# Patient Record
Sex: FEMALE | Race: WHITE | NOT HISPANIC OR LATINO | Employment: OTHER | URBAN - METROPOLITAN AREA
[De-identification: names, ages, dates, MRNs, and addresses within clinical notes are randomized per-mention and may not be internally consistent; named-entity substitution may affect disease eponyms.]

---

## 2017-06-16 DIAGNOSIS — I25.10 CORONARY ARTERY DISEASE INVOLVING NATIVE CORONARY ARTERY OF NATIVE HEART WITHOUT ANGINA PECTORIS: Primary | ICD-10-CM

## 2017-07-13 ENCOUNTER — INITIAL CONSULT (OUTPATIENT)
Dept: CARDIOLOGY | Facility: CLINIC | Age: 77
End: 2017-07-13
Payer: MEDICARE

## 2017-07-13 ENCOUNTER — DOCUMENTATION ONLY (OUTPATIENT)
Dept: CARDIOLOGY | Facility: CLINIC | Age: 77
End: 2017-07-13

## 2017-07-13 VITALS
OXYGEN SATURATION: 97 % | HEIGHT: 59 IN | BODY MASS INDEX: 22.05 KG/M2 | WEIGHT: 109.38 LBS | DIASTOLIC BLOOD PRESSURE: 60 MMHG | SYSTOLIC BLOOD PRESSURE: 100 MMHG

## 2017-07-13 DIAGNOSIS — I25.10 CORONARY ARTERY DISEASE INVOLVING NATIVE CORONARY ARTERY OF NATIVE HEART WITHOUT ANGINA PECTORIS: Primary | ICD-10-CM

## 2017-07-13 DIAGNOSIS — I25.119 CORONARY ARTERY DISEASE INVOLVING NATIVE CORONARY ARTERY OF NATIVE HEART WITH ANGINA PECTORIS: ICD-10-CM

## 2017-07-13 DIAGNOSIS — I48.0 PAROXYSMAL ATRIAL FIBRILLATION: ICD-10-CM

## 2017-07-13 DIAGNOSIS — I65.23 BILATERAL CAROTID ARTERY STENOSIS: ICD-10-CM

## 2017-07-13 DIAGNOSIS — I25.118 CORONARY ARTERY DISEASE OF NATIVE ARTERY OF NATIVE HEART WITH STABLE ANGINA PECTORIS: Primary | ICD-10-CM

## 2017-07-13 DIAGNOSIS — E78.5 DYSLIPIDEMIA: ICD-10-CM

## 2017-07-13 DIAGNOSIS — I10 ESSENTIAL HYPERTENSION: ICD-10-CM

## 2017-07-13 PROCEDURE — 1125F AMNT PAIN NOTED PAIN PRSNT: CPT | Mod: S$GLB,,, | Performed by: INTERNAL MEDICINE

## 2017-07-13 PROCEDURE — 1159F MED LIST DOCD IN RCRD: CPT | Mod: S$GLB,,, | Performed by: INTERNAL MEDICINE

## 2017-07-13 PROCEDURE — 99204 OFFICE O/P NEW MOD 45 MIN: CPT | Mod: S$GLB,,, | Performed by: INTERNAL MEDICINE

## 2017-07-13 PROCEDURE — 99999 PR PBB SHADOW E&M-EST. PATIENT-LVL III: CPT | Mod: PBBFAC,,, | Performed by: INTERNAL MEDICINE

## 2017-07-13 RX ORDER — PRAVASTATIN SODIUM 20 MG/1
20 TABLET ORAL DAILY
Qty: 90 TABLET | Refills: 3 | Status: SHIPPED | OUTPATIENT
Start: 2017-07-13 | End: 2022-01-03

## 2017-07-13 RX ORDER — WARFARIN 2.5 MG/1
2.5 TABLET ORAL DAILY
Status: ON HOLD | COMMUNITY
End: 2018-05-23 | Stop reason: HOSPADM

## 2017-07-13 RX ORDER — SODIUM CHLORIDE 9 MG/ML
3 INJECTION, SOLUTION INTRAVENOUS CONTINUOUS
Status: CANCELLED | OUTPATIENT
Start: 2017-07-13 | End: 2017-07-13

## 2017-07-13 RX ORDER — DIPHENHYDRAMINE HCL 25 MG
50 CAPSULE ORAL ONCE
Status: CANCELLED | OUTPATIENT
Start: 2017-07-13 | End: 2017-07-13

## 2017-07-13 NOTE — PROGRESS NOTES
OUTPATIENT CATHETERIZATION INSTRUCTIONS    You have been scheduled for a procedure in the catheterization lab on Friday, July 28, 2017.     Please report to the Cardiology Waiting Area on the Third floor of the hospital and check in at 7:30 AM.   You will then be taken to the SSCU (Short Stay Cardiac Unit) and prepared for your procedure. Please be aware that this is not the time of your procedure but the time you are to arrive. The procedures are scheduled on an hourly basis; however, emergency cases take precedence over all other cases.       You may not have anything to eat or drink after midnight the night before your test. You may take your regular morning medications with water. If there are any medications that you should not take you will be instructed to hold them that morning. If you are diabetic and on Metformin (Glucophage) do not take it the day before, the day of, and for 2 days after your procedure.      The procedure will take 1-2 hours to perform. After the procedure, you will return to SSCU on the third floor of the hospital. You will need to lie still (or keep your arm still) for the next 4 to 6 hours to minimize bleeding from the puncture site. Your family may remain in the room with you during this time.       You may be able to be discharged home that same afternoon if there is someone to drive you home and there were no complications. If you have one of the balloon, stent, or device procedures you may spend the night in the hospital. Your doctor will determine, based on your progress, the date and time of your discharge. The results of your procedure will be discussed with you before you are discharged. Any further testing or procedures will be scheduled for you either before you leave or you will be called with these appointments.       If you should have any questions, concerns, or need to change the date of your procedure, please call  ANNETTE Mahoney @ [phone]    Special  Instructions:  Hold Coumadin Tuesday, Wednesday and Thursday before your procedure  Drink plenty of water the day before and after the procedure.        THE ABOVE INSTRUCTIONS WERE GIVEN TO THE PATIENT VERBALLY AND THEY VERBALIZED UNDERSTANDING.  THEY DO NOT REQUIRE ANY SPECIAL NEEDS AND DO NOT HAVE ANY LEARNING BARRIERS.          Directions for Reporting to Cardiology Waiting Area in the Hospital  If you park in the Parking Garage:  Take elevators to the1st floor of the parking garage.  Continue past the gift shop, coffee shop, and piano.  Take a right and go to the gold elevators. (Elevator B)  Take the elevator to the 3rd floor.  Follow the arrow on the sign on the wall that says Cath Lab Registration/EP Lab Registration.  Follow the long hallway all the way around until you come to a big open area.  This is the registration area.  Check in at Reception Desk.    OR    If family is dropping you off:  Have them drop you off at the front of the Hospital under the green overhang.  Enter through the doors and take a right.  Take the E elevators to the 3rd floor Cardiology Waiting Area.  Check in at the Reception Desk in the waiting room.

## 2017-07-13 NOTE — LETTER
July 13, 2017      Travon Nance MD  [address] [place] [address] Bld  [address]  Cardiology Center  [facility] [address]           [facility]  [address]  [facility] [address]  Phone: [phone]          Patient: Katt Mcneal   MR Number: [id number]   YOB: 1940   Date of Visit: 7/13/2017       Dear Dr. Travon Nance:    Thank you for referring Katt Mcneal to me for evaluation. Attached you will find relevant portions of my assessment and plan of care.    If you have questions, please do not hesitate to call me. I look forward to following Katt Mcneal along with you.    Sincerely,    Nish Marks MD    Enclosure  CC:  No Recipients    If you would like to receive this communication electronically, please contact [email or web address] or [phone] to request more information on [facility] access.    For providers and/or their staff who would like to refer a patient to [facility], please contact us through our one-stop-shop provider referral line, [facility], at [phone].    If you feel you have received this communication in error or would no longer like to receive these types of communications, please e-mail [email or web address]

## 2017-07-13 NOTE — PROGRESS NOTES
Cardiology Clinic Note  Reason for Visit: CAD  Referring MD: Dr Palomino    HPI:   Mrs Katt Mcneal is a 75 yo lady referred by Dr Palomino for angina.    She has known CAD with previous RCA PCI with stents in 2015, pAF with [id number] of 3 on warfarin, HTN, HLD who is referred for angina  He last episode was 4 weeks ago which happened at rest - substernal CP with nausea that resolved in 2 hrs- she did not seek any medical attention at that time    She has known CAD with the previous coronary angiogram in 2015 revealing 40% LM and 85% mLAD disease that was not intervened on.    She denies any syncopal events, TOSCANO, orthopnea, LE swelling    She reports bruising on her extremities with the triple regimen she is currently on (asa, plavix, warfarin)    ROS:    Constitution: Negative for fever or chills. Negative for weight loss or gain.   HENT: Negative for sore throat or headaches. Negative for rhinorrhea.  Eyes: Negative for blurred or double vision.   Cardiovascular: See above  Pulmonary: Negative for SOB. Negative for cough.   Gastrointestinal: Negative for abdominal pain. Negative for nausea/ vomiting. Negative for diarrhea.   : Negative for dysuria.   Neurological: Negative for focal weakness or sensory changes.  PMH:     Past Medical History:   Diagnosis Date    Arthritis     Atrial fibrillation     Coronary artery disease     Fibromyalgia     Hyperlipidemia     Hypertension     Pancreatitis     Thyroid disease      Past Surgical History:   Procedure Laterality Date    CHOLECYSTECTOMY      CORONARY STENT PLACEMENT  3/24/15    HYSTERECTOMY  10/28/2004     Allergies:     Review of patient's allergies indicates:   Allergen Reactions    Sulfa (sulfonamide antibiotics) Hives    Bactrim [sulfamethoxazole-trimethoprim] Other (See Comments)     Pt. Reports that it caused severe pain    Integrilin [eptifibatide]     Statins-hmg-coa reductase inhibitors Other (See Comments)     Muscle pain. Patient  states some, not all statins    Xarelto [rivaroxaban]     Epinephrine Palpitations     Medications:     Current Outpatient Prescriptions on File Prior to Visit   Medication Sig Dispense Refill    amlodipine (NORVASC) 2.5 MG tablet Take 2.5 mg by mouth 2 (two) times daily as needed (for high blood pressure).       aspirin (ECOTRIN) 81 MG EC tablet Take 81 mg by mouth once daily.      CALCIUM CARBONATE/VITAMIN D3 (CALCIUM 500 + D ORAL) Take 1 tablet by mouth once daily.       carvedilol (COREG) 25 MG tablet Take 6.25 mg by mouth 2 (two) times daily with meals. at 9am and 9pm      cholecalciferol, vitamin D3, 2,000 unit Cap Take 1 capsule by mouth once daily.      clopidogrel (PLAVIX) 75 mg tablet       hyoscyamine (ANASPAZ,LEVSIN) 0.125 mg Tab Take 125 mcg by mouth every 6 (six) hours as needed.      levothyroxine (SYNTHROID) 75 MCG tablet Take 50 mcg by mouth once daily.       lorazepam (ATIVAN) 0.5 MG tablet Take 0.5 mg by mouth every 6 (six) hours as needed for Anxiety.      nitroGLYCERIN (NITROSTAT) 0.4 MG SL tablet Place 0.4 mg under the tongue every 5 (five) minutes as needed for Chest pain.      ondansetron (ZOFRAN-ODT) 4 MG TbDL Take 1 tablet (4 mg total) by mouth every 8 (eight) hours as needed (for nausea/vomiting). 30 tablet 3    potassium chloride (KLOR-CON) 10 MEQ TbSR Take 10 mEq by mouth once daily.      pravastatin (PRAVACHOL) 40 MG tablet Take 10 mg by mouth every other day. on Tuesday, Thursday and Saturday      predniSONE (DELTASONE) 5 MG tablet Take 2.5 mg by mouth once daily.       propafenone (RHTHYMOL) 150 MG Tab Take 150 mg by mouth 2 (two) times daily.      ramipril (ALTACE) 5 MG capsule Take 5 mg by mouth once daily.       No current facility-administered medications on file prior to visit.      Social History:     Social History   Substance Use Topics    Smoking status: Former Smoker    Smokeless tobacco: Never Used    Alcohol use No     Family History:   No family  history on file.  Physical Exam:   There were no vitals taken for this visit.     Constitutional: No acute distress, conversant  HEENT: Sclera anicteric, Pupils equal, round and reactive to light, extraocular motions intact, Oropharynx clear  Neck: No JVD, no carotid bruits  Cardiovascular: regular rate and rhythm, no murmur, rubs or gallops, normal S1/S2  Pulmonary: Clear to auscultation bilaterally  Abdominal: Abdomen soft, nontender, nondistended, positive bowel sounds  Extremities: No lower extremity edema,   Pulses: 2+ BL Radial, 2+ BL carotid, 2+ BL DP, 2+ BL PT, normal Allens Test BL  Skin: No ecchymosis, erythema, or ulcers  Psych: Alert and oriented x 3, appropriate affect  Neuro: CNII-XII intact, no focal deficits    Labs:     Lab Results   Component Value Date     12/14/2016    K 4.5 12/14/2016     12/14/2016    CO2 26 12/14/2016    BUN 17 12/14/2016    CREATININE 0.9 12/14/2016    ANIONGAP 10 12/14/2016     Lab Results   Component Value Date    AST 20 12/14/2016    ALT 10 12/14/2016    ALKPHOS 72 12/14/2016    BILITOT 0.3 12/14/2016    ALBUMIN 3.7 12/14/2016     Lab Results   Component Value Date    CALCIUM 10.0 12/14/2016    MG 1.8 01/22/2016    PHOS 3.1 01/22/2016      Lab Results   Component Value Date    WBC 9.07 12/14/2016    HGB 12.0 12/14/2016    HCT 37.1 12/14/2016     12/14/2016    GRAN 5.8 12/14/2016    GRAN 63.8 12/14/2016     Lab Results   Component Value Date    INR 1.0 01/19/2016     No results found for: CHOL, HDL, LDLCALC, TRIG  No results found for: HGBA1C  No results found for: TSH, J4QGYGS       Imaging:        EF   Date Value Ref Range Status   01/20/2016 65 55 - 65      No significant valvular abnormalities    Assessment:   Mrs Katt Mcneal is a 77 yo lady referred by Dr Palomino for angina.    Plan:     1) CAD with new onset angina  - continue asa and plavix  - continue pravachol (intolerant of crestor and lipitor)  - RRA; slender sheath, 5F bella catheter  -  stop warfarin 3 days before the procedure  - The risks, benefits, and alternatives of coronary vascular angiography and possible intervention were discussed with pt. All questions were answered and informed consent was obtained. I had a detailed discussion with the patient regarding risk of stroke, MI, bleeding access site complications, renal failure, emergent need for heart surgery, acute limb complications including ischemia and loss, contrast allergy and death. Pt understands the risks and benefits of the procedure and wishes to proceed. If stents are needed and there is preference for TONY, pt understands that would necessitate aspirin for life with plavix for at least 1 year. Additionally, pt is aware that non compliance is likely to result in stent clotting with heart attack, heart failure, and/or death.    2) HLD  TChol - 232, LDL -151  increase pravachol to 20 mg daily  Intolerant of more potent statins    3) HTN  BP controlled  No change in medication regimen    4) Paroxysmal AFib  - currently in SR  - follow up with EP as scheduled  - on warfarin    5) Bilateral carotid stenosis. patient has no known h/o CVA; continue staitn    All of patient's and daughter's questions were answered    Signed:  Priyanka Miller MD  Interventional Cardiology Fellow  [phone]  7/13/2017 11:05 AM      I have personally taken the history and examined this patient and agree with the resident's note as stated above.  All of the patient's and her daughter's questions were answered.    Follow-up:   Future Appointments  Date Time Provider Department Center   7/13/2017 12:00 PM LAB, APPOINTMENT [facility]

## 2017-07-28 ENCOUNTER — HOSPITAL ENCOUNTER (OUTPATIENT)
Facility: HOSPITAL | Age: 77
Discharge: HOME | End: 2017-07-29
Attending: INTERNAL MEDICINE | Admitting: INTERNAL MEDICINE
Payer: MEDICARE

## 2017-07-28 DIAGNOSIS — Z98.61 POSTSURGICAL PERCUTANEOUS TRANSLUMINAL CORONARY ANGIOPLASTY STATUS: Primary | ICD-10-CM

## 2017-07-28 DIAGNOSIS — I25.118 CORONARY ARTERY DISEASE OF NATIVE ARTERY OF NATIVE HEART WITH STABLE ANGINA PECTORIS: ICD-10-CM

## 2017-07-28 DIAGNOSIS — K85.90 ACUTE PANCREATITIS WITHOUT INFECTION OR NECROSIS: ICD-10-CM

## 2017-07-28 LAB
ABO + RH BLD: NORMAL
BASOPHILS # BLD AUTO: 0.03 K/UL
BASOPHILS NFR BLD: 0.3 %
BLD GP AB SCN CELLS X3 SERPL QL: NORMAL
CORONARY STENOSIS: ABNORMAL
CORONARY STENT: YES
DIFFERENTIAL METHOD: ABNORMAL
EOSINOPHIL # BLD AUTO: 0.8 K/UL
EOSINOPHIL NFR BLD: 8.7 %
ERYTHROCYTE [DISTWIDTH] IN BLOOD BY AUTOMATED COUNT: 14.9 %
HCT VFR BLD AUTO: 37 %
HGB BLD-MCNC: 12.1 G/DL
INR PPP: 1.3
LYMPHOCYTES # BLD AUTO: 3 K/UL
LYMPHOCYTES NFR BLD: 32 %
MCH RBC QN AUTO: 28 PG
MCHC RBC AUTO-ENTMCNC: 32.7 G/DL
MCV RBC AUTO: 86 FL
MONOCYTES # BLD AUTO: 0.7 K/UL
MONOCYTES NFR BLD: 7.2 %
NEUTROPHILS # BLD AUTO: 4.9 K/UL
NEUTROPHILS NFR BLD: 51.6 %
PLATELET # BLD AUTO: 296 K/UL
PMV BLD AUTO: 11 FL
PROTHROMBIN TIME: 13.4 SEC
RBC # BLD AUTO: 4.32 M/UL
WBC # BLD AUTO: 9.47 K/UL

## 2017-07-28 PROCEDURE — 92978 ENDOLUMINL IVUS OCT C 1ST: CPT | Mod: 26,LD,, | Performed by: INTERNAL MEDICINE

## 2017-07-28 PROCEDURE — 93005 ELECTROCARDIOGRAM TRACING: CPT

## 2017-07-28 PROCEDURE — 86900 BLOOD TYPING SEROLOGIC ABO: CPT

## 2017-07-28 PROCEDURE — 85610 PROTHROMBIN TIME: CPT

## 2017-07-28 PROCEDURE — 92978 ENDOLUMINL IVUS OCT C 1ST: CPT

## 2017-07-28 PROCEDURE — 92928 PRQ TCAT PLMT NTRAC ST 1 LES: CPT | Mod: RC,,, | Performed by: INTERNAL MEDICINE

## 2017-07-28 PROCEDURE — 63600175 PHARM REV CODE 636 W HCPCS

## 2017-07-28 PROCEDURE — 86850 RBC ANTIBODY SCREEN: CPT

## 2017-07-28 PROCEDURE — 93571 IV DOP VEL&/PRESS C FLO 1ST: CPT | Mod: 26,RC,, | Performed by: INTERNAL MEDICINE

## 2017-07-28 PROCEDURE — 85025 COMPLETE CBC W/AUTO DIFF WBC: CPT

## 2017-07-28 PROCEDURE — 93010 ELECTROCARDIOGRAM REPORT: CPT | Mod: 77,,, | Performed by: INTERNAL MEDICINE

## 2017-07-28 PROCEDURE — C1753 CATH, INTRAVAS ULTRASOUND: HCPCS

## 2017-07-28 PROCEDURE — 99152 MOD SED SAME PHYS/QHP 5/>YRS: CPT | Mod: ,,, | Performed by: INTERNAL MEDICINE

## 2017-07-28 PROCEDURE — 25000003 PHARM REV CODE 250: Performed by: INTERNAL MEDICINE

## 2017-07-28 PROCEDURE — 25000003 PHARM REV CODE 250

## 2017-07-28 PROCEDURE — 93010 ELECTROCARDIOGRAM REPORT: CPT | Mod: ,,, | Performed by: INTERNAL MEDICINE

## 2017-07-28 PROCEDURE — 93458 L HRT ARTERY/VENTRICLE ANGIO: CPT | Mod: 26,59,, | Performed by: INTERNAL MEDICINE

## 2017-07-28 RX ORDER — SODIUM CHLORIDE 9 MG/ML
1.5 INJECTION, SOLUTION INTRAVENOUS CONTINUOUS
Status: ACTIVE | OUTPATIENT
Start: 2017-07-28 | End: 2017-07-28

## 2017-07-28 RX ORDER — NITROGLYCERIN 400 UG/1
2 SPRAY ORAL ONCE
Status: COMPLETED | OUTPATIENT
Start: 2017-07-28 | End: 2017-07-28

## 2017-07-28 RX ORDER — PREDNISONE 2.5 MG/1
2.5 TABLET ORAL DAILY
Status: DISCONTINUED | OUTPATIENT
Start: 2017-07-29 | End: 2017-07-29 | Stop reason: HOSPADM

## 2017-07-28 RX ORDER — NITROGLYCERIN 0.4 MG/1
0.4 TABLET SUBLINGUAL EVERY 5 MIN PRN
Status: DISCONTINUED | OUTPATIENT
Start: 2017-07-28 | End: 2017-07-29 | Stop reason: HOSPADM

## 2017-07-28 RX ORDER — ASPIRIN 81 MG/1
81 TABLET ORAL DAILY
Status: DISCONTINUED | OUTPATIENT
Start: 2017-07-29 | End: 2017-07-29 | Stop reason: HOSPADM

## 2017-07-28 RX ORDER — CLOPIDOGREL BISULFATE 75 MG/1
75 TABLET ORAL ONCE
Status: DISCONTINUED | OUTPATIENT
Start: 2017-07-29 | End: 2017-07-28

## 2017-07-28 RX ORDER — DIPHENHYDRAMINE HCL 50 MG
50 CAPSULE ORAL ONCE
Status: COMPLETED | OUTPATIENT
Start: 2017-07-28 | End: 2017-07-28

## 2017-07-28 RX ORDER — POTASSIUM CHLORIDE 750 MG/1
10 CAPSULE, EXTENDED RELEASE ORAL DAILY
Status: DISCONTINUED | OUTPATIENT
Start: 2017-07-28 | End: 2017-07-29 | Stop reason: HOSPADM

## 2017-07-28 RX ORDER — AMLODIPINE BESYLATE 2.5 MG/1
2.5 TABLET ORAL 2 TIMES DAILY
Status: DISCONTINUED | OUTPATIENT
Start: 2017-07-28 | End: 2017-07-29 | Stop reason: HOSPADM

## 2017-07-28 RX ORDER — LEVOTHYROXINE SODIUM 50 UG/1
50 TABLET ORAL
Status: DISCONTINUED | OUTPATIENT
Start: 2017-07-28 | End: 2017-07-29 | Stop reason: HOSPADM

## 2017-07-28 RX ORDER — NITROGLYCERIN 400 UG/1
1 SPRAY ORAL ONCE
Status: COMPLETED | OUTPATIENT
Start: 2017-07-28 | End: 2017-07-28

## 2017-07-28 RX ORDER — CARVEDILOL 6.25 MG/1
6.25 TABLET ORAL 2 TIMES DAILY
Status: DISCONTINUED | OUTPATIENT
Start: 2017-07-28 | End: 2017-07-29 | Stop reason: HOSPADM

## 2017-07-28 RX ORDER — LORAZEPAM 0.5 MG/1
0.5 TABLET ORAL EVERY 6 HOURS PRN
Status: DISCONTINUED | OUTPATIENT
Start: 2017-07-28 | End: 2017-07-29 | Stop reason: HOSPADM

## 2017-07-28 RX ORDER — CLOPIDOGREL BISULFATE 75 MG/1
75 TABLET ORAL ONCE
Status: DISCONTINUED | OUTPATIENT
Start: 2017-07-29 | End: 2017-07-29 | Stop reason: HOSPADM

## 2017-07-28 RX ORDER — AMLODIPINE BESYLATE 2.5 MG/1
2.5 TABLET ORAL 2 TIMES DAILY PRN
Status: DISCONTINUED | OUTPATIENT
Start: 2017-07-28 | End: 2017-07-28 | Stop reason: ALTCHOICE

## 2017-07-28 RX ORDER — CARVEDILOL 6.25 MG/1
6.25 TABLET ORAL 2 TIMES DAILY WITH MEALS
Status: DISCONTINUED | OUTPATIENT
Start: 2017-07-28 | End: 2017-07-28

## 2017-07-28 RX ORDER — SODIUM CHLORIDE 9 MG/ML
3 INJECTION, SOLUTION INTRAVENOUS CONTINUOUS
Status: ACTIVE | OUTPATIENT
Start: 2017-07-28 | End: 2017-07-28

## 2017-07-28 RX ORDER — RAMIPRIL 5 MG/1
5 CAPSULE ORAL DAILY
Status: DISCONTINUED | OUTPATIENT
Start: 2017-07-28 | End: 2017-07-29 | Stop reason: HOSPADM

## 2017-07-28 RX ORDER — ONDANSETRON 4 MG/1
4 TABLET, ORALLY DISINTEGRATING ORAL EVERY 8 HOURS PRN
Status: DISCONTINUED | OUTPATIENT
Start: 2017-07-28 | End: 2017-07-29 | Stop reason: HOSPADM

## 2017-07-28 RX ADMIN — SODIUM CHLORIDE 3 ML/KG/HR: 0.9 INJECTION, SOLUTION INTRAVENOUS at 07:07

## 2017-07-28 RX ADMIN — AMLODIPINE BESYLATE 2.5 MG: 2.5 TABLET ORAL at 08:07

## 2017-07-28 RX ADMIN — DIPHENHYDRAMINE HYDROCHLORIDE 50 MG: 50 CAPSULE ORAL at 07:07

## 2017-07-28 RX ADMIN — NITROGLYCERIN 2 SPRAY: 400 SPRAY ORAL at 12:07

## 2017-07-28 RX ADMIN — LORAZEPAM 0.5 MG: 0.5 TABLET ORAL at 10:07

## 2017-07-28 RX ADMIN — ALUMINUM HYDROXIDE, MAGNESIUM HYDROXIDE, AND SIMETHICONE 25 ML: 200; 200; 20 SUSPENSION ORAL at 02:07

## 2017-07-28 RX ADMIN — CARVEDILOL 6.25 MG: 6.25 TABLET, FILM COATED ORAL at 08:07

## 2017-07-28 RX ADMIN — NITROGLYCERIN 1 SPRAY: 400 SPRAY ORAL at 12:07

## 2017-07-28 RX ADMIN — NITROGLYCERIN 1 SPRAY: 400 SPRAY ORAL at 01:07

## 2017-07-28 NOTE — INTERVAL H&P NOTE
The patient has been examined and the H&P has been reviewed:    I concur with the findings and no changes have occurred since H&P was written.    Anesthesia/Surgery risks, benefits and alternative options discussed and understood by patient/family.          Active Hospital Problems    Diagnosis  POA    Coronary artery disease of native artery of native heart with stable angina pectoris [I25.118]  Yes      Resolved Hospital Problems    Diagnosis Date Resolved POA   No resolved problems to display.

## 2017-07-28 NOTE — PROGRESS NOTES
"Post Cath Note  Referring Physician: Dr Palomino  Procedure: PCI  Indication: CCS III angina    HPI:   75 yo lady with known CAD with CCS III angina    Cath Results:   Access:  RRA    PCI to the prox and mid LAD and PLB with TONY    IVUS used for stent sizing    FFR of prox RCA was 0.88    See full cath report for further details    Intervention:   As above    Closure device: NA  Patient tolerated the procedure well, no complications    Post Cath Exam:   BP (!) 143/79 (BP Location: Right arm, Patient Position: Lying, BP Method: Automatic)   Pulse (!) 59   Temp 98.5 °F (36.9 °C) (Oral)   Resp 16   Ht 4' 11" (1.499 m)   Wt 49 kg (108 lb)   SpO2 96%   Breastfeeding? No   BMI 21.81 kg/m²   No unusual pain, hematoma, thrill or bruit at vascular access site.  Distal pulse present without signs of ischemia.    Recommendations:   DAPT for atleast 1 year  Continue pravachol for now at 20 mg  Cardiac rehab consult    Signed:  Priyanka richards MD  Interventional Cardiology Fellow  Pager: [phone]  7/28/2017 11:57 AM    "

## 2017-07-28 NOTE — H&P (VIEW-ONLY)
Cardiology Clinic Note  Reason for Visit: CAD  Referring MD: Dr Palomino    HPI:   Mrs Katt Mcneal is a 77 yo lady referred by Dr Palomino for angina.    She has known CAD with previous RCA PCI with stents in 2015, pAF with [id number] of 3 on warfarin, HTN, HLD who is referred for angina  He last episode was 4 weeks ago which happened at rest - substernal CP with nausea that resolved in 2 hrs- she did not seek any medical attention at that time    She has known CAD with the previous coronary angiogram in 2015 revealing 40% LM and 85% mLAD disease that was not intervened on.    She denies any syncopal events, TOSCANO, orthopnea, LE swelling    She reports bruising on her extremities with the triple regimen she is currently on (asa, plavix, warfarin)    ROS:    Constitution: Negative for fever or chills. Negative for weight loss or gain.   HENT: Negative for sore throat or headaches. Negative for rhinorrhea.  Eyes: Negative for blurred or double vision.   Cardiovascular: See above  Pulmonary: Negative for SOB. Negative for cough.   Gastrointestinal: Negative for abdominal pain. Negative for nausea/ vomiting. Negative for diarrhea.   : Negative for dysuria.   Neurological: Negative for focal weakness or sensory changes.  PMH:     Past Medical History:   Diagnosis Date    Arthritis     Atrial fibrillation     Coronary artery disease     Fibromyalgia     Hyperlipidemia     Hypertension     Pancreatitis     Thyroid disease      Past Surgical History:   Procedure Laterality Date    CHOLECYSTECTOMY      CORONARY STENT PLACEMENT  3/24/15    HYSTERECTOMY  10/28/2004     Allergies:     Review of patient's allergies indicates:   Allergen Reactions    Sulfa (sulfonamide antibiotics) Hives    Bactrim [sulfamethoxazole-trimethoprim] Other (See Comments)     Pt. Reports that it caused severe pain    Integrilin [eptifibatide]     Statins-hmg-coa reductase inhibitors Other (See Comments)     Muscle pain. Patient  states some, not all statins    Xarelto [rivaroxaban]     Epinephrine Palpitations     Medications:     Current Outpatient Prescriptions on File Prior to Visit   Medication Sig Dispense Refill    amlodipine (NORVASC) 2.5 MG tablet Take 2.5 mg by mouth 2 (two) times daily as needed (for high blood pressure).       aspirin (ECOTRIN) 81 MG EC tablet Take 81 mg by mouth once daily.      CALCIUM CARBONATE/VITAMIN D3 (CALCIUM 500 + D ORAL) Take 1 tablet by mouth once daily.       carvedilol (COREG) 25 MG tablet Take 6.25 mg by mouth 2 (two) times daily with meals. at 9am and 9pm      cholecalciferol, vitamin D3, 2,000 unit Cap Take 1 capsule by mouth once daily.      clopidogrel (PLAVIX) 75 mg tablet       hyoscyamine (ANASPAZ,LEVSIN) 0.125 mg Tab Take 125 mcg by mouth every 6 (six) hours as needed.      levothyroxine (SYNTHROID) 75 MCG tablet Take 50 mcg by mouth once daily.       lorazepam (ATIVAN) 0.5 MG tablet Take 0.5 mg by mouth every 6 (six) hours as needed for Anxiety.      nitroGLYCERIN (NITROSTAT) 0.4 MG SL tablet Place 0.4 mg under the tongue every 5 (five) minutes as needed for Chest pain.      ondansetron (ZOFRAN-ODT) 4 MG TbDL Take 1 tablet (4 mg total) by mouth every 8 (eight) hours as needed (for nausea/vomiting). 30 tablet 3    potassium chloride (KLOR-CON) 10 MEQ TbSR Take 10 mEq by mouth once daily.      pravastatin (PRAVACHOL) 40 MG tablet Take 10 mg by mouth every other day. on Tuesday, Thursday and Saturday      predniSONE (DELTASONE) 5 MG tablet Take 2.5 mg by mouth once daily.       propafenone (RHTHYMOL) 150 MG Tab Take 150 mg by mouth 2 (two) times daily.      ramipril (ALTACE) 5 MG capsule Take 5 mg by mouth once daily.       No current facility-administered medications on file prior to visit.      Social History:     Social History   Substance Use Topics    Smoking status: Former Smoker    Smokeless tobacco: Never Used    Alcohol use No     Family History:   No family  history on file.  Physical Exam:   There were no vitals taken for this visit.     Constitutional: No acute distress, conversant  HEENT: Sclera anicteric, Pupils equal, round and reactive to light, extraocular motions intact, Oropharynx clear  Neck: No JVD, no carotid bruits  Cardiovascular: regular rate and rhythm, no murmur, rubs or gallops, normal S1/S2  Pulmonary: Clear to auscultation bilaterally  Abdominal: Abdomen soft, nontender, nondistended, positive bowel sounds  Extremities: No lower extremity edema,   Pulses: 2+ BL Radial, 2+ BL carotid, 2+ BL DP, 2+ BL PT, normal Allens Test BL  Skin: No ecchymosis, erythema, or ulcers  Psych: Alert and oriented x 3, appropriate affect  Neuro: CNII-XII intact, no focal deficits    Labs:     Lab Results   Component Value Date     12/14/2016    K 4.5 12/14/2016     12/14/2016    CO2 26 12/14/2016    BUN 17 12/14/2016    CREATININE 0.9 12/14/2016    ANIONGAP 10 12/14/2016     Lab Results   Component Value Date    AST 20 12/14/2016    ALT 10 12/14/2016    ALKPHOS 72 12/14/2016    BILITOT 0.3 12/14/2016    ALBUMIN 3.7 12/14/2016     Lab Results   Component Value Date    CALCIUM 10.0 12/14/2016    MG 1.8 01/22/2016    PHOS 3.1 01/22/2016      Lab Results   Component Value Date    WBC 9.07 12/14/2016    HGB 12.0 12/14/2016    HCT 37.1 12/14/2016     12/14/2016    GRAN 5.8 12/14/2016    GRAN 63.8 12/14/2016     Lab Results   Component Value Date    INR 1.0 01/19/2016     No results found for: CHOL, HDL, LDLCALC, TRIG  No results found for: HGBA1C  No results found for: TSH, L5GWLCQ       Imaging:        EF   Date Value Ref Range Status   01/20/2016 65 55 - 65      No significant valvular abnormalities    Assessment:   Mrs Katt Mcneal is a 75 yo lady referred by Dr Palomino for angina.    Plan:     1) CAD with new onset angina  - continue asa and plavix  - continue pravachol (intolerant of crestor and lipitor)  - RRA; slender sheath, 5F bella catheter  -  stop warfarin 3 days before the procedure  - The risks, benefits, and alternatives of coronary vascular angiography and possible intervention were discussed with pt. All questions were answered and informed consent was obtained. I had a detailed discussion with the patient regarding risk of stroke, MI, bleeding access site complications, renal failure, emergent need for heart surgery, acute limb complications including ischemia and loss, contrast allergy and death. Pt understands the risks and benefits of the procedure and wishes to proceed. If stents are needed and there is preference for TONY, pt understands that would necessitate aspirin for life with plavix for at least 1 year. Additionally, pt is aware that non compliance is likely to result in stent clotting with heart attack, heart failure, and/or death.    2) HLD  TChol - 232, LDL -151  increase pravachol to 20 mg daily  Intolerant of more potent statins    3) HTN  BP controlled  No change in medication regimen    4) Paroxysmal AFib  - currently in SR  - follow up with EP as scheduled  - on warfarin    5) Bilateral carotid stenosis. patient has no known h/o CVA; continue staitn    All of patient's and daughter's questions were answered    Signed:  Priyanka Miller MD  Interventional Cardiology Fellow  [phone]  7/13/2017 11:05 AM      I have personally taken the history and examined this patient and agree with the resident's note as stated above.  All of the patient's and her daughter's questions were answered.    Follow-up:   Future Appointments  Date Time Provider Department Center   7/13/2017 12:00 PM LAB, APPOINTMENT [facility]

## 2017-07-28 NOTE — CONSULTS
"Cardiac Rehab     Katt Mcneal   [id number]   7/28/2017       Activity taught: Yes    Cardiac Rehab Phase Taught: Phase I & II    Risk Factors-Modifiable: nutrition, hyperlipidemia, hypertension, sedentary lifestyle, stress, exercise    Risk Factors-Non modifiable: age    Teaching Method: Verbal, Written and Living with Heart Disease book.    Understanding/Response: Pt verbalized understanding, all questions answered. Pt understands their cardiac rehab order is good for 12 months.   Pt lives in [place], [place] - given external order for [facility].    Comments: S/P PTCA. Discussed cardiac rehab and risk factor modification. Team to refer patient to [facility] Cardiac Rehab. Educational materials were used in the process and given to the patient. They included "Your Guide to Living with Heart Disease", Phase Two Cardiac Rehabilitation information along with a sample Mediterranean diet.The patient expressed understanding of the teaching and expressed desire to take a role in modifying the risk factors when they return home.    ERIN Reynoso RN  Cardiac Rehab Nurse      "

## 2017-07-28 NOTE — CONSULTS
Pt has been given external order for cardiac rehab to [facility]. see previous note from cardiac rehab.

## 2017-07-28 NOTE — PLAN OF CARE
md park notified of ekg sr with pvcs, bp and co pain of8 on scale 1-10. Nitro sl spray x 2 ordered.

## 2017-07-28 NOTE — DISCHARGE SUMMARY
[facility]  Cardiology  Discharge Summary      Patient Name: Katt Mcneal  MRN: [id number]  Admission Date: 7/28/2017  Hospital Length of Stay: 0 days  Discharge Date and Time: 7/29/17 0910  Attending Physician: Nish Marks MD  Discharging Provider: Priyanka Miller MD  Primary Care Physician: Kareen Hodges MD    HPI: Mrs Katt Mcneal is a 75 yo lady referred by Dr Palomino for angina.     She has known CAD with previous RCA PCI with stents in 2015, pAF with [id number] of 3 on warfarin, HTN, HLD who is referred for angina  He last episode was 4 weeks ago which happened at rest - substernal CP with nausea that resolved in 2 hrs- she did not seek any medical attention at that time     She has known CAD with the previous coronary angiogram in 2015 revealing 40% LM and 85% mLAD disease that was not intervened on.    Procedure(s) (LRB):  HEART CATH-LEFT (N/A)       Hospital Course (synopsis of major diagnoses, care, treatment, and services provided during the course of the hospital stay):   PCI to the prox and mid LAD and PLB with TONY     IVUS used for stent sizing     FFR of prox RCA was 0.88        Final Active Diagnoses:    Diagnosis Date Noted POA    PRINCIPAL PROBLEM:  Coronary artery disease of native artery of native heart with stable angina pectoris [I25.118] 07/28/2017 Yes      Problems Resolved During this Admission:    Diagnosis Date Noted Date Resolved POA       Discharged Condition: good    Follow Up:  Follow-up Information     Travon Nance MD In 2 weeks.    Specialty:  Cardiology  Contact information:  [address] [place] [address] [address]  [id number]  CARDIOLOGY [facility] [id number] [phone]                 Patient Instructions:   No discharge procedures on file.  Medications:  Reconciled Home Medications:   Current Discharge Medication List      CONTINUE these medications which have NOT CHANGED    Details   amlodipine (NORVASC) 2.5 MG tablet Take 2.5  mg by mouth 2 (two) times daily as needed (for high blood pressure).       aspirin (ECOTRIN) 81 MG EC tablet Take 81 mg by mouth once daily.      CALCIUM CARBONATE/VITAMIN D3 (CALCIUM 500 + D ORAL) Take 1 tablet by mouth once daily.       carvedilol (COREG) 25 MG tablet Take 6.25 mg by mouth 2 (two) times daily with meals. at 9am and 9pm      cholecalciferol, vitamin D3, 2,000 unit Cap Take 1 capsule by mouth once daily.      clopidogrel (PLAVIX) 75 mg tablet       levothyroxine (SYNTHROID) 75 MCG tablet Take 50 mcg by mouth once daily.       lorazepam (ATIVAN) 0.5 MG tablet Take 0.5 mg by mouth every 6 (six) hours as needed for Anxiety.      ondansetron (ZOFRAN-ODT) 4 MG TbDL Take 1 tablet (4 mg total) by mouth every 8 (eight) hours as needed (for nausea/vomiting).  Qty: 30 tablet, Refills: 3      potassium chloride (KLOR-CON) 10 MEQ TbSR Take 10 mEq by mouth once daily.      pravastatin (PRAVACHOL) 20 MG tablet Take 1 tablet (20 mg total) by mouth once daily.  Qty: 90 tablet, Refills: 3      predniSONE (DELTASONE) 5 MG tablet Take 2.5 mg by mouth once daily.       ramipril (ALTACE) 5 MG capsule Take 5 mg by mouth once daily.      SOTALOL HCL (SOTALOL AF ORAL) Take 80 mg by mouth.      nitroGLYCERIN (NITROSTAT) 0.4 MG SL tablet Place 0.4 mg under the tongue every 5 (five) minutes as needed for Chest pain.      warfarin (COUMADIN) 2.5 MG tablet Take 2.5 mg by mouth once daily.             Time spent on the discharge of patient: 35 minutes    Priyanka Miller MD  Cardiology  [facility]

## 2017-07-28 NOTE — PLAN OF CARE
Received report from ANNETTE Mahoney. Patient s/p [facility], AAOx3. VSS. Pt c/o of chest discomfort with minimal pain, ekg ordered. Notified Dr. Marks. Resp even and unlabored. Vasc band to r wrist is CDI. No active bleeding. No hematoma noted. Post procedure protocol reviewed with patient and patient's family. Understanding verbalized. Family members at bedside. Nurse call bell within reach. Will continue to monitor per post procedure protocol.

## 2017-07-29 VITALS
RESPIRATION RATE: 20 BRPM | TEMPERATURE: 98 F | WEIGHT: 108 LBS | OXYGEN SATURATION: 95 % | SYSTOLIC BLOOD PRESSURE: 143 MMHG | HEART RATE: 68 BPM | BODY MASS INDEX: 21.77 KG/M2 | DIASTOLIC BLOOD PRESSURE: 72 MMHG | HEIGHT: 59 IN

## 2017-07-29 LAB
ANION GAP SERPL CALC-SCNC: 11 MMOL/L
BASOPHILS # BLD AUTO: 0.04 K/UL
BASOPHILS NFR BLD: 0.5 %
BUN SERPL-MCNC: 13 MG/DL
CALCIUM SERPL-MCNC: 8.7 MG/DL
CHLORIDE SERPL-SCNC: 108 MMOL/L
CO2 SERPL-SCNC: 22 MMOL/L
CREAT SERPL-MCNC: 0.8 MG/DL
DIFFERENTIAL METHOD: ABNORMAL
EOSINOPHIL # BLD AUTO: 0.6 K/UL
EOSINOPHIL NFR BLD: 7.4 %
ERYTHROCYTE [DISTWIDTH] IN BLOOD BY AUTOMATED COUNT: 14.9 %
EST. GFR  (AFRICAN AMERICAN): >60 ML/MIN/1.73 M^2
EST. GFR  (NON AFRICAN AMERICAN): >60 ML/MIN/1.73 M^2
GLUCOSE SERPL-MCNC: 81 MG/DL
HCT VFR BLD AUTO: 33.1 %
HGB BLD-MCNC: 10.8 G/DL
LYMPHOCYTES # BLD AUTO: 3 K/UL
LYMPHOCYTES NFR BLD: 35.1 %
MCH RBC QN AUTO: 28.1 PG
MCHC RBC AUTO-ENTMCNC: 32.6 G/DL
MCV RBC AUTO: 86 FL
MONOCYTES # BLD AUTO: 0.8 K/UL
MONOCYTES NFR BLD: 9 %
NEUTROPHILS # BLD AUTO: 4.2 K/UL
NEUTROPHILS NFR BLD: 47.9 %
PLATELET # BLD AUTO: 264 K/UL
PMV BLD AUTO: 10.7 FL
POTASSIUM SERPL-SCNC: 3.5 MMOL/L
RBC # BLD AUTO: 3.85 M/UL
SODIUM SERPL-SCNC: 141 MMOL/L
WBC # BLD AUTO: 8.67 K/UL

## 2017-07-29 PROCEDURE — 25000003 PHARM REV CODE 250: Performed by: INTERNAL MEDICINE

## 2017-07-29 PROCEDURE — 85025 COMPLETE CBC W/AUTO DIFF WBC: CPT

## 2017-07-29 PROCEDURE — 36415 COLL VENOUS BLD VENIPUNCTURE: CPT

## 2017-07-29 PROCEDURE — 80048 BASIC METABOLIC PNL TOTAL CA: CPT

## 2017-07-29 RX ADMIN — LEVOTHYROXINE SODIUM 50 MCG: 50 TABLET ORAL at 06:07

## 2017-07-29 NOTE — PROGRESS NOTES
Patient discharged per MD orders. Instructions given on medications, wound care, activity, signs of infection, when to call MD, and follow up appointments. Pt verbalized understanding.  Patient AAOx3, VSS, no c/o pain or discomfort at this time. Telemetry and PIV removed. Patient brought via wheelchair to front of hospital to meet family with RN as escort.

## 2017-07-29 NOTE — PROGRESS NOTES
"    Interventional Cardiology Follow up Note  Referring Physician: Nish Marks MD  Procedure: HEART CATH-LEFT (N/A)    Subjective   No overnight acute event. No episodes of angina reported overnight. Ambulated without complaints.        Post Cath Exam:   BP (!) 143/72 (BP Location: Left arm, Patient Position: Lying, BP Method: Automatic)   Pulse 68   Temp 98.4 °F (36.9 °C) (Oral)   Resp 20   Ht 4' 11" (1.499 m)   Wt 49 kg (108 lb)   SpO2 95%   Breastfeeding? No   BMI 21.81 kg/m²   No unusual pain, hematoma, thrill or bruit at vascular access site.  Distal pulse present without signs of ischemia.    Recommendations:   - Routine post-cath care  - Continue secondary prevention for ASCVD  - Continue DaPT for at least 6 months   - Continue High intensity statin therapy.    Signed:  Srikanth William MD  Cardiology Fellow, PGY-7  Pager: [phone]  7/29/2017 9:08 AM  "

## 2017-07-29 NOTE — DISCHARGE INSTRUCTIONS
Discharge Instructions and Care of Your Wrist After a Cardiac Catheterization Procedure Performed via the Radial Artery    For 3-5 days following the procedure:  Do not subject your affected hand/arm to any forceful movements (i.e. supporting weight when rising from a chair or bed)  Avoid excessive (extension/flexion) wrist movement (i.e. supporting weight when rising from a chair or bed, push-ups, lifting garage doors, etc.)  Do not drive a car for 24 hours  The dressing on the puncture site may be removed after 24 hours and left open to air. If there is minor oozing, you may apply a Band-aid and remove after 12 hours  You may shower on the day following your procedure. Do not take a tub bath or submerge the puncture site in water for 3 days following the procedure  Do not lift anything heavier than 3 to 5 pounds with the affected hand/arm  Do not operate a lawnmower, motorcycle, chainsaw, or all-terrain vehicle   Do not engage in vigorous exercise (i.e. Tennis, Golf, Bowling) using the affected arm    If bleeding should occur following discharge:  Sit down and apply firm pressure to the puncture site with your fingers for 10 minutes   If the bleeding stops, continue to sit quietly, keeping your wrist straight for 2 hours. Notify your physician as soon as possible   If bleeding does not stop after 10 minutes or if there is a large amount of bleeding or spurting, call 911 immediately. Do not drive yourself to the hospital.     You should expect mild tingling in your hand and tenderness at the puncture site for up to 3 days.     Notify your physician if these symptoms persist or if you experience:  Change in color or temperature of the hand or arm  Redness, heat, or pus at the puncture site  Chills or fever greater than 101.0 F

## 2017-07-31 LAB
POC ACTIVATED CLOTTING TIME K: 241 SEC (ref 74–137)
POC ACTIVATED CLOTTING TIME K: 241 SEC (ref 74–137)
POC ACTIVATED CLOTTING TIME K: 257 SEC (ref 74–137)
SAMPLE: ABNORMAL

## 2018-03-09 RX ORDER — ONDANSETRON 4 MG/1
4 TABLET, ORALLY DISINTEGRATING ORAL EVERY 8 HOURS PRN
Qty: 30 TABLET | OUTPATIENT
Start: 2018-03-09

## 2018-05-15 ENCOUNTER — HOSPITAL ENCOUNTER (INPATIENT)
Facility: HOSPITAL | Age: 78
LOS: 8 days | Discharge: HOME | DRG: 438 | End: 2018-05-23
Attending: EMERGENCY MEDICINE | Admitting: EMERGENCY MEDICINE
Payer: MEDICARE

## 2018-05-15 DIAGNOSIS — I48.91 ATRIAL FIBRILLATION WITH RVR: Primary | ICD-10-CM

## 2018-05-15 DIAGNOSIS — R10.13 EPIGASTRIC ABDOMINAL PAIN: ICD-10-CM

## 2018-05-15 DIAGNOSIS — R10.13 EPIGASTRIC PAIN: ICD-10-CM

## 2018-05-15 DIAGNOSIS — R10.9 ABDOMINAL PAIN: ICD-10-CM

## 2018-05-15 DIAGNOSIS — K85.10 ACUTE BILIARY PANCREATITIS WITHOUT INFECTION OR NECROSIS: ICD-10-CM

## 2018-05-15 LAB
ALBUMIN SERPL BCP-MCNC: 3.9 G/DL
ALP SERPL-CCNC: 90 U/L
ALT SERPL W/O P-5'-P-CCNC: 16 U/L
ANION GAP SERPL CALC-SCNC: 12 MMOL/L
AST SERPL-CCNC: 30 U/L
BASOPHILS # BLD AUTO: 0.01 K/UL
BASOPHILS NFR BLD: 0.1 %
BILIRUB SERPL-MCNC: 0.4 MG/DL
BNP SERPL-MCNC: 193 PG/ML
BUN SERPL-MCNC: 25 MG/DL
CALCIUM SERPL-MCNC: 9.8 MG/DL
CHLORIDE SERPL-SCNC: 105 MMOL/L
CO2 SERPL-SCNC: 24 MMOL/L
CREAT SERPL-MCNC: 1.5 MG/DL
DIFFERENTIAL METHOD: ABNORMAL
EOSINOPHIL # BLD AUTO: 0.1 K/UL
EOSINOPHIL NFR BLD: 0.9 %
ERYTHROCYTE [DISTWIDTH] IN BLOOD BY AUTOMATED COUNT: 17.5 %
EST. GFR  (AFRICAN AMERICAN): 39 ML/MIN/1.73 M^2
EST. GFR  (NON AFRICAN AMERICAN): 34 ML/MIN/1.73 M^2
GLUCOSE SERPL-MCNC: 137 MG/DL
HCT VFR BLD AUTO: 38.3 %
HGB BLD-MCNC: 11.9 G/DL
INR PPP: 1.9
LIPASE SERPL-CCNC: >1000 U/L
LYMPHOCYTES # BLD AUTO: 0.6 K/UL
LYMPHOCYTES NFR BLD: 5.7 %
MCH RBC QN AUTO: 24.1 PG
MCHC RBC AUTO-ENTMCNC: 31.1 G/DL
MCV RBC AUTO: 78 FL
MONOCYTES # BLD AUTO: 0.2 K/UL
MONOCYTES NFR BLD: 1.8 %
NEUTROPHILS # BLD AUTO: 10 K/UL
NEUTROPHILS NFR BLD: 91.3 %
PLATELET # BLD AUTO: 357 K/UL
PMV BLD AUTO: 10.8 FL
POTASSIUM SERPL-SCNC: 4 MMOL/L
PROT SERPL-MCNC: 8.4 G/DL
PROTHROMBIN TIME: 20.4 SEC
RBC # BLD AUTO: 4.93 M/UL
SODIUM SERPL-SCNC: 141 MMOL/L
TROPONIN I SERPL DL<=0.01 NG/ML-MCNC: 0.01 NG/ML
WBC # BLD AUTO: 10.96 K/UL

## 2018-05-15 PROCEDURE — 83880 ASSAY OF NATRIURETIC PEPTIDE: CPT

## 2018-05-15 PROCEDURE — 84484 ASSAY OF TROPONIN QUANT: CPT

## 2018-05-15 PROCEDURE — 63600175 PHARM REV CODE 636 W HCPCS: Performed by: EMERGENCY MEDICINE

## 2018-05-15 PROCEDURE — 12000002 HC ACUTE/MED SURGE SEMI-PRIVATE ROOM

## 2018-05-15 PROCEDURE — 85610 PROTHROMBIN TIME: CPT

## 2018-05-15 PROCEDURE — 25000003 PHARM REV CODE 250: Performed by: EMERGENCY MEDICINE

## 2018-05-15 PROCEDURE — 99285 EMERGENCY DEPT VISIT HI MDM: CPT | Mod: 25

## 2018-05-15 PROCEDURE — 85025 COMPLETE CBC W/AUTO DIFF WBC: CPT

## 2018-05-15 PROCEDURE — 83690 ASSAY OF LIPASE: CPT

## 2018-05-15 PROCEDURE — 93010 ELECTROCARDIOGRAM REPORT: CPT | Mod: ,,, | Performed by: INTERNAL MEDICINE

## 2018-05-15 PROCEDURE — 80053 COMPREHEN METABOLIC PANEL: CPT

## 2018-05-15 PROCEDURE — 83605 ASSAY OF LACTIC ACID: CPT

## 2018-05-15 PROCEDURE — 83615 LACTATE (LD) (LDH) ENZYME: CPT

## 2018-05-15 PROCEDURE — 84478 ASSAY OF TRIGLYCERIDES: CPT

## 2018-05-15 PROCEDURE — 93005 ELECTROCARDIOGRAM TRACING: CPT

## 2018-05-15 PROCEDURE — 96361 HYDRATE IV INFUSION ADD-ON: CPT

## 2018-05-15 PROCEDURE — 96375 TX/PRO/DX INJ NEW DRUG ADDON: CPT

## 2018-05-15 PROCEDURE — 96374 THER/PROPH/DIAG INJ IV PUSH: CPT

## 2018-05-15 RX ORDER — ONDANSETRON 2 MG/ML
4 INJECTION INTRAMUSCULAR; INTRAVENOUS
Status: COMPLETED | OUTPATIENT
Start: 2018-05-15 | End: 2018-05-15

## 2018-05-15 RX ORDER — MORPHINE SULFATE 10 MG/ML
6 INJECTION INTRAMUSCULAR; INTRAVENOUS; SUBCUTANEOUS
Status: COMPLETED | OUTPATIENT
Start: 2018-05-15 | End: 2018-05-15

## 2018-05-15 RX ADMIN — ONDANSETRON 4 MG: 2 INJECTION INTRAMUSCULAR; INTRAVENOUS at 11:05

## 2018-05-15 RX ADMIN — SODIUM CHLORIDE 1000 ML: 0.9 INJECTION, SOLUTION INTRAVENOUS at 11:05

## 2018-05-15 RX ADMIN — MORPHINE SULFATE 6 MG: 10 INJECTION INTRAVENOUS at 11:05

## 2018-05-16 PROBLEM — R10.13 EPIGASTRIC ABDOMINAL PAIN: Status: ACTIVE | Noted: 2018-05-16

## 2018-05-16 PROBLEM — Z79.01 CHRONIC ANTICOAGULATION: Status: ACTIVE | Noted: 2018-05-16

## 2018-05-16 PROBLEM — E03.8 OTHER SPECIFIED HYPOTHYROIDISM: Status: ACTIVE | Noted: 2018-05-16

## 2018-05-16 PROBLEM — K85.10 ACUTE BILIARY PANCREATITIS WITHOUT INFECTION OR NECROSIS: Status: ACTIVE | Noted: 2018-05-16

## 2018-05-16 LAB
ALBUMIN SERPL BCP-MCNC: 3 G/DL
ALP SERPL-CCNC: 64 U/L
ALT SERPL W/O P-5'-P-CCNC: 12 U/L
ANION GAP SERPL CALC-SCNC: 9 MMOL/L
AST SERPL-CCNC: 21 U/L
BACTERIA #/AREA URNS HPF: ABNORMAL /HPF
BILIRUB SERPL-MCNC: 0.5 MG/DL
BILIRUB UR QL STRIP: NEGATIVE
BUN SERPL-MCNC: 21 MG/DL
CALCIUM SERPL-MCNC: 8.5 MG/DL
CHLORIDE SERPL-SCNC: 108 MMOL/L
CLARITY UR: CLEAR
CO2 SERPL-SCNC: 24 MMOL/L
COLOR UR: YELLOW
CREAT SERPL-MCNC: 0.9 MG/DL
EST. GFR  (AFRICAN AMERICAN): >60 ML/MIN/1.73 M^2
EST. GFR  (NON AFRICAN AMERICAN): >60 ML/MIN/1.73 M^2
GLUCOSE SERPL-MCNC: 124 MG/DL
GLUCOSE UR QL STRIP: NEGATIVE
HGB UR QL STRIP: ABNORMAL
INR PPP: 2
KETONES UR QL STRIP: NEGATIVE
LACTATE SERPL-SCNC: 2.1 MMOL/L
LDH SERPL L TO P-CCNC: 319 U/L
LEUKOCYTE ESTERASE UR QL STRIP: NEGATIVE
LIPASE SERPL-CCNC: >1000 U/L
MICROSCOPIC COMMENT: ABNORMAL
NITRITE UR QL STRIP: NEGATIVE
PH UR STRIP: 6 [PH] (ref 5–8)
POTASSIUM SERPL-SCNC: 4.2 MMOL/L
PROT SERPL-MCNC: 6.3 G/DL
PROT UR QL STRIP: NEGATIVE
PROTHROMBIN TIME: 20.6 SEC
RBC #/AREA URNS HPF: 1 /HPF (ref 0–4)
SODIUM SERPL-SCNC: 141 MMOL/L
SP GR UR STRIP: 1.01 (ref 1–1.03)
TRIGL SERPL-MCNC: 161 MG/DL
TROPONIN I SERPL DL<=0.01 NG/ML-MCNC: 0.01 NG/ML
URN SPEC COLLECT METH UR: ABNORMAL
UROBILINOGEN UR STRIP-ACNC: NEGATIVE EU/DL
WBC #/AREA URNS HPF: 2 /HPF (ref 0–5)

## 2018-05-16 PROCEDURE — 25000003 PHARM REV CODE 250: Performed by: EMERGENCY MEDICINE

## 2018-05-16 PROCEDURE — 25500020 PHARM REV CODE 255: Performed by: EMERGENCY MEDICINE

## 2018-05-16 PROCEDURE — 83690 ASSAY OF LIPASE: CPT

## 2018-05-16 PROCEDURE — 63600175 PHARM REV CODE 636 W HCPCS: Performed by: EMERGENCY MEDICINE

## 2018-05-16 PROCEDURE — 85610 PROTHROMBIN TIME: CPT

## 2018-05-16 PROCEDURE — 11000001 HC ACUTE MED/SURG PRIVATE ROOM

## 2018-05-16 PROCEDURE — 96376 TX/PRO/DX INJ SAME DRUG ADON: CPT

## 2018-05-16 PROCEDURE — 25000242 PHARM REV CODE 250 ALT 637 W/ HCPCS: Performed by: HOSPITALIST

## 2018-05-16 PROCEDURE — 36415 COLL VENOUS BLD VENIPUNCTURE: CPT

## 2018-05-16 PROCEDURE — 94640 AIRWAY INHALATION TREATMENT: CPT

## 2018-05-16 PROCEDURE — 25000003 PHARM REV CODE 250: Performed by: INTERNAL MEDICINE

## 2018-05-16 PROCEDURE — 96361 HYDRATE IV INFUSION ADD-ON: CPT

## 2018-05-16 PROCEDURE — 81000 URINALYSIS NONAUTO W/SCOPE: CPT

## 2018-05-16 PROCEDURE — 80053 COMPREHEN METABOLIC PANEL: CPT

## 2018-05-16 PROCEDURE — 84484 ASSAY OF TROPONIN QUANT: CPT

## 2018-05-16 PROCEDURE — S0028 INJECTION, FAMOTIDINE, 20 MG: HCPCS | Performed by: EMERGENCY MEDICINE

## 2018-05-16 PROCEDURE — 27000221 HC OXYGEN, UP TO 24 HOURS

## 2018-05-16 RX ORDER — ONDANSETRON 2 MG/ML
4 INJECTION INTRAMUSCULAR; INTRAVENOUS EVERY 8 HOURS PRN
Status: DISCONTINUED | OUTPATIENT
Start: 2018-05-16 | End: 2018-05-23 | Stop reason: HOSPADM

## 2018-05-16 RX ORDER — MORPHINE SULFATE 10 MG/ML
6 INJECTION INTRAMUSCULAR; INTRAVENOUS; SUBCUTANEOUS
Status: COMPLETED | OUTPATIENT
Start: 2018-05-16 | End: 2018-05-16

## 2018-05-16 RX ORDER — LEVOTHYROXINE SODIUM 50 UG/1
50 TABLET ORAL
Status: DISCONTINUED | OUTPATIENT
Start: 2018-05-16 | End: 2018-05-23 | Stop reason: HOSPADM

## 2018-05-16 RX ORDER — POLYETHYLENE GLYCOL 3350 17 G/17G
17 POWDER, FOR SOLUTION ORAL DAILY
Status: DISCONTINUED | OUTPATIENT
Start: 2018-05-16 | End: 2018-05-16

## 2018-05-16 RX ORDER — FAMOTIDINE 10 MG/ML
20 INJECTION INTRAVENOUS EVERY 12 HOURS
Status: DISCONTINUED | OUTPATIENT
Start: 2018-05-16 | End: 2018-05-16

## 2018-05-16 RX ORDER — MORPHINE SULFATE 10 MG/ML
6 INJECTION INTRAMUSCULAR; INTRAVENOUS; SUBCUTANEOUS EVERY 4 HOURS PRN
Status: DISCONTINUED | OUTPATIENT
Start: 2018-05-16 | End: 2018-05-23 | Stop reason: HOSPADM

## 2018-05-16 RX ORDER — WARFARIN 2.5 MG/1
2.5 TABLET ORAL DAILY
Status: DISCONTINUED | OUTPATIENT
Start: 2018-05-17 | End: 2018-05-16

## 2018-05-16 RX ORDER — MORPHINE SULFATE 10 MG/ML
4 INJECTION INTRAMUSCULAR; INTRAVENOUS; SUBCUTANEOUS EVERY 4 HOURS PRN
Status: DISCONTINUED | OUTPATIENT
Start: 2018-05-16 | End: 2018-05-23 | Stop reason: HOSPADM

## 2018-05-16 RX ORDER — FAMOTIDINE 10 MG/ML
20 INJECTION INTRAVENOUS DAILY
Status: DISCONTINUED | OUTPATIENT
Start: 2018-05-17 | End: 2018-05-21 | Stop reason: ALTCHOICE

## 2018-05-16 RX ORDER — ASPIRIN 81 MG/1
81 TABLET ORAL DAILY
Status: DISCONTINUED | OUTPATIENT
Start: 2018-05-16 | End: 2018-05-23 | Stop reason: HOSPADM

## 2018-05-16 RX ORDER — CARVEDILOL 6.25 MG/1
6.25 TABLET ORAL 2 TIMES DAILY WITH MEALS
Status: DISCONTINUED | OUTPATIENT
Start: 2018-05-16 | End: 2018-05-16

## 2018-05-16 RX ORDER — SODIUM CHLORIDE, SODIUM LACTATE, POTASSIUM CHLORIDE, CALCIUM CHLORIDE 600; 310; 30; 20 MG/100ML; MG/100ML; MG/100ML; MG/100ML
250 INJECTION, SOLUTION INTRAVENOUS
Status: COMPLETED | OUTPATIENT
Start: 2018-05-16 | End: 2018-05-16

## 2018-05-16 RX ORDER — SODIUM CHLORIDE, SODIUM LACTATE, POTASSIUM CHLORIDE, CALCIUM CHLORIDE 600; 310; 30; 20 MG/100ML; MG/100ML; MG/100ML; MG/100ML
250 INJECTION, SOLUTION INTRAVENOUS CONTINUOUS
Status: DISCONTINUED | OUTPATIENT
Start: 2018-05-16 | End: 2018-05-16

## 2018-05-16 RX ORDER — SODIUM CHLORIDE, SODIUM LACTATE, POTASSIUM CHLORIDE, CALCIUM CHLORIDE 600; 310; 30; 20 MG/100ML; MG/100ML; MG/100ML; MG/100ML
INJECTION, SOLUTION INTRAVENOUS CONTINUOUS
Status: DISCONTINUED | OUTPATIENT
Start: 2018-05-16 | End: 2018-05-21

## 2018-05-16 RX ORDER — RAMELTEON 8 MG/1
8 TABLET ORAL NIGHTLY PRN
Status: DISCONTINUED | OUTPATIENT
Start: 2018-05-16 | End: 2018-05-16

## 2018-05-16 RX ORDER — LORAZEPAM 0.5 MG/1
0.5 TABLET ORAL EVERY 6 HOURS PRN
Status: DISCONTINUED | OUTPATIENT
Start: 2018-05-16 | End: 2018-05-16

## 2018-05-16 RX ORDER — WARFARIN 2.5 MG/1
2.5 TABLET ORAL DAILY
Status: DISCONTINUED | OUTPATIENT
Start: 2018-05-16 | End: 2018-05-16

## 2018-05-16 RX ORDER — SODIUM CHLORIDE, SODIUM LACTATE, POTASSIUM CHLORIDE, CALCIUM CHLORIDE 600; 310; 30; 20 MG/100ML; MG/100ML; MG/100ML; MG/100ML
250 INJECTION, SOLUTION INTRAVENOUS CONTINUOUS
Status: ACTIVE | OUTPATIENT
Start: 2018-05-16 | End: 2018-05-16

## 2018-05-16 RX ORDER — DEXTROSE MONOHYDRATE AND SODIUM CHLORIDE 5; .45 G/100ML; G/100ML
INJECTION, SOLUTION INTRAVENOUS CONTINUOUS
Status: DISCONTINUED | OUTPATIENT
Start: 2018-05-16 | End: 2018-05-16

## 2018-05-16 RX ORDER — ONDANSETRON 2 MG/ML
4 INJECTION INTRAMUSCULAR; INTRAVENOUS EVERY 8 HOURS PRN
Status: DISCONTINUED | OUTPATIENT
Start: 2018-05-16 | End: 2018-05-16

## 2018-05-16 RX ORDER — PRAVASTATIN SODIUM 10 MG/1
20 TABLET ORAL DAILY
Status: DISCONTINUED | OUTPATIENT
Start: 2018-05-16 | End: 2018-05-23 | Stop reason: HOSPADM

## 2018-05-16 RX ORDER — CLOPIDOGREL BISULFATE 75 MG/1
75 TABLET ORAL DAILY
Status: DISCONTINUED | OUTPATIENT
Start: 2018-05-16 | End: 2018-05-16

## 2018-05-16 RX ORDER — ASPIRIN 81 MG/1
81 TABLET ORAL DAILY
Status: DISCONTINUED | OUTPATIENT
Start: 2018-05-17 | End: 2018-05-16

## 2018-05-16 RX ORDER — AMLODIPINE BESYLATE 2.5 MG/1
2.5 TABLET ORAL 2 TIMES DAILY PRN
Status: DISCONTINUED | OUTPATIENT
Start: 2018-05-16 | End: 2018-05-16

## 2018-05-16 RX ORDER — IPRATROPIUM BROMIDE AND ALBUTEROL SULFATE 2.5; .5 MG/3ML; MG/3ML
3 SOLUTION RESPIRATORY (INHALATION) EVERY 6 HOURS PRN
Status: DISCONTINUED | OUTPATIENT
Start: 2018-05-16 | End: 2018-05-18

## 2018-05-16 RX ADMIN — ASPIRIN 81 MG: 81 TABLET, COATED ORAL at 05:05

## 2018-05-16 RX ADMIN — FAMOTIDINE 20 MG: 10 INJECTION INTRAVENOUS at 08:05

## 2018-05-16 RX ADMIN — IPRATROPIUM BROMIDE AND ALBUTEROL SULFATE 3 ML: .5; 2.5 SOLUTION RESPIRATORY (INHALATION) at 09:05

## 2018-05-16 RX ADMIN — MORPHINE SULFATE 6 MG: 10 INJECTION INTRAVENOUS at 01:05

## 2018-05-16 RX ADMIN — SODIUM CHLORIDE, SODIUM LACTATE, POTASSIUM CHLORIDE, AND CALCIUM CHLORIDE 250 ML: .6; .31; .03; .02 INJECTION, SOLUTION INTRAVENOUS at 08:05

## 2018-05-16 RX ADMIN — SODIUM CHLORIDE, SODIUM LACTATE, POTASSIUM CHLORIDE, AND CALCIUM CHLORIDE: .6; .31; .03; .02 INJECTION, SOLUTION INTRAVENOUS at 05:05

## 2018-05-16 RX ADMIN — MORPHINE SULFATE 6 MG: 10 INJECTION INTRAVENOUS at 09:05

## 2018-05-16 RX ADMIN — IOHEXOL 50 ML: 350 INJECTION, SOLUTION INTRAVENOUS at 12:05

## 2018-05-16 RX ADMIN — ONDANSETRON HYDROCHLORIDE 4 MG: 2 INJECTION INTRAMUSCULAR; INTRAVENOUS at 11:05

## 2018-05-16 RX ADMIN — MORPHINE SULFATE 6 MG: 10 INJECTION INTRAVENOUS at 12:05

## 2018-05-16 RX ADMIN — MORPHINE SULFATE 4 MG: 10 INJECTION INTRAVENOUS at 08:05

## 2018-05-16 RX ADMIN — SODIUM CHLORIDE, SODIUM LACTATE, POTASSIUM CHLORIDE, AND CALCIUM CHLORIDE 1000 ML: .6; .31; .03; .02 INJECTION, SOLUTION INTRAVENOUS at 02:05

## 2018-05-16 NOTE — PLAN OF CARE
Problem: Patient Care Overview  Goal: Plan of Care Review  Patient remains free from falls/ injuries this shift, AAO x 4, can make her needs known, c/o abdominal pain during shift managed well with current pain regime, IV fluids infusing as ordered, 30 cc ice chips given for c/o dry mouth, tolerated well, safety maintained, family at bedside.

## 2018-05-16 NOTE — PLAN OF CARE
"   05/16/18 1036   Discharge Assessment   Assessment Type Discharge Planning Assessment   Confirmed/corrected address and phone number on facesheet? Yes   Assessment information obtained from? Patient;Caregiver   Prior to hospitilization cognitive status: Alert/Oriented   Prior to hospitalization functional status: Independent   Current cognitive status: Alert/Oriented   Current Functional Status: Independent   Facility Arrived From: Home    Lives With child(boris), adult   Able to Return to Prior Arrangements yes   Is patient able to care for self after discharge? Yes   Who are your caregiver(s) and their phone number(s)? [place], pt daughter, [phone]   Patient's perception of discharge disposition home or selfcare   Readmission Within The Last 30 Days no previous admission in last 30 days   Patient currently being followed by outpatient case management? No   Patient currently receives any other outside agency services? No   Equipment Currently Used at Home none   Do you have any problems affording any of your prescribed medications? No   Is the patient taking medications as prescribed? yes   Does the patient have transportation home? Yes   Transportation Available family or friend will provide   Dialysis Name and Scheduled days N/A    Does the patient receive services at [facility]? Yes  (Dr. Cool (Heart doctor))   Discharge Plan A Home with family   Discharge Plan B Home with family   Patient/Family In Agreement With Plan yes     SW to patient's room to discuss Helping the patient manage care at home. Pt and pt daughter, Georgia, completed assessment with SW. Georgia informed SW, she is pt HELP at home and she helps with all pt ADL needs.     TN/SW role explained to pt.     "Discharge planning begins on Admission" pamphlet discussed and placed in "My Health Packet" and placed at bedside.      SW's name and contact info placed on white board.     Preferred Appointment time: Mid morning " appointments.     Preferred pharmacy:   [facility] - [place] - [address]  [address] [place] [address]  Phone: [phone] Fax: [phone]

## 2018-05-16 NOTE — HPI
77 year old female with CAD s/p PCI in July/2017, paroxysmal AFib on coumadin, Hx of pancreatitis s/p stent removal, hypertension and anxiety who presented with acute epigastric pain of one day in evolution. It is severe, constant and radiates to back. Patient stated this is classic of her pancreatitis flares. Also stated she had just had a stent removed 1.5 months ago. Is not on pancreatic enzymes. Stated bouts of diarrhea which are chronic for her.     In the ED, patient was noted to have a tender abdomen, tachycardic, hypertensive and positive findings for pancreatitis on CT of abdomen. Lipase > 1000. Patient admitted to hospital medicine for acute pancreatitis.

## 2018-05-16 NOTE — ASSESSMENT & PLAN NOTE
Patient reported PCI x 4 stents in July/2017. Currently chest pain free and EKG/trop not consistent with ACS. Is on ASA, plavix, carvedilol and ramipril. Would prefer to continue SHA but will discuss with GI in case ERCP is planned. BP currently at goal without antihypertensive therapy. Treat pain.

## 2018-05-16 NOTE — ED PROVIDER NOTES
Encounter Date: 5/15/2018    SORT:  77 y.o. female presenting to ED for epigastric abdominal pain similar to past episodes of idiopathic pancreatitis per family. Also has cardiac Hx. Limited triage exam:  Uncomfortable appearing patient; frail. If orders were placed, they are pending.     Alec Chavez PA-C    SCRIBE #1 NOTE: I, Xavier Alvarado, am scribing for, and in the presence of, Hiren Ricks MD. Other sections scribed: HPI, ROS, PE.       History     Chief Complaint   Patient presents with    Chest Pain     started earlier today. Radiates to stomach. Hx of Pancretitis. 10/10 pain.      CC: Abdominal Pain, Chest Pain  HPI: This 77 y.o. female with Hx of HTN, HLD, CAD s/p PCI, A-Fib, chronic anticoagulation Coumadin, Plavix therapy, bilateral carotid artery stenosis, pancreatitis, fibromyalgia presents to the ED c/o low abdominal pain that developed this morning and has since spread throughout her abdomen, back, and into her chest. Pt states that her pain is severe, constant, acute, worst in upper abdomen, and consistent with previous episodes of pancreatitis. Pt reports associated nausea. Pt reports having episodes of similar Sx 2 and 3 months ago. She states that she was free of episodes like these for nearly a year while she had a stent in her pancreas, but started coming again once this stent was removed earlier this year. Pt states that she had to go to cath lab at [facility] a few months ago after one of these episodes. She denies fever, cough, SOB.        The history is provided by the patient.     Review of patient's allergies indicates:   Allergen Reactions    Sulfa (sulfonamide antibiotics) Hives    Bactrim [sulfamethoxazole-trimethoprim] Other (See Comments)     Pt. Reports that it caused severe pain    Integrilin [eptifibatide]     Statins-hmg-coa reductase inhibitors Other (See Comments)     Muscle pain. Patient states some, not all statins    Xarelto [rivaroxaban]     Epinephrine Palpitations      Past Medical History:   Diagnosis Date    Arthritis     Atrial fibrillation     Bilateral carotid artery stenosis 7/13/2017    Coronary artery disease     Fibromyalgia     Hyperlipidemia     Hypertension     Myocardial infarction 03/21/2015    Pancreatitis     Thyroid disease      Past Surgical History:   Procedure Laterality Date    CHOLECYSTECTOMY      CORONARY STENT PLACEMENT  3/24/15    HYSTERECTOMY  10/28/2004     History reviewed. No pertinent family history.  Social History   Substance Use Topics    Smoking status: Former Smoker     Quit date: 7/13/1964    Smokeless tobacco: Former User    Alcohol use No     Review of Systems   Constitutional: Negative for chills and fever.   HENT: Negative for ear pain, rhinorrhea and sore throat.    Eyes: Negative for pain.   Respiratory: Negative for cough and shortness of breath.    Cardiovascular: Positive for chest pain.   Gastrointestinal: Positive for abdominal pain and nausea. Negative for diarrhea and vomiting.   Genitourinary: Negative for difficulty urinating and dysuria.   Musculoskeletal: Positive for back pain. Negative for arthralgias and myalgias.   Skin: Negative for rash.   Neurological: Negative for headaches.       Physical Exam     Initial Vitals   BP Pulse Resp Temp SpO2   05/15/18 2308 05/15/18 2256 05/15/18 2256 05/15/18 2307 05/15/18 2256   (!) 210/101 (!) 128 (!) 29 99.6 °F (37.6 °C) (!) 93 %      MAP       05/15/18 2308       137.33         Physical Exam    Nursing note and vitals reviewed.  Constitutional: She appears well-developed and well-nourished. She is not diaphoretic. She appears distressed.   HENT:   Head: Normocephalic and atraumatic.   Eyes: EOM are normal.   Neck: Normal range of motion.   Cardiovascular: An irregularly irregular rhythm present. Tachycardia present.    Pulmonary/Chest: Breath sounds normal. No respiratory distress.   Abdominal: Soft. There is tenderness (generalized). There is guarding.   Hypoactive  bowel sounds   Musculoskeletal: Normal range of motion. She exhibits no edema.   Neurological: She is alert.   Skin: Skin is warm and dry.   Psychiatric: Thought content normal.         ED Course   Procedures  Labs Reviewed   CBC W/ AUTO DIFFERENTIAL - Abnormal; Notable for the following:        Result Value    Hemoglobin 11.9 (*)     MCV 78 (*)     MCH 24.1 (*)     MCHC 31.1 (*)     RDW 17.5 (*)     Platelets 357 (*)     Gran # (ANC) 10.0 (*)     Lymph # 0.6 (*)     Mono # 0.2 (*)     Gran% 91.3 (*)     Lymph% 5.7 (*)     Mono% 1.8 (*)     All other components within normal limits   COMPREHENSIVE METABOLIC PANEL - Abnormal; Notable for the following:     Glucose 137 (*)     BUN, Bld 25 (*)     Creatinine 1.5 (*)     eGFR if  39 (*)     eGFR if non  34 (*)     All other components within normal limits   LIPASE - Abnormal; Notable for the following:     Lipase >1000 (*)     All other components within normal limits   URINALYSIS - Abnormal; Notable for the following:     Occult Blood UA 1+ (*)     All other components within normal limits   B-TYPE NATRIURETIC PEPTIDE - Abnormal; Notable for the following:      (*)     All other components within normal limits   PROTIME-INR - Abnormal; Notable for the following:     Prothrombin Time 20.4 (*)     INR 1.9 (*)     All other components within normal limits   LACTATE DEHYDROGENASE - Abnormal; Notable for the following:      (*)     All other components within normal limits   URINALYSIS MICROSCOPIC - Abnormal; Notable for the following:     Bacteria, UA Few (*)     All other components within normal limits   TRIGLYCERIDES - Abnormal; Notable for the following:     Triglycerides 161 (*)     All other components within normal limits   TROPONIN I   LACTIC ACID, PLASMA   LIPASE             Medical Decision Making:   Initial Assessment:   77-year-old female with history of idiopathic pancreatitis, coronary artery disease status post  multiple stents, presenting with epigastric pain, nausea, vomiting.  Also notes mild chest pain, though unclear if this is related to her epigastric pain.  History of A. fib.  On arrival, the patient presents in A. fib, tachycardic, with moderate to severe distress due to abdominal pain.  Patient states she is nauseous though did not actively vomit.  Has significant tenderness and guarding with some peritoneal signs of of her abdomen.  Differential includes acute pancreatitis, small bowel obstruction, mesenteric ischemia, though underlying cardiac angina is not impossible.  Patient states she cannot tolerate morphine without dilution.  Given Zofran, morphine deluded.  We'll get labs, chest x-ray and abdominal x-ray.  EKG shows A. fib with RVR with a rate in the 125, though no changes in QRS morphology concerning for ischemia compared to baseline in 2017.    Lipase greater than 1000.  Triglycerides not severely elevated.  Initial troponin negative.  Admitted labs show Baker score of 1 on admit.  CT scan without evidence of necrotizing pancreatitis.  We'll admit for pain control, IV fluids started.  Initial troponin negative, can observe for ACS and further trend troponins while inpatient.            Scribe Attestation:   Scribe #1: I performed the above scribed service and the documentation accurately describes the services I performed. I attest to the accuracy of the note.    Attending Attestation:           Physician Attestation for Scribe:  Physician Attestation Statement for Scribe #1: I, Hiren Ricks MD, reviewed documentation, as scribed by Xavier Alvarado in my presence, and it is both accurate and complete.                    Clinical Impression:   Diagnoses of Epigastric abdominal pain, Epigastric pain, and Abdominal pain were pertinent to this visit.    Disposition:   Disposition: Admitted  Condition: Fair                        Hiren Ricks MD  05/16/18 9073

## 2018-05-16 NOTE — H&P
[facility] Medicine  History & Physical    Patient Name: Katt Mcneal  MRN: [id number]  Admission Date: 5/15/2018  Attending Physician: Kalpana Crain MD   Primary Care Provider: Kareen Hodges MD         Patient information was obtained from patient, relative(s) and ER records.     Subjective:     Principal Problem:Acute biliary pancreatitis without infection or necrosis    Chief Complaint:   Chief Complaint   Patient presents with    Chest Pain     started earlier today. Radiates to stomach. Hx of Pancretitis. 10/10 pain.         HPI: 77 year old female with CAD s/p PCI in July/2017, paroxysmal AFib on coumadin, Hx of pancreatitis s/p stent removal, hypertension and anxiety who presented with acute epigastric pain of one day in evolution. It is severe, constant and radiates to back. Patient stated this is classic of her pancreatitis flares. Also stated she had just had a stent removed 1.5 months ago. Is not on pancreatic enzymes. Stated bouts of diarrhea which are chronic for her.     In the ED, patient was noted to have a tender abdomen, tachycardic, hypertensive and positive findings for pancreatitis on CT of abdomen. Lipase > 1000. Patient admitted to hospital medicine for acute pancreatitis.     Past Medical History:   Diagnosis Date    Arthritis     Atrial fibrillation     Bilateral carotid artery stenosis 7/13/2017    Coronary artery disease     Fibromyalgia     Hyperlipidemia     Hypertension     Myocardial infarction 03/21/2015    Pancreatitis     Thyroid disease        Past Surgical History:   Procedure Laterality Date    CHOLECYSTECTOMY      CORONARY STENT PLACEMENT  3/24/15    HYSTERECTOMY  10/28/2004       Review of patient's allergies indicates:   Allergen Reactions    Sulfa (sulfonamide antibiotics) Hives    Bactrim [sulfamethoxazole-trimethoprim] Other (See Comments)     Pt. Reports that it caused severe pain    Integrilin [eptifibatide]      Statins-hmg-coa reductase inhibitors Other (See Comments)     Muscle pain. Patient states some, not all statins    Xarelto [rivaroxaban]     Epinephrine Palpitations       No current facility-administered medications on file prior to encounter.      Current Outpatient Prescriptions on File Prior to Encounter   Medication Sig    amlodipine (NORVASC) 2.5 MG tablet Take 2.5 mg by mouth 2 (two) times daily as needed (for high blood pressure). Prn systolic >160    aspirin (ECOTRIN) 81 MG EC tablet Take 81 mg by mouth once daily.    CALCIUM CARBONATE/VITAMIN D3 (CALCIUM 500 + D ORAL) Take 1 tablet by mouth once daily.     carvedilol (COREG) 25 MG tablet Take 6.25 mg by mouth 2 (two) times daily with meals. at 9am and 9pm    cholecalciferol, vitamin D3, 2,000 unit Cap Take 1 capsule by mouth once daily.    clopidogrel (PLAVIX) 75 mg tablet Take 75 mg by mouth once daily.     levothyroxine (SYNTHROID) 75 MCG tablet Take 50 mcg by mouth once daily.     lorazepam (ATIVAN) 0.5 MG tablet Take 0.5 mg by mouth every 6 (six) hours as needed for Anxiety.    nitroGLYCERIN (NITROSTAT) 0.4 MG SL tablet Place 0.4 mg under the tongue every 5 (five) minutes as needed for Chest pain.    ondansetron (ZOFRAN-ODT) 4 MG TbDL Take 1 tablet (4 mg total) by mouth every 8 (eight) hours as needed (for nausea/vomiting). (Patient taking differently: Take 4 mg by mouth every 4 (four) hours as needed (for nausea/vomiting). )    potassium chloride (KLOR-CON) 10 MEQ TbSR Take 10 mEq by mouth once daily.    pravastatin (PRAVACHOL) 20 MG tablet Take 1 tablet (20 mg total) by mouth once daily.    predniSONE (DELTASONE) 5 MG tablet Take 2.5 mg by mouth once daily.     ramipril (ALTACE) 5 MG capsule Take 5 mg by mouth 2 (two) times daily.     SOTALOL HCL (SOTALOL AF ORAL) Take 80 mg by mouth 2 (two) times daily.     warfarin (COUMADIN) 2.5 MG tablet Take 2.5 mg by mouth once daily.     Family History     None        Social History Main  Topics    Smoking status: Former Smoker     Quit date: 7/13/1964    Smokeless tobacco: Former User    Alcohol use No    Drug use: No    Sexual activity: Not Currently     Review of Systems   Constitutional: Negative.    Eyes: Negative.    Respiratory: Negative.    Cardiovascular: Negative.    Gastrointestinal: Positive for abdominal pain and diarrhea. Negative for abdominal distention, constipation, nausea and vomiting.   Genitourinary: Negative.    Musculoskeletal: Negative.    Skin: Negative.    Neurological: Negative.    Hematological: Negative.    Psychiatric/Behavioral: Negative.      Objective:     Vital Signs (Most Recent):  Temp: 97.8 °F (36.6 °C) (05/16/18 1228)  Pulse: 77 (05/16/18 1228)  Resp: (!) 22 (05/16/18 1228)  BP: (!) 153/73 (05/16/18 1259)  SpO2: (!) 94 % (05/16/18 1228) Vital Signs (24h Range):  Temp:  [97.8 °F (36.6 °C)-99.6 °F (37.6 °C)] 97.8 °F (36.6 °C)  Pulse:  [] 77  Resp:  [17-32] 22  SpO2:  [90 %-99 %] 94 %  BP: (118-210)/() 153/73     Weight: 48.7 kg (107 lb 5.8 oz)  Body mass index is 21.68 kg/m².    Physical Exam   Constitutional: She is oriented to person, place, and time. She appears well-developed. No distress.   HENT:   Head: Normocephalic and atraumatic.   Mouth/Throat: Oropharynx is clear and moist.   Eyes: Conjunctivae and EOM are normal. No scleral icterus.   Neck: Normal range of motion.   Cardiovascular: Normal rate, regular rhythm, normal heart sounds and intact distal pulses.    Pulmonary/Chest: Effort normal and breath sounds normal. She has no wheezes. She has no rales.   Abdominal: Soft. Bowel sounds are normal. She exhibits no distension and no mass. There is tenderness. There is guarding. There is no rebound. No hernia.   Musculoskeletal: Normal range of motion. She exhibits no edema.   Neurological: She is alert and oriented to person, place, and time.   Skin: Skin is warm and dry. Capillary refill takes less than 2 seconds. She is not diaphoretic.    Psychiatric: She has a normal mood and affect. Her behavior is normal. Judgment and thought content normal.   Nursing note and vitals reviewed.        CRANIAL NERVES     CN III, IV, VI   Extraocular motions are normal.        Significant Labs: All pertinent labs within the past 24 hours have been reviewed.    Significant Imaging: I have reviewed all pertinent imaging results/findings within the past 24 hours.  I have reviewed and interpreted all pertinent imaging results/findings within the past 24 hours.    Assessment/Plan:     * Acute biliary pancreatitis without infection or necrosis    Per patient, she had a stent removed 1.5 months ago. CT of abdomen revealed nonspecific prominence of the extrahepatic biliary tree without choledocholithiasis. Also with extensive peripancreatic fluid without drainable collection or evidence of necrosis. Lipase > 1000 and tender to palpation, all consistent with acute pancreatitis. Surprisingly liver enzymes and T Mamadou are WNL. Is s/p cholecystectomy. NPO, IVFs, pain management and consult GI.           Abdominal pain    2/2 pancreatitis        Other specified hypothyroidism    No acute issues. Continue home regimen          Chronic anticoagulation    On warfarin for AFib. Will hold temporarily pending GI recs. May need ERCP          Dyslipidemia    No acute issues. Continue statin          Bilateral carotid artery stenosis    No acute issues          Essential hypertension              Paroxysmal atrial fibrillation    Currently in NSR          Chronic diastolic heart failure    Well compensated.           Coronary artery disease involving native coronary artery of native heart without angina pectoris    Patient reported PCI x 4 stents in July/2017. Currently chest pain free and EKG/trop not consistent with ACS. Is on ASA, plavix, carvedilol and ramipril. Would prefer to continue SHA but will discuss with GI in case ERCP is planned. BP currently at goal without antihypertensive  therapy. Treat pain.             VTE Risk Mitigation         Ordered     warfarin (COUMADIN) tablet 2.5 mg  Daily      05/16/18 5234             Kalpana Sterling MD  Department of Hospital Medicine   [facility]

## 2018-05-16 NOTE — CONSULTS
Chief complaint: Abdominal pain, nausea and vomiting.    History of present illness: The patient is a 77 year old female with a history of HTN, CAD, diastolic CHF, polymyalgia rheumatica, atrial fibrillation, hypothyroidism, irritable bowel syndrome and recurrent idiopathic pancreatitis presenting with pancreatitis.  She reports acute, severe, unrelenting upper abdominal pain that began yesterday afternoon.  Associated nausea and emesis.  No clear precipitant.  No associated fever, chills or diarrhea.  She is well-known to our service for recurrent idiopathic pancreatitis, dating back to the beginning of 2015.  She has undergone extensive work-up to determine an etiology.  She does not drink or smoke.  She is s/p cholecystectomy.  Her triglycerides are normal.  Medications, including prednisone, have been ruled out.  IgG-4 serology for autoimmune pancreatitis have been negative.  She has undergone multiple EUSs and MRCPs.  She underwent ERCP with stenting of pancreatic duct in 2016 by Dr. Tadeo at [facility].  She remained symptom-free for 20 months following stent.  Most recently, she was hospitalized at [facility] in late February for recurrent attack.  She had repeat ERCP with PD stenting on March 5 of this year by Dr. Vance.  Stent was removed on March 29.      Past medical history:  Hypertension.  Coronary artery disease.  Congestive heart failure, diastolic.  Polymyalgia rheumatica.  Atrial fibrillation.  Hypothyroidism.  Irritable bowel syndrome.  Recurrent idiopathic pancreatitis.     Surgical history:  Cholecystectomy.  Hysterectomy.  Bladder suspension.  Percutaneous coronary intervention x 3 stents.    Social history:  Tobacco use: denies.  Alcohol use: denies.  Illicit drug use: denies.    Family history:  No family history of liver, pancreatic or colon cancer.    Allergies:  Sulfa.  Bactrim.  Integrilin.  Xarelto.  Statins.    Medications on admission:  No current facility-administered medications on  file prior to encounter.      Current Outpatient Prescriptions on File Prior to Encounter   Medication Sig Dispense Refill    amlodipine (NORVASC) 2.5 MG tablet Take 2.5 mg by mouth 2 (two) times daily as needed (for high blood pressure). Prn systolic >160      aspirin (ECOTRIN) 81 MG EC tablet Take 81 mg by mouth once daily.      CALCIUM CARBONATE/VITAMIN D3 (CALCIUM 500 + D ORAL) Take 1 tablet by mouth once daily.       carvedilol (COREG) 25 MG tablet Take 6.25 mg by mouth 2 (two) times daily with meals. at 9am and 9pm      cholecalciferol, vitamin D3, 2,000 unit Cap Take 1 capsule by mouth once daily.      clopidogrel (PLAVIX) 75 mg tablet Take 75 mg by mouth once daily.       levothyroxine (SYNTHROID) 75 MCG tablet Take 50 mcg by mouth once daily.       lorazepam (ATIVAN) 0.5 MG tablet Take 0.5 mg by mouth every 6 (six) hours as needed for Anxiety.      nitroGLYCERIN (NITROSTAT) 0.4 MG SL tablet Place 0.4 mg under the tongue every 5 (five) minutes as needed for Chest pain.      ondansetron (ZOFRAN-ODT) 4 MG TbDL Take 1 tablet (4 mg total) by mouth every 8 (eight) hours as needed (for nausea/vomiting). (Patient taking differently: Take 4 mg by mouth every 4 (four) hours as needed (for nausea/vomiting). ) 30 tablet 3    potassium chloride (KLOR-CON) 10 MEQ TbSR Take 10 mEq by mouth once daily.      pravastatin (PRAVACHOL) 20 MG tablet Take 1 tablet (20 mg total) by mouth once daily. 90 tablet 3    predniSONE (DELTASONE) 5 MG tablet Take 2.5 mg by mouth once daily.       ramipril (ALTACE) 5 MG capsule Take 5 mg by mouth 2 (two) times daily.       SOTALOL HCL (SOTALOL AF ORAL) Take 80 mg by mouth 2 (two) times daily.       warfarin (COUMADIN) 2.5 MG tablet Take 2.5 mg by mouth once daily.       Review of systems:  CONSTITUTIONAL: Negative for fever, chills, weakness, weight loss, weight gain.  HEENT: Negative for blurred vision, hearing loss, nasal congestion, dry mouth, sore throat.  CARDIOVASCULAR:  Negative for chest pain or palpitations.  RESPIRATORY: Negative for SOB or cough.  GASTROINTESTINAL: See HPI  GENITOURINARY: Negative for dysuria or hematuria.  MUSCULOSKELETAL: Positive for chronic joint pain.  SKIN: Negative for rashes/lesions.  NEUROLOGIC: Negative for headaches, numbness/tingling.  ENDOCRINE: Positive for hypothyroidism. No diabetes.  HEMATOLOGIC: Negative for anemia or blood dyscrasias.    Vitals:    05/16/18 1228   BP:    Pulse: 77   Resp: (!) 22   Temp: 97.8 °F (36.6 °C)     Physical exam:  General: Elderly female in no acute distress.  HENT: Normocephalic, atraumatic, hearing grossly intact  Eyes: PERRLA, EOMI, anicteric sclera  Cardiovascular: Regular rate and rhythm. No murmurs appreciated.  Lungs: Non-labored respirations. Breath sounds equal.   Abdomen: Normal BS. Soft, ND. Tender in the epigastric region. No rebound or guarding.  Extremities: No C/C/E, 2+ dorsalis pedis pulses bilaterally  Neuro: AA&O x 3, no asterixes or tremors  Psych: Appropriate mood and affect. No SI.  Skin: No jaundice, rashes or lesions  Musculoskeletal: 5/5 strength bilaterally      Recent Labs  Lab 05/15/18  2255   WBC 10.96   RBC 4.93   HGB 11.9*   HCT 38.3   *   MCV 78*   MCH 24.1*   MCHC 31.1*   Lipase = >1,000    Imaging:  CT abd/pelvis (5-15-18):  Impression:  Marked inflammatory changes with edematous appearance of the pancreas and ill-defined peripancreatic fluid, consistent with acute pancreatitis.  No evidence of pancreatic enhancement to suggest necrotic pancreatitis.  No drainable collection.  Suggest short-term follow-up.  Extensive diverticulosis coli without evidence of acute diverticulitis.    Impression: 77 year old female with a history of HTN, CAD, diastolic CHF, polymyalgia rheumatica, atrial fibrillation, hypothyroidism, irritable bowel syndrome and recurrent idiopathic pancreatitis presenting with pancreatitis.     Plan:  1.  Recurrent pancreatitis - etiology remains unclear despite  very extensive work-up by our group and Dr. Tadeo.  She recently had stenting of her pancreatic duct in March as she remained symptomatic for nearly two years after previous stenting performed at [facility].  Her lipase this admission was >1,000 and CT of the abdomen and pelvis is consistent with acute pancreatitis without evidence of necrosis.  She will be managed with supportive care for now.  Lactated ringers should be continued, and she can be given ice chips for now.  Anti-emetics and analgesics can be given as needed.  Hopefully advance to clear liquids this evening or tomorrow morning.  Once she is tolerating a solid diet without pain or nausea or vomiting, she can be discharged with close follow-up in our clinic to determine a long-term plan.

## 2018-05-16 NOTE — SUBJECTIVE & OBJECTIVE
Past Medical History:   Diagnosis Date    Arthritis     Atrial fibrillation     Bilateral carotid artery stenosis 7/13/2017    Coronary artery disease     Fibromyalgia     Hyperlipidemia     Hypertension     Myocardial infarction 03/21/2015    Pancreatitis     Thyroid disease        Past Surgical History:   Procedure Laterality Date    CHOLECYSTECTOMY      CORONARY STENT PLACEMENT  3/24/15    HYSTERECTOMY  10/28/2004       Review of patient's allergies indicates:   Allergen Reactions    Sulfa (sulfonamide antibiotics) Hives    Bactrim [sulfamethoxazole-trimethoprim] Other (See Comments)     Pt. Reports that it caused severe pain    Integrilin [eptifibatide]     Statins-hmg-coa reductase inhibitors Other (See Comments)     Muscle pain. Patient states some, not all statins    Xarelto [rivaroxaban]     Epinephrine Palpitations       No current facility-administered medications on file prior to encounter.      Current Outpatient Prescriptions on File Prior to Encounter   Medication Sig    amlodipine (NORVASC) 2.5 MG tablet Take 2.5 mg by mouth 2 (two) times daily as needed (for high blood pressure). Prn systolic >160    aspirin (ECOTRIN) 81 MG EC tablet Take 81 mg by mouth once daily.    CALCIUM CARBONATE/VITAMIN D3 (CALCIUM 500 + D ORAL) Take 1 tablet by mouth once daily.     carvedilol (COREG) 25 MG tablet Take 6.25 mg by mouth 2 (two) times daily with meals. at 9am and 9pm    cholecalciferol, vitamin D3, 2,000 unit Cap Take 1 capsule by mouth once daily.    clopidogrel (PLAVIX) 75 mg tablet Take 75 mg by mouth once daily.     levothyroxine (SYNTHROID) 75 MCG tablet Take 50 mcg by mouth once daily.     lorazepam (ATIVAN) 0.5 MG tablet Take 0.5 mg by mouth every 6 (six) hours as needed for Anxiety.    nitroGLYCERIN (NITROSTAT) 0.4 MG SL tablet Place 0.4 mg under the tongue every 5 (five) minutes as needed for Chest pain.    ondansetron (ZOFRAN-ODT) 4 MG TbDL Take 1 tablet (4 mg total) by  mouth every 8 (eight) hours as needed (for nausea/vomiting). (Patient taking differently: Take 4 mg by mouth every 4 (four) hours as needed (for nausea/vomiting). )    potassium chloride (KLOR-CON) 10 MEQ TbSR Take 10 mEq by mouth once daily.    pravastatin (PRAVACHOL) 20 MG tablet Take 1 tablet (20 mg total) by mouth once daily.    predniSONE (DELTASONE) 5 MG tablet Take 2.5 mg by mouth once daily.     ramipril (ALTACE) 5 MG capsule Take 5 mg by mouth 2 (two) times daily.     SOTALOL HCL (SOTALOL AF ORAL) Take 80 mg by mouth 2 (two) times daily.     warfarin (COUMADIN) 2.5 MG tablet Take 2.5 mg by mouth once daily.     Family History     None        Social History Main Topics    Smoking status: Former Smoker     Quit date: 7/13/1964    Smokeless tobacco: Former User    Alcohol use No    Drug use: No    Sexual activity: Not Currently     Review of Systems   Constitutional: Negative.    Eyes: Negative.    Respiratory: Negative.    Cardiovascular: Negative.    Gastrointestinal: Positive for abdominal pain and diarrhea. Negative for abdominal distention, constipation, nausea and vomiting.   Genitourinary: Negative.    Musculoskeletal: Negative.    Skin: Negative.    Neurological: Negative.    Hematological: Negative.    Psychiatric/Behavioral: Negative.      Objective:     Vital Signs (Most Recent):  Temp: 97.8 °F (36.6 °C) (05/16/18 1228)  Pulse: 77 (05/16/18 1228)  Resp: (!) 22 (05/16/18 1228)  BP: (!) 153/73 (05/16/18 1259)  SpO2: (!) 94 % (05/16/18 1228) Vital Signs (24h Range):  Temp:  [97.8 °F (36.6 °C)-99.6 °F (37.6 °C)] 97.8 °F (36.6 °C)  Pulse:  [] 77  Resp:  [17-32] 22  SpO2:  [90 %-99 %] 94 %  BP: (118-210)/() 153/73     Weight: 48.7 kg (107 lb 5.8 oz)  Body mass index is 21.68 kg/m².    Physical Exam   Constitutional: She is oriented to person, place, and time. She appears well-developed. No distress.   HENT:   Head: Normocephalic and atraumatic.   Mouth/Throat: Oropharynx is clear  and moist.   Eyes: Conjunctivae and EOM are normal. No scleral icterus.   Neck: Normal range of motion.   Cardiovascular: Normal rate, regular rhythm, normal heart sounds and intact distal pulses.    Pulmonary/Chest: Effort normal and breath sounds normal. She has no wheezes. She has no rales.   Abdominal: Soft. Bowel sounds are normal. She exhibits no distension and no mass. There is tenderness. There is guarding. There is no rebound. No hernia.   Musculoskeletal: Normal range of motion. She exhibits no edema.   Neurological: She is alert and oriented to person, place, and time.   Skin: Skin is warm and dry. Capillary refill takes less than 2 seconds. She is not diaphoretic.   Psychiatric: She has a normal mood and affect. Her behavior is normal. Judgment and thought content normal.   Nursing note and vitals reviewed.        CRANIAL NERVES     CN III, IV, VI   Extraocular motions are normal.        Significant Labs: All pertinent labs within the past 24 hours have been reviewed.    Significant Imaging: I have reviewed all pertinent imaging results/findings within the past 24 hours.  I have reviewed and interpreted all pertinent imaging results/findings within the past 24 hours.

## 2018-05-16 NOTE — ED TRIAGE NOTES
Patient with abdominal pain that started this evening, sees Dr. Vance for pancreatitis. Also noted to be in A-fib with RVR per EKG.

## 2018-05-16 NOTE — NURSING
Pt admitted through ER, family at bedside. Pt and family oriented to room bed and call light.bed is locked and low with call light within reach. Side rails up x 2. IVF infusing through right ac. Oxygen @ 2L applied in room.

## 2018-05-16 NOTE — ASSESSMENT & PLAN NOTE
Per patient, she had a stent removed 1.5 months ago. CT of abdomen revealed nonspecific prominence of the extrahepatic biliary tree without choledocholithiasis. Also with extensive peripancreatic fluid without drainable collection or evidence of necrosis. Lipase > 1000 and tender to palpation, all consistent with acute pancreatitis. Surprisingly liver enzymes and T Mamadou are WNL. Is s/p cholecystectomy. NPO, IVFs, pain management and consult GI.

## 2018-05-17 PROBLEM — R10.13 EPIGASTRIC ABDOMINAL PAIN: Status: ACTIVE | Noted: 2018-05-17

## 2018-05-17 LAB
ALBUMIN SERPL BCP-MCNC: 2.7 G/DL
ALP SERPL-CCNC: 65 U/L
ALT SERPL W/O P-5'-P-CCNC: 16 U/L
ANION GAP SERPL CALC-SCNC: 7 MMOL/L
AST SERPL-CCNC: 27 U/L
BILIRUB SERPL-MCNC: 0.5 MG/DL
BUN SERPL-MCNC: 19 MG/DL
CALCIUM SERPL-MCNC: 8.7 MG/DL
CHLORIDE SERPL-SCNC: 106 MMOL/L
CO2 SERPL-SCNC: 28 MMOL/L
CREAT SERPL-MCNC: 0.8 MG/DL
EST. GFR  (AFRICAN AMERICAN): >60 ML/MIN/1.73 M^2
EST. GFR  (NON AFRICAN AMERICAN): >60 ML/MIN/1.73 M^2
GLUCOSE SERPL-MCNC: 75 MG/DL
INR PPP: 1.9
LIPASE SERPL-CCNC: >1000 U/L
POTASSIUM SERPL-SCNC: 3.9 MMOL/L
PROT SERPL-MCNC: 6 G/DL
PROTHROMBIN TIME: 19.7 SEC
SODIUM SERPL-SCNC: 141 MMOL/L

## 2018-05-17 PROCEDURE — 94640 AIRWAY INHALATION TREATMENT: CPT

## 2018-05-17 PROCEDURE — 25000242 PHARM REV CODE 250 ALT 637 W/ HCPCS: Performed by: HOSPITALIST

## 2018-05-17 PROCEDURE — 11000001 HC ACUTE MED/SURG PRIVATE ROOM

## 2018-05-17 PROCEDURE — 63600175 PHARM REV CODE 636 W HCPCS: Performed by: INTERNAL MEDICINE

## 2018-05-17 PROCEDURE — 25000003 PHARM REV CODE 250: Performed by: INTERNAL MEDICINE

## 2018-05-17 PROCEDURE — 27000221 HC OXYGEN, UP TO 24 HOURS

## 2018-05-17 PROCEDURE — 63600175 PHARM REV CODE 636 W HCPCS: Performed by: EMERGENCY MEDICINE

## 2018-05-17 PROCEDURE — 36415 COLL VENOUS BLD VENIPUNCTURE: CPT

## 2018-05-17 PROCEDURE — 80053 COMPREHEN METABOLIC PANEL: CPT

## 2018-05-17 PROCEDURE — 94761 N-INVAS EAR/PLS OXIMETRY MLT: CPT

## 2018-05-17 PROCEDURE — 85610 PROTHROMBIN TIME: CPT

## 2018-05-17 PROCEDURE — 97802 MEDICAL NUTRITION INDIV IN: CPT | Performed by: DIETITIAN, REGISTERED

## 2018-05-17 PROCEDURE — 83690 ASSAY OF LIPASE: CPT

## 2018-05-17 PROCEDURE — S0028 INJECTION, FAMOTIDINE, 20 MG: HCPCS | Performed by: INTERNAL MEDICINE

## 2018-05-17 RX ORDER — CARVEDILOL 6.25 MG/1
25 TABLET ORAL 2 TIMES DAILY
Status: DISCONTINUED | OUTPATIENT
Start: 2018-05-17 | End: 2018-05-23 | Stop reason: HOSPADM

## 2018-05-17 RX ORDER — RAMIPRIL 2.5 MG/1
5 CAPSULE ORAL 2 TIMES DAILY
Status: DISCONTINUED | OUTPATIENT
Start: 2018-05-17 | End: 2018-05-23 | Stop reason: HOSPADM

## 2018-05-17 RX ORDER — MORPHINE SULFATE 10 MG/ML
3 INJECTION INTRAMUSCULAR; INTRAVENOUS; SUBCUTANEOUS ONCE
Status: DISCONTINUED | OUTPATIENT
Start: 2018-05-17 | End: 2018-05-23 | Stop reason: HOSPADM

## 2018-05-17 RX ORDER — SOTALOL HYDROCHLORIDE 80 MG/1
80 TABLET ORAL DAILY
Status: DISCONTINUED | OUTPATIENT
Start: 2018-05-17 | End: 2018-05-21

## 2018-05-17 RX ORDER — PREDNISONE 2.5 MG/1
2.5 TABLET ORAL DAILY
Status: DISCONTINUED | OUTPATIENT
Start: 2018-05-17 | End: 2018-05-23 | Stop reason: HOSPADM

## 2018-05-17 RX ORDER — DIPHENHYDRAMINE HCL 25 MG
25 CAPSULE ORAL EVERY 8 HOURS PRN
Status: DISPENSED | OUTPATIENT
Start: 2018-05-17 | End: 2018-05-19

## 2018-05-17 RX ADMIN — CARVEDILOL 25 MG: 6.25 TABLET, FILM COATED ORAL at 04:05

## 2018-05-17 RX ADMIN — PRAVASTATIN SODIUM 20 MG: 10 TABLET ORAL at 08:05

## 2018-05-17 RX ADMIN — IPRATROPIUM BROMIDE AND ALBUTEROL SULFATE 3 ML: .5; 2.5 SOLUTION RESPIRATORY (INHALATION) at 07:05

## 2018-05-17 RX ADMIN — MORPHINE SULFATE 6 MG: 10 INJECTION INTRAVENOUS at 01:05

## 2018-05-17 RX ADMIN — DIPHENHYDRAMINE HYDROCHLORIDE 25 MG: 25 CAPSULE ORAL at 04:05

## 2018-05-17 RX ADMIN — SODIUM CHLORIDE, SODIUM LACTATE, POTASSIUM CHLORIDE, AND CALCIUM CHLORIDE: .6; .31; .03; .02 INJECTION, SOLUTION INTRAVENOUS at 04:05

## 2018-05-17 RX ADMIN — PREDNISONE 2.5 MG: 2.5 TABLET ORAL at 02:05

## 2018-05-17 RX ADMIN — ASPIRIN 81 MG: 81 TABLET, COATED ORAL at 08:05

## 2018-05-17 RX ADMIN — LEVOTHYROXINE SODIUM 50 MCG: 50 TABLET ORAL at 07:05

## 2018-05-17 RX ADMIN — SODIUM CHLORIDE, SODIUM LACTATE, POTASSIUM CHLORIDE, AND CALCIUM CHLORIDE: .6; .31; .03; .02 INJECTION, SOLUTION INTRAVENOUS at 08:05

## 2018-05-17 RX ADMIN — SOTALOL HYDROCHLORIDE 80 MG: 80 TABLET ORAL at 02:05

## 2018-05-17 RX ADMIN — RAMIPRIL 5 MG: 2.5 CAPSULE ORAL at 04:05

## 2018-05-17 RX ADMIN — FAMOTIDINE 20 MG: 10 INJECTION INTRAVENOUS at 08:05

## 2018-05-17 RX ADMIN — IPRATROPIUM BROMIDE AND ALBUTEROL SULFATE 3 ML: .5; 2.5 SOLUTION RESPIRATORY (INHALATION) at 01:05

## 2018-05-17 RX ADMIN — IPRATROPIUM BROMIDE AND ALBUTEROL SULFATE 3 ML: .5; 2.5 SOLUTION RESPIRATORY (INHALATION) at 08:05

## 2018-05-17 NOTE — SUBJECTIVE & OBJECTIVE
Interval History: still with pain and lipase > 1000. BP elevated.     Review of Systems   Constitutional: Negative.    Eyes: Negative.    Respiratory: Negative.    Cardiovascular: Negative.    Gastrointestinal: Positive for abdominal pain. Negative for abdominal distention, constipation, diarrhea, nausea and vomiting.   Genitourinary: Negative.    Musculoskeletal: Negative.    Skin: Negative.    Neurological: Negative.    Hematological: Negative.    Psychiatric/Behavioral: Negative.      Objective:     Vital Signs (Most Recent):  Temp: 98.5 °F (36.9 °C) (05/17/18 1254)  Pulse: 74 (05/17/18 1327)  Resp: 18 (05/17/18 1327)  BP: (!) 191/90 (05/17/18 1254)  SpO2: (!) 94 % (05/17/18 1327) Vital Signs (24h Range):  Temp:  [97.9 °F (36.6 °C)-98.6 °F (37 °C)] 98.5 °F (36.9 °C)  Pulse:  [70-85] 74  Resp:  [17-20] 18  SpO2:  [94 %-99 %] 94 %  BP: (128-191)/(67-90) 191/90     Weight: 48.7 kg (107 lb 5.8 oz)  Body mass index is 21.68 kg/m².    Intake/Output Summary (Last 24 hours) at 05/17/18 1457  Last data filed at 05/16/18 2000   Gross per 24 hour   Intake                0 ml   Output              600 ml   Net             -600 ml      Physical Exam   Constitutional: She is oriented to person, place, and time. She appears well-developed. No distress.   HENT:   Head: Normocephalic and atraumatic.   Mouth/Throat: Oropharynx is clear and moist.   Eyes: Conjunctivae and EOM are normal. No scleral icterus.   Neck: Normal range of motion.   Cardiovascular: Normal rate, regular rhythm, normal heart sounds and intact distal pulses.    Pulmonary/Chest: Effort normal and breath sounds normal. She has no wheezes. She has no rales.   Abdominal: Soft. Bowel sounds are normal. She exhibits no distension and no mass. There is tenderness. There is guarding. There is no rebound. No hernia.   Musculoskeletal: Normal range of motion. She exhibits no edema.   Neurological: She is alert and oriented to person, place, and time.   Skin: Skin is  warm and dry. Capillary refill takes less than 2 seconds. She is not diaphoretic.   Psychiatric: She has a normal mood and affect. Her behavior is normal. Judgment and thought content normal.   Nursing note and vitals reviewed.      Significant Labs: All pertinent labs within the past 24 hours have been reviewed.    Significant Imaging: I have reviewed all pertinent imaging results/findings within the past 24 hours.  I have reviewed and interpreted all pertinent imaging results/findings within the past 24 hours.

## 2018-05-17 NOTE — PLAN OF CARE
Problem: Patient Care Overview  Goal: Plan of Care Review  Patient request aerosol Q6prn given via mp tolerated well sats 99% on 2lpm

## 2018-05-17 NOTE — CONSULTS
[facility] [place]  Consult Note    SUMMARY     Food & Nutrition  Education    Diet Education: coumadin   Time Spent: 10 minutes  Learners: patient      Nutrition Education provided with handouts: handout from Nutrition Care Manual      Comments: Pt has been on coumadin and familiar with the foods she needs to monitor.  Also discussed supplements/herbals.      All questions and concerns answered. Pt verbalize understanding. Dietitian's contact information provided.       Follow-Up: per protocol    Please Re-consult as needed        Thanks!

## 2018-05-17 NOTE — PROGRESS NOTES
[facility] Hospital Medicine  Progress Note    Patient Name: Katt Mcneal  MRN: [id number]  Patient Class: IP- Inpatient   Admission Date: 5/15/2018  Length of Stay: 1 days  Attending Physician: Kalpana Crain MD  Primary Care Provider: Kareen Hodges MD        Subjective:     Principal Problem:Acute biliary pancreatitis without infection or necrosis    HPI:  77 year old female with CAD s/p PCI in July/2017, paroxysmal AFib on coumadin, Hx of pancreatitis s/p stent removal, hypertension and anxiety who presented with acute epigastric pain of one day in evolution. It is severe, constant and radiates to back. Patient stated this is classic of her pancreatitis flares. Also stated she had just had a stent removed 1.5 months ago. Is not on pancreatic enzymes. Stated bouts of diarrhea which are chronic for her.     In the ED, patient was noted to have a tender abdomen, tachycardic, hypertensive and positive findings for pancreatitis on CT of abdomen. Lipase > 1000. Patient admitted to hospital medicine for acute pancreatitis.     Hospital Course:  No notes on file    Interval History: still with pain and lipase > 1000. BP elevated.     Review of Systems   Constitutional: Negative.    Eyes: Negative.    Respiratory: Negative.    Cardiovascular: Negative.    Gastrointestinal: Positive for abdominal pain. Negative for abdominal distention, constipation, diarrhea, nausea and vomiting.   Genitourinary: Negative.    Musculoskeletal: Negative.    Skin: Negative.    Neurological: Negative.    Hematological: Negative.    Psychiatric/Behavioral: Negative.      Objective:     Vital Signs (Most Recent):  Temp: 98.5 °F (36.9 °C) (05/17/18 1254)  Pulse: 74 (05/17/18 1327)  Resp: 18 (05/17/18 1327)  BP: (!) 191/90 (05/17/18 1254)  SpO2: (!) 94 % (05/17/18 1327) Vital Signs (24h Range):  Temp:  [97.9 °F (36.6 °C)-98.6 °F (37 °C)] 98.5 °F (36.9 °C)  Pulse:  [70-85] 74  Resp:  [17-20] 18  SpO2:  [94 %-99 %]  94 %  BP: (128-191)/(67-90) 191/90     Weight: 48.7 kg (107 lb 5.8 oz)  Body mass index is 21.68 kg/m².    Intake/Output Summary (Last 24 hours) at 05/17/18 1457  Last data filed at 05/16/18 2000   Gross per 24 hour   Intake                0 ml   Output              600 ml   Net             -600 ml      Physical Exam   Constitutional: She is oriented to person, place, and time. She appears well-developed. No distress.   HENT:   Head: Normocephalic and atraumatic.   Mouth/Throat: Oropharynx is clear and moist.   Eyes: Conjunctivae and EOM are normal. No scleral icterus.   Neck: Normal range of motion.   Cardiovascular: Normal rate, regular rhythm, normal heart sounds and intact distal pulses.    Pulmonary/Chest: Effort normal and breath sounds normal. She has no wheezes. She has no rales.   Abdominal: Soft. Bowel sounds are normal. She exhibits no distension and no mass. There is tenderness. There is guarding. There is no rebound. No hernia.   Musculoskeletal: Normal range of motion. She exhibits no edema.   Neurological: She is alert and oriented to person, place, and time.   Skin: Skin is warm and dry. Capillary refill takes less than 2 seconds. She is not diaphoretic.   Psychiatric: She has a normal mood and affect. Her behavior is normal. Judgment and thought content normal.   Nursing note and vitals reviewed.      Significant Labs: All pertinent labs within the past 24 hours have been reviewed.    Significant Imaging: I have reviewed all pertinent imaging results/findings within the past 24 hours.  I have reviewed and interpreted all pertinent imaging results/findings within the past 24 hours.    Assessment/Plan:      * Acute biliary pancreatitis without infection or necrosis    Per patient, she had a stent removed 1.5 months ago. CT of abdomen revealed nonspecific prominence of the extrahepatic biliary tree without choledocholithiasis. Also with extensive peripancreatic fluid without drainable collection or  evidence of necrosis. Lipase > 1000 and tender to palpation, all consistent with acute pancreatitis. Surprisingly liver enzymes and T Mamadou are WNL. Is s/p cholecystectomy. NPO, IVFs, pain management. GI recommending current treatment. Holding warfarin and plavix in case ERCP and PD stent placement planned while inpatient. Will discuss with them tomorrow.           Abdominal pain    2/2 pancreatitis        Other specified hypothyroidism    No acute issues. Continue home regimen          Chronic anticoagulation    On warfarin for AFib. Will hold temporarily pending GI recs. May need ERCP          Dyslipidemia    No acute issues. Continue statin          Bilateral carotid artery stenosis    No acute issues          Essential hypertension              Paroxysmal atrial fibrillation    Currently in NSR. Restart sotalol          Chronic diastolic heart failure    Well compensated.           Coronary artery disease involving native coronary artery of native heart without angina pectoris    Patient reported PCI x 4 stents in July/2017. Currently chest pain free and EKG/trop not consistent with ACS. Is on ASA, plavix, carvedilol and ramipril. Would prefer to continue SHA but currently on ASA only in case ERCP planned. Treat pain. Restart antihypertensives gradually            VTE Risk Mitigation         Ordered     Place JUNE hose  Until discontinued      05/17/18 1501     Place sequential compression device  Until discontinued      05/17/18 1501              Kalpana Sterling MD  Department of Hospital Medicine   [facility]

## 2018-05-17 NOTE — PLAN OF CARE
Problem: Patient Care Overview  Goal: Plan of Care Review  Outcome: Ongoing (interventions implemented as appropriate)  Resting well this shift. One time complaint of pain adequately relieved with PRN medication. IV fluids administered as ordered. Family at bedside, supportive and involved in care. Remains free from falls or trauma.

## 2018-05-17 NOTE — NURSING
Dr. Bradshaw notified of complaint of shortness of breath/wheezing, requesting breathing treatments. New orders given.

## 2018-05-17 NOTE — ASSESSMENT & PLAN NOTE
Patient reported PCI x 4 stents in July/2017. Currently chest pain free and EKG/trop not consistent with ACS. Is on ASA, plavix, carvedilol and ramipril. Would prefer to continue SHA but currently on ASA only in case ERCP planned. Treat pain. Restart antihypertensives gradually

## 2018-05-17 NOTE — ASSESSMENT & PLAN NOTE
Per patient, she had a stent removed 1.5 months ago. CT of abdomen revealed nonspecific prominence of the extrahepatic biliary tree without choledocholithiasis. Also with extensive peripancreatic fluid without drainable collection or evidence of necrosis. Lipase > 1000 and tender to palpation, all consistent with acute pancreatitis. Surprisingly liver enzymes and T Mamadou are WNL. Is s/p cholecystectomy. NPO, IVFs, pain management. GI recommending current treatment. Holding warfarin and plavix in case ERCP and PD stent placement planned while inpatient. Will discuss with them tomorrow.

## 2018-05-17 NOTE — PLAN OF CARE
Problem: Patient Care Overview  Goal: Plan of Care Review  Outcome: Ongoing (interventions implemented as appropriate)  Pt free of accidental injury during shift. No s/s of distress noted. Oriented x 4. C/O of pain to abdomen relieved with iv morphine. Remains NPO. IVF  Infusing as ordered. Frequent rounds made. Safety measures maintained. Call bell within reach. Will continue to monitor

## 2018-05-17 NOTE — PROGRESS NOTES
Chief Complaint / Reason for Consult: Abd. Pain, N/V    Pt. Reports continued abd. pain    ROS: No CP, SOB, F/C    Patient Vitals for the past 24 hrs:   BP Temp Temp src Pulse Resp SpO2   05/17/18 1327 - - - 74 18 (!) 94 %   05/17/18 1254 (!) 191/90 98.5 °F (36.9 °C) Oral 85 19 98 %   05/17/18 0800 - - - 80 - -   05/17/18 0758 - - - 75 20 (!) 94 %   05/17/18 0751 (!) 165/77 97.9 °F (36.6 °C) Oral 75 19 99 %   05/17/18 0455 134/67 98.3 °F (36.8 °C) Oral 70 18 99 %   05/17/18 0050 136/67 98.6 °F (37 °C) Oral 80 18 95 %   05/16/18 2123 - - - 74 17 99 %   05/16/18 1657 128/78 97.9 °F (36.6 °C) Oral 74 19 97 %       Physical Exam:  Gen - Well developed, well nourished, no apparent distress  HEENT - Anicteric  CV - S1, S2, no murmurs/rubs  Lungs - CTA-B, normal excursion  Abd - Soft, + TTP in epigastrium, no rebound, ND, normal BS's, no HSM.  Ext - No c/c/e  Neuro - No asterixis    Labs:    Recent Labs  Lab 05/17/18  0419   CALCIUM 8.7   PROT 6.0      K 3.9   CO2 28      BUN 19   CREATININE 0.8   ALKPHOS 65   ALT 16   AST 27   BILITOT 0.5       Recent Labs  Lab 05/17/18  0419   INR 1.9*     Lipase > 1000    Imaging:  Reviewed CT - + pancreatitis    Assessment:  This patient is a 77 y.o. female with:   1. Acute/Intermittent Pancreatitis - Unclear etiology.   Has had previous unrevealing evaluation.  Has undergone ERCP with PD stenting in the past, with some relief.  BUN is decreasing.    2. Abd. Pain, N/V - secondary to #1.  3. HTN, CAD, diastolic CHF, Polymyalgia Rheumatica, A-fib, Hypothyroidism, IBS.....    Recommendations:  1. Monitor abd. Exam.  2. IVF's/LR, analgesics.  3. Hopefully can resume PO diet, soon.  4. Evaluation for possible outpatient repeat PD stenting

## 2018-05-18 LAB
ALBUMIN SERPL BCP-MCNC: 2.6 G/DL
ALP SERPL-CCNC: 78 U/L
ALT SERPL W/O P-5'-P-CCNC: 12 U/L
ANION GAP SERPL CALC-SCNC: 7 MMOL/L
AST SERPL-CCNC: 24 U/L
BILIRUB SERPL-MCNC: 0.5 MG/DL
BUN SERPL-MCNC: 16 MG/DL
CALCIUM SERPL-MCNC: 9.2 MG/DL
CHLORIDE SERPL-SCNC: 103 MMOL/L
CO2 SERPL-SCNC: 28 MMOL/L
CREAT SERPL-MCNC: 0.7 MG/DL
EST. GFR  (AFRICAN AMERICAN): >60 ML/MIN/1.73 M^2
EST. GFR  (NON AFRICAN AMERICAN): >60 ML/MIN/1.73 M^2
GLUCOSE SERPL-MCNC: 58 MG/DL
INR PPP: 1.5
LIPASE SERPL-CCNC: 227 U/L
POTASSIUM SERPL-SCNC: 4.3 MMOL/L
PROT SERPL-MCNC: 6.3 G/DL
PROTHROMBIN TIME: 15.4 SEC
SODIUM SERPL-SCNC: 138 MMOL/L

## 2018-05-18 PROCEDURE — S0028 INJECTION, FAMOTIDINE, 20 MG: HCPCS | Performed by: INTERNAL MEDICINE

## 2018-05-18 PROCEDURE — 80053 COMPREHEN METABOLIC PANEL: CPT

## 2018-05-18 PROCEDURE — 36415 COLL VENOUS BLD VENIPUNCTURE: CPT

## 2018-05-18 PROCEDURE — 27000221 HC OXYGEN, UP TO 24 HOURS

## 2018-05-18 PROCEDURE — 94640 AIRWAY INHALATION TREATMENT: CPT

## 2018-05-18 PROCEDURE — 85610 PROTHROMBIN TIME: CPT

## 2018-05-18 PROCEDURE — 63600175 PHARM REV CODE 636 W HCPCS: Performed by: EMERGENCY MEDICINE

## 2018-05-18 PROCEDURE — 94761 N-INVAS EAR/PLS OXIMETRY MLT: CPT

## 2018-05-18 PROCEDURE — 11000001 HC ACUTE MED/SURG PRIVATE ROOM

## 2018-05-18 PROCEDURE — 83690 ASSAY OF LIPASE: CPT

## 2018-05-18 PROCEDURE — 25000242 PHARM REV CODE 250 ALT 637 W/ HCPCS: Performed by: HOSPITALIST

## 2018-05-18 PROCEDURE — 25000003 PHARM REV CODE 250: Performed by: INTERNAL MEDICINE

## 2018-05-18 PROCEDURE — 63600175 PHARM REV CODE 636 W HCPCS: Performed by: INTERNAL MEDICINE

## 2018-05-18 PROCEDURE — 25000242 PHARM REV CODE 250 ALT 637 W/ HCPCS: Performed by: INTERNAL MEDICINE

## 2018-05-18 RX ORDER — ENOXAPARIN SODIUM 100 MG/ML
1 INJECTION SUBCUTANEOUS EVERY 12 HOURS
Status: DISCONTINUED | OUTPATIENT
Start: 2018-05-18 | End: 2018-05-19

## 2018-05-18 RX ORDER — WARFARIN 2.5 MG/1
2.5 TABLET ORAL DAILY
Status: DISCONTINUED | OUTPATIENT
Start: 2018-05-18 | End: 2018-05-20

## 2018-05-18 RX ORDER — CLOPIDOGREL BISULFATE 75 MG/1
75 TABLET ORAL DAILY
Status: DISCONTINUED | OUTPATIENT
Start: 2018-05-19 | End: 2018-05-23 | Stop reason: HOSPADM

## 2018-05-18 RX ORDER — HYDRALAZINE HYDROCHLORIDE 20 MG/ML
5 INJECTION INTRAMUSCULAR; INTRAVENOUS EVERY 8 HOURS PRN
Status: DISCONTINUED | OUTPATIENT
Start: 2018-05-18 | End: 2018-05-22

## 2018-05-18 RX ORDER — IPRATROPIUM BROMIDE AND ALBUTEROL SULFATE 2.5; .5 MG/3ML; MG/3ML
3 SOLUTION RESPIRATORY (INHALATION)
Status: DISCONTINUED | OUTPATIENT
Start: 2018-05-18 | End: 2018-05-21

## 2018-05-18 RX ADMIN — PREDNISONE 2.5 MG: 2.5 TABLET ORAL at 08:05

## 2018-05-18 RX ADMIN — CARVEDILOL 25 MG: 6.25 TABLET, FILM COATED ORAL at 08:05

## 2018-05-18 RX ADMIN — RAMIPRIL 5 MG: 2.5 CAPSULE ORAL at 08:05

## 2018-05-18 RX ADMIN — IPRATROPIUM BROMIDE AND ALBUTEROL SULFATE 3 ML: .5; 2.5 SOLUTION RESPIRATORY (INHALATION) at 07:05

## 2018-05-18 RX ADMIN — SODIUM CHLORIDE, SODIUM LACTATE, POTASSIUM CHLORIDE, AND CALCIUM CHLORIDE: .6; .31; .03; .02 INJECTION, SOLUTION INTRAVENOUS at 11:05

## 2018-05-18 RX ADMIN — IPRATROPIUM BROMIDE AND ALBUTEROL SULFATE 3 ML: .5; 2.5 SOLUTION RESPIRATORY (INHALATION) at 04:05

## 2018-05-18 RX ADMIN — SOTALOL HYDROCHLORIDE 80 MG: 80 TABLET ORAL at 08:05

## 2018-05-18 RX ADMIN — SODIUM CHLORIDE, SODIUM LACTATE, POTASSIUM CHLORIDE, AND CALCIUM CHLORIDE: .6; .31; .03; .02 INJECTION, SOLUTION INTRAVENOUS at 05:05

## 2018-05-18 RX ADMIN — PRAVASTATIN SODIUM 20 MG: 10 TABLET ORAL at 08:05

## 2018-05-18 RX ADMIN — LEVOTHYROXINE SODIUM 50 MCG: 50 TABLET ORAL at 05:05

## 2018-05-18 RX ADMIN — ENOXAPARIN SODIUM 50 MG: 100 INJECTION SUBCUTANEOUS at 04:05

## 2018-05-18 RX ADMIN — MORPHINE SULFATE 6 MG: 10 INJECTION INTRAVENOUS at 11:05

## 2018-05-18 RX ADMIN — HYDRALAZINE HYDROCHLORIDE 5 MG: 20 INJECTION INTRAMUSCULAR; INTRAVENOUS at 11:05

## 2018-05-18 RX ADMIN — MORPHINE SULFATE 6 MG: 10 INJECTION INTRAVENOUS at 04:05

## 2018-05-18 RX ADMIN — WARFARIN SODIUM 2.5 MG: 2.5 TABLET ORAL at 05:05

## 2018-05-18 RX ADMIN — IPRATROPIUM BROMIDE AND ALBUTEROL SULFATE 3 ML: .5; 2.5 SOLUTION RESPIRATORY (INHALATION) at 12:05

## 2018-05-18 RX ADMIN — IPRATROPIUM BROMIDE AND ALBUTEROL SULFATE 3 ML: .5; 2.5 SOLUTION RESPIRATORY (INHALATION) at 11:05

## 2018-05-18 RX ADMIN — FAMOTIDINE 20 MG: 10 INJECTION INTRAVENOUS at 08:05

## 2018-05-18 RX ADMIN — HYDRALAZINE HYDROCHLORIDE 5 MG: 20 INJECTION INTRAMUSCULAR; INTRAVENOUS at 08:05

## 2018-05-18 RX ADMIN — ASPIRIN 81 MG: 81 TABLET, COATED ORAL at 08:05

## 2018-05-18 RX ADMIN — SODIUM CHLORIDE, SODIUM LACTATE, POTASSIUM CHLORIDE, AND CALCIUM CHLORIDE: .6; .31; .03; .02 INJECTION, SOLUTION INTRAVENOUS at 02:05

## 2018-05-18 RX ADMIN — MORPHINE SULFATE 6 MG: 10 INJECTION INTRAVENOUS at 07:05

## 2018-05-18 NOTE — PLAN OF CARE
Problem: Patient Care Overview  Goal: Plan of Care Review  Outcome: Ongoing (interventions implemented as appropriate)  Resting well this shift. Denies pain. PRN respiratory treatments for wheezing. IV fluids administered as ordered. Family at bedside, supportive and involved in care. Remains free from falls or trauma.

## 2018-05-18 NOTE — ASSESSMENT & PLAN NOTE
Per patient, she had a stent removed 1.5 months ago. CT of abdomen revealed nonspecific prominence of the extrahepatic biliary tree without choledocholithiasis. Also with extensive peripancreatic fluid without drainable collection or evidence of necrosis. Lipase was > 1000 and tender to palpation, all consistent with acute pancreatitis. Surprisingly liver enzymes and T Mamadou are WNL. Is s/p cholecystectomy. NPO, IVFs, pain management (strongly advised to ask for morphine for pain control). GI recommending current treatment. Lipase now much better down to 200s. Hopefully start diet tomorrow. Decrease rate of fluids to 100 cc/hr. No immediate plans for endoscopy while inpatient.

## 2018-05-18 NOTE — PLAN OF CARE
05/18/18 1430   Discharge Reassessment   Assessment Type Discharge Planning Reassessment   Provided patient/caregiver education on the expected discharge date and the discharge plan No   Do you have any problems affording any of your prescribed medications? No   Discharge Plan A Home with family   Discharge Plan B Home with family   Can the patient answer the patient profile reliably? Yes, cognitively intact   How does the patient rate their overall health at the present time? Fair   Describe the patient's ability to walk at the present time. No restrictions   How often would a person be available to care for the patient? Whenever needed   Number of comorbid conditions (as recorded on the chart) Three   During the past month, has the patient often been bothered by feeling down, depressed or hopeless? No   During the past month, has the patient often been bothered by little interest or pleasure in doing things? No     CINTIA contacted Dr. Degroot's office @ [phone] to schedule PCP appointment. CINTIA spoke with Magaly, appointment scheduled and placed in follow-up tab.     Barriers to discharge: No barriers has been identified at this time.     Discharge Goal: Pt discharge goal is home. Pt has declined HH.      Help at home: Pt daughter, [place], HELPS PT AT HOME.

## 2018-05-18 NOTE — ASSESSMENT & PLAN NOTE
Patient reported PCI x 4 stents in July/2017. Currently chest pain free and EKG/trop not consistent with ACS. Is on ASA, plavix, carvedilol and ramipril. Antihypertensives started for HBP. Pain is a factor. Restart plavix as there are no immediate plans for endoscopy while inpatient

## 2018-05-18 NOTE — PLAN OF CARE
Problem: Patient Care Overview  Goal: Plan of Care Review  Outcome: Ongoing (interventions implemented as appropriate)  Pt free of accidental injury during shift. No s/s of distress noted. C/O pain to abdomen. Medicated as prescribed. Scheduled breathing treatments administered as ordered. IVF infusing. Remains npo. Safety measures maintained. Will continue to monitor

## 2018-05-18 NOTE — SUBJECTIVE & OBJECTIVE
Interval History: still with pain but lipase actually better. With shallow breathing due to pain but not asking for pain meds because she is trying to avoid it. Using incentive spirometer. Has NC in place. BP elevated.     Review of Systems   Constitutional: Negative.    Eyes: Negative.    Respiratory: Positive for shortness of breath.    Cardiovascular: Negative.    Gastrointestinal: Positive for abdominal pain. Negative for abdominal distention, constipation, diarrhea, nausea and vomiting.   Genitourinary: Negative.    Musculoskeletal: Negative.    Skin: Negative.    Neurological: Negative.    Hematological: Negative.    Psychiatric/Behavioral: Negative.      Objective:     Vital Signs (Most Recent):  Temp: 97.9 °F (36.6 °C) (05/18/18 1058)  Pulse: 81 (05/18/18 1615)  Resp: 18 (05/18/18 1615)  BP: (!) 181/87 (05/18/18 1058)  SpO2: 99 % (05/18/18 1615) Vital Signs (24h Range):  Temp:  [97.6 °F (36.4 °C)-98.4 °F (36.9 °C)] 97.9 °F (36.6 °C)  Pulse:  [77-84] 81  Resp:  [18-20] 18  SpO2:  [85 %-100 %] 99 %  BP: (162-205)/() 181/87     Weight: 48.7 kg (107 lb 5.8 oz)  Body mass index is 21.68 kg/m².    Intake/Output Summary (Last 24 hours) at 05/18/18 1623  Last data filed at 05/18/18 1200   Gross per 24 hour   Intake             3000 ml   Output              900 ml   Net             2100 ml      Physical Exam   Constitutional: She is oriented to person, place, and time. She appears well-developed. No distress.   HENT:   Head: Normocephalic and atraumatic.   Mouth/Throat: Oropharynx is clear and moist.   Eyes: Conjunctivae and EOM are normal. No scleral icterus.   Neck: Normal range of motion.   Cardiovascular: Normal rate, regular rhythm, normal heart sounds and intact distal pulses.    Pulmonary/Chest: Effort normal. She has no wheezes.   Coarse breath sounds bibasilar  Speaking in full sentences   Abdominal: Soft. Bowel sounds are normal. She exhibits no distension and no mass. There is tenderness. There is  guarding. There is no rebound. No hernia.   Musculoskeletal: Normal range of motion. She exhibits no edema.   Neurological: She is alert and oriented to person, place, and time.   Skin: Skin is warm and dry. Capillary refill takes less than 2 seconds. She is not diaphoretic.   Psychiatric: She has a normal mood and affect. Her behavior is normal. Judgment and thought content normal.   Nursing note and vitals reviewed.      Significant Labs: All pertinent labs within the past 24 hours have been reviewed.    Significant Imaging: I have reviewed all pertinent imaging results/findings within the past 24 hours.  I have reviewed and interpreted all pertinent imaging results/findings within the past 24 hours.

## 2018-05-18 NOTE — PROGRESS NOTES
"Gastroenterology    CC: Abd pain    HPI 77 y.o. female N/V resolved.  Some mild flatus.  Pain better, though still present.       Past Medical History:   Diagnosis Date    Arthritis     Atrial fibrillation     Bilateral carotid artery stenosis 7/13/2017    Coronary artery disease     Fibromyalgia     Hyperlipidemia     Hypertension     Myocardial infarction 03/21/2015    Pancreatitis     Thyroid disease          Review of Systems  General ROS: negative for chills, fever   Cardiovascular ROS: no chest pain or dyspnea     Physical Examination  BP (!) 162/84 (BP Location: Left arm, Patient Position: Lying)   Pulse 77   Temp 97.6 °F (36.4 °C) (Oral)   Resp 20   Ht 4' 11" (1.499 m)   Wt 48.7 kg (107 lb 5.8 oz)   SpO2 100%   Breastfeeding? No   BMI 21.68 kg/m²   General appearance: alert, cooperative, no distress  HENT: Normocephalic, atraumatic, neck symmetrical, no nasal discharge   Eyes: conjunctivae/corneas clear, no icterus, EOM's intact  Lungs: resp non-labored  Heart: regular rate   Abdomen: soft, ND, TTP diffuse  Extremities: extremities symmetric; no clubbing, cyanosis, or edema  Neurologic: Alert and oriented X 3, MAEW    Imaging:  CT reviewed    Assessment:     This patient is a 77 y.o. female with:   1. Acute/Intermittent Pancreatitis - Unclear etiology.   Has had previous extensive unrevealing evaluation.  Has undergone ERCP with PD stenting in the past, with some relief, though last stent placed in March this year. Prednisone can cause pancreatitis but she is on a small dose. Statin also a possibility. BUN is decreasing.  HR normal.   2. Abd. Pain, N/V - secondary to #1.  3. HTN, CAD, diastolic CHF, Polymyalgia Rheumatica, A-fib, Hypothyroidism, IBS.....    Plan:   - Cont LR  - Hold off on diet until pain improves  - Will plan MRI/MRCP as outpt       "

## 2018-05-18 NOTE — PROGRESS NOTES
[facility] Hospital Medicine  Progress Note    Patient Name: Katt Mcneal  MRN: [id number]  Patient Class: IP- Inpatient   Admission Date: 5/15/2018  Length of Stay: 2 days  Attending Physician: Kalpana Crain MD  Primary Care Provider: Kareen Hodges MD        Subjective:     Principal Problem:Acute biliary pancreatitis without infection or necrosis    HPI:  77 year old female with CAD s/p PCI in July/2017, paroxysmal AFib on coumadin, Hx of pancreatitis s/p stent removal, hypertension and anxiety who presented with acute epigastric pain of one day in evolution. It is severe, constant and radiates to back. Patient stated this is classic of her pancreatitis flares. Also stated she had just had a stent removed 1.5 months ago. Is not on pancreatic enzymes. Stated bouts of diarrhea which are chronic for her.     In the ED, patient was noted to have a tender abdomen, tachycardic, hypertensive and positive findings for pancreatitis on CT of abdomen. Lipase > 1000. Patient admitted to hospital medicine for acute pancreatitis.     Hospital Course:  No notes on file    Interval History: still with pain but lipase actually better. With shallow breathing due to pain but not asking for pain meds because she is trying to avoid it. Using incentive spirometer. Has NC in place. BP elevated.     Review of Systems   Constitutional: Negative.    Eyes: Negative.    Respiratory: Positive for shortness of breath.    Cardiovascular: Negative.    Gastrointestinal: Positive for abdominal pain. Negative for abdominal distention, constipation, diarrhea, nausea and vomiting.   Genitourinary: Negative.    Musculoskeletal: Negative.    Skin: Negative.    Neurological: Negative.    Hematological: Negative.    Psychiatric/Behavioral: Negative.      Objective:     Vital Signs (Most Recent):  Temp: 97.9 °F (36.6 °C) (05/18/18 1058)  Pulse: 81 (05/18/18 1615)  Resp: 18 (05/18/18 1615)  BP: (!) 181/87 (05/18/18  1058)  SpO2: 99 % (05/18/18 1615) Vital Signs (24h Range):  Temp:  [97.6 °F (36.4 °C)-98.4 °F (36.9 °C)] 97.9 °F (36.6 °C)  Pulse:  [77-84] 81  Resp:  [18-20] 18  SpO2:  [85 %-100 %] 99 %  BP: (162-205)/() 181/87     Weight: 48.7 kg (107 lb 5.8 oz)  Body mass index is 21.68 kg/m².    Intake/Output Summary (Last 24 hours) at 05/18/18 1623  Last data filed at 05/18/18 1200   Gross per 24 hour   Intake             3000 ml   Output              900 ml   Net             2100 ml      Physical Exam   Constitutional: She is oriented to person, place, and time. She appears well-developed. No distress.   HENT:   Head: Normocephalic and atraumatic.   Mouth/Throat: Oropharynx is clear and moist.   Eyes: Conjunctivae and EOM are normal. No scleral icterus.   Neck: Normal range of motion.   Cardiovascular: Normal rate, regular rhythm, normal heart sounds and intact distal pulses.    Pulmonary/Chest: Effort normal. She has no wheezes.   Coarse breath sounds bibasilar  Speaking in full sentences   Abdominal: Soft. Bowel sounds are normal. She exhibits no distension and no mass. There is tenderness. There is guarding. There is no rebound. No hernia.   Musculoskeletal: Normal range of motion. She exhibits no edema.   Neurological: She is alert and oriented to person, place, and time.   Skin: Skin is warm and dry. Capillary refill takes less than 2 seconds. She is not diaphoretic.   Psychiatric: She has a normal mood and affect. Her behavior is normal. Judgment and thought content normal.   Nursing note and vitals reviewed.      Significant Labs: All pertinent labs within the past 24 hours have been reviewed.    Significant Imaging: I have reviewed all pertinent imaging results/findings within the past 24 hours.  I have reviewed and interpreted all pertinent imaging results/findings within the past 24 hours.    Assessment/Plan:      * Acute biliary pancreatitis without infection or necrosis    Per patient, she had a stent  removed 1.5 months ago. CT of abdomen revealed nonspecific prominence of the extrahepatic biliary tree without choledocholithiasis. Also with extensive peripancreatic fluid without drainable collection or evidence of necrosis. Lipase was > 1000 and tender to palpation, all consistent with acute pancreatitis. Surprisingly liver enzymes and T Mamadou are WNL. Is s/p cholecystectomy. NPO, IVFs, pain management (strongly advised to ask for morphine for pain control). GI recommending current treatment. Lipase now much better down to 200s. Hopefully start diet tomorrow. Decrease rate of fluids to 100 cc/hr. No immediate plans for endoscopy while inpatient.         Abdominal pain    2/2 pancreatitis        Other specified hypothyroidism    No acute issues. Continue home regimen          Chronic anticoagulation    Restart warfarin bridged with lovenox until INR > 2          Dyslipidemia    No acute issues. Continue statin          Bilateral carotid artery stenosis    No acute issues          Essential hypertension              Paroxysmal atrial fibrillation    Currently in NSR. Restarted sotalol and coreg          Chronic diastolic heart failure    Well compensated.           Coronary artery disease involving native coronary artery of native heart without angina pectoris    Patient reported PCI x 4 stents in July/2017. Currently chest pain free and EKG/trop not consistent with ACS. Is on ASA, plavix, carvedilol and ramipril. Antihypertensives started for HBP. Pain is a factor. Restart plavix as there are no immediate plans for endoscopy while inpatient            VTE Risk Mitigation         Ordered     warfarin (COUMADIN) tablet 2.5 mg  Daily      05/18/18 1032     enoxaparin injection 50 mg  Every 12 hours      05/18/18 1623     Place JUNE hose  Until discontinued      05/17/18 1501     Place sequential compression device  Until discontinued      05/17/18 1501              Kalpana Sterling MD  Department of Hospital Medicine    [facility]

## 2018-05-18 NOTE — PLAN OF CARE
05/18/18 1105   Medicare Message   Important Message from Medicare regarding Discharge Appeal Rights Given to patient/caregiver;Explained to patient/caregiver;Signed/date by patient/caregiver   Date IMM was signed 05/18/18   Time IMM was signed 1100

## 2018-05-19 PROBLEM — R04.0 ANTERIOR EPISTAXIS: Status: ACTIVE | Noted: 2018-05-19

## 2018-05-19 LAB
ALBUMIN SERPL BCP-MCNC: 2.7 G/DL
ALP SERPL-CCNC: 74 U/L
ALT SERPL W/O P-5'-P-CCNC: 15 U/L
ANION GAP SERPL CALC-SCNC: 16 MMOL/L
AST SERPL-CCNC: 28 U/L
BILIRUB SERPL-MCNC: 0.6 MG/DL
BUN SERPL-MCNC: 14 MG/DL
CALCIUM SERPL-MCNC: 9.4 MG/DL
CHLORIDE SERPL-SCNC: 103 MMOL/L
CO2 SERPL-SCNC: 20 MMOL/L
CREAT SERPL-MCNC: 0.8 MG/DL
EST. GFR  (AFRICAN AMERICAN): >60 ML/MIN/1.73 M^2
EST. GFR  (NON AFRICAN AMERICAN): >60 ML/MIN/1.73 M^2
GLUCOSE SERPL-MCNC: 49 MG/DL
INR PPP: 1.8
LIPASE SERPL-CCNC: 75 U/L
POCT GLUCOSE: 112 MG/DL (ref 70–110)
POCT GLUCOSE: 169 MG/DL (ref 70–110)
POCT GLUCOSE: 51 MG/DL (ref 70–110)
POTASSIUM SERPL-SCNC: 4.4 MMOL/L
PROT SERPL-MCNC: 6.8 G/DL
PROTHROMBIN TIME: 19 SEC
SODIUM SERPL-SCNC: 139 MMOL/L

## 2018-05-19 PROCEDURE — 94640 AIRWAY INHALATION TREATMENT: CPT

## 2018-05-19 PROCEDURE — 80053 COMPREHEN METABOLIC PANEL: CPT

## 2018-05-19 PROCEDURE — S0028 INJECTION, FAMOTIDINE, 20 MG: HCPCS | Performed by: INTERNAL MEDICINE

## 2018-05-19 PROCEDURE — 85610 PROTHROMBIN TIME: CPT

## 2018-05-19 PROCEDURE — 25000242 PHARM REV CODE 250 ALT 637 W/ HCPCS: Performed by: INTERNAL MEDICINE

## 2018-05-19 PROCEDURE — 83690 ASSAY OF LIPASE: CPT

## 2018-05-19 PROCEDURE — 36415 COLL VENOUS BLD VENIPUNCTURE: CPT

## 2018-05-19 PROCEDURE — 25000003 PHARM REV CODE 250: Performed by: INTERNAL MEDICINE

## 2018-05-19 PROCEDURE — 63600175 PHARM REV CODE 636 W HCPCS: Performed by: EMERGENCY MEDICINE

## 2018-05-19 PROCEDURE — 11000001 HC ACUTE MED/SURG PRIVATE ROOM

## 2018-05-19 PROCEDURE — 63600175 PHARM REV CODE 636 W HCPCS: Performed by: INTERNAL MEDICINE

## 2018-05-19 RX ORDER — GLUCAGON 1 MG
1 KIT INJECTION
Status: DISCONTINUED | OUTPATIENT
Start: 2018-05-19 | End: 2018-05-23 | Stop reason: HOSPADM

## 2018-05-19 RX ORDER — IBUPROFEN 200 MG
16 TABLET ORAL
Status: DISCONTINUED | OUTPATIENT
Start: 2018-05-19 | End: 2018-05-23 | Stop reason: HOSPADM

## 2018-05-19 RX ORDER — IBUPROFEN 200 MG
24 TABLET ORAL
Status: DISCONTINUED | OUTPATIENT
Start: 2018-05-19 | End: 2018-05-23 | Stop reason: HOSPADM

## 2018-05-19 RX ADMIN — CLOPIDOGREL BISULFATE 75 MG: 75 TABLET ORAL at 08:05

## 2018-05-19 RX ADMIN — RAMIPRIL 5 MG: 2.5 CAPSULE ORAL at 08:05

## 2018-05-19 RX ADMIN — IPRATROPIUM BROMIDE AND ALBUTEROL SULFATE 3 ML: .5; 2.5 SOLUTION RESPIRATORY (INHALATION) at 03:05

## 2018-05-19 RX ADMIN — RAMIPRIL 5 MG: 2.5 CAPSULE ORAL at 09:05

## 2018-05-19 RX ADMIN — CARVEDILOL 25 MG: 6.25 TABLET, FILM COATED ORAL at 08:05

## 2018-05-19 RX ADMIN — IPRATROPIUM BROMIDE AND ALBUTEROL SULFATE 3 ML: .5; 2.5 SOLUTION RESPIRATORY (INHALATION) at 11:05

## 2018-05-19 RX ADMIN — MORPHINE SULFATE 6 MG: 10 INJECTION INTRAVENOUS at 08:05

## 2018-05-19 RX ADMIN — DIPHENHYDRAMINE HYDROCHLORIDE 25 MG: 25 CAPSULE ORAL at 09:05

## 2018-05-19 RX ADMIN — WARFARIN SODIUM 2.5 MG: 2.5 TABLET ORAL at 04:05

## 2018-05-19 RX ADMIN — PRAVASTATIN SODIUM 20 MG: 10 TABLET ORAL at 08:05

## 2018-05-19 RX ADMIN — MORPHINE SULFATE 6 MG: 10 INJECTION INTRAVENOUS at 10:05

## 2018-05-19 RX ADMIN — FAMOTIDINE 20 MG: 10 INJECTION INTRAVENOUS at 08:05

## 2018-05-19 RX ADMIN — LEVOTHYROXINE SODIUM 50 MCG: 50 TABLET ORAL at 06:05

## 2018-05-19 RX ADMIN — ASPIRIN 81 MG: 81 TABLET, COATED ORAL at 08:05

## 2018-05-19 RX ADMIN — SODIUM CHLORIDE, SODIUM LACTATE, POTASSIUM CHLORIDE, AND CALCIUM CHLORIDE: .6; .31; .03; .02 INJECTION, SOLUTION INTRAVENOUS at 03:05

## 2018-05-19 RX ADMIN — CARVEDILOL 25 MG: 6.25 TABLET, FILM COATED ORAL at 09:05

## 2018-05-19 RX ADMIN — PREDNISONE 2.5 MG: 2.5 TABLET ORAL at 08:05

## 2018-05-19 RX ADMIN — IPRATROPIUM BROMIDE AND ALBUTEROL SULFATE 3 ML: .5; 2.5 SOLUTION RESPIRATORY (INHALATION) at 08:05

## 2018-05-19 RX ADMIN — SOTALOL HYDROCHLORIDE 80 MG: 80 TABLET ORAL at 08:05

## 2018-05-19 RX ADMIN — HYDRALAZINE HYDROCHLORIDE 5 MG: 20 INJECTION INTRAMUSCULAR; INTRAVENOUS at 07:05

## 2018-05-19 RX ADMIN — DEXTROSE MONOHYDRATE 25 G: 25 INJECTION, SOLUTION INTRAVENOUS at 07:05

## 2018-05-19 NOTE — PLAN OF CARE
Problem: Patient Care Overview  Goal: Plan of Care Review  Outcome: Ongoing (interventions implemented as appropriate)  Pt free of accidental injury during shift. No s/s of distress noted. Stating pain is improving. IVF infusing. Tolerating clear liquid diet well. Up to chair and ambulated in room with standby assist. O2 4L NC sating upper 90's. No nose bleeds noted. Instructed to use IS at bedside. Verbalized understanding. Safety measures maintained. Will continue to monitor

## 2018-05-19 NOTE — PROGRESS NOTES
The patient states her pain is improving slowly, but she still has pain.  She does not wish to eat yet.  The patient has been refusing her pain medications as she states the coumadin, aspirin, plavix, and lovenox are causing her to have nasal bleeding and the pain medications would sedate her to where she is concerned if she could protect her airway with the nasal bleeding if she is sleeping.    Vitals:    05/18/18 1945 05/18/18 1959 05/18/18 2326 05/19/18 0446   BP:   (!) 166/75 (!) 190/91   BP Location:       Patient Position:   Lying Lying   Pulse: 82 79 83 84   Resp:  18 18 18   Temp:   98.3 °F (36.8 °C) 97.9 °F (36.6 °C)   TempSrc:   Oral Oral   SpO2:  98% 99% 98%   Weight:       Height:          albuterol-ipratropium  3 mL Nebulization Q4H WAKE    aspirin  81 mg Oral Daily    carvedilol  25 mg Oral BID    clopidogrel  75 mg Oral Daily    dextrose 50%  25 g Intravenous Once    enoxaparin  1 mg/kg Subcutaneous Q12H    famotidine (PF)  20 mg Intravenous Daily    levothyroxine  50 mcg Oral Before breakfast    morphine  3 mg Intravenous Once    pravastatin  20 mg Oral Daily    predniSONE  2.5 mg Oral Daily    ramipril  5 mg Oral BID    sotalol  80 mg Oral Daily    warfarin  2.5 mg Oral Daily     P.E.:  GEN: A x O x 3, NAD  HEENT: EOMI, PERRL, anicteric sclera  CV: RRR, no M/R/G  Chest: CTA B  Abdomen: soft, tender in left upper quadrant, nondistended, normoactive BS  Ext: No C/C/E. 2+ dorsalis pedis pulses B    Labs:  Recent Results (from the past 336 hour(s))   CBC auto differential    Collection Time: 05/15/18 10:55 PM   Result Value Ref Range    WBC 10.96 3.90 - 12.70 K/uL    Hemoglobin 11.9 (L) 12.0 - 16.0 g/dL    Hematocrit 38.3 37.0 - 48.5 %    Platelets 357 (H) 150 - 350 K/uL     CMP  Sodium   Date Value Ref Range Status   05/19/2018 139 136 - 145 mmol/L Final     Potassium   Date Value Ref Range Status   05/19/2018 4.4 3.5 - 5.1 mmol/L Final     Chloride   Date Value Ref Range Status    05/19/2018 103 95 - 110 mmol/L Final     CO2   Date Value Ref Range Status   05/19/2018 20 (L) 23 - 29 mmol/L Final     Glucose   Date Value Ref Range Status   05/19/2018 49 (LL) 70 - 110 mg/dL Final     Comment:     Glucose  critical result(s) called and verbal readback obtained from   Lilly Chaparro., 05/19/2018 07:25       BUN, Bld   Date Value Ref Range Status   05/19/2018 14 8 - 23 mg/dL Final     Creatinine   Date Value Ref Range Status   05/19/2018 0.8 0.5 - 1.4 mg/dL Final     Calcium   Date Value Ref Range Status   05/19/2018 9.4 8.7 - 10.5 mg/dL Final     Total Protein   Date Value Ref Range Status   05/19/2018 6.8 6.0 - 8.4 g/dL Final     Albumin   Date Value Ref Range Status   05/19/2018 2.7 (L) 3.5 - 5.2 g/dL Final     Total Bilirubin   Date Value Ref Range Status   05/19/2018 0.6 0.1 - 1.0 mg/dL Final     Comment:     For infants and newborns, interpretation of results should be based  on gestational age, weight and in agreement with clinical  observations.  Premature Infant recommended reference ranges:  Up to 24 hours.............<8.0 mg/dL  Up to 48 hours............<12.0 mg/dL  3-5 days..................<15.0 mg/dL  6-29 days.................<15.0 mg/dL       Alkaline Phosphatase   Date Value Ref Range Status   05/19/2018 74 55 - 135 U/L Final     AST   Date Value Ref Range Status   05/19/2018 28 10 - 40 U/L Final     ALT   Date Value Ref Range Status   05/19/2018 15 10 - 44 U/L Final     Anion Gap   Date Value Ref Range Status   05/19/2018 16 8 - 16 mmol/L Final     eGFR if    Date Value Ref Range Status   05/19/2018 >60 >60 mL/min/1.73 m^2 Final     eGFR if non    Date Value Ref Range Status   05/19/2018 >60 >60 mL/min/1.73 m^2 Final     Comment:     Calculation used to obtain the estimated glomerular filtration  rate (eGFR) is the CKD-EPI equation.            Recent Labs  Lab 05/18/18  1142   INR 1.5*     CT of Abdomen/Pelvis:  Marked inflammatory changes with  edematous appearance of the pancreas and ill-defined peripancreatic fluid, consistent with acute pancreatitis.  No evidence of pancreatic enhancement to suggest necrotic pancreatitis.  No drainable collection.  Suggest short-term follow-up.    Extensive diverticulosis coli without evidence of acute diverticulitis.    Additional findings as above.    A/P:  The patient is a 77 year old woman with a history of HTN, CAD, diastolic CHF, atrial fibrillation, hypothyroidism, polymyalgia rhematica, irritable bowel syndrome, and recurrent idiopathic pancreatitis presenting with pancreatitis.    1.  Pancreatitis - she has undergone an extensive workup including EUSs and ERCPs and the etiology of her acute intermittent pancreatitis is unclear.  Pancreatic duct stenting has helped her in the past.  Her lipase has normalized, but she continues to have some pain as she is refusing pain medications.  She is on aspirin, plavix, coumadin, and lovenox and she is having nasal bleeding.  This will be differed to the Primary Team.  Once her pain improves, she can be started on a liquid diet advanced to a low fat diet.  If she does not improve, an MRI/MRCP can be considered.  She may eventually benefit from an outpatient ERCP with pancreatic duct stenting.

## 2018-05-19 NOTE — SUBJECTIVE & OBJECTIVE
Interval History: still with LUQ pain but better. With some epistaxis after starting full dose lovenox to bridge for warfarin. Short of breath on exertion. Lipase 72 today. Liver enzymes still normal.     Review of Systems   Constitutional: Negative.    HENT: Positive for nosebleeds.    Eyes: Negative.    Respiratory: Positive for shortness of breath.    Cardiovascular: Negative.    Gastrointestinal: Positive for abdominal pain. Negative for abdominal distention, constipation, diarrhea, nausea and vomiting.   Genitourinary: Negative.    Musculoskeletal: Negative.    Skin: Negative.    Neurological: Negative.    Hematological: Negative.    Psychiatric/Behavioral: Negative.      Objective:     Vital Signs (Most Recent):  Temp: 98.1 °F (36.7 °C) (05/19/18 0751)  Pulse: 96 (05/19/18 0804)  Resp: 20 (05/19/18 0804)  BP: (!) 141/67 (05/19/18 0751)  SpO2: 96 % (05/19/18 0804) Vital Signs (24h Range):  Temp:  [97.9 °F (36.6 °C)-98.3 °F (36.8 °C)] 98.1 °F (36.7 °C)  Pulse:  [76-96] 96  Resp:  [18-20] 20  SpO2:  [95 %-100 %] 96 %  BP: (139-192)/(67-91) 141/67     Weight: 48.7 kg (107 lb 5.8 oz)  Body mass index is 21.68 kg/m².    Intake/Output Summary (Last 24 hours) at 05/19/18 0925  Last data filed at 05/19/18 0616   Gross per 24 hour   Intake             2760 ml   Output              550 ml   Net             2210 ml      Physical Exam   Constitutional: She is oriented to person, place, and time. She appears well-developed. No distress.   HENT:   Head: Normocephalic and atraumatic.   Mouth/Throat: Oropharynx is clear and moist.   Eyes: Conjunctivae and EOM are normal. No scleral icterus.   Neck: Normal range of motion.   Cardiovascular: Normal rate, regular rhythm, normal heart sounds and intact distal pulses.    Pulmonary/Chest: Effort normal. She has no wheezes.   Coarse breath sounds bibasilar  Speaking in full sentences   Abdominal: Soft. Bowel sounds are normal. She exhibits no distension and no mass. There is  tenderness. There is no rebound and no guarding. No hernia.   Musculoskeletal: Normal range of motion. She exhibits no edema.   Neurological: She is alert and oriented to person, place, and time.   Skin: Skin is warm and dry. Capillary refill takes less than 2 seconds. She is not diaphoretic.   Psychiatric: She has a normal mood and affect. Her behavior is normal. Judgment and thought content normal.   Nursing note and vitals reviewed.      Significant Labs: All pertinent labs within the past 24 hours have been reviewed.    Significant Imaging: I have reviewed all pertinent imaging results/findings within the past 24 hours.  I have reviewed and interpreted all pertinent imaging results/findings within the past 24 hours.

## 2018-05-19 NOTE — NURSING
Notified by lab of critical glucose of 49. Recheck pt's glucose resulted of 51. Notified Dr. Sterling

## 2018-05-19 NOTE — PLAN OF CARE
Problem: Fall Risk (Adult)  Goal: Identify Related Risk Factors and Signs and Symptoms  Related risk factors and signs and symptoms are identified upon initiation of Human Response Clinical Practice Guideline (CPG)   Outcome: Ongoing (interventions implemented as appropriate)   05/19/18 0250   Fall Risk   Related Risk Factors (Fall Risk) age-related changes;environment unfamiliar   Signs and Symptoms (Fall Risk) presence of risk factors     Goal: Absence of Falls  Patient will demonstrate the desired outcomes by discharge/transition of care.   Outcome: Ongoing (interventions implemented as appropriate)   05/19/18 0250   Fall Risk (Adult)   Absence of Falls making progress toward outcome

## 2018-05-19 NOTE — NURSING
Pt stated nose has been bleeding throughout night with large clots coming out. Pt's constantly picking at nose. Notified MD, Instructed to place vaseline in nose

## 2018-05-19 NOTE — PROGRESS NOTES
[facility] Hospital Medicine  Progress Note    Patient Name: Katt Mcneal  MRN: [id number]  Patient Class: IP- Inpatient   Admission Date: 5/15/2018  Length of Stay: 3 days  Attending Physician: Kalpana Crain MD  Primary Care Provider: Kareen Hodges MD        Subjective:     Principal Problem:Acute biliary pancreatitis without infection or necrosis    HPI:  77 year old female with CAD s/p PCI in July/2017, paroxysmal AFib on coumadin, Hx of pancreatitis s/p stent removal, hypertension and anxiety who presented with acute epigastric pain of one day in evolution. It is severe, constant and radiates to back. Patient stated this is classic of her pancreatitis flares. Also stated she had just had a stent removed 1.5 months ago. Is not on pancreatic enzymes. Stated bouts of diarrhea which are chronic for her.     In the ED, patient was noted to have a tender abdomen, tachycardic, hypertensive and positive findings for pancreatitis on CT of abdomen. Lipase > 1000. Patient admitted to hospital medicine for acute pancreatitis.     Hospital Course:  No notes on file    Interval History: still with LUQ pain but better. With some epistaxis after starting full dose lovenox to bridge for warfarin. Short of breath on exertion. Lipase 72 today. Liver enzymes still normal.     Review of Systems   Constitutional: Negative.    HENT: Positive for nosebleeds.    Eyes: Negative.    Respiratory: Positive for shortness of breath.    Cardiovascular: Negative.    Gastrointestinal: Positive for abdominal pain. Negative for abdominal distention, constipation, diarrhea, nausea and vomiting.   Genitourinary: Negative.    Musculoskeletal: Negative.    Skin: Negative.    Neurological: Negative.    Hematological: Negative.    Psychiatric/Behavioral: Negative.      Objective:     Vital Signs (Most Recent):  Temp: 98.1 °F (36.7 °C) (05/19/18 0751)  Pulse: 96 (05/19/18 0804)  Resp: 20 (05/19/18 0804)  BP: (!) 141/67  (05/19/18 0751)  SpO2: 96 % (05/19/18 0804) Vital Signs (24h Range):  Temp:  [97.9 °F (36.6 °C)-98.3 °F (36.8 °C)] 98.1 °F (36.7 °C)  Pulse:  [76-96] 96  Resp:  [18-20] 20  SpO2:  [95 %-100 %] 96 %  BP: (139-192)/(67-91) 141/67     Weight: 48.7 kg (107 lb 5.8 oz)  Body mass index is 21.68 kg/m².    Intake/Output Summary (Last 24 hours) at 05/19/18 0904  Last data filed at 05/19/18 0616   Gross per 24 hour   Intake             2760 ml   Output              550 ml   Net             2210 ml      Physical Exam   Constitutional: She is oriented to person, place, and time. She appears well-developed. No distress.   HENT:   Head: Normocephalic and atraumatic.   Mouth/Throat: Oropharynx is clear and moist.   Eyes: Conjunctivae and EOM are normal. No scleral icterus.   Neck: Normal range of motion.   Cardiovascular: Normal rate, regular rhythm, normal heart sounds and intact distal pulses.    Pulmonary/Chest: Effort normal. She has no wheezes.   Coarse breath sounds bibasilar  Speaking in full sentences   Abdominal: Soft. Bowel sounds are normal. She exhibits no distension and no mass. There is tenderness. There is no rebound and no guarding. No hernia.   Musculoskeletal: Normal range of motion. She exhibits no edema.   Neurological: She is alert and oriented to person, place, and time.   Skin: Skin is warm and dry. Capillary refill takes less than 2 seconds. She is not diaphoretic.   Psychiatric: She has a normal mood and affect. Her behavior is normal. Judgment and thought content normal.   Nursing note and vitals reviewed.      Significant Labs: All pertinent labs within the past 24 hours have been reviewed.    Significant Imaging: I have reviewed all pertinent imaging results/findings within the past 24 hours.  I have reviewed and interpreted all pertinent imaging results/findings within the past 24 hours.    Assessment/Plan:      * Acute biliary pancreatitis without infection or necrosis    Per patient, she had a stent  removed 1.5 months ago. CT of abdomen revealed nonspecific prominence of the extrahepatic biliary tree without choledocholithiasis. Also with extensive peripancreatic fluid without drainable collection or evidence of necrosis. Lipase was > 1000 and tender to palpation, all consistent with acute pancreatitis. Surprisingly liver enzymes and T Mamadou are WNL. Is s/p cholecystectomy. Lipase now much better down to 70s. Trial of liquid diet today. Decrease rate of fluids to 75 cc/hr. Will stop if tolerates diet. No immediate plans for endoscopy while inpatient.         Abdominal pain    2/2 pancreatitis        Anterior epistaxis    2/2 use of low flow NC despite using humidifier. On ASA, plavix and lovenox/warfarin. Stop lovenox. Advised to use incentive spirometer in order to wean off NC          Other specified hypothyroidism    No acute issues. Continue home regimen          Chronic anticoagulation    Restart warfarin bridged with lovenox until INR > 2          Dyslipidemia    No acute issues. Continue statin          Bilateral carotid artery stenosis    No acute issues          Essential hypertension              Paroxysmal atrial fibrillation    Currently in NSR. Restarted sotalol and coreg          Chronic diastolic heart failure    Well compensated.           Coronary artery disease involving native coronary artery of native heart without angina pectoris    Patient reported PCI x 4 stents in July/2017. Currently chest pain free and EKG/trop not consistent with ACS. Is on ASA, plavix, carvedilol and ramipril. Antihypertensives started for HBP. Pain is a factor. Restart plavix as there are no immediate plans for endoscopy while inpatient            VTE Risk Mitigation         Ordered     warfarin (COUMADIN) tablet 2.5 mg  Daily      05/18/18 1032     Place JUNE hose  Until discontinued      05/17/18 1501     Place sequential compression device  Until discontinued      05/17/18 1501              Kalpana Sterling  MD  Department of Hospital Medicine   [facility]

## 2018-05-19 NOTE — ASSESSMENT & PLAN NOTE
2/2 use of low flow NC despite using humidifier. On ASA, plavix and lovenox/warfarin. Stop lovenox. Advised to use incentive spirometer in order to wean off NC

## 2018-05-19 NOTE — ASSESSMENT & PLAN NOTE
Per patient, she had a stent removed 1.5 months ago. CT of abdomen revealed nonspecific prominence of the extrahepatic biliary tree without choledocholithiasis. Also with extensive peripancreatic fluid without drainable collection or evidence of necrosis. Lipase was > 1000 and tender to palpation, all consistent with acute pancreatitis. Surprisingly liver enzymes and T Mamadou are WNL. Is s/p cholecystectomy. Lipase now much better down to 70s. Trial of liquid diet today. Decrease rate of fluids to 75 cc/hr. Will stop if tolerates diet. No immediate plans for endoscopy while inpatient.

## 2018-05-20 LAB
ALBUMIN SERPL BCP-MCNC: 2.4 G/DL
ALP SERPL-CCNC: 57 U/L
ALT SERPL W/O P-5'-P-CCNC: 9 U/L
ANION GAP SERPL CALC-SCNC: 8 MMOL/L
AST SERPL-CCNC: 17 U/L
BILIRUB SERPL-MCNC: 0.4 MG/DL
BUN SERPL-MCNC: 9 MG/DL
CALCIUM SERPL-MCNC: 8.8 MG/DL
CHLORIDE SERPL-SCNC: 107 MMOL/L
CO2 SERPL-SCNC: 27 MMOL/L
CREAT SERPL-MCNC: 0.7 MG/DL
EST. GFR  (AFRICAN AMERICAN): >60 ML/MIN/1.73 M^2
EST. GFR  (NON AFRICAN AMERICAN): >60 ML/MIN/1.73 M^2
GLUCOSE SERPL-MCNC: 103 MG/DL
INR PPP: 3.2
LIPASE SERPL-CCNC: 39 U/L
POCT GLUCOSE: 133 MG/DL (ref 70–110)
POTASSIUM SERPL-SCNC: 3.4 MMOL/L
PROT SERPL-MCNC: 5.9 G/DL
PROTHROMBIN TIME: 33.6 SEC
SODIUM SERPL-SCNC: 142 MMOL/L

## 2018-05-20 PROCEDURE — 94761 N-INVAS EAR/PLS OXIMETRY MLT: CPT

## 2018-05-20 PROCEDURE — 80053 COMPREHEN METABOLIC PANEL: CPT

## 2018-05-20 PROCEDURE — 25000242 PHARM REV CODE 250 ALT 637 W/ HCPCS: Performed by: INTERNAL MEDICINE

## 2018-05-20 PROCEDURE — 94640 AIRWAY INHALATION TREATMENT: CPT

## 2018-05-20 PROCEDURE — 11000001 HC ACUTE MED/SURG PRIVATE ROOM

## 2018-05-20 PROCEDURE — 85610 PROTHROMBIN TIME: CPT

## 2018-05-20 PROCEDURE — 25000003 PHARM REV CODE 250: Performed by: INTERNAL MEDICINE

## 2018-05-20 PROCEDURE — 83690 ASSAY OF LIPASE: CPT

## 2018-05-20 PROCEDURE — 63600175 PHARM REV CODE 636 W HCPCS: Performed by: INTERNAL MEDICINE

## 2018-05-20 PROCEDURE — 27000221 HC OXYGEN, UP TO 24 HOURS

## 2018-05-20 PROCEDURE — 36415 COLL VENOUS BLD VENIPUNCTURE: CPT

## 2018-05-20 PROCEDURE — S0028 INJECTION, FAMOTIDINE, 20 MG: HCPCS | Performed by: INTERNAL MEDICINE

## 2018-05-20 RX ORDER — WARFARIN 2.5 MG/1
2.5 TABLET ORAL DAILY
Status: DISCONTINUED | OUTPATIENT
Start: 2018-05-21 | End: 2018-05-22

## 2018-05-20 RX ADMIN — RAMIPRIL 5 MG: 2.5 CAPSULE ORAL at 08:05

## 2018-05-20 RX ADMIN — PRAVASTATIN SODIUM 20 MG: 10 TABLET ORAL at 08:05

## 2018-05-20 RX ADMIN — SODIUM CHLORIDE, SODIUM LACTATE, POTASSIUM CHLORIDE, AND CALCIUM CHLORIDE: .6; .31; .03; .02 INJECTION, SOLUTION INTRAVENOUS at 12:05

## 2018-05-20 RX ADMIN — IPRATROPIUM BROMIDE AND ALBUTEROL SULFATE 3 ML: .5; 2.5 SOLUTION RESPIRATORY (INHALATION) at 07:05

## 2018-05-20 RX ADMIN — SODIUM CHLORIDE, SODIUM LACTATE, POTASSIUM CHLORIDE, AND CALCIUM CHLORIDE: .6; .31; .03; .02 INJECTION, SOLUTION INTRAVENOUS at 05:05

## 2018-05-20 RX ADMIN — HYDRALAZINE HYDROCHLORIDE 5 MG: 20 INJECTION INTRAMUSCULAR; INTRAVENOUS at 12:05

## 2018-05-20 RX ADMIN — CLOPIDOGREL BISULFATE 75 MG: 75 TABLET ORAL at 08:05

## 2018-05-20 RX ADMIN — CARVEDILOL 25 MG: 6.25 TABLET, FILM COATED ORAL at 08:05

## 2018-05-20 RX ADMIN — HYDRALAZINE HYDROCHLORIDE 5 MG: 20 INJECTION INTRAMUSCULAR; INTRAVENOUS at 10:05

## 2018-05-20 RX ADMIN — IPRATROPIUM BROMIDE AND ALBUTEROL SULFATE 3 ML: .5; 2.5 SOLUTION RESPIRATORY (INHALATION) at 03:05

## 2018-05-20 RX ADMIN — SOTALOL HYDROCHLORIDE 80 MG: 80 TABLET ORAL at 08:05

## 2018-05-20 RX ADMIN — ASPIRIN 81 MG: 81 TABLET, COATED ORAL at 08:05

## 2018-05-20 RX ADMIN — LEVOTHYROXINE SODIUM 50 MCG: 50 TABLET ORAL at 05:05

## 2018-05-20 RX ADMIN — PREDNISONE 2.5 MG: 2.5 TABLET ORAL at 08:05

## 2018-05-20 RX ADMIN — IPRATROPIUM BROMIDE AND ALBUTEROL SULFATE 3 ML: .5; 2.5 SOLUTION RESPIRATORY (INHALATION) at 11:05

## 2018-05-20 RX ADMIN — FAMOTIDINE 20 MG: 10 INJECTION INTRAVENOUS at 08:05

## 2018-05-20 NOTE — PLAN OF CARE
Problem: Patient Care Overview  Goal: Plan of Care Review  Pt fever free, no nausea or vomiting, stated she had slight discomfort to left side of abd, AOx4

## 2018-05-20 NOTE — PROGRESS NOTES
[facility] Hospital Medicine  Progress Note    Patient Name: Katt Mcneal  MRN: [id number]  Patient Class: IP- Inpatient   Admission Date: 5/15/2018  Length of Stay: 4 days  Attending Physician: Kalpana Crain MD  Primary Care Provider: Kareen Hodges MD        Subjective:     Principal Problem:Acute biliary pancreatitis without infection or necrosis    HPI:  77 year old female with CAD s/p PCI in July/2017, paroxysmal AFib on coumadin, Hx of pancreatitis s/p stent removal, hypertension and anxiety who presented with acute epigastric pain of one day in evolution. It is severe, constant and radiates to back. Patient stated this is classic of her pancreatitis flares. Also stated she had just had a stent removed 1.5 months ago. Is not on pancreatic enzymes. Stated bouts of diarrhea which are chronic for her.     In the ED, patient was noted to have a tender abdomen, tachycardic, hypertensive and positive findings for pancreatitis on CT of abdomen. Lipase > 1000. Patient admitted to hospital medicine for acute pancreatitis.     Hospital Course:  No notes on file    Interval History: with a bit of pain but tolerating diet. INR 3.2    Review of Systems   Constitutional: Negative.    HENT: Positive for nosebleeds.    Eyes: Negative.    Respiratory: Negative for shortness of breath.    Cardiovascular: Negative.    Gastrointestinal: Positive for abdominal pain (better). Negative for abdominal distention, constipation, diarrhea, nausea and vomiting.   Genitourinary: Negative.    Musculoskeletal: Negative.    Skin: Negative.    Neurological: Negative.    Hematological: Negative.    Psychiatric/Behavioral: Negative.      Objective:     Vital Signs (Most Recent):  Temp: 98 °F (36.7 °C) (05/20/18 1216)  Pulse: 87 (05/20/18 1216)  Resp: 17 (05/20/18 1216)  BP: (!) 176/82 (05/20/18 1216)  SpO2: 97 % (05/20/18 1216) Vital Signs (24h Range):  Temp:  [97.2 °F (36.2 °C)-99.2 °F (37.3 °C)] 98 °F (36.7  °C)  Pulse:  [79-93] 87  Resp:  [17-20] 17  SpO2:  [91 %-99 %] 97 %  BP: (126-184)/(72-90) 176/82     Weight: 48.7 kg (107 lb 5.8 oz)  Body mass index is 21.68 kg/m².    Intake/Output Summary (Last 24 hours) at 05/20/18 1339  Last data filed at 05/20/18 1300   Gross per 24 hour   Intake             2160 ml   Output              600 ml   Net             1560 ml      Physical Exam   Constitutional: She is oriented to person, place, and time. She appears well-developed. No distress.   HENT:   Head: Normocephalic and atraumatic.   Mouth/Throat: Oropharynx is clear and moist.   Eyes: Conjunctivae and EOM are normal. No scleral icterus.   Neck: Normal range of motion.   Cardiovascular: Normal rate, regular rhythm, normal heart sounds and intact distal pulses.    Pulmonary/Chest: Effort normal. No respiratory distress. She has no wheezes. She has no rales.     Speaking in full sentences   Abdominal: Soft. Bowel sounds are normal. She exhibits no distension and no mass. There is tenderness. There is no rebound and no guarding. No hernia.   Musculoskeletal: Normal range of motion. She exhibits no edema.   Neurological: She is alert and oriented to person, place, and time.   Skin: Skin is warm and dry. Capillary refill takes less than 2 seconds. She is not diaphoretic.   Psychiatric: She has a normal mood and affect. Her behavior is normal. Judgment and thought content normal.   Nursing note and vitals reviewed.      Significant Labs: All pertinent labs within the past 24 hours have been reviewed.    Significant Imaging: I have reviewed all pertinent imaging results/findings within the past 24 hours.  I have reviewed and interpreted all pertinent imaging results/findings within the past 24 hours.    Assessment/Plan:      * Acute biliary pancreatitis without infection or necrosis    Per patient, she had a stent removed 1.5 months ago. CT of abdomen revealed nonspecific prominence of the extrahepatic biliary tree without  choledocholithiasis. Also with extensive peripancreatic fluid without drainable collection or evidence of necrosis. Lipase was > 1000 and tender to palpation, all consistent with acute pancreatitis. Surprisingly liver enzymes and T Mamadou are WNL. Is s/p cholecystectomy. Lipase now much better down to < 70s. Stop fluids. Advance diet        Abdominal pain    2/2 pancreatitis        Anterior epistaxis    2/2 use of low flow NC despite using humidifier. On ASA, plavix and warfarin. Better. Advised to use incentive spirometer in order to wean off NC          Other specified hypothyroidism    No acute issues. Continue home regimen          Chronic anticoagulation    INR 3.2. Hold warfarin today. INR in AM          Dyslipidemia    No acute issues. Continue statin          Bilateral carotid artery stenosis    No acute issues          Essential hypertension              Paroxysmal atrial fibrillation    Currently in NSR. Restarted sotalol and coreg          Chronic diastolic heart failure    Well compensated.           Coronary artery disease involving native coronary artery of native heart without angina pectoris    Patient reported PCI x 4 stents in July/2017. Currently chest pain free and EKG/trop not consistent with ACS. Is on ASA, plavix, carvedilol and ramipril. Antihypertensives started for HBP. Pain is a factor. Restart plavix as there are no immediate plans for endoscopy while inpatient            VTE Risk Mitigation         Ordered     warfarin (COUMADIN) tablet 2.5 mg  Daily      05/20/18 1127     Place JUNE hose  Until discontinued      05/17/18 1501     Place sequential compression device  Until discontinued      05/17/18 1501              Kalpana Sterling MD  Department of Hospital Medicine   [facility]

## 2018-05-20 NOTE — NURSING
Pt free of accidental injury during shift. No s/s of distress noted. C/o mild pain to abdomen but refuses medications. IVF infusing. Tolerating regular diet well. BP remains elevated. Prn hydralazine administered. Frequent rounds made. Safety measures maintained. Will continue to monitor

## 2018-05-20 NOTE — SUBJECTIVE & OBJECTIVE
Interval History: with a bit of pain but tolerating diet. INR 3.2    Review of Systems   Constitutional: Negative.    HENT: Positive for nosebleeds.    Eyes: Negative.    Respiratory: Negative for shortness of breath.    Cardiovascular: Negative.    Gastrointestinal: Positive for abdominal pain (better). Negative for abdominal distention, constipation, diarrhea, nausea and vomiting.   Genitourinary: Negative.    Musculoskeletal: Negative.    Skin: Negative.    Neurological: Negative.    Hematological: Negative.    Psychiatric/Behavioral: Negative.      Objective:     Vital Signs (Most Recent):  Temp: 98 °F (36.7 °C) (05/20/18 1216)  Pulse: 87 (05/20/18 1216)  Resp: 17 (05/20/18 1216)  BP: (!) 176/82 (05/20/18 1216)  SpO2: 97 % (05/20/18 1216) Vital Signs (24h Range):  Temp:  [97.2 °F (36.2 °C)-99.2 °F (37.3 °C)] 98 °F (36.7 °C)  Pulse:  [79-93] 87  Resp:  [17-20] 17  SpO2:  [91 %-99 %] 97 %  BP: (126-184)/(72-90) 176/82     Weight: 48.7 kg (107 lb 5.8 oz)  Body mass index is 21.68 kg/m².    Intake/Output Summary (Last 24 hours) at 05/20/18 1339  Last data filed at 05/20/18 1300   Gross per 24 hour   Intake             2160 ml   Output              600 ml   Net             1560 ml      Physical Exam   Constitutional: She is oriented to person, place, and time. She appears well-developed. No distress.   HENT:   Head: Normocephalic and atraumatic.   Mouth/Throat: Oropharynx is clear and moist.   Eyes: Conjunctivae and EOM are normal. No scleral icterus.   Neck: Normal range of motion.   Cardiovascular: Normal rate, regular rhythm, normal heart sounds and intact distal pulses.    Pulmonary/Chest: Effort normal. No respiratory distress. She has no wheezes. She has no rales.     Speaking in full sentences   Abdominal: Soft. Bowel sounds are normal. She exhibits no distension and no mass. There is tenderness. There is no rebound and no guarding. No hernia.   Musculoskeletal: Normal range of motion. She exhibits no edema.    Neurological: She is alert and oriented to person, place, and time.   Skin: Skin is warm and dry. Capillary refill takes less than 2 seconds. She is not diaphoretic.   Psychiatric: She has a normal mood and affect. Her behavior is normal. Judgment and thought content normal.   Nursing note and vitals reviewed.      Significant Labs: All pertinent labs within the past 24 hours have been reviewed.    Significant Imaging: I have reviewed all pertinent imaging results/findings within the past 24 hours.  I have reviewed and interpreted all pertinent imaging results/findings within the past 24 hours.

## 2018-05-20 NOTE — PROGRESS NOTES
The patient still has some left upper quadrant pain, but her pain has improved significantly and she is tolerating a liquid diet.  She feels much better.    Vitals:    05/19/18 2023 05/19/18 2320 05/20/18 0320 05/20/18 0701   BP:  136/72 (!) 142/73 (!) 184/90   BP Location:  Right arm Left arm Left arm   Patient Position:  Lying Lying Lying   Pulse: 83 90 84 93   Resp: 18 20 17   Temp:  98.7 °F (37.1 °C) 97.8 °F (36.6 °C) 98.1 °F (36.7 °C)   TempSrc:  Oral Oral Oral   SpO2: 98% (!) 93% 99% 97%   Weight:       Height:          albuterol-ipratropium  3 mL Nebulization Q4H WAKE    aspirin  81 mg Oral Daily    carvedilol  25 mg Oral BID    clopidogrel  75 mg Oral Daily    famotidine (PF)  20 mg Intravenous Daily    levothyroxine  50 mcg Oral Before breakfast    morphine  3 mg Intravenous Once    pravastatin  20 mg Oral Daily    predniSONE  2.5 mg Oral Daily    ramipril  5 mg Oral BID    sotalol  80 mg Oral Daily    warfarin  2.5 mg Oral Daily     P.E.:  GEN: A x O x 3, NAD  HEENT: EOMI, PERRL, anicteric sclera  CV: RRR, no M/R/G  Chest: CTA B  Abdomen: soft, mildly tender in left upper quadrant, nondistended, normoactive BS  Ext: No C/C/E. 2+ dorsalis pedis pulses B    Labs:  Recent Results (from the past 336 hour(s))   CBC auto differential    Collection Time: 05/15/18 10:55 PM   Result Value Ref Range    WBC 10.96 3.90 - 12.70 K/uL    Hemoglobin 11.9 (L) 12.0 - 16.0 g/dL    Hematocrit 38.3 37.0 - 48.5 %    Platelets 357 (H) 150 - 350 K/uL     CMP  Sodium   Date Value Ref Range Status   05/19/2018 139 136 - 145 mmol/L Final     Potassium   Date Value Ref Range Status   05/19/2018 4.4 3.5 - 5.1 mmol/L Final     Chloride   Date Value Ref Range Status   05/19/2018 103 95 - 110 mmol/L Final     CO2   Date Value Ref Range Status   05/19/2018 20 (L) 23 - 29 mmol/L Final     Glucose   Date Value Ref Range Status   05/19/2018 49 (LL) 70 - 110 mg/dL Final     Comment:     Glucose  critical result(s) called and  verbal readback obtained from   Lilly Chaparro., 05/19/2018 07:25       BUN, Bld   Date Value Ref Range Status   05/19/2018 14 8 - 23 mg/dL Final     Creatinine   Date Value Ref Range Status   05/19/2018 0.8 0.5 - 1.4 mg/dL Final     Calcium   Date Value Ref Range Status   05/19/2018 9.4 8.7 - 10.5 mg/dL Final     Total Protein   Date Value Ref Range Status   05/19/2018 6.8 6.0 - 8.4 g/dL Final     Albumin   Date Value Ref Range Status   05/19/2018 2.7 (L) 3.5 - 5.2 g/dL Final     Total Bilirubin   Date Value Ref Range Status   05/19/2018 0.6 0.1 - 1.0 mg/dL Final     Comment:     For infants and newborns, interpretation of results should be based  on gestational age, weight and in agreement with clinical  observations.  Premature Infant recommended reference ranges:  Up to 24 hours.............<8.0 mg/dL  Up to 48 hours............<12.0 mg/dL  3-5 days..................<15.0 mg/dL  6-29 days.................<15.0 mg/dL       Alkaline Phosphatase   Date Value Ref Range Status   05/19/2018 74 55 - 135 U/L Final     AST   Date Value Ref Range Status   05/19/2018 28 10 - 40 U/L Final     ALT   Date Value Ref Range Status   05/19/2018 15 10 - 44 U/L Final     Anion Gap   Date Value Ref Range Status   05/19/2018 16 8 - 16 mmol/L Final     eGFR if    Date Value Ref Range Status   05/19/2018 >60 >60 mL/min/1.73 m^2 Final     eGFR if non    Date Value Ref Range Status   05/19/2018 >60 >60 mL/min/1.73 m^2 Final     Comment:     Calculation used to obtain the estimated glomerular filtration  rate (eGFR) is the CKD-EPI equation.            Recent Labs  Lab 05/20/18  0505   INR 3.2*     CT of Abdomen/Pelvis:  Marked inflammatory changes with edematous appearance of the pancreas and ill-defined peripancreatic fluid, consistent with acute pancreatitis.  No evidence of pancreatic enhancement to suggest necrotic pancreatitis.  No drainable collection.  Suggest short-term follow-up.    Extensive  diverticulosis coli without evidence of acute diverticulitis.    Additional findings as above.    A/P:  The patient is a 77 year old woman with a history of HTN, CAD, diastolic CHF, atrial fibrillation, hypothyroidism, polymyalgia rhematica, irritable bowel syndrome, and recurrent idiopathic pancreatitis presenting with pancreatitis.    1.  Pancreatitis - she has undergone an extensive workup including EUSs and ERCPs and the etiology of her acute intermittent pancreatitis is unclear.  Pancreatic duct stenting has helped her in the past.  Her lipase has normalized down to 75 (today's value is pending).  She is on aspirin, plavix, and coumadin.  The patient is tolerating a liquid diet and she will be advanced to a low fat diet.  If she does not improve, an MRI/MRCP can be considered.  She may eventually benefit from an outpatient ERCP with pancreatic duct stenting.  The patient will be monitored briefly.

## 2018-05-20 NOTE — ASSESSMENT & PLAN NOTE
Per patient, she had a stent removed 1.5 months ago. CT of abdomen revealed nonspecific prominence of the extrahepatic biliary tree without choledocholithiasis. Also with extensive peripancreatic fluid without drainable collection or evidence of necrosis. Lipase was > 1000 and tender to palpation, all consistent with acute pancreatitis. Surprisingly liver enzymes and T Mamadou are WNL. Is s/p cholecystectomy. Lipase now much better down to < 70s. Stop fluids. Advance diet

## 2018-05-20 NOTE — ASSESSMENT & PLAN NOTE
2/2 use of low flow NC despite using humidifier. On ASA, plavix and warfarin. Better. Advised to use incentive spirometer in order to wean off NC

## 2018-05-21 PROBLEM — R06.02 SHORTNESS OF BREATH: Status: ACTIVE | Noted: 2018-05-21

## 2018-05-21 PROBLEM — I48.91 A-FIB: Status: ACTIVE | Noted: 2018-05-21

## 2018-05-21 LAB
ANION GAP SERPL CALC-SCNC: 9 MMOL/L
BUN SERPL-MCNC: 6 MG/DL
CALCIUM SERPL-MCNC: 9.6 MG/DL
CHLORIDE SERPL-SCNC: 102 MMOL/L
CO2 SERPL-SCNC: 33 MMOL/L
CREAT SERPL-MCNC: 0.7 MG/DL
EST. GFR  (AFRICAN AMERICAN): >60 ML/MIN/1.73 M^2
EST. GFR  (NON AFRICAN AMERICAN): >60 ML/MIN/1.73 M^2
GLUCOSE SERPL-MCNC: 130 MG/DL
INR PPP: 2.5
POCT GLUCOSE: 115 MG/DL (ref 70–110)
POCT GLUCOSE: 121 MG/DL (ref 70–110)
POCT GLUCOSE: 163 MG/DL (ref 70–110)
POTASSIUM SERPL-SCNC: 3.1 MMOL/L
PROTHROMBIN TIME: 26.8 SEC
SODIUM SERPL-SCNC: 144 MMOL/L

## 2018-05-21 PROCEDURE — 27000221 HC OXYGEN, UP TO 24 HOURS

## 2018-05-21 PROCEDURE — 25000242 PHARM REV CODE 250 ALT 637 W/ HCPCS: Performed by: INTERNAL MEDICINE

## 2018-05-21 PROCEDURE — 63600175 PHARM REV CODE 636 W HCPCS: Performed by: INTERNAL MEDICINE

## 2018-05-21 PROCEDURE — 80048 BASIC METABOLIC PNL TOTAL CA: CPT

## 2018-05-21 PROCEDURE — 94640 AIRWAY INHALATION TREATMENT: CPT

## 2018-05-21 PROCEDURE — 20000000 HC ICU ROOM

## 2018-05-21 PROCEDURE — S0028 INJECTION, FAMOTIDINE, 20 MG: HCPCS | Performed by: INTERNAL MEDICINE

## 2018-05-21 PROCEDURE — 94761 N-INVAS EAR/PLS OXIMETRY MLT: CPT

## 2018-05-21 PROCEDURE — 99291 CRITICAL CARE FIRST HOUR: CPT | Mod: ,,, | Performed by: INTERNAL MEDICINE

## 2018-05-21 PROCEDURE — 25000003 PHARM REV CODE 250: Performed by: INTERNAL MEDICINE

## 2018-05-21 PROCEDURE — 85610 PROTHROMBIN TIME: CPT

## 2018-05-21 PROCEDURE — 36415 COLL VENOUS BLD VENIPUNCTURE: CPT

## 2018-05-21 RX ORDER — IPRATROPIUM BROMIDE AND ALBUTEROL SULFATE 2.5; .5 MG/3ML; MG/3ML
3 SOLUTION RESPIRATORY (INHALATION)
Status: DISCONTINUED | OUTPATIENT
Start: 2018-05-21 | End: 2018-05-23 | Stop reason: HOSPADM

## 2018-05-21 RX ORDER — SOTALOL HYDROCHLORIDE 80 MG/1
80 TABLET ORAL 2 TIMES DAILY
Status: DISCONTINUED | OUTPATIENT
Start: 2018-05-21 | End: 2018-05-21

## 2018-05-21 RX ORDER — FUROSEMIDE 10 MG/ML
40 INJECTION INTRAMUSCULAR; INTRAVENOUS ONCE
Status: COMPLETED | OUTPATIENT
Start: 2018-05-21 | End: 2018-05-21

## 2018-05-21 RX ORDER — SOTALOL HYDROCHLORIDE 80 MG/1
80 TABLET ORAL 2 TIMES DAILY
Status: DISCONTINUED | OUTPATIENT
Start: 2018-05-22 | End: 2018-05-22

## 2018-05-21 RX ORDER — POTASSIUM CHLORIDE 7.45 MG/ML
10 INJECTION INTRAVENOUS
Status: COMPLETED | OUTPATIENT
Start: 2018-05-21 | End: 2018-05-21

## 2018-05-21 RX ORDER — LANOLIN ALCOHOL/MO/W.PET/CERES
400 CREAM (GRAM) TOPICAL ONCE
Status: COMPLETED | OUTPATIENT
Start: 2018-05-21 | End: 2018-05-21

## 2018-05-21 RX ORDER — FAMOTIDINE 20 MG/1
20 TABLET, FILM COATED ORAL DAILY
Status: DISCONTINUED | OUTPATIENT
Start: 2018-05-22 | End: 2018-05-23 | Stop reason: HOSPADM

## 2018-05-21 RX ADMIN — RAMIPRIL 5 MG: 2.5 CAPSULE ORAL at 08:05

## 2018-05-21 RX ADMIN — MAGNESIUM OXIDE TAB 400 MG (241.3 MG ELEMENTAL MG) 400 MG: 400 (241.3 MG) TAB at 11:05

## 2018-05-21 RX ADMIN — AMIODARONE HYDROCHLORIDE 150 MG: 1.5 INJECTION, SOLUTION INTRAVENOUS at 11:05

## 2018-05-21 RX ADMIN — CARVEDILOL 25 MG: 6.25 TABLET, FILM COATED ORAL at 08:05

## 2018-05-21 RX ADMIN — HYDRALAZINE HYDROCHLORIDE 5 MG: 20 INJECTION INTRAMUSCULAR; INTRAVENOUS at 07:05

## 2018-05-21 RX ADMIN — WARFARIN SODIUM 2.5 MG: 2.5 TABLET ORAL at 05:05

## 2018-05-21 RX ADMIN — PRAVASTATIN SODIUM 20 MG: 10 TABLET ORAL at 08:05

## 2018-05-21 RX ADMIN — AMIODARONE HYDROCHLORIDE 0.5 MG/MIN: 1.8 INJECTION, SOLUTION INTRAVENOUS at 05:05

## 2018-05-21 RX ADMIN — SOTALOL HYDROCHLORIDE 80 MG: 80 TABLET ORAL at 08:05

## 2018-05-21 RX ADMIN — PREDNISONE 2.5 MG: 2.5 TABLET ORAL at 08:05

## 2018-05-21 RX ADMIN — CARVEDILOL 25 MG: 6.25 TABLET, FILM COATED ORAL at 09:05

## 2018-05-21 RX ADMIN — FUROSEMIDE 40 MG: 10 INJECTION, SOLUTION INTRAMUSCULAR; INTRAVENOUS at 08:05

## 2018-05-21 RX ADMIN — ASPIRIN 81 MG: 81 TABLET, COATED ORAL at 08:05

## 2018-05-21 RX ADMIN — POTASSIUM CHLORIDE 10 MEQ: 7.46 INJECTION, SOLUTION INTRAVENOUS at 11:05

## 2018-05-21 RX ADMIN — POTASSIUM CHLORIDE 10 MEQ: 7.46 INJECTION, SOLUTION INTRAVENOUS at 01:05

## 2018-05-21 RX ADMIN — IPRATROPIUM BROMIDE AND ALBUTEROL SULFATE 3 ML: .5; 3 SOLUTION RESPIRATORY (INHALATION) at 07:05

## 2018-05-21 RX ADMIN — FAMOTIDINE 20 MG: 10 INJECTION INTRAVENOUS at 08:05

## 2018-05-21 RX ADMIN — POTASSIUM BICARBONATE 50 MEQ: 25 TABLET, EFFERVESCENT ORAL at 11:05

## 2018-05-21 RX ADMIN — CLOPIDOGREL BISULFATE 75 MG: 75 TABLET ORAL at 08:05

## 2018-05-21 RX ADMIN — IPRATROPIUM BROMIDE AND ALBUTEROL SULFATE 3 ML: .5; 3 SOLUTION RESPIRATORY (INHALATION) at 05:05

## 2018-05-21 RX ADMIN — AMIODARONE HYDROCHLORIDE 1 MG/MIN: 1.8 INJECTION, SOLUTION INTRAVENOUS at 11:05

## 2018-05-21 RX ADMIN — IPRATROPIUM BROMIDE AND ALBUTEROL SULFATE 3 ML: .5; 3 SOLUTION RESPIRATORY (INHALATION) at 08:05

## 2018-05-21 NOTE — PROGRESS NOTES
[facility] Hospital Medicine  Progress Note    Patient Name: Katt Mcneal  MRN: [id number]  Patient Class: IP- Inpatient   Admission Date: 5/15/2018  Length of Stay: 5 days  Attending Physician: Kalpana Crain MD  Primary Care Provider: Kareen Hodges MD        Subjective:     Principal Problem:Acute biliary pancreatitis without infection or necrosis    HPI:  77 year old female with CAD s/p PCI in July/2017, paroxysmal AFib on coumadin, Hx of pancreatitis s/p stent removal, hypertension and anxiety who presented with acute epigastric pain of one day in evolution. It is severe, constant and radiates to back. Patient stated this is classic of her pancreatitis flares. Also stated she had just had a stent removed 1.5 months ago. Is not on pancreatic enzymes. Stated bouts of diarrhea which are chronic for her.     In the ED, patient was noted to have a tender abdomen, tachycardic, hypertensive and positive findings for pancreatitis on CT of abdomen. Lipase > 1000. Patient admitted to hospital medicine for acute pancreatitis.     Hospital Course:  Ms Mcneal presented with acute pancreatitis. Had a PD stent removed about a month and a half ago by Dr Vance. Initiated on fluids and placed NPO. GI consulted. Lipase decreased from 1000 to 36. Pain improved. CT of abdomen done in the ED showed no choledocholithiasis but a nonspecific prominence of the extrahepatic biliary tree. Is s/p cholecystectomy. GI plans for further imaging/workup as outpatient.   Patient did experience wheezing and shortness of breath. CXR with bibasilar atelectasis which is consistent with shallow breathing when pancreatic pain was most severe. Duo-nebs and incentive spirometer. Lasix IV when able to stop fluids.     Interval History:pain better and tolerating diet. SOB    Review of Systems   Constitutional: Negative.    HENT: Negative for nosebleeds.    Eyes: Negative.    Respiratory: Positive for shortness of breath.     Cardiovascular: Negative.    Gastrointestinal: Positive for abdominal pain (better). Negative for abdominal distention, constipation, diarrhea, nausea and vomiting.   Genitourinary: Negative.    Musculoskeletal: Negative.    Skin: Negative.    Neurological: Negative.    Hematological: Negative.    Psychiatric/Behavioral: Negative.      Objective:     Vital Signs (Most Recent):  Temp: 97.9 °F (36.6 °C) (05/21/18 0414)  Pulse: 88 (05/21/18 0710)  Resp: 18 (05/21/18 0710)  BP: (!) 194/96 (05/21/18 0414)  SpO2: 98 % (05/21/18 0710) Vital Signs (24h Range):  Temp:  [97.9 °F (36.6 °C)-98.7 °F (37.1 °C)] 97.9 °F (36.6 °C)  Pulse:  [] 88  Resp:  [16-22] 18  SpO2:  [97 %-100 %] 98 %  BP: (145-194)/(70-96) 194/96     Weight: 48.7 kg (107 lb 5.8 oz)  Body mass index is 21.68 kg/m².    Intake/Output Summary (Last 24 hours) at 05/21/18 0807  Last data filed at 05/21/18 0000   Gross per 24 hour   Intake          2533.75 ml   Output              950 ml   Net          1583.75 ml      Physical Exam   Constitutional: She is oriented to person, place, and time. She appears well-developed. No distress.   HENT:   Head: Normocephalic and atraumatic.   Mouth/Throat: Oropharynx is clear and moist.   Eyes: Conjunctivae and EOM are normal. No scleral icterus.   Neck: Normal range of motion.   Cardiovascular: Normal rate, regular rhythm, normal heart sounds and intact distal pulses.    Pulmonary/Chest: Effort normal. No respiratory distress. She has no wheezes. She has rales (bibasilar).   Using accessory muscles  Speaking in full sentences   Abdominal: Soft. Bowel sounds are normal. She exhibits no distension and no mass. There is tenderness. There is no rebound and no guarding. No hernia.   Musculoskeletal: Normal range of motion. She exhibits no edema.   Neurological: She is alert and oriented to person, place, and time.   Skin: Skin is warm and dry. Capillary refill takes less than 2 seconds. She is not diaphoretic.    Psychiatric: She has a normal mood and affect. Her behavior is normal. Judgment and thought content normal.   Nursing note and vitals reviewed.      Significant Labs: All pertinent labs within the past 24 hours have been reviewed.    Significant Imaging: I have reviewed all pertinent imaging results/findings within the past 24 hours.  I have reviewed and interpreted all pertinent imaging results/findings within the past 24 hours.    Assessment/Plan:      * Acute biliary pancreatitis without infection or necrosis    Per patient, she had a stent removed 1.5 months ago. CT of abdomen revealed nonspecific prominence of the extrahepatic biliary tree without choledocholithiasis. Also with extensive peripancreatic fluid without drainable collection or evidence of necrosis. Lipase was > 1000 and tender to palpation, all consistent with acute pancreatitis. Surprisingly liver enzymes and T Mamadou are WNL. Is s/p cholecystectomy. Lipase now much better down to < 70s. Stop fluids. Advance diet when ready. Continue with liquid diet for now        Abdominal pain    2/2 pancreatitis        Shortness of breath    With evidence of atelectasis. Fluids stopped. Given underlying diastolic dysfunction I will give a dose of IV lasix now. On supplemental O2. XRay now. Duo-nebs          Anterior epistaxis    2/2 use of low flow NC despite using humidifier. On ASA, plavix and warfarin. Resolved. Advised to use incentive spirometer in order to wean off NC          Other specified hypothyroidism    No acute issues. Continue home regimen          Chronic anticoagulation    INR 3.2 yesterday. Held warfarin. INR today          Dyslipidemia    No acute issues. Continue statin          Bilateral carotid artery stenosis    No acute issues          Essential hypertension              Paroxysmal atrial fibrillation    Currently in NSR. Restarted sotalol and coreg          Chronic diastolic heart failure    Well compensated.           Coronary artery  disease involving native coronary artery of native heart without angina pectoris    Patient reported PCI x 4 stents in July/2017. Currently chest pain free and EKG/trop not consistent with ACS. Is on ASA, plavix, carvedilol and ramipril. BP control.             VTE Risk Mitigation         Ordered     warfarin (COUMADIN) tablet 2.5 mg  Daily      05/20/18 1127     Place JUNE hose  Until discontinued      05/17/18 1501     Place sequential compression device  Until discontinued      05/17/18 1501        Check INR. Treat SOB. HOme when able to take off NC and breathing better      Kalpana Sterling MD  Department of Hospital Medicine   [facility]

## 2018-05-21 NOTE — PROGRESS NOTES
Chief Complaint / Reason for Consult: Abd. Pain, N/V    Abd. Pain has improved.  No N/V.  Pt. Transferred to ICU due to A-fib / RVR    ROS: No CP, F/C, + SOB this AM    Patient Vitals for the past 24 hrs:   BP Temp Temp src Pulse Resp SpO2   05/21/18 1400 (!) 151/74 - - 74 (!) 23 100 %   05/21/18 1345 - - - 75 (!) 22 100 %   05/21/18 1330 (!) 189/85 - - 72 (!) 25 100 %   05/21/18 1315 133/73 - - 75 (!) 25 100 %   05/21/18 1300 (!) 143/84 - - 75 (!) 24 100 %   05/21/18 1245 139/76 - - 77 (!) 25 100 %   05/21/18 1230 (!) 140/68 - - 77 (!) 26 100 %   05/21/18 1215 113/77 - - 77 (!) 37 100 %   05/21/18 1200 115/68 - - 78 (!) 26 99 %   05/21/18 1145 126/70 - - 80 (!) 28 100 %   05/21/18 1130 117/67 - - (!) 125 (!) 28 100 %   05/21/18 1115 (!) 129/58 98.3 °F (36.8 °C) Oral (!) 125 (!) 24 98 %   05/21/18 1100 131/63 - - (!) 143 (!) 23 100 %   05/21/18 1045 118/81 - - (!) 138 (!) 27 100 %   05/21/18 0900 (!) 135/90 - - (!) 125 - -   05/21/18 0812 (!) 166/85 97.6 °F (36.4 °C) Oral 84 18 98 %   05/21/18 0710 - - - 88 18 98 %   05/21/18 0414 (!) 194/96 97.9 °F (36.6 °C) Oral 85 18 99 %   05/21/18 0001 (!) 145/70 - - 88 - -   05/20/18 2335 (!) 157/74 98.4 °F (36.9 °C) Oral 89 18 99 %   05/20/18 2202 (!) 192/90 - - 85 - -   05/20/18 1957 - - - (!) 115 18 98 %   05/20/18 1903 (!) 180/84 98.3 °F (36.8 °C) Oral 85 18 100 %   05/20/18 1640 (!) 161/89 98.7 °F (37.1 °C) Oral (!) 127 16 100 %   05/20/18 1510 - - - 92 (!) 22 99 %       Physical Exam:  Gen - Well developed, well nourished, no apparent distress  HEENT - Anicteric  CV - S1, S2, no murmurs/rubs  Lungs - CTA-B, normal excursion  Abd - Soft, NT, ND, normal BS's, no HSM.  Ext - No c/c/e  Neuro - No asterixis    Labs:    Recent Labs  Lab 05/21/18  1021   CALCIUM 9.6      K 3.1*   CO2 33*      BUN 6*   CREATININE 0.7       Recent Labs  Lab 05/21/18  1021   INR 2.5*     Lipase - 39    Imaging:  Reviewed CT, + pancreatitis    Assessment:  This patient is a 77 y.o.  female with:   1. Acute/Intermittent Pancreatitis - Unclear etiology.   Has had previous extensive unrevealing evaluation.  Has undergone ERCP with PD stenting in the past, with some relief, though last stent placed in March this year.  BUN is decreasing.    2. Abd. Pain, N/V - Improved, secondary to #1.  3. A-fib  4. HTN, CAD, diastolic CHF, Polymyalgia Rheumatica, A-fib, Hypothyroidism, IBS.....    Recommendations:  1. Monitor abd. Exam.  2. Outpatient MRI/MRCP.  3. Consider repeat ERCP with PD stenting, as outpatient.  4. Continue with Cardiac treatment.    5. Will s/o for now.  Please reconsult, PRN.  Thanks.

## 2018-05-21 NOTE — PROGRESS NOTES
Pt states she is sob after using bed pan, pt assessed, wheeze heard, Resp called for breathing tx.

## 2018-05-21 NOTE — NURSING
Placed call to Dr. Sterling about patient being in a -fib with RVR's with a rate of 140's. Waiting for return call. Will continue to monitor.

## 2018-05-21 NOTE — ASSESSMENT & PLAN NOTE
Patient reported PCI x 4 stents in July/2017. Currently chest pain free and EKG/trop not consistent with ACS. Is on ASA, plavix, carvedilol and ramipril. BP control.

## 2018-05-21 NOTE — SUBJECTIVE & OBJECTIVE
Interval History:pain better and tolerating diet. SOB    Review of Systems   Constitutional: Negative.    HENT: Negative for nosebleeds.    Eyes: Negative.    Respiratory: Positive for shortness of breath.    Cardiovascular: Negative.    Gastrointestinal: Positive for abdominal pain (better). Negative for abdominal distention, constipation, diarrhea, nausea and vomiting.   Genitourinary: Negative.    Musculoskeletal: Negative.    Skin: Negative.    Neurological: Negative.    Hematological: Negative.    Psychiatric/Behavioral: Negative.      Objective:     Vital Signs (Most Recent):  Temp: 97.9 °F (36.6 °C) (05/21/18 0414)  Pulse: 88 (05/21/18 0710)  Resp: 18 (05/21/18 0710)  BP: (!) 194/96 (05/21/18 0414)  SpO2: 98 % (05/21/18 0710) Vital Signs (24h Range):  Temp:  [97.9 °F (36.6 °C)-98.7 °F (37.1 °C)] 97.9 °F (36.6 °C)  Pulse:  [] 88  Resp:  [16-22] 18  SpO2:  [97 %-100 %] 98 %  BP: (145-194)/(70-96) 194/96     Weight: 48.7 kg (107 lb 5.8 oz)  Body mass index is 21.68 kg/m².    Intake/Output Summary (Last 24 hours) at 05/21/18 0807  Last data filed at 05/21/18 0000   Gross per 24 hour   Intake          2533.75 ml   Output              950 ml   Net          1583.75 ml      Physical Exam   Constitutional: She is oriented to person, place, and time. She appears well-developed. No distress.   HENT:   Head: Normocephalic and atraumatic.   Mouth/Throat: Oropharynx is clear and moist.   Eyes: Conjunctivae and EOM are normal. No scleral icterus.   Neck: Normal range of motion.   Cardiovascular: Normal rate, regular rhythm, normal heart sounds and intact distal pulses.    Pulmonary/Chest: Effort normal. No respiratory distress. She has no wheezes. She has rales (bibasilar).   Using accessory muscles  Speaking in full sentences   Abdominal: Soft. Bowel sounds are normal. She exhibits no distension and no mass. There is tenderness. There is no rebound and no guarding. No hernia.   Musculoskeletal: Normal range of  motion. She exhibits no edema.   Neurological: She is alert and oriented to person, place, and time.   Skin: Skin is warm and dry. Capillary refill takes less than 2 seconds. She is not diaphoretic.   Psychiatric: She has a normal mood and affect. Her behavior is normal. Judgment and thought content normal.   Nursing note and vitals reviewed.      Significant Labs: All pertinent labs within the past 24 hours have been reviewed.    Significant Imaging: I have reviewed all pertinent imaging results/findings within the past 24 hours.  I have reviewed and interpreted all pertinent imaging results/findings within the past 24 hours.

## 2018-05-21 NOTE — PROGRESS NOTES
Pt sys BP elevated greater than 160, tele on, tele monitor informed that Rn getting ready to push PRN hydralazine. Alarms active and audible. HR 85

## 2018-05-21 NOTE — HOSPITAL COURSE
Ms Mcneal presented with acute pancreatitis. Had a PD stent removed about a month and a half ago by Dr Vance. Initiated on fluids and placed NPO. GI consulted. Lipase decreased from 1000 to 36. Pain improved. CT of abdomen done in the ED showed no choledocholithiasis but a nonspecific prominence of the extrahepatic biliary tree. Is s/p cholecystectomy. GI plans for further imaging/workup as outpatient.   Patient did experience wheezing and shortness of breath. CXR with bibasilar atelectasis which is consistent with shallow breathing when pancreatic pain was most severe. Duo-nebs and incentive spirometer. Given one dose of lasix once off IVFs. Breathing did improve after this. Stepped up to ICU for A Fib with RVR. Potassium noted to be low at 3.1 after furosemide IV. Given potassium, mag and amiodarone infusion. Converted quickly to NSR. Cardiology consulted.     5/23- atrial fibrillation rate controlled on amiodarone and BB; plan to continue and pt cardiologist can decide to resume sotalol at his discretion; follow up GI for pancreatitis. PT/OT had no further recommendations. Ok for dc home. Oxygen weaned off.

## 2018-05-21 NOTE — SIGNIFICANT EVENT
Ms Mcneal was SOB this AM and with rales on exam. Felt better post one dose of IV lasix and duo-nebs. About 3 hours later patient converted to AFib with RVR despite her usual BB dose in AM. On exam, patient appeared comfortable and sleeping. Lungs clear, no evidence of volume overload, and evidently with tachycardia irregularly irregular. Will transfer to ICU for amiodarone infusion. Pending INR and added a BMP as potassium may be low post lasix. Will give mag and potassium while waiting for lab results. Cardiology consulted. Patient and daughter updated on plan of care

## 2018-05-21 NOTE — ASSESSMENT & PLAN NOTE
Per patient, she had a stent removed 1.5 months ago. CT of abdomen revealed nonspecific prominence of the extrahepatic biliary tree without choledocholithiasis. Also with extensive peripancreatic fluid without drainable collection or evidence of necrosis. Lipase was > 1000 and tender to palpation, all consistent with acute pancreatitis. Surprisingly liver enzymes and T Mamadou are WNL. Is s/p cholecystectomy. Lipase now much better down to < 70s. Stop fluids. Advance diet when ready. Continue with liquid diet for now

## 2018-05-21 NOTE — ASSESSMENT & PLAN NOTE
2/2 use of low flow NC despite using humidifier. On ASA, plavix and warfarin. Resolved. Advised to use incentive spirometer in order to wean off NC

## 2018-05-21 NOTE — ASSESSMENT & PLAN NOTE
With evidence of atelectasis. Fluids stopped. Given underlying diastolic dysfunction I will give a dose of IV lasix now. On supplemental O2. XRay now. Duo-nebs

## 2018-05-22 PROBLEM — I48.91 A-FIB: Status: ACTIVE | Noted: 2018-05-22

## 2018-05-22 LAB
ANION GAP SERPL CALC-SCNC: 9 MMOL/L
BUN SERPL-MCNC: 7 MG/DL
CALCIUM SERPL-MCNC: 9.1 MG/DL
CHLORIDE SERPL-SCNC: 98 MMOL/L
CO2 SERPL-SCNC: 36 MMOL/L
CREAT SERPL-MCNC: 0.8 MG/DL
EST. GFR  (AFRICAN AMERICAN): >60 ML/MIN/1.73 M^2
EST. GFR  (NON AFRICAN AMERICAN): >60 ML/MIN/1.73 M^2
GLUCOSE SERPL-MCNC: 108 MG/DL
INR PPP: 2.8
MAGNESIUM SERPL-MCNC: 1.5 MG/DL
POCT GLUCOSE: 105 MG/DL (ref 70–110)
POCT GLUCOSE: 136 MG/DL (ref 70–110)
POCT GLUCOSE: 148 MG/DL (ref 70–110)
POTASSIUM SERPL-SCNC: 3.1 MMOL/L
PROTHROMBIN TIME: 29.8 SEC
SODIUM SERPL-SCNC: 143 MMOL/L

## 2018-05-22 PROCEDURE — 21400001 HC TELEMETRY ROOM

## 2018-05-22 PROCEDURE — 83735 ASSAY OF MAGNESIUM: CPT

## 2018-05-22 PROCEDURE — 94761 N-INVAS EAR/PLS OXIMETRY MLT: CPT

## 2018-05-22 PROCEDURE — 63600175 PHARM REV CODE 636 W HCPCS: Performed by: INTERNAL MEDICINE

## 2018-05-22 PROCEDURE — 25000003 PHARM REV CODE 250: Performed by: INTERNAL MEDICINE

## 2018-05-22 PROCEDURE — 25000242 PHARM REV CODE 250 ALT 637 W/ HCPCS: Performed by: INTERNAL MEDICINE

## 2018-05-22 PROCEDURE — 63600175 PHARM REV CODE 636 W HCPCS: Performed by: EMERGENCY MEDICINE

## 2018-05-22 PROCEDURE — 80048 BASIC METABOLIC PNL TOTAL CA: CPT

## 2018-05-22 PROCEDURE — 85610 PROTHROMBIN TIME: CPT

## 2018-05-22 PROCEDURE — 27000221 HC OXYGEN, UP TO 24 HOURS

## 2018-05-22 PROCEDURE — 94640 AIRWAY INHALATION TREATMENT: CPT

## 2018-05-22 PROCEDURE — 36415 COLL VENOUS BLD VENIPUNCTURE: CPT

## 2018-05-22 PROCEDURE — 99232 SBSQ HOSP IP/OBS MODERATE 35: CPT | Mod: ,,, | Performed by: INTERNAL MEDICINE

## 2018-05-22 RX ORDER — WARFARIN 1 MG/1
2 TABLET ORAL DAILY
Status: DISCONTINUED | OUTPATIENT
Start: 2018-05-24 | End: 2018-05-22

## 2018-05-22 RX ORDER — WARFARIN 1 MG/1
2 TABLET ORAL DAILY
Status: DISCONTINUED | OUTPATIENT
Start: 2018-05-24 | End: 2018-05-23 | Stop reason: HOSPADM

## 2018-05-22 RX ORDER — HYDRALAZINE HYDROCHLORIDE 20 MG/ML
10 INJECTION INTRAMUSCULAR; INTRAVENOUS ONCE
Status: COMPLETED | OUTPATIENT
Start: 2018-05-22 | End: 2018-05-22

## 2018-05-22 RX ORDER — WARFARIN 2.5 MG/1
2.5 TABLET ORAL DAILY
Status: DISCONTINUED | OUTPATIENT
Start: 2018-05-23 | End: 2018-05-22

## 2018-05-22 RX ORDER — AMIODARONE HYDROCHLORIDE 200 MG/1
200 TABLET ORAL 2 TIMES DAILY
Status: DISCONTINUED | OUTPATIENT
Start: 2018-05-22 | End: 2018-05-23 | Stop reason: HOSPADM

## 2018-05-22 RX ORDER — HYDRALAZINE HYDROCHLORIDE 20 MG/ML
10 INJECTION INTRAMUSCULAR; INTRAVENOUS EVERY 8 HOURS PRN
Status: DISCONTINUED | OUTPATIENT
Start: 2018-05-22 | End: 2018-05-23 | Stop reason: HOSPADM

## 2018-05-22 RX ORDER — POTASSIUM CHLORIDE 750 MG/1
30 TABLET, EXTENDED RELEASE ORAL DAILY
Status: COMPLETED | OUTPATIENT
Start: 2018-05-22 | End: 2018-05-23

## 2018-05-22 RX ORDER — LANOLIN ALCOHOL/MO/W.PET/CERES
400 CREAM (GRAM) TOPICAL DAILY
Status: DISCONTINUED | OUTPATIENT
Start: 2018-05-22 | End: 2018-05-23 | Stop reason: HOSPADM

## 2018-05-22 RX ADMIN — MAGNESIUM OXIDE TAB 400 MG (241.3 MG ELEMENTAL MG) 400 MG: 400 (241.3 MG) TAB at 09:05

## 2018-05-22 RX ADMIN — AMIODARONE HYDROCHLORIDE 200 MG: 200 TABLET ORAL at 02:05

## 2018-05-22 RX ADMIN — ONDANSETRON HYDROCHLORIDE 4 MG: 2 INJECTION INTRAMUSCULAR; INTRAVENOUS at 09:05

## 2018-05-22 RX ADMIN — SOTALOL HYDROCHLORIDE 80 MG: 80 TABLET ORAL at 09:05

## 2018-05-22 RX ADMIN — ASPIRIN 81 MG: 81 TABLET, COATED ORAL at 09:05

## 2018-05-22 RX ADMIN — AMIODARONE HYDROCHLORIDE 0.5 MG/MIN: 1.8 INJECTION, SOLUTION INTRAVENOUS at 05:05

## 2018-05-22 RX ADMIN — PRAVASTATIN SODIUM 20 MG: 10 TABLET ORAL at 09:05

## 2018-05-22 RX ADMIN — CARVEDILOL 25 MG: 6.25 TABLET, FILM COATED ORAL at 09:05

## 2018-05-22 RX ADMIN — HYDRALAZINE HYDROCHLORIDE 5 MG: 20 INJECTION INTRAMUSCULAR; INTRAVENOUS at 06:05

## 2018-05-22 RX ADMIN — POTASSIUM CHLORIDE 30 MEQ: 750 TABLET, EXTENDED RELEASE ORAL at 09:05

## 2018-05-22 RX ADMIN — HYDRALAZINE HYDROCHLORIDE 10 MG: 20 INJECTION INTRAMUSCULAR; INTRAVENOUS at 11:05

## 2018-05-22 RX ADMIN — FAMOTIDINE 20 MG: 20 TABLET ORAL at 09:05

## 2018-05-22 RX ADMIN — IPRATROPIUM BROMIDE AND ALBUTEROL SULFATE 3 ML: .5; 3 SOLUTION RESPIRATORY (INHALATION) at 03:05

## 2018-05-22 RX ADMIN — RAMIPRIL 5 MG: 2.5 CAPSULE ORAL at 09:05

## 2018-05-22 RX ADMIN — CLOPIDOGREL BISULFATE 75 MG: 75 TABLET ORAL at 09:05

## 2018-05-22 RX ADMIN — IPRATROPIUM BROMIDE AND ALBUTEROL SULFATE 3 ML: .5; 3 SOLUTION RESPIRATORY (INHALATION) at 11:05

## 2018-05-22 RX ADMIN — IPRATROPIUM BROMIDE AND ALBUTEROL SULFATE 3 ML: .5; 3 SOLUTION RESPIRATORY (INHALATION) at 08:05

## 2018-05-22 RX ADMIN — PREDNISONE 2.5 MG: 2.5 TABLET ORAL at 09:05

## 2018-05-22 RX ADMIN — IPRATROPIUM BROMIDE AND ALBUTEROL SULFATE 3 ML: .5; 3 SOLUTION RESPIRATORY (INHALATION) at 07:05

## 2018-05-22 RX ADMIN — LEVOTHYROXINE SODIUM 50 MCG: 50 TABLET ORAL at 05:05

## 2018-05-22 NOTE — SUBJECTIVE & OBJECTIVE
Interval History: Mainly just feels bad and weak.  She denies any severe abdominal pain.    Telemetry: Normal sinus rhythm    Review of Systems   All other systems reviewed and are negative.    Objective:     Vital Signs (Most Recent):  Temp: 97.7 °F (36.5 °C) (05/22/18 0800)  Pulse: 72 (05/22/18 1104)  Resp: 19 (05/22/18 1104)  BP: (!) 170/97 (05/22/18 1103)  SpO2: 100 % (05/22/18 1104) Vital Signs (24h Range):  Temp:  [96.9 °F (36.1 °C)-98.5 °F (36.9 °C)] 97.7 °F (36.5 °C)  Pulse:  [] 72  Resp:  [13-37] 19  SpO2:  [92 %-100 %] 100 %  BP: (113-189)/() 170/97     Weight: 48.7 kg (107 lb 5.8 oz)  Body mass index is 21.68 kg/m².     SpO2: 100 %  O2 Device (Oxygen Therapy): nasal cannula      Intake/Output Summary (Last 24 hours) at 05/22/18 1114  Last data filed at 05/22/18 1100   Gross per 24 hour   Intake           594.84 ml   Output             1750 ml   Net         -1155.16 ml       Lines/Drains/Airways     Peripheral Intravenous Line                 Peripheral IV - Single Lumen 05/15/18 2300 Right Antecubital 6 days         Peripheral IV - Single Lumen 05/21/18 1200 Left Forearm less than 1 day                Physical Exam   Constitutional: She is oriented to person, place, and time. No distress.   Cardiovascular: Normal rate and regular rhythm.    Pulmonary/Chest: Effort normal and breath sounds normal.   Musculoskeletal: She exhibits no edema.   Neurological: She is alert and oriented to person, place, and time.       Significant Labs:   BMP:   Recent Labs  Lab 05/21/18  1021 05/22/18  0214   * 108    143   K 3.1* 3.1*    98   CO2 33* 36*   BUN 6* 7*   CREATININE 0.7 0.8   CALCIUM 9.6 9.1   MG  --  1.5*   , CBC No results for input(s): WBC, HGB, HCT, PLT in the last 48 hours. and INR   Recent Labs  Lab 05/21/18  1021 05/22/18  0214   INR 2.5* 2.8*       Significant Imaging: Echocardiogram:   2D echo with color flow doppler:   Results for orders placed or performed during the  hospital encounter of 01/18/16   2D echo with color flow doppler   Result Value Ref Range    EF 65 55 - 65    Diastolic Dysfunction Yes (A)     Est. PA Systolic Pressure 28     Tricuspid Valve Regurgitation MILD

## 2018-05-22 NOTE — PROGRESS NOTES
MD Sterling notified of elevated BP of 181/89. Last dose of hydralazine 5 mg at 0632, new order for hydralazine 10 mg q8h. Order given for 1x dose of hydralazine 10 mg now.

## 2018-05-22 NOTE — ASSESSMENT & PLAN NOTE
Per patient, she had a stent removed 1.5 months ago. CT of abdomen revealed nonspecific prominence of the extrahepatic biliary tree without choledocholithiasis. Also with extensive peripancreatic fluid without drainable collection or evidence of necrosis. Lipase was > 1000 and tender to palpation, all consistent with acute pancreatitis. Surprisingly liver enzymes and T Mamadou are WNL. Is s/p cholecystectomy.   RESOLVED. Advanced to cardiac diet.

## 2018-05-22 NOTE — HPI
77 y.o. female with Hx of HTN, HLD, CAD s/p PCI, A-Fib, chronic anticoagulation Coumadin, Plavix therapy, bilateral carotid artery stenosis, pancreatitis, fibromyalgia presents to the ED c/o low abdominal pain that developed this morning and has since spread throughout her abdomen, back, and into her chest. Pt states that her pain is severe, constant, acute, worst in upper abdomen, and consistent with previous episodes of pancreatitis. Pt reports associated nausea. Pt reports having episodes of similar Sx 2 and 3 months ago. She states that she was free of episodes like these for nearly a year while she had a stent in her pancreas, but started coming again once this stent was removed earlier this year. Pt states that she had to go to cath lab at [facility] a few months ago after one of these episodes. She denies fever, cough, SOB.    Patient was previously seen at the [facility] and underwent multivessel PCI by Dr. Marks in 7-17.  Patient is somewhat of a poor historian and was currently resting comfortably.  She was transferred to the intensive carrier for amiodarone infusion in his artery converted to normal sinus rhythm.  She denies any current chest pain, shortness of breath or palpitations.  She's had no problems leading up to her presentation with chronic pancreatitis issues.  She says she is symptomatic when she goes in RVR feeling irregular heartbeats and palpitations with shortness of breath.  She denies any PND, orthopnea or lower edema.  She's not expressing dizziness, presyncope or syncope.

## 2018-05-22 NOTE — PROGRESS NOTES
The patient has a decreased appetite, but she is able to eat without issues.  She reports having wheezing.  The patient remains in the ICU for atrial fibrillation and she is rate controlled.    Vitals:    05/22/18 0100 05/22/18 0300 05/22/18 0400 05/22/18 0500   BP: (!) 176/98 (!) 166/81 (!) 142/75 (!) 162/81   BP Location:       Patient Position:       Pulse: 69 70 68 67   Resp: (!) 24 (!) 30 (!) 21 (!) 23   Temp:       TempSrc:       SpO2: 100% 99% 100% 100%   Weight:       Height:          albuterol-ipratropium  3 mL Nebulization Q4H WAKE    aspirin  81 mg Oral Daily    carvedilol  25 mg Oral BID    clopidogrel  75 mg Oral Daily    famotidine  20 mg Oral Daily    levothyroxine  50 mcg Oral Before breakfast    morphine  3 mg Intravenous Once    pravastatin  20 mg Oral Daily    predniSONE  2.5 mg Oral Daily    ramipril  5 mg Oral BID    sotalol  80 mg Oral BID    warfarin  2.5 mg Oral Daily     P.E.:  GEN: A x O x 3, NAD  HEENT: EOMI, PERRL, anicteric sclera  CV: RRR, no M/R/G  Chest: CTA B  Abdomen: soft, mildly tender in left upper quadrant, nondistended, normoactive BS  Ext: No C/C/E. 2+ dorsalis pedis pulses B    Labs:  Recent Results (from the past 336 hour(s))   CBC auto differential    Collection Time: 05/15/18 10:55 PM   Result Value Ref Range    WBC 10.96 3.90 - 12.70 K/uL    Hemoglobin 11.9 (L) 12.0 - 16.0 g/dL    Hematocrit 38.3 37.0 - 48.5 %    Platelets 357 (H) 150 - 350 K/uL     CMP  Sodium   Date Value Ref Range Status   05/22/2018 143 136 - 145 mmol/L Final     Potassium   Date Value Ref Range Status   05/22/2018 3.1 (L) 3.5 - 5.1 mmol/L Final     Chloride   Date Value Ref Range Status   05/22/2018 98 95 - 110 mmol/L Final     CO2   Date Value Ref Range Status   05/22/2018 36 (H) 23 - 29 mmol/L Final     Glucose   Date Value Ref Range Status   05/22/2018 108 70 - 110 mg/dL Final     BUN, Bld   Date Value Ref Range Status   05/22/2018 7 (L) 8 - 23 mg/dL Final     Creatinine   Date Value  Ref Range Status   05/22/2018 0.8 0.5 - 1.4 mg/dL Final     Calcium   Date Value Ref Range Status   05/22/2018 9.1 8.7 - 10.5 mg/dL Final     Total Protein   Date Value Ref Range Status   05/20/2018 5.9 (L) 6.0 - 8.4 g/dL Final     Albumin   Date Value Ref Range Status   05/20/2018 2.4 (L) 3.5 - 5.2 g/dL Final     Total Bilirubin   Date Value Ref Range Status   05/20/2018 0.4 0.1 - 1.0 mg/dL Final     Comment:     For infants and newborns, interpretation of results should be based  on gestational age, weight and in agreement with clinical  observations.  Premature Infant recommended reference ranges:  Up to 24 hours.............<8.0 mg/dL  Up to 48 hours............<12.0 mg/dL  3-5 days..................<15.0 mg/dL  6-29 days.................<15.0 mg/dL       Alkaline Phosphatase   Date Value Ref Range Status   05/20/2018 57 55 - 135 U/L Final     AST   Date Value Ref Range Status   05/20/2018 17 10 - 40 U/L Final     ALT   Date Value Ref Range Status   05/20/2018 9 (L) 10 - 44 U/L Final     Anion Gap   Date Value Ref Range Status   05/22/2018 9 8 - 16 mmol/L Final     eGFR if    Date Value Ref Range Status   05/22/2018 >60 >60 mL/min/1.73 m^2 Final     eGFR if non    Date Value Ref Range Status   05/22/2018 >60 >60 mL/min/1.73 m^2 Final     Comment:     Calculation used to obtain the estimated glomerular filtration  rate (eGFR) is the CKD-EPI equation.            Recent Labs  Lab 05/22/18  0214   INR 2.8*     CT of Abdomen/Pelvis:  Marked inflammatory changes with edematous appearance of the pancreas and ill-defined peripancreatic fluid, consistent with acute pancreatitis.  No evidence of pancreatic enhancement to suggest necrotic pancreatitis.  No drainable collection.  Suggest short-term follow-up.    Extensive diverticulosis coli without evidence of acute diverticulitis.    Additional findings as above.    A/P:  The patient is a 77 year old woman with a history of HTN, CAD,  diastolic CHF, atrial fibrillation, hypothyroidism, polymyalgia rhematica, irritable bowel syndrome, and recurrent idiopathic pancreatitis presenting with pancreatitis.    1.  Pancreatitis - she has undergone an extensive workup including EUSs and ERCPs and the etiology of her acute intermittent pancreatitis is unclear.  Pancreatic duct stenting has helped her in the past.  Her lipase has normalized.  She is on aspirin, plavix, and coumadin.  The patient is tolerating a llow fat diet.  She may eventually benefit from an outpatient ERCP with pancreatic duct stenting.  The patient will be monitored briefly and she will likely be discharged soon.

## 2018-05-22 NOTE — PROGRESS NOTES
[facility] Hospital Medicine  Progress Note    Patient Name: Katt Mcneal  MRN: [id number]  Patient Class: IP- Inpatient   Admission Date: 5/15/2018  Length of Stay: 6 days  Attending Physician: Kalpana Crain MD  Primary Care Provider: Kareen Hodges MD        Subjective:     Principal Problem:Acute biliary pancreatitis without infection or necrosis    HPI:  77 year old female with CAD s/p PCI in July/2017, paroxysmal AFib on coumadin, Hx of pancreatitis s/p stent removal, hypertension and anxiety who presented with acute epigastric pain of one day in evolution. It is severe, constant and radiates to back. Patient stated this is classic of her pancreatitis flares. Also stated she had just had a stent removed 1.5 months ago. Is not on pancreatic enzymes. Stated bouts of diarrhea which are chronic for her.     In the ED, patient was noted to have a tender abdomen, tachycardic, hypertensive and positive findings for pancreatitis on CT of abdomen. Lipase > 1000. Patient admitted to hospital medicine for acute pancreatitis.     Hospital Course:  Ms Mcneal presented with acute pancreatitis. Had a PD stent removed about a month and a half ago by Dr Vance. Initiated on fluids and placed NPO. GI consulted. Lipase decreased from 1000 to 36. Pain improved. CT of abdomen done in the ED showed no choledocholithiasis but a nonspecific prominence of the extrahepatic biliary tree. Is s/p cholecystectomy. GI plans for further imaging/workup as outpatient.   Patient did experience wheezing and shortness of breath. CXR with bibasilar atelectasis which is consistent with shallow breathing when pancreatic pain was most severe. Duo-nebs and incentive spirometer. Given one dose of lasix once off IVFs. Breathing did improve after this. Stepped up to ICU for A Fib with RVR. Potassium noted to be low at 3.1 after furosemide IV. Given potassium, mag and amiodarone infusion. Converted quickly to NSR.  Cardiology consulted.     Interval History: no pain and tolerating diet. But with wheezing and some dried blood clots in nose while on NC. O2 sats good.     Review of Systems   Constitutional: Negative.    HENT: Positive for nosebleeds.    Eyes: Negative.    Respiratory: Positive for shortness of breath and wheezing.    Cardiovascular: Negative.    Gastrointestinal: Negative for abdominal distention, abdominal pain, constipation, diarrhea, nausea and vomiting.   Genitourinary: Negative.    Musculoskeletal: Negative.    Skin: Negative.    Neurological: Negative.    Hematological: Negative.    Psychiatric/Behavioral: Negative.      Objective:     Vital Signs (Most Recent):  Temp: 97.7 °F (36.5 °C) (05/22/18 0800)  Pulse: 77 (05/22/18 0915)  Resp: 20 (05/22/18 0915)  BP: (!) 171/87 (05/22/18 0900)  SpO2: 98 % (05/22/18 0915) Vital Signs (24h Range):  Temp:  [96.9 °F (36.1 °C)-98.5 °F (36.9 °C)] 97.7 °F (36.5 °C)  Pulse:  [] 77  Resp:  [13-37] 20  SpO2:  [92 %-100 %] 98 %  BP: (113-189)/() 171/87     Weight: 48.7 kg (107 lb 5.8 oz)  Body mass index is 21.68 kg/m².    Intake/Output Summary (Last 24 hours) at 05/22/18 0944  Last data filed at 05/22/18 0900   Gross per 24 hour   Intake           761.44 ml   Output             1850 ml   Net         -1088.56 ml      Physical Exam   Constitutional: She is oriented to person, place, and time. She appears well-developed. No distress.   HENT:   Head: Normocephalic and atraumatic.   Mouth/Throat: Oropharynx is clear and moist.   Eyes: Conjunctivae and EOM are normal. No scleral icterus.   Neck: Normal range of motion.   Cardiovascular: Normal rate, regular rhythm, normal heart sounds and intact distal pulses.    Pulmonary/Chest: Effort normal. No respiratory distress. She has wheezes (faint wheezing RLL). She has no rales.   Using accessory muscles when speaking, but speaking in full sentences     Abdominal: Soft. Bowel sounds are normal. She exhibits no distension  and no mass. There is no tenderness. There is no rebound and no guarding. No hernia.   Musculoskeletal: Normal range of motion. She exhibits no edema.   Neurological: She is alert and oriented to person, place, and time.   Skin: Skin is warm and dry. Capillary refill takes less than 2 seconds. She is not diaphoretic.   Psychiatric: She has a normal mood and affect. Her behavior is normal. Judgment and thought content normal.   Nursing note and vitals reviewed.      Significant Labs: All pertinent labs within the past 24 hours have been reviewed.    Significant Imaging: I have reviewed all pertinent imaging results/findings within the past 24 hours.  I have reviewed and interpreted all pertinent imaging results/findings within the past 24 hours.    Assessment/Plan:      * Acute biliary pancreatitis without infection or necrosis    Per patient, she had a stent removed 1.5 months ago. CT of abdomen revealed nonspecific prominence of the extrahepatic biliary tree without choledocholithiasis. Also with extensive peripancreatic fluid without drainable collection or evidence of necrosis. Lipase was > 1000 and tender to palpation, all consistent with acute pancreatitis. Surprisingly liver enzymes and T Mamadou are WNL. Is s/p cholecystectomy.   RESOLVED. Advanced to cardiac diet.         Abdominal pain    2/2 pancreatitis. Resolved         Atrial fibrillation with RVR    Likely 2/2 hypokalemia post furosemide IV. Replace potassium. Converted to NSR on amiodarone infusion. Transition to PO?? Appreciate cardiology recs regarding AFib           Shortness of breath    With evidence of atelectasis. Fluids stopped. Given underlying diastolic dysfunction I will give a dose of IV lasix now. On supplemental O2. XRay now. Duo-nebs          Anterior epistaxis    2/2 use of low flow NC despite using humidifier. On ASA, plavix and warfarin. Advised to use incentive spirometer in order to wean off NC. Duo-nebs. No humidifier today. Will add.  Up in chair. Really try to wean off NC today. Hold today's dose of warfarin (INR 2.8 today).          Other specified hypothyroidism    No acute issues. Continue home regimen          Chronic anticoagulation    Stable and on warfarin.           Dyslipidemia    No acute issues. Continue statin          Bilateral carotid artery stenosis    No acute issues          Essential hypertension              Paroxysmal atrial fibrillation    Currently in NSR. Management as above          Chronic diastolic heart failure    Well compensated.           Coronary artery disease involving native coronary artery of native heart without angina pectoris    Patient reported PCI x 4 stents in July/2017. Currently chest pain free and EKG/trop not consistent with ACS. Is on ASA, plavix, carvedilol and ramipril. BP control.             VTE Risk Mitigation         Ordered     warfarin (COUMADIN) tablet 2.5 mg  Daily      05/22/18 0942     Place JUNE hose  Until discontinued      05/17/18 1501     Place sequential compression device  Until discontinued      05/17/18 1501        Pending cardiology recs regarding amio infusion. Step down to today to telemetry floor once on PO regimen.     Critical care time spent on the evaluation and treatment of severe organ dysfunction, review of pertinent labs and imaging studies, discussions with consulting providers and discussions with patient/family: > 35 minutes.    Addendum: stable for step down to telemetry floor once off amiodarone infusion    Kalpana Sterling MD  Department of Hospital Medicine   [facility]

## 2018-05-22 NOTE — PLAN OF CARE
Problem: Patient Care Overview  Goal: Plan of Care Review  Outcome: Ongoing (interventions implemented as appropriate)  Pt remains in ICU. Transfer orders in place; awaiting tele bed assignment. Transitioned to PO amio; amio gtt stopped. HR stable in 70s-80s, NSR. Weaned to room air; maintaining sats >93%. Remains with poor appetite, diet changed to cardiac. Hydralazine given x 1 for elevated BP. Pt remains anxious at times. No BM this shift. Voids independently with no difficulty. VSS. Remains free of falls, injuries and new breakdown.

## 2018-05-22 NOTE — PROVIDER TRANSFER
ICU transfer note    Ms Mcneal is a 78 yo female with CAD s/p PCI on ASA and plavix, Afib on sotalol/caverdilol and coumadin, Hx of pancreatitis and asthma who presented with pancreatitis. Had a PD stent removed about 1.5 months ago by Dr Vance. She actually improved with fluids and now tolerating diet. Pancreatitis resolved. Did feel SOB therefore fluids stopped, given nebs, oxygen, IS and a dose of lasix. Went into AFib with RVR soon after. Given potassium, mag and started on amiodarone, therefore moved to ICU. Converted to NSR. Cards recommends stopping sotalol while on PO amio. Needs to f/u with her cardiologist to transition her again to sotalol. Wean off NC, up in chair, PT/OT (likely need HH). F/u with cardiologist and Dr Vance. Home once off NC and PT/OT has recs for dispo. Daughter very involved and are very anxious about many things despite reassurance.

## 2018-05-22 NOTE — ASSESSMENT & PLAN NOTE
2/2 use of low flow NC despite using humidifier. On ASA, plavix and warfarin. Advised to use incentive spirometer in order to wean off NC. Duo-nebs. No humidifier today. Will add. Up in chair. Really try to wean off NC today. Hold today's dose of warfarin (INR 2.8 today).

## 2018-05-22 NOTE — PLAN OF CARE
Problem: Patient Care Overview  Goal: Individualization & Mutuality  Pt resting. VSS. Pt now in NSR. Remains on amio drip at 0.5 mg/min. No new injuries noted.

## 2018-05-22 NOTE — ASSESSMENT & PLAN NOTE
No new signs of ACS  History of multivessel PCI as above  On dual antiplatelet therapy  Continue medicines for secondary prevention as tolerated

## 2018-05-22 NOTE — PLAN OF CARE
Problem: Cardiac Rhythm Management Device (Adult)  Goal: Signs and Symptoms of Listed Potential Problems Will be Absent, Minimized or Managed (Cardiac Rhythm Management Device)  Signs and symptoms of listed potential problems will be absent, minimized or managed by discharge/transition of care (reference Cardiac Rhythm Management Device (Adult) CPG).   Outcome: Ongoing (interventions implemented as appropriate)  Patient oriented x4, pt hypertensive-IV hydralazine given x1. Patient remains on amio gtt, however is NSR.  On  3LNC- trialed off, patient desat to 88%. Patient has adequate urine output and denied pain.

## 2018-05-22 NOTE — SUBJECTIVE & OBJECTIVE
Past Medical History:   Diagnosis Date    Arthritis     Atrial fibrillation     Bilateral carotid artery stenosis 7/13/2017    Coronary artery disease     Fibromyalgia     Hyperlipidemia     Hypertension     Myocardial infarction 03/21/2015    Pancreatitis     Thyroid disease        Past Surgical History:   Procedure Laterality Date    CHOLECYSTECTOMY      CORONARY STENT PLACEMENT  3/24/15    HYSTERECTOMY  10/28/2004       Review of patient's allergies indicates:   Allergen Reactions    Sulfa (sulfonamide antibiotics) Hives    Bactrim [sulfamethoxazole-trimethoprim] Other (See Comments)     Pt. Reports that it caused severe pain    Integrilin [eptifibatide]     Statins-hmg-coa reductase inhibitors Other (See Comments)     Muscle pain. Patient states some, not all statins    Xarelto [rivaroxaban]     Epinephrine Palpitations       No current facility-administered medications on file prior to encounter.      Current Outpatient Prescriptions on File Prior to Encounter   Medication Sig    amlodipine (NORVASC) 2.5 MG tablet Take 2.5 mg by mouth 2 (two) times daily as needed (for high blood pressure). Prn systolic >160    aspirin (ECOTRIN) 81 MG EC tablet Take 81 mg by mouth once daily.    CALCIUM CARBONATE/VITAMIN D3 (CALCIUM 500 + D ORAL) Take 1 tablet by mouth once daily.     carvedilol (COREG) 25 MG tablet Take 6.25 mg by mouth 2 (two) times daily with meals. at 9am and 9pm    cholecalciferol, vitamin D3, 2,000 unit Cap Take 1 capsule by mouth once daily.    clopidogrel (PLAVIX) 75 mg tablet Take 75 mg by mouth once daily.     levothyroxine (SYNTHROID) 75 MCG tablet Take 50 mcg by mouth once daily.     lorazepam (ATIVAN) 0.5 MG tablet Take 0.5 mg by mouth every 6 (six) hours as needed for Anxiety.    nitroGLYCERIN (NITROSTAT) 0.4 MG SL tablet Place 0.4 mg under the tongue every 5 (five) minutes as needed for Chest pain.    ondansetron (ZOFRAN-ODT) 4 MG TbDL Take 1 tablet (4 mg total) by  mouth every 8 (eight) hours as needed (for nausea/vomiting). (Patient taking differently: Take 4 mg by mouth every 4 (four) hours as needed (for nausea/vomiting). )    potassium chloride (KLOR-CON) 10 MEQ TbSR Take 10 mEq by mouth once daily.    pravastatin (PRAVACHOL) 20 MG tablet Take 1 tablet (20 mg total) by mouth once daily.    predniSONE (DELTASONE) 5 MG tablet Take 2.5 mg by mouth once daily.     ramipril (ALTACE) 5 MG capsule Take 5 mg by mouth 2 (two) times daily.     SOTALOL HCL (SOTALOL AF ORAL) Take 80 mg by mouth 2 (two) times daily.     warfarin (COUMADIN) 2.5 MG tablet Take 2.5 mg by mouth once daily.     Family History     None        Social History Main Topics    Smoking status: Former Smoker     Quit date: 7/13/1964    Smokeless tobacco: Former User    Alcohol use No    Drug use: No    Sexual activity: Not Currently     Review of Systems   Constitution: Negative.   HENT: Negative.    Eyes: Negative.    Cardiovascular: Positive for chest pain, irregular heartbeat and palpitations. Negative for dyspnea on exertion, leg swelling, near-syncope, orthopnea, paroxysmal nocturnal dyspnea and syncope.   Respiratory: Positive for shortness of breath.    Skin: Negative.    Musculoskeletal: Negative.    Gastrointestinal: Positive for abdominal pain. Negative for constipation and diarrhea.   Genitourinary: Negative for dysuria.   Neurological: Negative.    Psychiatric/Behavioral: Negative.      Objective:     Vital Signs (Most Recent):  Temp: 98.5 °F (36.9 °C) (05/21/18 1500)  Pulse: 72 (05/21/18 1800)  Resp: (!) 28 (05/21/18 1800)  BP: (!) 161/81 (05/21/18 1800)  SpO2: 100 % (05/21/18 1800) Vital Signs (24h Range):  Temp:  [97.6 °F (36.4 °C)-98.5 °F (36.9 °C)] 98.5 °F (36.9 °C)  Pulse:  [] 72  Resp:  [18-37] 28  SpO2:  [98 %-100 %] 100 %  BP: (113-194)/(58-96) 161/81     Weight: 48.7 kg (107 lb 5.8 oz)  Body mass index is 21.68 kg/m².    SpO2: 100 %  O2 Device (Oxygen Therapy): nasal  cannula      Intake/Output Summary (Last 24 hours) at 05/21/18 1908  Last data filed at 05/21/18 1800   Gross per 24 hour   Intake           884.69 ml   Output             1150 ml   Net          -265.31 ml       Lines/Drains/Airways     Peripheral Intravenous Line                 Peripheral IV - Single Lumen 05/15/18 2300 Right Antecubital 5 days         Peripheral IV - Single Lumen 05/21/18 1200 Left Forearm less than 1 day                Physical Exam   Constitutional: She is oriented to person, place, and time. She appears well-developed and well-nourished.   HENT:   Head: Normocephalic and atraumatic.   Eyes: Conjunctivae and EOM are normal. Pupils are equal, round, and reactive to light.   Neck: Normal range of motion. Neck supple. No thyromegaly present.   Cardiovascular: Normal rate and regular rhythm.    No murmur heard.  Pulmonary/Chest: Effort normal and breath sounds normal. No respiratory distress.   Abdominal: Soft. Bowel sounds are normal.   Musculoskeletal: She exhibits no edema.   Neurological: She is alert and oriented to person, place, and time.   Skin: Skin is warm and dry.   Psychiatric: She has a normal mood and affect. Her behavior is normal.       Significant Labs:   CMP   Recent Labs  Lab 05/20/18  0505 05/21/18  1021    144   K 3.4* 3.1*    102   CO2 27 33*    130*   BUN 9 6*   CREATININE 0.7 0.7   CALCIUM 8.8 9.6   PROT 5.9*  --    ALBUMIN 2.4*  --    BILITOT 0.4  --    ALKPHOS 57  --    AST 17  --    ALT 9*  --    ANIONGAP 8 9   ESTGFRAFRICA >60 >60   EGFRNONAA >60 >60   , CBC No results for input(s): WBC, HGB, HCT, PLT in the last 48 hours., INR   Recent Labs  Lab 05/20/18  0505 05/21/18  1021   INR 3.2* 2.5*   , Lipid Panel No results for input(s): CHOL, HDL, LDLCALC, TRIG, CHOLHDL in the last 48 hours. and Troponin No results for input(s): TROPONINI in the last 48 hours.    Significant Imaging: Echocardiogram:   2D echo with color flow doppler:   Results for orders  placed or performed during the hospital encounter of 01/18/16   2D echo with color flow doppler   Result Value Ref Range    EF 65 55 - 65    Diastolic Dysfunction Yes (A)     Est. PA Systolic Pressure 28     Tricuspid Valve Regurgitation MILD      LHC:  7-17  B. Summary/Post-Operative Diagnosis       LVEDP=20mmHg.    Successful PCI of 90% mid LAD stenosis with 3X26 TONY.    Successful PCI of 80% proximal LAD stenosis with 3.5X15 & 3.5X9 TONY.    Successful PCI of 80% mid PLB stenosis with 2.5X22 TONY.    60% proximal RCA stenosis; FFR=0.87.    Diagnostic:          Patient has a right dominant coronary artery.        - Left Main Coronary Artery:             The LM has luminal irregularities. There is ART 3 flow.     - Left Anterior Descending Artery:             The proximal LAD has a 80% stenosis. There is ART 3 flow. The remaining portion of the vessel has luminal irregularities.                     Lesion Details:   This is a Type C lesion.  The length is 10-20 mm, eccentric shape, mild proximal tortuosity, segment angulation of 45-90 degrees, irregular contour, moderate to heavy calcification. Bifurcation is present. The minimum   luminal diameter is 0.5 millimeters.             The mid LAD has a 95% stenosis. There is ART 3 flow. The remaining portion of the vessel has luminal irregularities.                     Lesion Details:   This is a Type C lesion.  The length is 20mm, eccentric shape, moderate proximal tortuosity, segment angulation of 45-90 degrees, irregular contour, moderate to heavy calcification. No bifurcation is present. The   minimum luminal diameter is 0.1 millimeters.             The distal LAD has luminal irregularities. There is ART 3 flow.     - D1:             The proximal D1 has a 30% stenosis. There is ART 3 flow. The remaining portion of the vessel has luminal irregularities.     - Left Circumflex Artery:             The LCX has luminal irregularities. There is ART 3 flow.     -  OM1:             The OM1 has a 20% stenosis. There is ART 3 flow. The remaining portion of the vessel has luminal irregularities.     - OM2:             The OM2 has luminal irregularities. There is ART 3 flow.     - Right Coronary Artery:             The proximal RCA has a 60% stenosis. There is ART 3 flow. FFR was 0.87. The remaining portion of the vessel has luminal irregularities.             The mid RCA has luminal irregularities. There is ART 3 flow.             The distal RCA has luminal irregularities. There is ART 3 flow.     - Posterior Lateral Branch:             The mid PLB has a 80% stenosis. There is ART 3 flow. The remaining portion of the vessel has luminal irregularities.                     Lesion Details:   This is a Type C lesion.  The length is 20mm, eccentric shape, moderate proximal tortuosity, segment angulation of 45-90 degrees, irregular contour, mild to moderate calcification. No bifurcation is present. The   minimum luminal diameter is 0.4 millimeters.     - Posterior Descending Artery:             The PDA has luminal irregularities. There is ART 3 flow.      Intervention          Proximal LAD:              The lesion was successfully intervened. Post-stenosis of 0%, post-ART 3 flow and TMP grade 3. The vessel was accessed natively.  The following items were used: 2.5X15 TREK Rx Balloon, 3.9N13O971 Euphora SC Balloon, Stent Resolute Rx 3.50x15   (TONY), Stent Resolute Rx 3.50x9 (TONY) and Cath Ivus [facility] [place].       Mid LAD:              The lesion was successfully intervened. Post-stenosis of 0%, post-ART 3 flow and TMP grade 3. The vessel was accessed natively.  The following items were used: 2.5X15 TREK Rx Balloon, Cath Mini Trek 1.5 X 20 Rx, 3.0X15 TREK Rx Balloon, Stent   Resolute Rx 3.00x26 (TONY) and Cath Iv[facility] [place].       Mid PLB:              The lesion was successfully intervened. Post-stenosis of 0%, post-RAT 3 flow and TMP grade 3. The vessel was  accessed natively.  The following items were used: 2.0X15 Mini TREK Rx Balloon and Stent Resolute Rx 2.50x22 (TONY).

## 2018-05-22 NOTE — ASSESSMENT & PLAN NOTE
Likely 2/2 hypokalemia post furosemide IV. Replace potassium. Converted to NSR on amiodarone infusion. Transition to PO?? Appreciate cardiology recs regarding AFib

## 2018-05-22 NOTE — PROGRESS NOTES
[facility]  Cardiology  Progress Note    Patient Name: Katt Mcneal  MRN: [id number]  Admission Date: 5/15/2018  Hospital Length of Stay: 6 days  Code Status: Full Code   Attending Physician: Kalpana Crain MD   Primary Care Physician: Kareen Hodges MD  Expected Discharge Date:   Principal Problem:Acute biliary pancreatitis without infection or necrosis    Subjective:     Hospital Course:   5-21: Transfer to ICU for amiodarone infusion and converted to normal sinus rhythm    Interval History: Mainly just feels bad and weak.  She denies any severe abdominal pain.    Telemetry: Normal sinus rhythm    Review of Systems   All other systems reviewed and are negative.    Objective:     Vital Signs (Most Recent):  Temp: 97.7 °F (36.5 °C) (05/22/18 0800)  Pulse: 72 (05/22/18 1104)  Resp: 19 (05/22/18 1104)  BP: (!) 170/97 (05/22/18 1103)  SpO2: 100 % (05/22/18 1104) Vital Signs (24h Range):  Temp:  [96.9 °F (36.1 °C)-98.5 °F (36.9 °C)] 97.7 °F (36.5 °C)  Pulse:  [] 72  Resp:  [13-37] 19  SpO2:  [92 %-100 %] 100 %  BP: (113-189)/() 170/97     Weight: 48.7 kg (107 lb 5.8 oz)  Body mass index is 21.68 kg/m².     SpO2: 100 %  O2 Device (Oxygen Therapy): nasal cannula      Intake/Output Summary (Last 24 hours) at 05/22/18 1114  Last data filed at 05/22/18 1100   Gross per 24 hour   Intake           594.84 ml   Output             1750 ml   Net         -1155.16 ml       Lines/Drains/Airways     Peripheral Intravenous Line                 Peripheral IV - Single Lumen 05/15/18 2300 Right Antecubital 6 days         Peripheral IV - Single Lumen 05/21/18 1200 Left Forearm less than 1 day                Physical Exam   Constitutional: She is oriented to person, place, and time. No distress.   Cardiovascular: Normal rate and regular rhythm.    Pulmonary/Chest: Effort normal and breath sounds normal.   Musculoskeletal: She exhibits no edema.   Neurological: She is alert and oriented to person,  place, and time.       Significant Labs:   BMP:   Recent Labs  Lab 05/21/18  1021 05/22/18  0214   * 108    143   K 3.1* 3.1*    98   CO2 33* 36*   BUN 6* 7*   CREATININE 0.7 0.8   CALCIUM 9.6 9.1   MG  --  1.5*   , CBC No results for input(s): WBC, HGB, HCT, PLT in the last 48 hours. and INR   Recent Labs  Lab 05/21/18  1021 05/22/18  0214   INR 2.5* 2.8*       Significant Imaging: Echocardiogram:   2D echo with color flow doppler:   Results for orders placed or performed during the hospital encounter of 01/18/16   2D echo with color flow doppler   Result Value Ref Range    EF 65 55 - 65    Diastolic Dysfunction Yes (A)     Est. PA Systolic Pressure 28     Tricuspid Valve Regurgitation MILD      Assessment and Plan:     Brief HPI:     Paroxysmal atrial fibrillation    Converted to normal sinus rhythm on amiodarone infusion   changed to oral amiodarone today  Previously on Coumadin for OAC  Hold Coumadin dosing times one day then reduce to 2 mg with decreased clearance on amiodarone  Continue follow PT/INR        Chronic recurrent pancreatitis    Management per primary and surgery        Coronary artery disease of native artery of native heart with stable angina pectoris    No new signs of ACS  History of multivessel PCI as above  On dual antiplatelet therapy  Continue medicines for secondary prevention as tolerated        Essential hypertension             Dyslipidemia    On statin            VTE Risk Mitigation         Ordered     warfarin (COUMADIN) tablet 2 mg  Daily      05/22/18 1115     Place JUNE hose  Until discontinued      05/17/18 1501     Place sequential compression device  Until discontinued      05/17/18 1501        *Amiodarone infusion complete this afternoon.  Okay to transfer to telemetry after that likely will need to be weaned quickly as an outpatient and transition back to sotalol when stable.     Will follow loosely     Andrew Cordon MD  Cardiology  [facility] [place]

## 2018-05-22 NOTE — CONSULTS
[facility]  Cardiology  Consult Note    Patient Name: Katt Mcneal  MRN: [id number]  Admission Date: 5/15/2018  Hospital Length of Stay: 5 days  Code Status: Full Code   Attending Provider: Osvaldo Padron MD   Consulting Provider: Andrew Rader MD  Primary Care Physician: Kareen Hodges MD  Principal Problem:Acute biliary pancreatitis without infection or necrosis    Patient information was obtained from patient, past medical records and ER records.     Inpatient consult to Cardiology  Consult performed by: ANDREW RADER  Consult ordered by: OSVALDO PADRON  Reason for consult: A. fib with RVR        Subjective:     Chief Complaint:  Abdominal pain     HPI:   77 y.o. female with Hx of HTN, HLD, CAD s/p PCI, A-Fib, chronic anticoagulation Coumadin, Plavix therapy, bilateral carotid artery stenosis, pancreatitis, fibromyalgia presents to the ED c/o low abdominal pain that developed this morning and has since spread throughout her abdomen, back, and into her chest. Pt states that her pain is severe, constant, acute, worst in upper abdomen, and consistent with previous episodes of pancreatitis. Pt reports associated nausea. Pt reports having episodes of similar Sx 2 and 3 months ago. She states that she was free of episodes like these for nearly a year while she had a stent in her pancreas, but started coming again once this stent was removed earlier this year. Pt states that she had to go to cath lab at [facility] a few months ago after one of these episodes. She denies fever, cough, SOB.    Patient was previously seen at the main campus and underwent multivessel PCI by Dr. Marks in 7-17.  Patient is somewhat of a poor historian and was currently resting comfortably.  She was transferred to the intensive carrier for amiodarone infusion in his artery converted to normal sinus rhythm.  She denies any current chest pain, shortness of breath or palpitations.  She's had no problems leading  up to her presentation with chronic pancreatitis issues.  She says she is symptomatic when she goes in RVR feeling irregular heartbeats and palpitations with shortness of breath.  She denies any PND, orthopnea or lower edema.  She's not expressing dizziness, presyncope or syncope.          Past Medical History:   Diagnosis Date    Arthritis     Atrial fibrillation     Bilateral carotid artery stenosis 7/13/2017    Coronary artery disease     Fibromyalgia     Hyperlipidemia     Hypertension     Myocardial infarction 03/21/2015    Pancreatitis     Thyroid disease        Past Surgical History:   Procedure Laterality Date    CHOLECYSTECTOMY      CORONARY STENT PLACEMENT  3/24/15    HYSTERECTOMY  10/28/2004       Review of patient's allergies indicates:   Allergen Reactions    Sulfa (sulfonamide antibiotics) Hives    Bactrim [sulfamethoxazole-trimethoprim] Other (See Comments)     Pt. Reports that it caused severe pain    Integrilin [eptifibatide]     Statins-hmg-coa reductase inhibitors Other (See Comments)     Muscle pain. Patient states some, not all statins    Xarelto [rivaroxaban]     Epinephrine Palpitations       No current facility-administered medications on file prior to encounter.      Current Outpatient Prescriptions on File Prior to Encounter   Medication Sig    amlodipine (NORVASC) 2.5 MG tablet Take 2.5 mg by mouth 2 (two) times daily as needed (for high blood pressure). Prn systolic >160    aspirin (ECOTRIN) 81 MG EC tablet Take 81 mg by mouth once daily.    CALCIUM CARBONATE/VITAMIN D3 (CALCIUM 500 + D ORAL) Take 1 tablet by mouth once daily.     carvedilol (COREG) 25 MG tablet Take 6.25 mg by mouth 2 (two) times daily with meals. at 9am and 9pm    cholecalciferol, vitamin D3, 2,000 unit Cap Take 1 capsule by mouth once daily.    clopidogrel (PLAVIX) 75 mg tablet Take 75 mg by mouth once daily.     levothyroxine (SYNTHROID) 75 MCG tablet Take 50 mcg by mouth once daily.      lorazepam (ATIVAN) 0.5 MG tablet Take 0.5 mg by mouth every 6 (six) hours as needed for Anxiety.    nitroGLYCERIN (NITROSTAT) 0.4 MG SL tablet Place 0.4 mg under the tongue every 5 (five) minutes as needed for Chest pain.    ondansetron (ZOFRAN-ODT) 4 MG TbDL Take 1 tablet (4 mg total) by mouth every 8 (eight) hours as needed (for nausea/vomiting). (Patient taking differently: Take 4 mg by mouth every 4 (four) hours as needed (for nausea/vomiting). )    potassium chloride (KLOR-CON) 10 MEQ TbSR Take 10 mEq by mouth once daily.    pravastatin (PRAVACHOL) 20 MG tablet Take 1 tablet (20 mg total) by mouth once daily.    predniSONE (DELTASONE) 5 MG tablet Take 2.5 mg by mouth once daily.     ramipril (ALTACE) 5 MG capsule Take 5 mg by mouth 2 (two) times daily.     SOTALOL HCL (SOTALOL AF ORAL) Take 80 mg by mouth 2 (two) times daily.     warfarin (COUMADIN) 2.5 MG tablet Take 2.5 mg by mouth once daily.     Family History     None        Social History Main Topics    Smoking status: Former Smoker     Quit date: 7/13/1964    Smokeless tobacco: Former User    Alcohol use No    Drug use: No    Sexual activity: Not Currently     Review of Systems   Constitution: Negative.   HENT: Negative.    Eyes: Negative.    Cardiovascular: Positive for chest pain, irregular heartbeat and palpitations. Negative for dyspnea on exertion, leg swelling, near-syncope, orthopnea, paroxysmal nocturnal dyspnea and syncope.   Respiratory: Positive for shortness of breath.    Skin: Negative.    Musculoskeletal: Negative.    Gastrointestinal: Positive for abdominal pain. Negative for constipation and diarrhea.   Genitourinary: Negative for dysuria.   Neurological: Negative.    Psychiatric/Behavioral: Negative.      Objective:     Vital Signs (Most Recent):  Temp: 98.5 °F (36.9 °C) (05/21/18 1500)  Pulse: 72 (05/21/18 1800)  Resp: (!) 28 (05/21/18 1800)  BP: (!) 161/81 (05/21/18 1800)  SpO2: 100 % (05/21/18 1800) Vital Signs (24h  Range):  Temp:  [97.6 °F (36.4 °C)-98.5 °F (36.9 °C)] 98.5 °F (36.9 °C)  Pulse:  [] 72  Resp:  [18-37] 28  SpO2:  [98 %-100 %] 100 %  BP: (113-194)/(58-96) 161/81     Weight: 48.7 kg (107 lb 5.8 oz)  Body mass index is 21.68 kg/m².    SpO2: 100 %  O2 Device (Oxygen Therapy): nasal cannula      Intake/Output Summary (Last 24 hours) at 05/21/18 1908  Last data filed at 05/21/18 1800   Gross per 24 hour   Intake           884.69 ml   Output             1150 ml   Net          -265.31 ml       Lines/Drains/Airways     Peripheral Intravenous Line                 Peripheral IV - Single Lumen 05/15/18 2300 Right Antecubital 5 days         Peripheral IV - Single Lumen 05/21/18 1200 Left Forearm less than 1 day                Physical Exam   Constitutional: She is oriented to person, place, and time. She appears well-developed and well-nourished.   HENT:   Head: Normocephalic and atraumatic.   Eyes: Conjunctivae and EOM are normal. Pupils are equal, round, and reactive to light.   Neck: Normal range of motion. Neck supple. No thyromegaly present.   Cardiovascular: Normal rate and regular rhythm.    No murmur heard.  Pulmonary/Chest: Effort normal and breath sounds normal. No respiratory distress.   Abdominal: Soft. Bowel sounds are normal.   Musculoskeletal: She exhibits no edema.   Neurological: She is alert and oriented to person, place, and time.   Skin: Skin is warm and dry.   Psychiatric: She has a normal mood and affect. Her behavior is normal.       Significant Labs:   CMP   Recent Labs  Lab 05/20/18  0505 05/21/18  1021    144   K 3.4* 3.1*    102   CO2 27 33*    130*   BUN 9 6*   CREATININE 0.7 0.7   CALCIUM 8.8 9.6   PROT 5.9*  --    ALBUMIN 2.4*  --    BILITOT 0.4  --    ALKPHOS 57  --    AST 17  --    ALT 9*  --    ANIONGAP 8 9   ESTGFRAFRICA >60 >60   EGFRNONAA >60 >60   , CBC No results for input(s): WBC, HGB, HCT, PLT in the last 48 hours., INR   Recent Labs  Lab 05/20/18  3037  05/21/18  1021   INR 3.2* 2.5*   , Lipid Panel No results for input(s): CHOL, HDL, LDLCALC, TRIG, CHOLHDL in the last 48 hours. and Troponin No results for input(s): TROPONINI in the last 48 hours.    Significant Imaging: Echocardiogram:   2D echo with color flow doppler:   Results for orders placed or performed during the hospital encounter of 01/18/16   2D echo with color flow doppler   Result Value Ref Range    EF 65 55 - 65    Diastolic Dysfunction Yes (A)     Est. PA Systolic Pressure 28     Tricuspid Valve Regurgitation MILD      LHC:  7-17  B. Summary/Post-Operative Diagnosis       LVEDP=20mmHg.    Successful PCI of 90% mid LAD stenosis with 3X26 TOYN.    Successful PCI of 80% proximal LAD stenosis with 3.5X15 & 3.5X9 TONY.    Successful PCI of 80% mid PLB stenosis with 2.5X22 TONY.    60% proximal RCA stenosis; FFR=0.87.    Diagnostic:          Patient has a right dominant coronary artery.        - Left Main Coronary Artery:             The LM has luminal irregularities. There is ART 3 flow.     - Left Anterior Descending Artery:             The proximal LAD has a 80% stenosis. There is ART 3 flow. The remaining portion of the vessel has luminal irregularities.                     Lesion Details:   This is a Type C lesion.  The length is 10-20 mm, eccentric shape, mild proximal tortuosity, segment angulation of 45-90 degrees, irregular contour, moderate to heavy calcification. Bifurcation is present. The minimum   luminal diameter is 0.5 millimeters.             The mid LAD has a 95% stenosis. There is ART 3 flow. The remaining portion of the vessel has luminal irregularities.                     Lesion Details:   This is a Type C lesion.  The length is 20mm, eccentric shape, moderate proximal tortuosity, segment angulation of 45-90 degrees, irregular contour, moderate to heavy calcification. No bifurcation is present. The   minimum luminal diameter is 0.1 millimeters.             The distal LAD has luminal  irregularities. There is ART 3 flow.     - D1:             The proximal D1 has a 30% stenosis. There is ART 3 flow. The remaining portion of the vessel has luminal irregularities.     - Left Circumflex Artery:             The LCX has luminal irregularities. There is ART 3 flow.     - OM1:             The OM1 has a 20% stenosis. There is ART 3 flow. The remaining portion of the vessel has luminal irregularities.     - OM2:             The OM2 has luminal irregularities. There is ART 3 flow.     - Right Coronary Artery:             The proximal RCA has a 60% stenosis. There is ART 3 flow. FFR was 0.87. The remaining portion of the vessel has luminal irregularities.             The mid RCA has luminal irregularities. There is ART 3 flow.             The distal RCA has luminal irregularities. There is ART 3 flow.     - Posterior Lateral Branch:             The mid PLB has a 80% stenosis. There is ART 3 flow. The remaining portion of the vessel has luminal irregularities.                     Lesion Details:   This is a Type C lesion.  The length is 20mm, eccentric shape, moderate proximal tortuosity, segment angulation of 45-90 degrees, irregular contour, mild to moderate calcification. No bifurcation is present. The   minimum luminal diameter is 0.4 millimeters.     - Posterior Descending Artery:             The PDA has luminal irregularities. There is ART 3 flow.      Intervention          Proximal LAD:              The lesion was successfully intervened. Post-stenosis of 0%, post-ART 3 flow and TMP grade 3. The vessel was accessed natively.  The following items were used: 2.5X15 TREK Rx Balloon, 3.3Y83C596 Euphora SC Balloon, Stent Resolute Rx 3.50x15   (TONY), Stent Resolute Rx 3.50x9 (TONY) and Cath Ivus [facility] [place].       Mid LAD:              The lesion was successfully intervened. Post-stenosis of 0%, post-ART 3 flow and TMP grade 3. The vessel was accessed natively.  The following items were  used: 2.5X15 TREK Rx Balloon, Cath Mini Trek 1.5 X 20 Rx, 3.0X15 TREK Rx Balloon, Stent   Resolute Rx 3.00x26 (TONY) and Cath Ivus [facility] [place].       Mid PLB:              The lesion was successfully intervened. Post-stenosis of 0%, post-ART 3 flow and TMP grade 3. The vessel was accessed natively.  The following items were used: 2.0X15 Mini TREK Rx Balloon and Stent Resolute Rx 2.50x22 (TONY).      Assessment and Plan:     Paroxysmal atrial fibrillation    Converted to normal sinus rhythm on amiodarone infusion  Previously on Coumadin for OAC        Chronic recurrent pancreatitis    Management per primary and surgery        Coronary artery disease of native artery of native heart with stable angina pectoris    No new signs of ACS  History of multivessel PCI as above  On dual antiplatelet therapy  Continue medicines for secondary prevention as tolerated        Essential hypertension             Dyslipidemia    On statin            VTE Risk Mitigation         Ordered     warfarin (COUMADIN) tablet 2.5 mg  Daily      05/20/18 1127     Place JUNE hose  Until discontinued      05/17/18 1501     Place sequential compression device  Until discontinued      05/17/18 1501        *Total ICU time spent on case 35 minutes    Thank you for your consult. I will follow-up with patient. Please contact us if you have any additional questions.    Andrew Cordon MD  Cardiology   [facility] [place]

## 2018-05-22 NOTE — HOSPITAL COURSE
5-21: Transfer to ICU for amiodarone infusion and converted to normal sinus rhythm  5-22: Transfer to floor after protocol completed

## 2018-05-23 VITALS
SYSTOLIC BLOOD PRESSURE: 163 MMHG | HEART RATE: 72 BPM | WEIGHT: 104.94 LBS | OXYGEN SATURATION: 97 % | RESPIRATION RATE: 18 BRPM | DIASTOLIC BLOOD PRESSURE: 78 MMHG | TEMPERATURE: 98 F | BODY MASS INDEX: 21.16 KG/M2 | HEIGHT: 59 IN

## 2018-05-23 PROBLEM — I48.91 ATRIAL FIBRILLATION WITH RVR: Status: RESOLVED | Noted: 2018-05-21 | Resolved: 2018-05-23

## 2018-05-23 PROBLEM — R10.13 EPIGASTRIC ABDOMINAL PAIN: Status: RESOLVED | Noted: 2018-05-17 | Resolved: 2018-05-23

## 2018-05-23 PROBLEM — R06.02 SHORTNESS OF BREATH: Status: RESOLVED | Noted: 2018-05-21 | Resolved: 2018-05-23

## 2018-05-23 PROBLEM — R04.0 ANTERIOR EPISTAXIS: Status: RESOLVED | Noted: 2018-05-19 | Resolved: 2018-05-23

## 2018-05-23 LAB
ANION GAP SERPL CALC-SCNC: 9 MMOL/L
BUN SERPL-MCNC: 10 MG/DL
CALCIUM SERPL-MCNC: 9.2 MG/DL
CHLORIDE SERPL-SCNC: 101 MMOL/L
CO2 SERPL-SCNC: 31 MMOL/L
CREAT SERPL-MCNC: 0.8 MG/DL
EST. GFR  (AFRICAN AMERICAN): >60 ML/MIN/1.73 M^2
EST. GFR  (NON AFRICAN AMERICAN): >60 ML/MIN/1.73 M^2
GLUCOSE SERPL-MCNC: 117 MG/DL
INR PPP: 2.8
MAGNESIUM SERPL-MCNC: 1.9 MG/DL
POCT GLUCOSE: 105 MG/DL (ref 70–110)
POCT GLUCOSE: 142 MG/DL (ref 70–110)
POCT GLUCOSE: 92 MG/DL (ref 70–110)
POTASSIUM SERPL-SCNC: 3.7 MMOL/L
PROTHROMBIN TIME: 29.1 SEC
SODIUM SERPL-SCNC: 141 MMOL/L

## 2018-05-23 PROCEDURE — 25000242 PHARM REV CODE 250 ALT 637 W/ HCPCS: Performed by: INTERNAL MEDICINE

## 2018-05-23 PROCEDURE — 97161 PT EVAL LOW COMPLEX 20 MIN: CPT

## 2018-05-23 PROCEDURE — 83735 ASSAY OF MAGNESIUM: CPT

## 2018-05-23 PROCEDURE — 36415 COLL VENOUS BLD VENIPUNCTURE: CPT

## 2018-05-23 PROCEDURE — G8988 SELF CARE GOAL STATUS: HCPCS | Mod: CI

## 2018-05-23 PROCEDURE — 94640 AIRWAY INHALATION TREATMENT: CPT

## 2018-05-23 PROCEDURE — 85610 PROTHROMBIN TIME: CPT

## 2018-05-23 PROCEDURE — 99232 SBSQ HOSP IP/OBS MODERATE 35: CPT | Mod: ,,, | Performed by: INTERNAL MEDICINE

## 2018-05-23 PROCEDURE — 25000003 PHARM REV CODE 250: Performed by: INTERNAL MEDICINE

## 2018-05-23 PROCEDURE — G8989 SELF CARE D/C STATUS: HCPCS | Mod: CI

## 2018-05-23 PROCEDURE — G8987 SELF CARE CURRENT STATUS: HCPCS | Mod: CI

## 2018-05-23 PROCEDURE — G8978 MOBILITY CURRENT STATUS: HCPCS | Mod: CJ

## 2018-05-23 PROCEDURE — 97165 OT EVAL LOW COMPLEX 30 MIN: CPT

## 2018-05-23 PROCEDURE — 63600175 PHARM REV CODE 636 W HCPCS: Performed by: INTERNAL MEDICINE

## 2018-05-23 PROCEDURE — 80048 BASIC METABOLIC PNL TOTAL CA: CPT

## 2018-05-23 PROCEDURE — 94761 N-INVAS EAR/PLS OXIMETRY MLT: CPT

## 2018-05-23 PROCEDURE — G8980 MOBILITY D/C STATUS: HCPCS | Mod: CJ

## 2018-05-23 PROCEDURE — G8979 MOBILITY GOAL STATUS: HCPCS | Mod: CJ

## 2018-05-23 RX ORDER — AMIODARONE HYDROCHLORIDE 200 MG/1
200 TABLET ORAL 2 TIMES DAILY
Qty: 60 TABLET | Refills: 11 | Status: ON HOLD | OUTPATIENT
Start: 2018-05-23 | End: 2022-01-16 | Stop reason: HOSPADM

## 2018-05-23 RX ORDER — WARFARIN 2 MG/1
2 TABLET ORAL DAILY
Qty: 30 TABLET | Refills: 11 | Status: SHIPPED | OUTPATIENT
Start: 2018-05-24 | End: 2019-05-24

## 2018-05-23 RX ORDER — AMLODIPINE BESYLATE 5 MG/1
5 TABLET ORAL DAILY
Status: DISCONTINUED | OUTPATIENT
Start: 2018-05-23 | End: 2018-05-23 | Stop reason: HOSPADM

## 2018-05-23 RX ORDER — LABETALOL HYDROCHLORIDE 5 MG/ML
20 INJECTION, SOLUTION INTRAVENOUS ONCE
Status: COMPLETED | OUTPATIENT
Start: 2018-05-23 | End: 2018-05-23

## 2018-05-23 RX ORDER — AMLODIPINE BESYLATE 5 MG/1
5 TABLET ORAL DAILY
Qty: 30 TABLET | Refills: 11 | Status: ON HOLD | OUTPATIENT
Start: 2018-05-24 | End: 2022-01-16 | Stop reason: HOSPADM

## 2018-05-23 RX ORDER — CLONIDINE HYDROCHLORIDE 0.1 MG/1
0.3 TABLET ORAL ONCE
Status: COMPLETED | OUTPATIENT
Start: 2018-05-23 | End: 2018-05-23

## 2018-05-23 RX ORDER — CARVEDILOL 25 MG/1
25 TABLET ORAL 2 TIMES DAILY
Qty: 60 TABLET | Refills: 11 | Status: SHIPPED | OUTPATIENT
Start: 2018-05-23 | End: 2021-12-20 | Stop reason: CLARIF

## 2018-05-23 RX ADMIN — LABETALOL HYDROCHLORIDE 20 MG: 5 INJECTION, SOLUTION INTRAVENOUS at 05:05

## 2018-05-23 RX ADMIN — HYDRALAZINE HYDROCHLORIDE 10 MG: 20 INJECTION INTRAMUSCULAR; INTRAVENOUS at 12:05

## 2018-05-23 RX ADMIN — CLOPIDOGREL BISULFATE 75 MG: 75 TABLET ORAL at 09:05

## 2018-05-23 RX ADMIN — CARVEDILOL 25 MG: 6.25 TABLET, FILM COATED ORAL at 09:05

## 2018-05-23 RX ADMIN — MAGNESIUM OXIDE TAB 400 MG (241.3 MG ELEMENTAL MG) 400 MG: 400 (241.3 MG) TAB at 09:05

## 2018-05-23 RX ADMIN — RAMIPRIL 5 MG: 2.5 CAPSULE ORAL at 09:05

## 2018-05-23 RX ADMIN — LEVOTHYROXINE SODIUM 50 MCG: 50 TABLET ORAL at 05:05

## 2018-05-23 RX ADMIN — CLONIDINE HYDROCHLORIDE 0.3 MG: 0.1 TABLET ORAL at 03:05

## 2018-05-23 RX ADMIN — AMLODIPINE BESYLATE 5 MG: 5 TABLET ORAL at 03:05

## 2018-05-23 RX ADMIN — PRAVASTATIN SODIUM 20 MG: 10 TABLET ORAL at 09:05

## 2018-05-23 RX ADMIN — IPRATROPIUM BROMIDE AND ALBUTEROL SULFATE 3 ML: .5; 3 SOLUTION RESPIRATORY (INHALATION) at 07:05

## 2018-05-23 RX ADMIN — AMIODARONE HYDROCHLORIDE 200 MG: 200 TABLET ORAL at 09:05

## 2018-05-23 RX ADMIN — ASPIRIN 81 MG: 81 TABLET, COATED ORAL at 09:05

## 2018-05-23 RX ADMIN — IPRATROPIUM BROMIDE AND ALBUTEROL SULFATE 3 ML: .5; 3 SOLUTION RESPIRATORY (INHALATION) at 11:05

## 2018-05-23 RX ADMIN — POTASSIUM CHLORIDE 30 MEQ: 750 TABLET, EXTENDED RELEASE ORAL at 09:05

## 2018-05-23 RX ADMIN — IPRATROPIUM BROMIDE AND ALBUTEROL SULFATE 3 ML: .5; 3 SOLUTION RESPIRATORY (INHALATION) at 03:05

## 2018-05-23 RX ADMIN — FAMOTIDINE 20 MG: 20 TABLET ORAL at 09:05

## 2018-05-23 RX ADMIN — PREDNISONE 2.5 MG: 2.5 TABLET ORAL at 09:05

## 2018-05-23 NOTE — PROGRESS NOTES
Patient has discharged orders. Patient will be discharged to home today with daughter, Patient educated on the importance of keeping all appointments and medication compliance.Both patient and daughter verbalized understanding of all instructions.

## 2018-05-23 NOTE — SUBJECTIVE & OBJECTIVE
Interval History: Mainly just feels weak.  She denies any significant abdominal pain and is tolerating by mouth.  She's had no recurrence of A. Fib.    Telemetry: Normal sinus rhythm    Review of Systems   All other systems reviewed and are negative.    Objective:     Vital Signs (Most Recent):  Temp: 98 °F (36.7 °C) (05/23/18 0728)  Pulse: 78 (05/23/18 0738)  Resp: 20 (05/23/18 0738)  BP: (!) 162/75 (05/23/18 0728)  SpO2: (!) 93 % (05/23/18 0738) Vital Signs (24h Range):  Temp:  [97.7 °F (36.5 °C)-98.7 °F (37.1 °C)] 98 °F (36.7 °C)  Pulse:  [70-82] 78  Resp:  [16-32] 20  SpO2:  [90 %-100 %] 93 %  BP: (144-202)/(75-97) 162/75     Weight: 47.6 kg (104 lb 15 oz)  Body mass index is 21.2 kg/m².     SpO2: (!) 93 %  O2 Device (Oxygen Therapy): room air (02 on stand-by)      Intake/Output Summary (Last 24 hours) at 05/23/18 0927  Last data filed at 05/23/18 0630   Gross per 24 hour   Intake           241.08 ml   Output              200 ml   Net            41.08 ml       Lines/Drains/Airways     Peripheral Intravenous Line                 Peripheral IV - Single Lumen 05/15/18 2300 Right Antecubital 7 days         Peripheral IV - Single Lumen 05/21/18 1200 Left Forearm 1 day                Physical Exam   Constitutional: She is oriented to person, place, and time. No distress.   Cardiovascular: Normal rate and regular rhythm.    Pulmonary/Chest: Effort normal and breath sounds normal.   Musculoskeletal: She exhibits no edema.   Neurological: She is alert and oriented to person, place, and time.       Significant Labs:   BMP:   Recent Labs  Lab 05/21/18  1021 05/22/18  0214 05/23/18  0542   * 108 117*    143 141   K 3.1* 3.1* 3.7    98 101   CO2 33* 36* 31*   BUN 6* 7* 10   CREATININE 0.7 0.8 0.8   CALCIUM 9.6 9.1 9.2   MG  --  1.5* 1.9    and CBC No results for input(s): WBC, HGB, HCT, PLT in the last 48 hours.    Significant Imaging: Echocardiogram:   2D echo with color flow doppler:   Results for orders  placed or performed during the hospital encounter of 01/18/16   2D echo with color flow doppler   Result Value Ref Range    EF 65 55 - 65    Diastolic Dysfunction Yes (A)     Est. PA Systolic Pressure 28     Tricuspid Valve Regurgitation MILD

## 2018-05-23 NOTE — PLAN OF CARE
Problem: Occupational Therapy Goal  Goal: Occupational Therapy Goal  Outcome: Outcome(s) achieved Date Met: 05/23/18  OT eval is complete. The patient is at her functional baseline and no OT is recommended. Nursing should assist the patient to amb to the bathroom for safety.    The patient has assist as needed for home. No DME is recommended.

## 2018-05-23 NOTE — PLAN OF CARE
Problem: Physical Therapy Goal  Goal: Physical Therapy Goal  Outcome: Outcome(s) achieved Date Met: 05/23/18    Patient ambulated 250ft with CGA and no AD. She is okay to ambulate with nursing staff or family supervision. She has no PT needs at discharge.

## 2018-05-23 NOTE — NURSING TRANSFER
Nursing Transfer Note      5/22/2018     Transfer To: [address]    Transfer via bed    Transfer with cardiac monitoring    Transported by transport & Judit, ANNETTE    Medicines sent: n/a    Chart send with patient: Yes    Notified: daughter, at bedside     Patient reassessed at: 05/22/2018 @ 1936    Upon arrival to floor: cardiac monitor applied, patient oriented to room, call bell in reach and bed in lowest position

## 2018-05-23 NOTE — PROGRESS NOTES
[facility]  Cardiology  Progress Note    Patient Name: Katt Mcneal  MRN: [id number]  Admission Date: 5/15/2018  Hospital Length of Stay: 7 days  Code Status: Full Code   Attending Physician: Shabnam Cadena MD   Primary Care Physician: Kareen Hodges MD  Expected Discharge Date: 5/23/2018  Principal Problem:Acute biliary pancreatitis without infection or necrosis    Subjective:     Hospital Course:   5-21: Transfer to ICU for amiodarone infusion and converted to normal sinus rhythm  5-22: Transfer to floor after protocol completed    Interval History: Mainly just feels weak.  She denies any significant abdominal pain and is tolerating by mouth.  She's had no recurrence of A. Fib.    Telemetry: Normal sinus rhythm    Review of Systems   All other systems reviewed and are negative.    Objective:     Vital Signs (Most Recent):  Temp: 98 °F (36.7 °C) (05/23/18 0728)  Pulse: 78 (05/23/18 0738)  Resp: 20 (05/23/18 0738)  BP: (!) 162/75 (05/23/18 0728)  SpO2: (!) 93 % (05/23/18 0738) Vital Signs (24h Range):  Temp:  [97.7 °F (36.5 °C)-98.7 °F (37.1 °C)] 98 °F (36.7 °C)  Pulse:  [70-82] 78  Resp:  [16-32] 20  SpO2:  [90 %-100 %] 93 %  BP: (144-202)/(75-97) 162/75     Weight: 47.6 kg (104 lb 15 oz)  Body mass index is 21.2 kg/m².     SpO2: (!) 93 %  O2 Device (Oxygen Therapy): room air (02 on stand-by)      Intake/Output Summary (Last 24 hours) at 05/23/18 0927  Last data filed at 05/23/18 0630   Gross per 24 hour   Intake           241.08 ml   Output              200 ml   Net            41.08 ml       Lines/Drains/Airways     Peripheral Intravenous Line                 Peripheral IV - Single Lumen 05/15/18 2300 Right Antecubital 7 days         Peripheral IV - Single Lumen 05/21/18 1200 Left Forearm 1 day                Physical Exam   Constitutional: She is oriented to person, place, and time. No distress.   Cardiovascular: Normal rate and regular rhythm.    Pulmonary/Chest: Effort normal and  breath sounds normal.   Musculoskeletal: She exhibits no edema.   Neurological: She is alert and oriented to person, place, and time.       Significant Labs:   BMP:   Recent Labs  Lab 05/21/18  1021 05/22/18  0214 05/23/18  0542   * 108 117*    143 141   K 3.1* 3.1* 3.7    98 101   CO2 33* 36* 31*   BUN 6* 7* 10   CREATININE 0.7 0.8 0.8   CALCIUM 9.6 9.1 9.2   MG  --  1.5* 1.9    and CBC No results for input(s): WBC, HGB, HCT, PLT in the last 48 hours.    Significant Imaging: Echocardiogram:   2D echo with color flow doppler:   Results for orders placed or performed during the hospital encounter of 01/18/16   2D echo with color flow doppler   Result Value Ref Range    EF 65 55 - 65    Diastolic Dysfunction Yes (A)     Est. PA Systolic Pressure 28     Tricuspid Valve Regurgitation MILD      Assessment and Plan:     Brief HPI:     Paroxysmal atrial fibrillation    Converted to normal sinus rhythm on amiodarone infusion  Previously on Coumadin for OAC  Adjusted for amiodarone.  Currently, follow PT/INR are stable        Chronic recurrent pancreatitis    Management per primary and surgery        Coronary artery disease of native artery of native heart with stable angina pectoris    No new signs of ACS  History of multivessel PCI as above  On dual antiplatelet therapy  Continue medicines for secondary prevention as tolerated        Essential hypertension     add back Norvasc        Dyslipidemia    On statin            VTE Risk Mitigation         Ordered     warfarin (COUMADIN) tablet 2 mg  Daily      05/22/18 2013     Place JUNE hose  Until discontinued      05/17/18 1501     Place sequential compression device  Until discontinued      05/17/18 1501        No further recommendations from CV standpoint.  We'll follow loosely    Andrew Cordon MD  Cardiology  [facility] [place]

## 2018-05-23 NOTE — PROGRESS NOTES
[place] contacted Dr. Higgins's office at [phone]; spoke to Susan; scheduled the earliest appt on 06/11/18 at 2:30 pm. Contacted [facility] at [phone] to schedule a GI f/u apptwith Dr. Vance; spoke with Maggie; appt scheduled on 05/31/18 at 3:30 PM.

## 2018-05-23 NOTE — PT/OT/SLP EVAL
Physical Therapy Evaluation and Discharge Note    Patient Name:  Katt Mcneal   MRN:  [id number]    Recommendations:     Discharge Recommendations:  home (with family assistance)   Discharge Equipment Recommendations: none   Barriers to discharge: None    Assessment:     Katt Mcneal is a 77 y.o. female admitted with a medical diagnosis of Acute biliary pancreatitis without infection or necrosis. At this time, patient is functioning at their prior level of function and does not require further acute PT services. Patient, daughter, and nurse educated that patient is okay to ambulate with nursing staff or family supervision. Patient and family educated that she should ambulate 3x's/day in hallway with supervision.     Recent Surgery: * No surgery found *      Plan:     During this hospitalization, patient does not require further acute PT services. Please re-consult if situation changes.     Plan of Care Reviewed with: patient, daughter    Subjective     Communicated with nurse Pres[place] prior to session.  Patient found reclined in bedside chair upon PT entry to room, agreeable to evaluation.      Chief Complaint: Has been laying in the bed too much. Per daughter, the patient was able to ambulate to the bathroom today.   Patient comments/goals: To go home.   Pain/Comfort:  · Pain Rating 1: 0/10    Living Environment:  Patient lives at home with her daughter. There are 4 steps with bilateral handrails to get into the house. Prior to admission, patients level of function was independent. Patient has the following equipment: none. Upon discharge, patient will have assistance from daughter.    Objective:     Patient found with: telemetry, peripheral IV     General Precautions: Standard, fall   Orthopedic Precautions:N/A   Braces: N/A     Exams:  · Cognitive Exam:  Patient is oriented to Person, Place, Time and Situation and follows 100% of simple commands   · Gross Motor Coordination:  WFL  · Postural Exam:  Patient  presented with the following abnormalities:    · -       No postural abnormalities identified  · Sensation:    · -       Intact  · Skin Integrity/Edema:      · -       Skin integrity: Visible skin intact  · RLE ROM: WFL  · RLE Strength: WFL  · LLE ROM: WFL  · LLE Strength: WFL    Functional Mobility:  · Transfers:     · Sit to Stand:  stand by assistance with no AD  · Gait: 250ft with no AD and CGA. Patient had a few loss of balance with ambulation but was able to self recover.     AM-PAC 6 CLICK MOBILITY  Total Score:22     Patient left reclined in bedside chair with all lines intact, nurse Genie notified, daughter present, and call button in reach.    GOALS:    Physical Therapy Goals     Not on file          Multidisciplinary Problems (Resolved)        Problem: Physical Therapy Goal    Goal Priority Disciplines Outcome Goal Variances Interventions   Physical Therapy Goal   (Resolved)     PT/OT, PT Outcome(s) achieved                     History:     Past Medical History:   Diagnosis Date    Arthritis     Atrial fibrillation     Bilateral carotid artery stenosis 7/13/2017    Coronary artery disease     Fibromyalgia     Hyperlipidemia     Hypertension     Myocardial infarction 03/21/2015    Pancreatitis     Thyroid disease        Past Surgical History:   Procedure Laterality Date    CHOLECYSTECTOMY      CORONARY STENT PLACEMENT  3/24/15    HYSTERECTOMY  10/28/2004     Time Tracking:     PT Received On: 05/23/18  PT Start Time: 1430     PT Stop Time: 1442  PT Total Time (min): 12 min     Billable Minutes: Evaluation  12 with OT present    Radha Odom, PT  05/23/2018

## 2018-05-23 NOTE — PROGRESS NOTES
[facility]    WRITTEN HEALTHCARE AND DISCHARGE INFORMATION     Follow-up Information     Kareen Hodges MD On 6/11/2018.    Specialty:  General Practice  Why:  Outpatient Services, PCP follow-up appointment. Patient should arrive by @ 2:30 PM. Please arrive 15 mins eatly.  Contact information:  [address]  [address]  [place] [address]  [phone]             Dwayne Vance MD On 5/31/2018.    Specialty:  Gastroenterology  Why:  Outpatient Services GI Follow-Up Thursday at 3:30 PM  Contact information:  [address] [place] [address]  [id number]  [facility]  [place] [address]  [phone]                                        Help at Home           [phone]  After discharge for assistance [facility] On Call Nurse Care Line 24/7  Assistance    Things You are responsible For To Manage Your Care At Home:  1.    Getting your prescriptions filled   2.    Taking your medications as directed, DO NOT MISS ANY DOSES!  3.    Going to your follow-up doctor appointment. This is important because it  allow the doctor to monitor your progress and determine if  any changes need to made to your treatment plan.     Thank you for choosing [facility] for your care.  Please answer any calls you may receive from [facility] we want to continue to support you as you manage your healthcare needs. [facility] is happy to have the opportunity to serve you.     Sincerely,  Your [facility] Team,  ANNETTE Marcial, RN;  [phone]

## 2018-05-23 NOTE — PT/OT/SLP EVAL
Occupational Therapy   Evaluation and Discharge Note    Name: Katt Mcneal  MRN: [id number]  Admitting Diagnosis:  Acute biliary pancreatitis without infection or necrosis      Recommendations:     Discharge Recommendations: home (with family assist)  Discharge Equipment Recommendations:  none  Barriers to discharge:  None    History:     Occupational Profile:  Living Environment: The patient lives with her dtr in a house with 4 RUBY and 3 steps  To the sunken living room.  Previous level of function: (I) with self care and amb w/out AD  Roles and Routines: The patient states she cleans the house.  Equipment Owned:  none  Assistance upon Discharge: daughter    Past Medical History:   Diagnosis Date    Arthritis     Atrial fibrillation     Bilateral carotid artery stenosis 7/13/2017    Coronary artery disease     Fibromyalgia     Hyperlipidemia     Hypertension     Myocardial infarction 03/21/2015    Pancreatitis     Thyroid disease        Past Surgical History:   Procedure Laterality Date    CHOLECYSTECTOMY      CORONARY STENT PLACEMENT  3/24/15    HYSTERECTOMY  10/28/2004       Subjective     Chief Complaint: ready to go home  Patient/Family stated goals: return to PLOF  Communicated with: Genie campbell prior to session.  Pain/Comfort:  · Pain Rating 1: 0/10    Patients cultural, spiritual, Sabianism conflicts given the current situation: no    Objective:     Patient found with: telemetry    General Precautions: Standard, fall   Orthopedic Precautions:N/A   Braces: N/A     Occupational Performance:    Bed Mobility:    · N/T (The patient was found in the chair)    Functional Mobility/Transfers:  · Patient completed Sit <> Stand Transfer with stand by assistance  with  no assistive device   · Functional Mobility: The patient amb w/out AD with CGA    Activities of Daily Living:  · Grooming: modified independence to comb her hair    Cognitive/Visual Perceptual:  Cognitive/Psychosocial Skills:     -        "Oriented to: Person, Place and Situation   -       Follows Commands/attention:Follows two-step commands  -       Communication: clear/fluent  -       Memory: No Deficits noted  -       Safety awareness/insight to disability: impaired   -       Mood/Affect/Coping skills/emotional control: Appropriate to situation    Physical Exam:  Balance: -       good  Postural examination/scapula alignment:    -       No postural abnormalities identified  Upper Extremity Range of Motion:     -       Right Upper Extremity: WFL  -       Left Upper Extremity: WFL  Upper Extremity Strength:    -       Right Upper Extremity: WFL  -       Left Upper Extremity: WFL    Patient left up in chair with all lines intact, call button in reach, nurse, Genie notified and daughter present    [facility] 6 Click:  [facility] Total Score: 23    Treatment & Education:  The patient tolerated OT eval. The patient and dtr were educated re: OT role and recommendations. The patient denies need for OT, Home health or DME for home. OT will D/C.  Education:    Assessment:     Katt Mcneal is a 77 y.o. female with a medical diagnosis of Acute biliary pancreatitis without infection or necrosis.  The patient will have assist as needed at home. The patient's daughter will assist as needed.    Clinical Decision Makin.  OT Low:  "Pt evaluation falls under low complexity for evaluation coding due to performance deficits noted in 1-3 areas as stated above and 0 co-morbities affecting current functional status. Data obtained from problem focused assessments. No modifications or assistance was required for completion of evaluation. Only brief occupational profile and history review completed."     Plan:     During this hospitalization, patient does not require further acute OT services.  Please re-consult if situation changes.    · Plan of Care Reviewed with: patient, daughter    This Plan of care has been discussed with the patient who was involved in its " development and understands and is in agreement with the identified goals and treatment plan    GOALS:    Occupational Therapy Goals     Not on file          Multidisciplinary Problems (Resolved)        Problem: Occupational Therapy Goal    Goal Priority Disciplines Outcome Interventions   Occupational Therapy Goal   (Resolved)     OT, PT/OT Outcome(s) achieved                    Time Tracking:     OT Date of Treatment: 05/23/18  OT Start Time: 1430  OT Stop Time: 1440  OT Total Time (min): 10 min    Billable Minutes:Evaluation 10 (with PT)    Rama Moscoso OT  5/23/2018

## 2018-05-23 NOTE — PHYSICIAN QUERY
"PT Name: Katt Mcneal  MR #: [id number]    Physician Query Form - Heart  Condition Clarification     Gail Starks RN, CCDS  Contact Info: [phone] [email or web address]    This form is a permanent document in the medical record.     Query Date: May 23, 2018    By submitting this query, we are merely seeking further clarification of documentation. Please utilize your independent clinical judgment when addressing the question(s) below.    The medical record contains the following   Indicators   Supporting Clinical Findings Location in Medical Record   x  on 5/15 Lab    EF     x Radiology findings CXR with bibasilar atelectasis which is consistent with shallow breathing when pancreatic pain was most severe.    ==> CXR 5/19  No significant left pleural fluid.    The right lung is clear.  No right pleural fluid.    ==>  CXR 5/21  new consolidation in the right lower lung zone.    Bilateral interstitial prominence noted.    No pleural effusion or pneumothorax.  Hosp Med Pn 5/21        Radiology          Radiology    Echo Results      "Ascites" documented     x "SOB" or "TOSCANO" documented Patient did experience wheezing and shortness of breath.  She has rales (bibasilar).   Using accessory muscles  Speaking in full sentences     Shortness of breath   With evidence of atelectasis. Fluids stopped. Given underlying diastolic dysfunction I will give a dose of IV lasix now. On supplemental O2. XRay now. Duo-nebs    Using accessory muscles when speaking, but speaking in full sentences Hosp Med Pn 5/21            Hosp Med Pn 5/21        Hosp med PN 5/22    "Hypoxia" documented     x Heart Failure documented Chronic diastolic heart failure H&P;   Hosp MD PN 5/22   x "Edema" documented On exam, patient appeared comfortable and sleeping.   Lungs clear, no evidence of volume overload, and evidently with tachycardia irregularly irregular   Sig Event MD 5/21   x Diuretics/Meds Lasix 40 mg IV x1 on 5/21 Mar     x " "Treatment: O2 4L NC sating upper 90's. No nose bleeds noted. Instructed to use IS at bedside. Will continue to monitor.    Duo-nebs and incentive spirometer.   Lasix IV when able to stop fluids  Positive for shortness of breath.   DuoNebs  CXR  Supplemental O2   RN Plan of Care 5/19          Hosp PN 5/21   x Other:  Coarse breath sounds bibasilar    Paroxysmal Afib    · Transferred to ICU for Amiodarone drip  · Consult Cards Hosp Med Pn 5/18    Cards CN 5/21    · Sig Event MD 5/21       Provider, please specify diagnosis or diagnoses associated with above clinical findings.           Please clarify acuity of "diastolic chf".                                [  ] Acute on Chronic Diastolic Heart Failure( EF > 40)*    [ x ] Chronic Diastolic Heart Failure (EF > 40)*    [  ] Other (please specify): ___________________________________    [  ] Clinically Undetermined            *American Heart Association                                                                                                          Please document in your progress notes daily for the duration of treatment until resolved and include in your discharge summary.    "

## 2018-05-23 NOTE — PHYSICIAN QUERY
PT Name: Katt Mcneal  MR #: [id number]    Physician Query Form - Respiratory Condition Clarification      Gail Starks RN, CCDS  Contact Info: [phone] [email or web address]      This form is a permanent document in the medical record.    Query Date: May 23, 2018    By submitting this query, we are merely seeking further clarification of documentation. Please utilize your independent clinical judgment when addressing the question(s) below.    The Medical record contains the following   Indicators   Supporting Clinical Findings Location in Medical Record   x   SOB, TOSCANO, Wheezing, Productive Cough, Use of Accessory Muscles, etc. Patient did experience wheezing and shortness of breath.  She has rales (bibasilar).   Using accessory muscles  Speaking in full sentences      Shortness of breath   With evidence of atelectasis. Fluids stopped. Given underlying diastolic dysfunction I will give a dose of IV lasix now. On supplemental O2. XRay now. Duo-nebs     Using accessory muscles when speaking, but speaking in full sentences Hosp Med Pn 5/21                 Hosp Med Pn 5/21           Hosp med PN 5/22   x   Acute/Chronic Illness Acute Pancreatitis  Chronic Diastolic CHF H&P         x   Radiology Findings CXR with bibasilar atelectasis which is consistent with shallow breathing when pancreatic pain was most severe.    ==> CXR 5/19  No significant left pleural fluid.    The right lung is clear.  No right pleural fluid.    ==>  CXR 5/21  new consolidation in the right lower lung zone.    Bilateral interstitial prominence noted.    No pleural effusion or pneumothorax.  Hosp Med Pn 5/21           Radiology              Radiology      Respiratory Distress or Failure        Hypoxia or Hypercapnia     x   RR         ABGs         O2 sat Sats 94% on Room Air 5/16  Sats 94 % on 2 lpm on 5/17  Sats 85% on room air on 5/17  Sats % on 3-4 lpm 5/17 - 5/21  Sats 93% on 2 lpm NC on 5/22  Sats 93% on Room Air on 5/23    RR:  18 - 34  HR: 96 - 138   VS Flow Sheet                VS Flow Sheet 5/15-23      BiPAP/Intubation     x   Supplemental O2 Room Air 5/15 - 16  Nasal Cannula 2 lpm 5/16 - 5/18  Nasal Cannula 3 lpm 5/18   Nasal Cannula 4 lpm 5/18 - 5/21  Nasal Cannula 3 lpm 5/21 - 5/22  Nasal Cannula 1.5 - 0.5 5/22 - 5/23 VS Flow Sheet      Home O2, Oxygen Dependence     x   Treatment O2 4L NC sating upper 90's. No nose bleeds noted. Instructed to use IS at bedside. Will continue to monitor.     Duo-nebs and incentive spirometer.   Lasix IV when able to stop fluids  Positive for shortness of breath.   DuoNebs  CXR  Supplemental O2 RN Plan of Care 5/19              Hosp PN 5/21   x   Other On exam, patient appeared comfortable and sleeping.   Lungs clear, no evidence of volume overload, and evidently with tachycardia irregularly irregular    Coarse breath sounds bibasilar     Paroxysmal Afib     · Transferred to ICU for Amiodarone drip  · Consult Cards Sig Event MD 5/21        Hosp Med Pn 5/18     Cards CN 5/21       · Sig Event MD 5/21     Provider, please specify diagnosis or diagnoses associated with above clinical findings.    [ x   ] Acute Respiratory Failure with Hypoxia       [    ] Other Acute Respiratory Failure      [    ] Acute Respiratory Insufficiency    [    ] Acute Respiratory Distress    [    ] Other Respiratory Diagnosis (please specify): ________________________________________    [    ] Clinically Undetermined    Please document in your progress notes daily for the duration of treatment until resolved and include in your discharge summary.

## 2018-05-23 NOTE — PLAN OF CARE
05/23/18 1735   Final Note   Assessment Type Final Discharge Note   Discharge Disposition Home   What phone number can be called within the next 1-3 days to see how you are doing after discharge? [phone]   Hospital Follow Up  Appt(s) scheduled? Yes   Discharge plans and expectations educations in teach back method with documentation complete? Yes

## 2018-05-23 NOTE — PROGRESS NOTES
The patient's daughter states she had an episode of claustrophobia around 3 AM, but otherwise had no issues.    Vitals:    05/23/18 0327 05/23/18 0434 05/23/18 0509 05/23/18 0513   BP: (!) 202/95 (!) 200/96 (!) 187/91 (!) 165/79   BP Location: Left arm      Patient Position: Lying      Pulse: 81 80 77 73   Resp: 18      Temp: 98.7 °F (37.1 °C)      TempSrc: Oral      SpO2: (!) 93%      Weight:    47.6 kg (104 lb 15 oz)   Height:          albuterol-ipratropium  3 mL Nebulization Q4H WAKE    amiodarone  200 mg Oral BID    aspirin  81 mg Oral Daily    carvedilol  25 mg Oral BID    clopidogrel  75 mg Oral Daily    famotidine  20 mg Oral Daily    levothyroxine  50 mcg Oral Before breakfast    magnesium oxide  400 mg Oral Daily    morphine  3 mg Intravenous Once    potassium chloride  30 mEq Oral Daily    pravastatin  20 mg Oral Daily    predniSONE  2.5 mg Oral Daily    ramipril  5 mg Oral BID    [START ON 5/24/2018] warfarin  2 mg Oral Daily     P.E.:  GEN: A x O x 3, NAD  HEENT: EOMI, PERRL, anicteric sclera  CV: RRR, no M/R/G  Chest: CTA B  Abdomen: soft, mildly tender in left upper quadrant, nondistended, normoactive BS  Ext: No C/C/E. 2+ dorsalis pedis pulses B    Labs:  Recent Results (from the past 336 hour(s))   CBC auto differential    Collection Time: 05/15/18 10:55 PM   Result Value Ref Range    WBC 10.96 3.90 - 12.70 K/uL    Hemoglobin 11.9 (L) 12.0 - 16.0 g/dL    Hematocrit 38.3 37.0 - 48.5 %    Platelets 357 (H) 150 - 350 K/uL     CMP  Sodium   Date Value Ref Range Status   05/22/2018 143 136 - 145 mmol/L Final     Potassium   Date Value Ref Range Status   05/22/2018 3.1 (L) 3.5 - 5.1 mmol/L Final     Chloride   Date Value Ref Range Status   05/22/2018 98 95 - 110 mmol/L Final     CO2   Date Value Ref Range Status   05/22/2018 36 (H) 23 - 29 mmol/L Final     Glucose   Date Value Ref Range Status   05/22/2018 108 70 - 110 mg/dL Final     BUN, Bld   Date Value Ref Range Status   05/22/2018 7 (L)  8 - 23 mg/dL Final     Creatinine   Date Value Ref Range Status   05/22/2018 0.8 0.5 - 1.4 mg/dL Final     Calcium   Date Value Ref Range Status   05/22/2018 9.1 8.7 - 10.5 mg/dL Final     Total Protein   Date Value Ref Range Status   05/20/2018 5.9 (L) 6.0 - 8.4 g/dL Final     Albumin   Date Value Ref Range Status   05/20/2018 2.4 (L) 3.5 - 5.2 g/dL Final     Total Bilirubin   Date Value Ref Range Status   05/20/2018 0.4 0.1 - 1.0 mg/dL Final     Comment:     For infants and newborns, interpretation of results should be based  on gestational age, weight and in agreement with clinical  observations.  Premature Infant recommended reference ranges:  Up to 24 hours.............<8.0 mg/dL  Up to 48 hours............<12.0 mg/dL  3-5 days..................<15.0 mg/dL  6-29 days.................<15.0 mg/dL       Alkaline Phosphatase   Date Value Ref Range Status   05/20/2018 57 55 - 135 U/L Final     AST   Date Value Ref Range Status   05/20/2018 17 10 - 40 U/L Final     ALT   Date Value Ref Range Status   05/20/2018 9 (L) 10 - 44 U/L Final     Anion Gap   Date Value Ref Range Status   05/22/2018 9 8 - 16 mmol/L Final     eGFR if    Date Value Ref Range Status   05/22/2018 >60 >60 mL/min/1.73 m^2 Final     eGFR if non    Date Value Ref Range Status   05/22/2018 >60 >60 mL/min/1.73 m^2 Final     Comment:     Calculation used to obtain the estimated glomerular filtration  rate (eGFR) is the CKD-EPI equation.            Recent Labs  Lab 05/23/18  0542   INR 2.8*     CT of Abdomen/Pelvis:  Marked inflammatory changes with edematous appearance of the pancreas and ill-defined peripancreatic fluid, consistent with acute pancreatitis.  No evidence of pancreatic enhancement to suggest necrotic pancreatitis.  No drainable collection.  Suggest short-term follow-up.    Extensive diverticulosis coli without evidence of acute diverticulitis.    Additional findings as above.    A/P:  The patient is a 77  year old woman with a history of HTN, CAD, diastolic CHF, atrial fibrillation, hypothyroidism, polymyalgia rhematica, irritable bowel syndrome, and recurrent idiopathic pancreatitis presenting with pancreatitis.    1.  Pancreatitis - she has undergone an extensive workup including EUSs and ERCPs and the etiology of her acute intermittent pancreatitis is unclear.  Pancreatic duct stenting has helped her in the past.  Her lipase has normalized.  She is on aspirin, plavix, and coumadin.  The patient is tolerating a low fat diet.  She may eventually benefit from an outpatient ERCP with pancreatic duct stenting.  Gastroenterology has nothing additional to add.  Please call GI with any further issues.

## 2018-05-23 NOTE — PLAN OF CARE
Problem: Pancreatitis, Acute/Chronic (Adult)  Intervention: Monitor/Manage Pain   05/23/18 1551   Pain/Comfort/Sleep Interventions   Pain Management Interventions care clustered;pain management plan reviewed with patient/caregiver;diversional activity prov   05/23/18 1551   Pain/Comfort/Sleep Interventions   Pain Management Interventions care clustered;pain management plan reviewed with patient/caregiver;diversional activity provided   ided       Goal: Signs and Symptoms of Listed Potential Problems Will be Absent, Minimized or Managed (Pancreatitis, Acute/Chronic)  Signs and symptoms of listed potential problems will be absent, minimized or managed by discharge/transition of care (reference Pancreatitis, Acute/Chronic (Adult) CPG).    05/23/18 1551   Pancreatitis, Acute/Chronic   Problems Assessed (Pancreatitis) fluid imbalance;nausea and vomiting;pain   Problems Present (Pancreatitis) diarrhea;nausea and vomiting;infection

## 2018-05-24 NOTE — DISCHARGE SUMMARY
[facility]  Discharge Summary      Patient Name: Katt Mcneal  MRN: [id number]  Admission Date: 5/15/2018  Hospital Length of Stay: 7 days  Discharge Date and Time:  05/23/2018 9:54 PM  Attending Physician: No att. providers found   Discharging Provider: Shabnam Cadena MD  Primary Care Provider: Kareen Hodges MD      HPI:   77 year old female with CAD s/p PCI in July/2017, paroxysmal AFib on coumadin, Hx of pancreatitis s/p stent removal, hypertension and anxiety who presented with acute epigastric pain of one day in evolution. It is severe, constant and radiates to back. Patient stated this is classic of her pancreatitis flares. Also stated she had just had a stent removed 1.5 months ago. Is not on pancreatic enzymes. Stated bouts of diarrhea which are chronic for her.     In the ED, patient was noted to have a tender abdomen, tachycardic, hypertensive and positive findings for pancreatitis on CT of abdomen. Lipase > 1000. Patient admitted to hospital medicine for acute pancreatitis.     * No surgery found *      Hospital Course:   Ms Mcneal presented with acute pancreatitis. Had a PD stent removed about a month and a half ago by Dr Vance. Initiated on fluids and placed NPO. GI consulted. Lipase decreased from 1000 to 36. Pain improved. CT of abdomen done in the ED showed no choledocholithiasis but a nonspecific prominence of the extrahepatic biliary tree. Is s/p cholecystectomy. GI plans for further imaging/workup as outpatient.   Patient did experience wheezing and shortness of breath. CXR with bibasilar atelectasis which is consistent with shallow breathing when pancreatic pain was most severe. Duo-nebs and incentive spirometer. Given one dose of lasix once off IVFs. Breathing did improve after this. Stepped up to ICU for A Fib with RVR. Potassium noted to be low at 3.1 after furosemide IV. Given potassium, mag and amiodarone infusion. Converted quickly to  NSR. Cardiology consulted.     5/23- atrial fibrillation rate controlled on amiodarone and BB; plan to continue and pt cardiologist can decide to resume sotalol at his discretion; follow up GI for pancreatitis. PT/OT had no further recommendations. Ok for dc home. Oxygen weaned off.      Consults:   Consults         Status Ordering Provider     Inpatient consult to Cardiology  Once     Provider:  Andrew Cordon MD    Completed OSVALDO PADRON     Inpatient consult to Gastroenterology  Once     Provider:  Tavon Roth MD    Completed RADHA SON     Nutrition Services Referral  Once     Provider:  (Not yet assigned)    OSVALDO Schmitt          No new Assessment & Plan notes have been filed under this hospital service since the last note was generated.  Service: Hospital Medicine    Final Active Diagnoses:    Diagnosis Date Noted POA    PRINCIPAL PROBLEM:  Acute biliary pancreatitis without infection or necrosis [K85.10] 05/16/2018 Yes    A-fib [I48.91] 05/22/2018 Yes    Chronic anticoagulation [Z79.01] 05/16/2018 Not Applicable    Other specified hypothyroidism [E03.8] 05/16/2018 Yes    Coronary artery disease of native artery of native heart with stable angina pectoris [I25.118] 07/28/2017 Yes    Bilateral carotid artery stenosis [I65.23] 07/13/2017 Yes    Dyslipidemia [E78.5] 07/13/2017 Yes    Essential hypertension [I10] 07/13/2017 Yes    Paroxysmal atrial fibrillation [I48.0] 07/13/2017 Yes    Chronic diastolic heart failure [I50.32] 01/20/2016 Yes    Coronary artery disease involving native coronary artery of native heart without angina pectoris [I25.10] 01/19/2016 Yes    Chronic recurrent pancreatitis [K86.1] 01/19/2016 Yes     Chronic      Problems Resolved During this Admission:    Diagnosis Date Noted Date Resolved POA    Shortness of breath [R06.02] 05/21/2018 05/23/2018 No    Atrial fibrillation with RVR [I48.91] 05/21/2018 05/23/2018 Yes    Anterior epistaxis  [R04.0] 05/19/2018 05/23/2018 No    Epigastric abdominal pain [R10.13] 05/17/2018 05/23/2018 Yes    Abdominal pain [R10.9] 01/20/2016 05/23/2018 Yes       Discharged Condition: good    Disposition: Home or Self Care    Follow Up:  Follow-up Information     Kareen Hodges MD On 6/11/2018.    Specialty:  General Practice  Why:  Outpatient Services, PCP follow-up appointment. Patient should arrive by @ 2:30 PM. Please arrive 15 mins eatly.  Contact information:  [address] [facility]  [address]  [place] [address]  [phone]             Dwayne Vance MD On 5/31/2018.    Specialty:  Gastroenterology  Why:  Outpatient Services GI Follow-Up Thursday at 3:30 PM  Contact information:  [address] [place] [address]  [id number]  [facility]  [place] [address]  [phone]                 Patient Instructions:     Diet Cardiac     Activity as tolerated     Notify your health care provider if you experience any of the following:  temperature >100.4     Notify your health care provider if you experience any of the following:  persistent nausea and vomiting or diarrhea     Notify your health care provider if you experience any of the following:  severe uncontrolled pain     Notify your health care provider if you experience any of the following:  difficulty breathing or increased cough             Pending Diagnostic Studies:     None         Medications:  Reconciled Home Medications:      Medication List      START taking these medications    amiodarone 200 MG Tab  Commonly known as:  PACERONE  Take 1 tablet (200 mg total) by mouth 2 (two) times daily.        CHANGE how you take these medications    amLODIPine 5 MG tablet  Commonly known as:  NORVASC  Take 1 tablet (5 mg total) by mouth once daily.  Start taking on:  5/24/2018  What changed:  · medication strength  · how much to take  · when to take this  · reasons to take this  · additional instructions     carvedilol 25 MG tablet  Commonly known as:   COREG  Take 1 tablet (25 mg total) by mouth 2 (two) times daily.  What changed:  · how much to take  · when to take this  · additional instructions     ondansetron 4 MG Tbdl  Commonly known as:  ZOFRAN-ODT  Take 1 tablet (4 mg total) by mouth every 8 (eight) hours as needed (for nausea/vomiting).  What changed:  when to take this     warfarin 2 MG tablet  Commonly known as:  COUMADIN  Take 1 tablet (2 mg total) by mouth Daily.  Start taking on:  5/24/2018  What changed:  · medication strength  · how much to take  · when to take this        CONTINUE taking these medications    aspirin 81 MG EC tablet  Commonly known as:  ECOTRIN  Take 81 mg by mouth once daily.     CALCIUM 500 + D ORAL  Take 1 tablet by mouth once daily.     cholecalciferol (vitamin D3) 2,000 unit Cap  Take 1 capsule by mouth once daily.     clopidogrel 75 mg tablet  Commonly known as:  PLAVIX  Take 75 mg by mouth once daily.     levothyroxine 75 MCG tablet  Commonly known as:  SYNTHROID  Take 50 mcg by mouth once daily.     LORazepam 0.5 MG tablet  Commonly known as:  ATIVAN  Take 0.5 mg by mouth every 6 (six) hours as needed for Anxiety.     nitroGLYCERIN 0.4 MG SL tablet  Commonly known as:  NITROSTAT  Place 0.4 mg under the tongue every 5 (five) minutes as needed for Chest pain.     potassium chloride 10 MEQ Tbsr  Commonly known as:  KLOR-CON  Take 10 mEq by mouth once daily.     pravastatin 20 MG tablet  Commonly known as:  PRAVACHOL  Take 1 tablet (20 mg total) by mouth once daily.     predniSONE 5 MG tablet  Commonly known as:  DELTASONE  Take 2.5 mg by mouth once daily.     ramipril 5 MG capsule  Commonly known as:  ALTACE  Take 5 mg by mouth 2 (two) times daily.        STOP taking these medications    SOTALOL AF ORAL            Indwelling Lines/Drains at time of discharge:   Lines/Drains/Airways          No matching active lines, drains, or airways          Time spent on the discharge of patient: 40 minutes  Patient was seen and examined on  the date of discharge and determined to be suitable for discharge.         Shabnam Cadena MD  Department of Hospital Medicine  [facility]

## 2018-05-25 ENCOUNTER — PATIENT OUTREACH (OUTPATIENT)
Dept: ADMINISTRATIVE | Facility: CLINIC | Age: 78
End: 2018-05-25

## 2018-05-25 NOTE — PATIENT INSTRUCTIONS
"Dyspnea (Shortness Of Breath)  Shortness of Breath (also known as "Dyspnea") is the sense that you can't catch your breath or can't get enough air.  Dyspnea can be caused by many different conditions such as:  Acute asthma attack  Worsening of emphysema (also called "COPD") -- a lung disease that is caused by smoking  A mucus plug blocks a large air passage in the lung -- this can occur with emphysema or chronic bronchitis  Congestive Heart Failure ("CHF") -- when a weak heart muscle allows excess fluid to collect in the lungs  Panic attacks, anxiety -- fear can cause rapid breathing ("hyperventilation")  Pneumonia -- infection in the lung tissue  Exposure to toxic fumes or smoke  Pulmonary embolus (blood clot to the lung)  Based on your visit today, the exact cause of your shortness of breath is not certain. Your tests do not show any of the serious causes of dyspnea. Sometimes, further testing is needed to find out if a serious problem exists. Therefore, it is important for you to watch for any new symptoms or worsening of your condition and follow up with your doctor as directed.  Home Care:  When your symptoms are better, resume your usual activities.  If you smoke, you need to stop. Join a stop-smoking program or ask your doctor for help.  Follow Up  with your doctor or as advised by our staff.  Get Prompt Medical Attention  if any of the following occur:  Increasing shortness of breath or wheezing  Redness, pain or swelling in one leg  Swelling in both legs or ankles  Unexpected weight gain  Chest, arm, shoulder, neck or upper back pain  Dizziness, weakness or fainting  Palpitations (the sense that your heart is fluttering, beating fast or hard)  Fever of 100.4ºF (38ºC) or higher, or as directed by your healthcare provider  Cough with dark colored or bloody sputum (mucus)  © 7966-6275 [employer], [address] [facility], [place] [address]. All rights reserved. This information is not intended as a " substitute for professional medical care. Always follow your healthcare professional's instructions.

## 2021-05-27 NOTE — PLAN OF CARE
Patient seen for OV 5/26/2021 with Dr Nathan Garza  Vasc band removed per protocol over 1 hour period. Patient tolerated well. Gauze/tegaderm dressing placed. Will monitor.

## 2021-10-04 NOTE — SUBJECTIVE & OBJECTIVE
Interval History: no pain and tolerating diet. But with wheezing and some dried blood clots in nose while on NC. O2 sats good.     Review of Systems   Constitutional: Negative.    HENT: Positive for nosebleeds.    Eyes: Negative.    Respiratory: Positive for shortness of breath and wheezing.    Cardiovascular: Negative.    Gastrointestinal: Negative for abdominal distention, abdominal pain, constipation, diarrhea, nausea and vomiting.   Genitourinary: Negative.    Musculoskeletal: Negative.    Skin: Negative.    Neurological: Negative.    Hematological: Negative.    Psychiatric/Behavioral: Negative.      Objective:     Vital Signs (Most Recent):  Temp: 97.7 °F (36.5 °C) (05/22/18 0800)  Pulse: 77 (05/22/18 0915)  Resp: 20 (05/22/18 0915)  BP: (!) 171/87 (05/22/18 0900)  SpO2: 98 % (05/22/18 0915) Vital Signs (24h Range):  Temp:  [96.9 °F (36.1 °C)-98.5 °F (36.9 °C)] 97.7 °F (36.5 °C)  Pulse:  [] 77  Resp:  [13-37] 20  SpO2:  [92 %-100 %] 98 %  BP: (113-189)/() 171/87     Weight: 48.7 kg (107 lb 5.8 oz)  Body mass index is 21.68 kg/m².    Intake/Output Summary (Last 24 hours) at 05/22/18 0944  Last data filed at 05/22/18 0900   Gross per 24 hour   Intake           761.44 ml   Output             1850 ml   Net         -1088.56 ml      Physical Exam   Constitutional: She is oriented to person, place, and time. She appears well-developed. No distress.   HENT:   Head: Normocephalic and atraumatic.   Mouth/Throat: Oropharynx is clear and moist.   Eyes: Conjunctivae and EOM are normal. No scleral icterus.   Neck: Normal range of motion.   Cardiovascular: Normal rate, regular rhythm, normal heart sounds and intact distal pulses.    Pulmonary/Chest: Effort normal. No respiratory distress. She has wheezes (faint wheezing RLL). She has no rales.   Using accessory muscles when speaking, but speaking in full sentences     Abdominal: Soft. Bowel sounds are normal. She exhibits no distension and no mass. There is no  tenderness. There is no rebound and no guarding. No hernia.   Musculoskeletal: Normal range of motion. She exhibits no edema.   Neurological: She is alert and oriented to person, place, and time.   Skin: Skin is warm and dry. Capillary refill takes less than 2 seconds. She is not diaphoretic.   Psychiatric: She has a normal mood and affect. Her behavior is normal. Judgment and thought content normal.   Nursing note and vitals reviewed.      Significant Labs: All pertinent labs within the past 24 hours have been reviewed.    Significant Imaging: I have reviewed all pertinent imaging results/findings within the past 24 hours.  I have reviewed and interpreted all pertinent imaging results/findings within the past 24 hours.   112

## 2021-12-20 ENCOUNTER — HOSPITAL ENCOUNTER (INPATIENT)
Facility: HOSPITAL | Age: 81
LOS: 10 days | Discharge: HOME HEALTH | DRG: 871 | End: 2021-12-30
Attending: EMERGENCY MEDICINE | Admitting: INTERNAL MEDICINE
Payer: MEDICARE

## 2021-12-20 DIAGNOSIS — R07.89 CHEST DISCOMFORT: ICD-10-CM

## 2021-12-20 DIAGNOSIS — R07.9 CHEST PAIN: ICD-10-CM

## 2021-12-20 DIAGNOSIS — K85.90 ACUTE PANCREATITIS, UNSPECIFIED COMPLICATION STATUS, UNSPECIFIED PANCREATITIS TYPE: ICD-10-CM

## 2021-12-20 DIAGNOSIS — R65.10 SIRS (SYSTEMIC INFLAMMATORY RESPONSE SYNDROME): Primary | ICD-10-CM

## 2021-12-20 DIAGNOSIS — K85.90 PANCREATITIS: ICD-10-CM

## 2021-12-20 DIAGNOSIS — R54 AGE-RELATED PHYSICAL DEBILITY: ICD-10-CM

## 2021-12-20 LAB
ALBUMIN SERPL BCP-MCNC: 3.3 G/DL (ref 3.5–5.2)
ALLENS TEST: ABNORMAL
ALP SERPL-CCNC: 125 U/L (ref 55–135)
ALT SERPL W/O P-5'-P-CCNC: 22 U/L (ref 10–44)
ANION GAP SERPL CALC-SCNC: 14 MMOL/L (ref 8–16)
AST SERPL-CCNC: 39 U/L (ref 10–40)
BACTERIA #/AREA URNS HPF: NORMAL /HPF
BASOPHILS NFR BLD: 0 % (ref 0–1.9)
BILIRUB SERPL-MCNC: 0.8 MG/DL (ref 0.1–1)
BILIRUB UR QL STRIP: NEGATIVE
BUN SERPL-MCNC: 24 MG/DL (ref 8–23)
CALCIUM SERPL-MCNC: 9.2 MG/DL (ref 8.7–10.5)
CHLORIDE SERPL-SCNC: 107 MMOL/L (ref 95–110)
CHOLEST SERPL-MCNC: 179 MG/DL (ref 120–199)
CHOLEST/HDLC SERPL: 8.1 {RATIO} (ref 2–5)
CLARITY UR: CLEAR
CO2 SERPL-SCNC: 22 MMOL/L (ref 23–29)
COLOR UR: YELLOW
CREAT SERPL-MCNC: 1.1 MG/DL (ref 0.5–1.4)
CTP QC/QA: YES
CTP QC/QA: YES
DELSYS: ABNORMAL
DIFFERENTIAL METHOD: ABNORMAL
EOSINOPHIL NFR BLD: 0 % (ref 0–8)
ERYTHROCYTE [DISTWIDTH] IN BLOOD BY AUTOMATED COUNT: 15.4 % (ref 11.5–14.5)
ERYTHROCYTE [SEDIMENTATION RATE] IN BLOOD BY WESTERGREN METHOD: 25 MM/H
EST. GFR  (AFRICAN AMERICAN): 55 ML/MIN/1.73 M^2
EST. GFR  (NON AFRICAN AMERICAN): 48 ML/MIN/1.73 M^2
FLOW: 2
GLUCOSE SERPL-MCNC: 117 MG/DL (ref 70–110)
GLUCOSE UR QL STRIP: NEGATIVE
HCO3 UR-SCNC: 23.7 MMOL/L (ref 24–28)
HCT VFR BLD AUTO: 35.8 % (ref 37–48.5)
HDLC SERPL-MCNC: 22 MG/DL (ref 40–75)
HDLC SERPL: 12.3 % (ref 20–50)
HGB BLD-MCNC: 11.4 G/DL (ref 12–16)
HGB UR QL STRIP: NEGATIVE
HYALINE CASTS #/AREA URNS LPF: 1 /LPF
IMM GRANULOCYTES # BLD AUTO: ABNORMAL K/UL (ref 0–0.04)
IMM GRANULOCYTES NFR BLD AUTO: ABNORMAL % (ref 0–0.5)
INR PPP: 1.9 (ref 0.8–1.2)
KETONES UR QL STRIP: NEGATIVE
LACTATE SERPL-SCNC: 2.1 MMOL/L (ref 0.5–2.2)
LACTATE SERPL-SCNC: 2.3 MMOL/L (ref 0.5–2.2)
LDH SERPL L TO P-CCNC: 283 U/L (ref 110–260)
LDLC SERPL CALC-MCNC: 137.6 MG/DL (ref 63–159)
LEUKOCYTE ESTERASE UR QL STRIP: NEGATIVE
LIPASE SERPL-CCNC: >1000 U/L (ref 4–60)
LYMPHOCYTES NFR BLD: 4 % (ref 18–48)
MAGNESIUM SERPL-MCNC: 1.6 MG/DL (ref 1.6–2.6)
MCH RBC QN AUTO: 26.8 PG (ref 27–31)
MCHC RBC AUTO-ENTMCNC: 31.8 G/DL (ref 32–36)
MCV RBC AUTO: 84 FL (ref 82–98)
MICROSCOPIC COMMENT: NORMAL
MODE: ABNORMAL
MONOCYTES NFR BLD: 0 % (ref 4–15)
NEUTROPHILS NFR BLD: 90 % (ref 38–73)
NEUTS BAND NFR BLD MANUAL: 6 %
NITRITE UR QL STRIP: NEGATIVE
NONHDLC SERPL-MCNC: 157 MG/DL
NRBC BLD-RTO: 0 /100 WBC
PCO2 BLDA: 35.1 MMHG (ref 35–45)
PH SMN: 7.44 [PH] (ref 7.35–7.45)
PH UR STRIP: 6 [PH] (ref 5–8)
PHOSPHATE SERPL-MCNC: 3.2 MG/DL (ref 2.7–4.5)
PLATELET # BLD AUTO: 331 K/UL (ref 150–450)
PMV BLD AUTO: 10.2 FL (ref 9.2–12.9)
PO2 BLDA: 60 MMHG (ref 40–60)
POC BE: 0 MMOL/L
POC MOLECULAR INFLUENZA A AGN: NEGATIVE
POC MOLECULAR INFLUENZA B AGN: NEGATIVE
POC SATURATED O2: 92 % (ref 95–100)
POC TCO2: 25 MMOL/L (ref 24–29)
POTASSIUM SERPL-SCNC: 4 MMOL/L (ref 3.5–5.1)
PROT SERPL-MCNC: 7.6 G/DL (ref 6–8.4)
PROT UR QL STRIP: ABNORMAL
PROTHROMBIN TIME: 20.1 SEC (ref 9–12.5)
RBC # BLD AUTO: 4.26 M/UL (ref 4–5.4)
RBC #/AREA URNS HPF: 2 /HPF (ref 0–4)
SAMPLE: ABNORMAL
SARS-COV-2 RDRP RESP QL NAA+PROBE: NEGATIVE
SITE: ABNORMAL
SODIUM SERPL-SCNC: 143 MMOL/L (ref 136–145)
SP GR UR STRIP: 1.02 (ref 1–1.03)
SP02: 99
TRIGL SERPL-MCNC: 97 MG/DL (ref 30–150)
URN SPEC COLLECT METH UR: ABNORMAL
UROBILINOGEN UR STRIP-ACNC: NEGATIVE EU/DL
WBC # BLD AUTO: 9.89 K/UL (ref 3.9–12.7)
WBC #/AREA URNS HPF: 3 /HPF (ref 0–5)

## 2021-12-20 PROCEDURE — 83690 ASSAY OF LIPASE: CPT | Performed by: EMERGENCY MEDICINE

## 2021-12-20 PROCEDURE — 96375 TX/PRO/DX INJ NEW DRUG ADDON: CPT | Mod: 59

## 2021-12-20 PROCEDURE — 25000003 PHARM REV CODE 250: Performed by: EMERGENCY MEDICINE

## 2021-12-20 PROCEDURE — 96367 TX/PROPH/DG ADDL SEQ IV INF: CPT

## 2021-12-20 PROCEDURE — 83605 ASSAY OF LACTIC ACID: CPT | Mod: 91 | Performed by: EMERGENCY MEDICINE

## 2021-12-20 PROCEDURE — U0002 COVID-19 LAB TEST NON-CDC: HCPCS | Performed by: EMERGENCY MEDICINE

## 2021-12-20 PROCEDURE — 63600175 PHARM REV CODE 636 W HCPCS: Performed by: EMERGENCY MEDICINE

## 2021-12-20 PROCEDURE — 80053 COMPREHEN METABOLIC PANEL: CPT | Performed by: EMERGENCY MEDICINE

## 2021-12-20 PROCEDURE — 87077 CULTURE AEROBIC IDENTIFY: CPT | Performed by: EMERGENCY MEDICINE

## 2021-12-20 PROCEDURE — 99291 CRITICAL CARE FIRST HOUR: CPT | Mod: 25

## 2021-12-20 PROCEDURE — 82803 BLOOD GASES ANY COMBINATION: CPT

## 2021-12-20 PROCEDURE — 99292 CRITICAL CARE ADDL 30 MIN: CPT

## 2021-12-20 PROCEDURE — 96361 HYDRATE IV INFUSION ADD-ON: CPT | Mod: 59

## 2021-12-20 PROCEDURE — 85027 COMPLETE CBC AUTOMATED: CPT | Performed by: EMERGENCY MEDICINE

## 2021-12-20 PROCEDURE — 80061 LIPID PANEL: CPT | Performed by: EMERGENCY MEDICINE

## 2021-12-20 PROCEDURE — 99900035 HC TECH TIME PER 15 MIN (STAT)

## 2021-12-20 PROCEDURE — 12000002 HC ACUTE/MED SURGE SEMI-PRIVATE ROOM

## 2021-12-20 PROCEDURE — 94761 N-INVAS EAR/PLS OXIMETRY MLT: CPT

## 2021-12-20 PROCEDURE — 81000 URINALYSIS NONAUTO W/SCOPE: CPT | Performed by: EMERGENCY MEDICINE

## 2021-12-20 PROCEDURE — C1751 CATH, INF, PER/CENT/MIDLINE: HCPCS

## 2021-12-20 PROCEDURE — 84100 ASSAY OF PHOSPHORUS: CPT | Performed by: EMERGENCY MEDICINE

## 2021-12-20 PROCEDURE — 83615 LACTATE (LD) (LDH) ENZYME: CPT | Performed by: EMERGENCY MEDICINE

## 2021-12-20 PROCEDURE — 87040 BLOOD CULTURE FOR BACTERIA: CPT | Performed by: EMERGENCY MEDICINE

## 2021-12-20 PROCEDURE — 85610 PROTHROMBIN TIME: CPT | Performed by: EMERGENCY MEDICINE

## 2021-12-20 PROCEDURE — 87186 SC STD MICRODIL/AGAR DIL: CPT | Performed by: EMERGENCY MEDICINE

## 2021-12-20 PROCEDURE — 27000221 HC OXYGEN, UP TO 24 HOURS

## 2021-12-20 PROCEDURE — 85007 BL SMEAR W/DIFF WBC COUNT: CPT | Performed by: EMERGENCY MEDICINE

## 2021-12-20 PROCEDURE — 25500020 PHARM REV CODE 255: Performed by: EMERGENCY MEDICINE

## 2021-12-20 PROCEDURE — 36569 INSJ PICC 5 YR+ W/O IMAGING: CPT

## 2021-12-20 PROCEDURE — 83605 ASSAY OF LACTIC ACID: CPT | Performed by: EMERGENCY MEDICINE

## 2021-12-20 PROCEDURE — 96365 THER/PROPH/DIAG IV INF INIT: CPT

## 2021-12-20 PROCEDURE — 83735 ASSAY OF MAGNESIUM: CPT | Performed by: EMERGENCY MEDICINE

## 2021-12-20 PROCEDURE — 96374 THER/PROPH/DIAG INJ IV PUSH: CPT | Mod: 59

## 2021-12-20 RX ORDER — MORPHINE SULFATE 4 MG/ML
1 INJECTION, SOLUTION INTRAMUSCULAR; INTRAVENOUS
Status: COMPLETED | OUTPATIENT
Start: 2021-12-20 | End: 2021-12-20

## 2021-12-20 RX ORDER — ACETAMINOPHEN 325 MG/1
650 TABLET ORAL
Status: COMPLETED | OUTPATIENT
Start: 2021-12-20 | End: 2021-12-20

## 2021-12-20 RX ORDER — SODIUM CHLORIDE 9 MG/ML
INJECTION, SOLUTION INTRAVENOUS
Status: COMPLETED | OUTPATIENT
Start: 2021-12-20 | End: 2021-12-20

## 2021-12-20 RX ORDER — WARFARIN 2 MG/1
2 TABLET ORAL
Status: ON HOLD | COMMUNITY
End: 2022-01-06 | Stop reason: HOSPADM

## 2021-12-20 RX ORDER — CYCLOBENZAPRINE HYDROCHLORIDE 7.5 MG/1
7.5 TABLET, FILM COATED ORAL 3 TIMES DAILY PRN
Status: ON HOLD | COMMUNITY
End: 2022-01-16 | Stop reason: HOSPADM

## 2021-12-20 RX ORDER — METOPROLOL SUCCINATE 25 MG/1
25 TABLET, EXTENDED RELEASE ORAL
Status: ON HOLD | COMMUNITY
Start: 2021-09-20 | End: 2022-01-16 | Stop reason: HOSPADM

## 2021-12-20 RX ORDER — SODIUM CHLORIDE 0.9 % (FLUSH) 0.9 %
10 SYRINGE (ML) INJECTION
Status: DISCONTINUED | OUTPATIENT
Start: 2021-12-20 | End: 2021-12-30 | Stop reason: HOSPADM

## 2021-12-20 RX ORDER — SODIUM CHLORIDE 0.9 % (FLUSH) 0.9 %
10 SYRINGE (ML) INJECTION EVERY 6 HOURS
Status: DISCONTINUED | OUTPATIENT
Start: 2021-12-21 | End: 2021-12-30 | Stop reason: HOSPADM

## 2021-12-20 RX ORDER — SODIUM CHLORIDE 9 MG/ML
INJECTION, SOLUTION INTRAVENOUS CONTINUOUS
Status: DISCONTINUED | OUTPATIENT
Start: 2021-12-20 | End: 2021-12-24

## 2021-12-20 RX ORDER — ONDANSETRON 2 MG/ML
8 INJECTION INTRAMUSCULAR; INTRAVENOUS
Status: COMPLETED | OUTPATIENT
Start: 2021-12-20 | End: 2021-12-20

## 2021-12-20 RX ORDER — NOREPINEPHRINE BITARTRATE/D5W 4MG/250ML
0.05 PLASTIC BAG, INJECTION (ML) INTRAVENOUS CONTINUOUS
Status: DISCONTINUED | OUTPATIENT
Start: 2021-12-20 | End: 2021-12-21

## 2021-12-20 RX ADMIN — Medication 0.05 MCG/KG/MIN: at 10:12

## 2021-12-20 RX ADMIN — PIPERACILLIN AND TAZOBACTAM 4.5 G: 4; .5 INJECTION, POWDER, LYOPHILIZED, FOR SOLUTION INTRAVENOUS; PARENTERAL at 04:12

## 2021-12-20 RX ADMIN — SODIUM CHLORIDE, SODIUM LACTATE, POTASSIUM CHLORIDE, AND CALCIUM CHLORIDE 1200 ML: .6; .31; .03; .02 INJECTION, SOLUTION INTRAVENOUS at 04:12

## 2021-12-20 RX ADMIN — ACETAMINOPHEN 650 MG: 325 TABLET ORAL at 03:12

## 2021-12-20 RX ADMIN — MORPHINE SULFATE 1 MG: 4 INJECTION, SOLUTION INTRAMUSCULAR; INTRAVENOUS at 08:12

## 2021-12-20 RX ADMIN — SODIUM CHLORIDE: 0.9 INJECTION, SOLUTION INTRAVENOUS at 08:12

## 2021-12-20 RX ADMIN — VANCOMYCIN HYDROCHLORIDE 500 MG: 500 INJECTION, POWDER, LYOPHILIZED, FOR SOLUTION INTRAVENOUS at 05:12

## 2021-12-20 RX ADMIN — ONDANSETRON 8 MG: 2 INJECTION INTRAMUSCULAR; INTRAVENOUS at 08:12

## 2021-12-20 RX ADMIN — IOHEXOL 75 ML: 350 INJECTION, SOLUTION INTRAVENOUS at 06:12

## 2021-12-20 NOTE — ED TRIAGE NOTES
Pt reports LLQ and chills since last night that worsened today. 1 vomiting episode reported today. Pt has a hx of diverticulitis and pancreatitis. Pt febrile and hypoxic during triage

## 2021-12-20 NOTE — ED NOTES
resp called   Partially impaired: cannot see medication labels or newsprint, but can see obstacles in path, and the surrounding layout; can count fingers at arm's length

## 2021-12-20 NOTE — Clinical Note
Is this patient a high probability for COVID-19?: No   Diagnosis: SIRS (systemic inflammatory response syndrome) [427595]   Admitting Provider:: LUCINDA BERRY [[id number]]   Future Attending Provider: OSVALDO PADRON [[id number]]   Reason for IP Medical Treatment  (Clinical interventions that can only be accomplished in the IP setting? ) :: sirs, pancreatitis   Estimated Length of Stay:: 2 midnights   I certify that Inpatient services for greater than or equal to 2 midnights are medically necessary:: Yes   Plans for Post-Acute care--if anticipated (pick the single best option):: A. No post acute care anticipated at this time

## 2021-12-20 NOTE — ED PROVIDER NOTES
"Encounter Date: 12/20/2021    SCRIBE #1 NOTE: I, Del Rogers, am scribing for, and in the presence of,  Shira Krueger MD. I have scribed the following portions of the note - Other sections scribed: HPI, ROS.       History     Chief Complaint   Patient presents with    Fever    Abdominal Pain     Pt reports LLQ and chills since last night that worsened today. 1 vomiting episode reported today. Pt has a hx of diverticulitis and pancreatitis. Fever of 102.9 and O2 sat od 91 % on RA during triage      Katt Mcneal is a 81 y.o. female with a PMHx of Atrial fibrillation, CAD, pancreatitis and diverticulosis presenting to the ED for severe abdominal pain beginning last night and worsening this morning around 9 AM. Per patient's daughter, the patient has had associated emesis, chills described as "uncontrollable shivering" and 102.9 fever. She notes that her symptoms today are different from the patient's past pancreatitis flare-ups. Patient is compliant with Coumadin and Aspirin. Daughter states the patient at baseline is able to converse and ambulate but currently has a foot fracture and cannot walk on it. PSHx of cholecystectomy and coronary stent placement. Denies Plavix use. Denies cough, rhinorrhea, SOB, congestion, chest pain, or skin changes. Known drug allergies include Xarelto, Epinephrine, Integrilin, Bactrim and Sulfa.       The history is provided by a relative (Daughter).     Review of patient's allergies indicates:   Allergen Reactions    Sulfa (sulfonamide antibiotics) Hives    Bactrim [sulfamethoxazole-trimethoprim] Other (See Comments)     Pt. Reports that it caused severe pain    Integrilin [eptifibatide]     Statins-hmg-coa reductase inhibitors Other (See Comments)     Muscle pain. Patient states some, not all statins    Xarelto [rivaroxaban]     Epinephrine Palpitations     Past Medical History:   Diagnosis Date    Arthritis     Atrial fibrillation     Bilateral carotid artery stenosis " 2017    Coronary artery disease     Fibromyalgia     Hyperlipidemia     Hypertension     Myocardial infarction 2015    Pancreatitis     Thyroid disease      Past Surgical History:   Procedure Laterality Date    CHOLECYSTECTOMY      CORONARY STENT PLACEMENT  3/24/15    HYSTERECTOMY  10/28/2004     No family history on file.  Social History     Tobacco Use    Smoking status: Former Smoker     Quit date: 1964     Years since quittin.4    Smokeless tobacco: Former User   Substance Use Topics    Alcohol use: No     Alcohol/week: 0.0 standard drinks    Drug use: No     Review of Systems   Constitutional: Positive for chills and fever.   HENT: Negative for congestion, rhinorrhea and sore throat.    Eyes: Negative for visual disturbance.   Respiratory: Negative for cough and shortness of breath.    Cardiovascular: Negative for chest pain.   Gastrointestinal: Positive for abdominal pain and vomiting. Negative for nausea.   Genitourinary: Negative for dysuria and vaginal discharge.   Skin: Negative for color change and rash.   Neurological: Negative for headaches.   Psychiatric/Behavioral: Negative for decreased concentration.       Physical Exam     Initial Vitals   BP Pulse Resp Temp SpO2   21 1550 21 1550 21 1555 21 1547 21 1550   121/66 (!) 111 (!) 24 (!) 102.9 °F (39.4 °C) (!) 91 %      MAP       --                Physical Exam    Nursing note and vitals reviewed.  Constitutional: She appears well-developed and well-nourished.   HENT:   Head: Normocephalic and atraumatic.   Eyes: Conjunctivae and EOM are normal. Pupils are equal, round, and reactive to light.   Neck:   Normal range of motion.  Cardiovascular: Normal rate.   Pulmonary/Chest: No respiratory distress.   Abdominal: She exhibits no distension. There is abdominal tenderness.   Musculoskeletal:         General: Normal range of motion.      Cervical back: Normal range of motion.     Neurological:  She is alert. No cranial nerve deficit. GCS score is 15. GCS eye subscore is 4. GCS verbal subscore is 5. GCS motor subscore is 6.   Skin: Skin is warm and dry.   Psychiatric: She has a normal mood and affect. Thought content normal.     ttp midepigastric    ED Course   Procedures  Labs Reviewed   CBC W/ AUTO DIFFERENTIAL - Abnormal; Notable for the following components:       Result Value    Hemoglobin 11.4 (*)     Hematocrit 35.8 (*)     MCH 26.8 (*)     MCHC 31.8 (*)     RDW 15.4 (*)     Gran % 90.0 (*)     Lymph % 4.0 (*)     Mono % 0.0 (*)     All other components within normal limits   COMPREHENSIVE METABOLIC PANEL - Abnormal; Notable for the following components:    CO2 22 (*)     Glucose 117 (*)     BUN 24 (*)     Albumin 3.3 (*)     eGFR if  55 (*)     eGFR if non  48 (*)     All other components within normal limits   LACTIC ACID, PLASMA - Abnormal; Notable for the following components:    Lactate (Lactic Acid) 2.3 (*)     All other components within normal limits   URINALYSIS, REFLEX TO URINE CULTURE - Abnormal; Notable for the following components:    Protein, UA 1+ (*)     All other components within normal limits    Narrative:     Specimen Source->Urine   LIPASE - Abnormal; Notable for the following components:    Lipase >1000 (*)     All other components within normal limits   LACTATE DEHYDROGENASE - Abnormal; Notable for the following components:     (*)     All other components within normal limits   LIPID PANEL - Abnormal; Notable for the following components:    HDL 22 (*)     HDL/Cholesterol Ratio 12.3 (*)     Total Cholesterol/HDL Ratio 8.1 (*)     All other components within normal limits   PROTIME-INR - Abnormal; Notable for the following components:    Prothrombin Time 20.1 (*)     INR 1.9 (*)     All other components within normal limits   ISTAT PROCEDURE - Abnormal; Notable for the following components:    POC HCO3 23.7 (*)     POC SATURATED O2 92 (*)      All other components within normal limits   MAGNESIUM   PHOSPHORUS   LACTIC ACID, PLASMA   URINALYSIS MICROSCOPIC    Narrative:     Specimen Source->Urine   POCT INFLUENZA A/B MOLECULAR   SARS-COV-2 RDRP GENE        ECG Results    None       Imaging Results          US Abdomen Complete (Final result)  Result time 12/20/21 21:59:34    Final result by Castillo Jimenez MD (12/20/21 21:59:34)                 Impression:      Echogenic appearance of the pancreas, suggestive of acute pancreatitis.    Focal 2.2 x 1.7 x 2.2 cm hypoechoic region within the pancreatic body.  Short-term follow-up with pancreatic CT protocol may be obtained, if there is concern for early pancreatic necrosis.    Status post cholecystectomy.  Intrahepatic and extrahepatic biliary dilatation.      Electronically signed by: Castillo Jimenez MD  Date:    12/20/2021  Time:    21:59             Narrative:    EXAMINATION:  US ABDOMEN COMPLETE    CLINICAL HISTORY:  Acute pancreatitis without necrosis or infection, unspecified    TECHNIQUE:  Complete abdominal ultrasound (including pancreas, aorta, liver, gallbladder, common bile duct, IVC, kidneys, and spleen) was performed.    COMPARISON:  CT abdomen pelvis dated 12/20/2021.    FINDINGS:  The pancreas is echogenic, suggestive of acute inflammation.  There is a 2.2 x 1.7 x 2.2 cm hypoechoic area within the pancreatic body.    The patient is status post cholecystectomy.  There is no abnormality in the gallbladder fossa.    The CBD is enlarged measuring 1.3 cm.  There is also intrahepatic biliary dilatation.    The liver is normal in appearance.  There are no discrete hepatic masses.    There are unchanged bilateral renal cystic lesions.  There is no evidence of hydroureteronephrosis.    No evidence of free fluid is identified.                               X-Ray Chest AP Portable (Final result)  Result time 12/20/21 20:30:33    Final result by Vadim Humphreys MD (12/20/21 20:30:33)                 Impression:       As above.      Electronically signed by: Vadim Humphreys MD  Date:    12/20/2021  Time:    20:30             Narrative:    EXAMINATION:  XR CHEST AP PORTABLE    CLINICAL HISTORY:  picc placement;    TECHNIQUE:  Single frontal view of the chest was performed.    COMPARISON:  Chest radiograph earlier same day at 16:09 hours    FINDINGS:  Monitoring leads overlie the chest.  Patient is slightly rotated.    Interval placement of right-sided PICC line with tip overlying the mid SVC.  No pneumothorax or new focal opacity.  Cardiomediastinal silhouette is midline and stable.  Otherwise grossly stable chest.                                CT Abdomen Pelvis With Contrast (Final result)  Result time 12/20/21 19:30:51    Final result by Castillo Jimenez MD (12/20/21 19:30:51)                 Impression:      Inflammatory changes identified in the pancreas with marked inflammatory changes surrounding the pancreas, consistent acute pancreatitis.    Inflammatory changes involving the duodenum.  This is most likely secondary to the inflammatory changes in the pancreas.    Additional findings above.    This report was flagged in Epic as abnormal.      Electronically signed by: Castillo Jimenez MD  Date:    12/20/2021  Time:    19:30             Narrative:    EXAMINATION:  CT ABDOMEN PELVIS WITH CONTRAST    CLINICAL HISTORY:  abd pain and sepsis;    TECHNIQUE:  Low dose axial images, sagittal and coronal reformations were obtained from the lung bases to the pubic symphysis following the IV administration of 75 cc of Omnipaque 350 .  Oral contrast was not given.    COMPARISON:  CT abdomen pelvis dated 05/16/2018.    FINDINGS:  There are no pleural effusions.  There is no evidence of a pneumothorax.  There are dependent changes in the lung bases.    The heart is enlarged.  There is normal tapering of the abdominal aorta.  There are extensive calcifications along the course of the abdominal aorta and its branch vessels.  The portal veins and  mesenteric veins are within normal limits.    There is no evidence of lymphadenopathy in the abdomen or pelvis.    The esophagus and stomach are unremarkable.  There are hazy changes involving the proximal duodenum.  The small bowel loops are unremarkable.  The appendix is within normal limits.  There is colonic diverticula without evidence of acute diverticulitis.    There is a subcentimeter hypodensity in the right hepatic lobe.  This is too small complete characterization.  The patient is status post cholecystectomy.  There is stable appearance of mild intrahepatic biliary dilatation.    The spleen is enlarged.  There are hazy changes around the pancreas.  There is dilatation of the main pancreatic duct.  No peripancreatic fluid collection is identified.  The adrenal glands are unremarkable.    There is no evidence of nephrolithiasis.  There are stable simple cyst in the upper pole of both kidneys.  The ureters and urinary bladder are within normal limits.    There is no evidence of a drainable collection in the abdomen or pelvis.  There is no evidence of free air.  There is no evidence of pneumatosis.  No portal venous air is identified.    The psoas margins are unremarkable.  There are chronic bilateral inferior pubic rami fractures present chronic right-sided superior pubic ramus fracture.  There is grade 1 anterolisthesis of L5 on S1.  No acute fractures identified.                               X-Ray Chest 1 View (Final result)  Result time 12/20/21 16:17:24    Final result by Channing Medina MD (12/20/21 16:17:24)                 Impression:      No acute chest disease identified.    Implanted cardiac loop recorder.      Electronically signed by: Channing Medina MD  Date:    12/20/2021  Time:    16:17             Narrative:    EXAMINATION:  XR CHEST 1 VIEW    CLINICAL HISTORY:  Sepsis;    TECHNIQUE:  Single frontal view of the chest was performed.    COMPARISON:  05/21/2018.    FINDINGS:  There is an  implanted cardiac loop recorder present.  The heart is not enlarged.  Atherosclerotic calcification is present within the thoracic aorta.  Superior mediastinal structures are unremarkable.  Pulmonary vasculature is within normal limits.  The lungs are free of focal consolidations.  There is no evidence for pneumothorax or pleural effusions.  Bony structures are grossly intact.                                 Medications   sodium chloride 0.9% flush 10 mL (10 mLs Intravenous Given 12/26/21 0100)     And   sodium chloride 0.9% flush 10 mL (10 mLs Intravenous Given 12/24/21 2019)   amiodarone tablet 200 mg (200 mg Oral Given 12/25/21 2116)   aspirin EC tablet 81 mg (81 mg Oral Given 12/25/21 0923)   calcium-vitamin D3 500 mg-5 mcg (200 unit) per tablet 1 tablet (1 tablet Oral Given 12/25/21 0923)   cholecalciferol (vitamin D3) capsule/tablet 400 Units (400 Units Oral Given 12/25/21 0923)   LORazepam tablet 0.5 mg (0.5 mg Oral Given 12/25/21 0923)   metoprolol succinate (TOPROL-XL) 24 hr tablet 25 mg (25 mg Oral Given 12/25/21 0923)   nitroGLYCERIN SL tablet 0.4 mg (0.4 mg Sublingual Given 12/22/21 1827)   predniSONE tablet 2.5 mg (2.5 mg Oral Given 12/25/21 0923)   warfarin (COUMADIN) tablet 2 mg (2 mg Oral Given 12/25/21 1845)   sodium chloride 0.9% flush 10 mL (10 mLs Intravenous Given 12/23/21 0712)   potassium bicarbonate disintegrating tablet 50 mEq (has no administration in time range)   potassium bicarbonate disintegrating tablet 35 mEq (has no administration in time range)   potassium bicarbonate disintegrating tablet 60 mEq (has no administration in time range)   magnesium oxide tablet 800 mg (has no administration in time range)   magnesium oxide tablet 800 mg (has no administration in time range)   senna-docusate 8.6-50 mg per tablet 1 tablet (0 tablets Oral Hold 12/25/21 2100)   glucose chewable tablet 16 g (has no administration in time range)   glucose chewable tablet 24 g (has no administration in time  range)   dextrose 50% injection 12.5 g (has no administration in time range)   dextrose 50% injection 25 g (has no administration in time range)   glucagon (human recombinant) injection 1 mg (has no administration in time range)   melatonin tablet 9 mg (9 mg Oral Given 12/25/21 2116)   naloxone 0.4 mg/mL injection 0.02 mg (has no administration in time range)   ondansetron injection 4 mg (has no administration in time range)   ondansetron injection 4 mg (has no administration in time range)   insulin aspart U-100 pen 0-5 Units (has no administration in time range)   acetaminophen tablet 650 mg (650 mg Oral Given 12/23/21 2034)   levothyroxine tablet 75 mcg (75 mcg Oral Given 12/25/21 0524)   mupirocin 2 % ointment (0 g Nasal Hold 12/25/21 2100)   morphine injection 2 mg (2 mg Intravenous Given 12/25/21 1757)   Amino acid 4.25% - dextrose 5% (CLINIMIX-E) solution with additives (1L provides 42.5 gm AA, 50 gm CHO (170 kcal/L dextrose), Na 35, K 30, Mg 5, Ca 4.5, Acetate 70, Cl 39, Phos 15) ( Intravenous Stopped 12/25/21 2234)   piperacillin-tazobactam 4.5 g in dextrose 5 % 100 mL IVPB (ready to mix system) (4.5 g Intravenous New Bag 12/26/21 0357)   Amino acid 4.25% - dextrose 5% (CLINIMIX-E) solution with additives (1L provides 42.5 gm AA, 50 gm CHO (170 kcal/L dextrose), Na 35, K 30, Mg 5, Ca 4.5, Acetate 70, Cl 39, Phos 15) ( Intravenous New Bag 12/25/21 2143)   fat emulsion 20 % infusion 250 mL (250 mLs Intravenous New Bag 12/25/21 2146)   acetaminophen tablet 650 mg (650 mg Oral Given 12/20/21 1557)   lactated ringers bolus 1,200 mL (0 mLs Intravenous Stopped 12/20/21 1903)   vancomycin 500 mg in dextrose 5 % 100 mL IVPB (ready to mix system) (0 mg/kg × 40.3 kg Intravenous Stopped 12/20/21 1830)   piperacillin-tazobactam 4.5 g in dextrose 5 % 100 mL IVPB (ready to mix system) (0 g Intravenous Stopped 12/20/21 1725)   iohexoL (OMNIPAQUE 350) injection 75 mL (75 mLs Intravenous Given 12/20/21 1800)   0.9%  NaCl  "infusion ( Intravenous Stopped 12/20/21 2224)   ondansetron injection 8 mg (8 mg Intravenous Given 12/20/21 2026)   morphine injection 1 mg (1 mg Intravenous Given 12/20/21 2027)   enoxaparin injection 40 mg (40 mg Subcutaneous Given 12/22/21 2029)   furosemide injection 40 mg (40 mg Intravenous Given 12/23/21 1240)   fat emulsion 20 % infusion 250 mL (0 mLs Intravenous Stopped 12/25/21 1033)   furosemide injection 40 mg (40 mg Intravenous Given 12/25/21 0949)     Medical Decision Making:   Clinical Tests:   Sepsis Perfusion Assessment: "I attest a sepsis perfusion exam was performed within 6 hours of sepsis, severe sepsis, or septic shock presentation, following fluid resuscitation."          Scribe Attestation:   Scribe #1: I performed the above scribed service and the documentation accurately describes the services I performed. I attest to the accuracy of the note.    Attending Attestation:         Attending Critical Care:   Critical Care Times:   Direct Patient Care (initial evaluation, reassessments, and time considering the case)................................................................60 minutes.   Additional History from reviewing old medical records or taking additional history from the family, EMS, PCP, etc.......................10 minutes.   Ordering, Reviewing, and Interpreting Diagnostic Studies...............................................................................................................10 minutes.   Documentation..................................................................................................................................................................................10 minutes.   Consultation with other Physicians. .................................................................................................................................................10 minutes.   ==============================================================  · Total Critical Care " Time - exclusive of procedural time: 100 minutes.  ==============================================================                 This is Dr. Vaughn dictating.  Patient signed out for me by Dr. Krueger, 81-year-old woman with history of pancreatitis and stenting came in SIRS positive CC I have reviewed all labs and imaging CT demonstrates pancreatitis.    Patient has gotten vanc and Zosyn as well as a 30 mL/kilogram bolus, I have added on a lipid panel as well as INR, and abdominal ultrasound to screen for other causes of pancreatitis and because of report the patient is on Coumadin  Dr. Castañeda from GI has been consulted and feels that the patient is okay to be started here.     Labs Reviewed   CBC W/ AUTO DIFFERENTIAL - Abnormal; Notable for the following components:       Result Value    Hemoglobin 11.4 (*)     Hematocrit 35.8 (*)     MCH 26.8 (*)     MCHC 31.8 (*)     RDW 15.4 (*)     Gran % 90.0 (*)     Lymph % 4.0 (*)     Mono % 0.0 (*)     All other components within normal limits   COMPREHENSIVE METABOLIC PANEL - Abnormal; Notable for the following components:    CO2 22 (*)     Glucose 117 (*)     BUN 24 (*)     Albumin 3.3 (*)     eGFR if  55 (*)     eGFR if non  48 (*)     All other components within normal limits   LACTIC ACID, PLASMA - Abnormal; Notable for the following components:    Lactate (Lactic Acid) 2.3 (*)     All other components within normal limits   URINALYSIS, REFLEX TO URINE CULTURE - Abnormal; Notable for the following components:    Protein, UA 1+ (*)     All other components within normal limits    Narrative:     Specimen Source->Urine   LIPASE - Abnormal; Notable for the following components:    Lipase >1000 (*)     All other components within normal limits   LACTATE DEHYDROGENASE - Abnormal; Notable for the following components:     (*)     All other components within normal limits   LIPID PANEL - Abnormal; Notable for the following components:     HDL 22 (*)     HDL/Cholesterol Ratio 12.3 (*)     Total Cholesterol/HDL Ratio 8.1 (*)     All other components within normal limits   PROTIME-INR - Abnormal; Notable for the following components:    Prothrombin Time 20.1 (*)     INR 1.9 (*)     All other components within normal limits   ISTAT PROCEDURE - Abnormal; Notable for the following components:    POC HCO3 23.7 (*)     POC SATURATED O2 92 (*)     All other components within normal limits   MAGNESIUM   PHOSPHORUS   LACTIC ACID, PLASMA   URINALYSIS MICROSCOPIC    Narrative:     Specimen Source->Urine   POCT INFLUENZA A/B MOLECULAR   SARS-COV-2 RDRP GENE       X-Ray Chest AP Portable   Final Result      Subtle increase in asymmetrical density in the right lower lung field medially, as above described.         Electronically signed by: Kimi Tolentino   Date:    12/22/2021   Time:    19:15      US Abdomen Complete   Final Result      Echogenic appearance of the pancreas, suggestive of acute pancreatitis.      Focal 2.2 x 1.7 x 2.2 cm hypoechoic region within the pancreatic body.  Short-term follow-up with pancreatic CT protocol may be obtained, if there is concern for early pancreatic necrosis.      Status post cholecystectomy.  Intrahepatic and extrahepatic biliary dilatation.         Electronically signed by: Castillo Jimenez MD   Date:    12/20/2021   Time:    21:59      X-Ray Chest AP Portable   Final Result      As above.         Electronically signed by: Vadim Humphreys MD   Date:    12/20/2021   Time:    20:30      CT Abdomen Pelvis With Contrast   Final Result   Abnormal      Inflammatory changes identified in the pancreas with marked inflammatory changes surrounding the pancreas, consistent acute pancreatitis.      Inflammatory changes involving the duodenum.  This is most likely secondary to the inflammatory changes in the pancreas.      Additional findings above.      This report was flagged in Epic as abnormal.         Electronically signed by: Castillo Jimenez  MD   Date:    12/20/2021   Time:    19:30      X-Ray Chest 1 View   Final Result      No acute chest disease identified.      Implanted cardiac loop recorder.         Electronically signed by: Channing Medina MD   Date:    12/20/2021   Time:    16:17        Pts bp has been trending down. She is now 80/50 with several repeat measurements. Have started her on levophed at 21:15 through picc will admit to ICU. Have notified medicine team.    Pt reassessed. Levophed not yet started, RN aware of order.    Pt reassessed after levophed, bp 131/58    Clinical Impression:   Final diagnoses:  [K85.90] Pancreatitis  [K85.90] Pancreatitis - eval for stone  [R65.10] SIRS (systemic inflammatory response syndrome) (Primary)  [K85.90] Acute pancreatitis, unspecified complication status, unspecified pancreatitis type          ED Disposition Condition    Admit               Edwin Vaughn MD  12/26/21 0551       Edwin Vaughn MD  01/18/22 0982

## 2021-12-21 LAB
ALBUMIN SERPL BCP-MCNC: 2.2 G/DL (ref 3.5–5.2)
ALP SERPL-CCNC: 79 U/L (ref 55–135)
ALT SERPL W/O P-5'-P-CCNC: 41 U/L (ref 10–44)
ANION GAP SERPL CALC-SCNC: 7 MMOL/L (ref 8–16)
AST SERPL-CCNC: 55 U/L (ref 10–40)
BILIRUB SERPL-MCNC: 0.3 MG/DL (ref 0.1–1)
BUN SERPL-MCNC: 22 MG/DL (ref 8–23)
CALCIUM SERPL-MCNC: 8.2 MG/DL (ref 8.7–10.5)
CHLORIDE SERPL-SCNC: 111 MMOL/L (ref 95–110)
CO2 SERPL-SCNC: 25 MMOL/L (ref 23–29)
CREAT SERPL-MCNC: 1 MG/DL (ref 0.5–1.4)
EST. GFR  (AFRICAN AMERICAN): >60 ML/MIN/1.73 M^2
EST. GFR  (NON AFRICAN AMERICAN): 53 ML/MIN/1.73 M^2
GLUCOSE SERPL-MCNC: 74 MG/DL (ref 70–110)
INR PPP: 1.8 (ref 0.8–1.2)
LIPASE SERPL-CCNC: 353 U/L (ref 4–60)
POCT GLUCOSE: 117 MG/DL (ref 70–110)
POCT GLUCOSE: 74 MG/DL (ref 70–110)
POTASSIUM SERPL-SCNC: 4.2 MMOL/L (ref 3.5–5.1)
PROT SERPL-MCNC: 5.5 G/DL (ref 6–8.4)
PROTHROMBIN TIME: 18.5 SEC (ref 9–12.5)
SODIUM SERPL-SCNC: 143 MMOL/L (ref 136–145)

## 2021-12-21 PROCEDURE — A4216 STERILE WATER/SALINE, 10 ML: HCPCS | Performed by: EMERGENCY MEDICINE

## 2021-12-21 PROCEDURE — 85610 PROTHROMBIN TIME: CPT | Performed by: INTERNAL MEDICINE

## 2021-12-21 PROCEDURE — 25000003 PHARM REV CODE 250: Performed by: INTERNAL MEDICINE

## 2021-12-21 PROCEDURE — 83690 ASSAY OF LIPASE: CPT | Performed by: INTERNAL MEDICINE

## 2021-12-21 PROCEDURE — 25000003 PHARM REV CODE 250: Performed by: EMERGENCY MEDICINE

## 2021-12-21 PROCEDURE — 80053 COMPREHEN METABOLIC PANEL: CPT | Performed by: INTERNAL MEDICINE

## 2021-12-21 PROCEDURE — 63600175 PHARM REV CODE 636 W HCPCS: Performed by: EMERGENCY MEDICINE

## 2021-12-21 PROCEDURE — 99223 PR INITIAL HOSPITAL CARE,LEVL III: ICD-10-PCS | Mod: ,,, | Performed by: STUDENT IN AN ORGANIZED HEALTH CARE EDUCATION/TRAINING PROGRAM

## 2021-12-21 PROCEDURE — 99223 1ST HOSP IP/OBS HIGH 75: CPT | Mod: ,,, | Performed by: STUDENT IN AN ORGANIZED HEALTH CARE EDUCATION/TRAINING PROGRAM

## 2021-12-21 PROCEDURE — 63600175 PHARM REV CODE 636 W HCPCS: Performed by: INTERNAL MEDICINE

## 2021-12-21 PROCEDURE — 21400001 HC TELEMETRY ROOM

## 2021-12-21 RX ORDER — POTASSIUM CHLORIDE 750 MG/1
10 TABLET, EXTENDED RELEASE ORAL DAILY
Status: DISCONTINUED | OUTPATIENT
Start: 2021-12-21 | End: 2021-12-21

## 2021-12-21 RX ORDER — LEVOTHYROXINE SODIUM 75 UG/1
75 TABLET ORAL DAILY
Status: DISCONTINUED | OUTPATIENT
Start: 2021-12-21 | End: 2021-12-30 | Stop reason: HOSPADM

## 2021-12-21 RX ORDER — ACETAMINOPHEN 500 MG
1000 TABLET ORAL EVERY 6 HOURS PRN
Status: DISCONTINUED | OUTPATIENT
Start: 2021-12-21 | End: 2021-12-21

## 2021-12-21 RX ORDER — AMOXICILLIN 250 MG
1 CAPSULE ORAL 2 TIMES DAILY
Status: DISCONTINUED | OUTPATIENT
Start: 2021-12-21 | End: 2021-12-30 | Stop reason: HOSPADM

## 2021-12-21 RX ORDER — NALOXONE HCL 0.4 MG/ML
0.02 VIAL (ML) INJECTION
Status: DISCONTINUED | OUTPATIENT
Start: 2021-12-21 | End: 2021-12-30 | Stop reason: HOSPADM

## 2021-12-21 RX ORDER — SODIUM CHLORIDE 0.9 % (FLUSH) 0.9 %
10 SYRINGE (ML) INJECTION EVERY 12 HOURS PRN
Status: DISCONTINUED | OUTPATIENT
Start: 2021-12-21 | End: 2021-12-30 | Stop reason: HOSPADM

## 2021-12-21 RX ORDER — ONDANSETRON 2 MG/ML
4 INJECTION INTRAMUSCULAR; INTRAVENOUS EVERY 8 HOURS PRN
Status: DISCONTINUED | OUTPATIENT
Start: 2021-12-21 | End: 2021-12-30 | Stop reason: HOSPADM

## 2021-12-21 RX ORDER — MORPHINE SULFATE 4 MG/ML
2 INJECTION, SOLUTION INTRAMUSCULAR; INTRAVENOUS EVERY 4 HOURS PRN
Status: DISCONTINUED | OUTPATIENT
Start: 2021-12-21 | End: 2021-12-30 | Stop reason: HOSPADM

## 2021-12-21 RX ORDER — LISINOPRIL 5 MG/1
5 TABLET ORAL DAILY
Status: DISCONTINUED | OUTPATIENT
Start: 2021-12-21 | End: 2021-12-21

## 2021-12-21 RX ORDER — GLUCAGON 1 MG
1 KIT INJECTION
Status: DISCONTINUED | OUTPATIENT
Start: 2021-12-21 | End: 2021-12-30 | Stop reason: HOSPADM

## 2021-12-21 RX ORDER — CYCLOBENZAPRINE HYDROCHLORIDE 7.5 MG/1
7.5 TABLET, FILM COATED ORAL 3 TIMES DAILY PRN
Status: DISCONTINUED | OUTPATIENT
Start: 2021-12-21 | End: 2021-12-21 | Stop reason: CLARIF

## 2021-12-21 RX ORDER — LANOLIN ALCOHOL/MO/W.PET/CERES
800 CREAM (GRAM) TOPICAL
Status: DISCONTINUED | OUTPATIENT
Start: 2021-12-21 | End: 2021-12-30 | Stop reason: HOSPADM

## 2021-12-21 RX ORDER — FERROUS SULFATE, DRIED 160(50) MG
1 TABLET, EXTENDED RELEASE ORAL DAILY
Status: DISCONTINUED | OUTPATIENT
Start: 2021-12-21 | End: 2021-12-30 | Stop reason: HOSPADM

## 2021-12-21 RX ORDER — METOPROLOL SUCCINATE 25 MG/1
25 TABLET, EXTENDED RELEASE ORAL DAILY
Status: DISCONTINUED | OUTPATIENT
Start: 2021-12-21 | End: 2021-12-30 | Stop reason: HOSPADM

## 2021-12-21 RX ORDER — PREDNISONE 2.5 MG/1
2.5 TABLET ORAL DAILY
Status: DISCONTINUED | OUTPATIENT
Start: 2021-12-21 | End: 2021-12-30 | Stop reason: HOSPADM

## 2021-12-21 RX ORDER — WARFARIN 2 MG/1
2 TABLET ORAL DAILY
Status: DISCONTINUED | OUTPATIENT
Start: 2021-12-21 | End: 2021-12-27

## 2021-12-21 RX ORDER — IBUPROFEN 200 MG
24 TABLET ORAL
Status: DISCONTINUED | OUTPATIENT
Start: 2021-12-21 | End: 2021-12-30 | Stop reason: HOSPADM

## 2021-12-21 RX ORDER — LORAZEPAM 0.5 MG/1
0.5 TABLET ORAL EVERY 6 HOURS PRN
Status: DISCONTINUED | OUTPATIENT
Start: 2021-12-21 | End: 2021-12-30 | Stop reason: HOSPADM

## 2021-12-21 RX ORDER — CYCLOBENZAPRINE HCL 5 MG
5 TABLET ORAL 3 TIMES DAILY PRN
Status: DISCONTINUED | OUTPATIENT
Start: 2021-12-21 | End: 2021-12-21

## 2021-12-21 RX ORDER — TALC
9 POWDER (GRAM) TOPICAL NIGHTLY PRN
Status: DISCONTINUED | OUTPATIENT
Start: 2021-12-21 | End: 2021-12-30 | Stop reason: HOSPADM

## 2021-12-21 RX ORDER — IBUPROFEN 200 MG
16 TABLET ORAL
Status: DISCONTINUED | OUTPATIENT
Start: 2021-12-21 | End: 2021-12-30 | Stop reason: HOSPADM

## 2021-12-21 RX ORDER — AMLODIPINE BESYLATE 5 MG/1
5 TABLET ORAL DAILY
Status: DISCONTINUED | OUTPATIENT
Start: 2021-12-21 | End: 2021-12-21

## 2021-12-21 RX ORDER — PRAVASTATIN SODIUM 10 MG/1
20 TABLET ORAL DAILY
Status: DISCONTINUED | OUTPATIENT
Start: 2021-12-21 | End: 2021-12-23

## 2021-12-21 RX ORDER — LEVOTHYROXINE SODIUM 50 UG/1
50 TABLET ORAL DAILY
Status: DISCONTINUED | OUTPATIENT
Start: 2021-12-21 | End: 2021-12-21

## 2021-12-21 RX ORDER — ACETAMINOPHEN 325 MG/1
650 TABLET ORAL EVERY 6 HOURS PRN
Status: DISCONTINUED | OUTPATIENT
Start: 2021-12-21 | End: 2021-12-30 | Stop reason: HOSPADM

## 2021-12-21 RX ORDER — MUPIROCIN 20 MG/G
OINTMENT TOPICAL 2 TIMES DAILY
Status: DISPENSED | OUTPATIENT
Start: 2021-12-21 | End: 2021-12-26

## 2021-12-21 RX ORDER — ONDANSETRON 4 MG/1
4 TABLET, ORALLY DISINTEGRATING ORAL EVERY 8 HOURS PRN
Status: DISCONTINUED | OUTPATIENT
Start: 2021-12-21 | End: 2021-12-21

## 2021-12-21 RX ORDER — CHOLECALCIFEROL (VITAMIN D3) 10 MCG
400 TABLET ORAL DAILY
Status: DISCONTINUED | OUTPATIENT
Start: 2021-12-21 | End: 2021-12-30 | Stop reason: HOSPADM

## 2021-12-21 RX ORDER — INSULIN ASPART 100 [IU]/ML
0-5 INJECTION, SOLUTION INTRAVENOUS; SUBCUTANEOUS
Status: DISCONTINUED | OUTPATIENT
Start: 2021-12-21 | End: 2021-12-30 | Stop reason: HOSPADM

## 2021-12-21 RX ORDER — ASPIRIN 81 MG/1
81 TABLET ORAL DAILY
Status: DISCONTINUED | OUTPATIENT
Start: 2021-12-21 | End: 2021-12-30 | Stop reason: HOSPADM

## 2021-12-21 RX ORDER — AMIODARONE HYDROCHLORIDE 200 MG/1
200 TABLET ORAL 2 TIMES DAILY
Status: DISCONTINUED | OUTPATIENT
Start: 2021-12-21 | End: 2021-12-30 | Stop reason: HOSPADM

## 2021-12-21 RX ORDER — NITROGLYCERIN 0.4 MG/1
0.4 TABLET SUBLINGUAL EVERY 5 MIN PRN
Status: DISCONTINUED | OUTPATIENT
Start: 2021-12-21 | End: 2021-12-30 | Stop reason: HOSPADM

## 2021-12-21 RX ADMIN — ASPIRIN 81 MG: 81 TABLET, COATED ORAL at 09:12

## 2021-12-21 RX ADMIN — WARFARIN SODIUM 2 MG: 2 TABLET ORAL at 06:12

## 2021-12-21 RX ADMIN — MUPIROCIN: 20 OINTMENT TOPICAL at 09:12

## 2021-12-21 RX ADMIN — LEVOTHYROXINE SODIUM 75 MCG: 75 TABLET ORAL at 09:12

## 2021-12-21 RX ADMIN — Medication 10 ML: at 02:12

## 2021-12-21 RX ADMIN — AMIODARONE HYDROCHLORIDE 200 MG: 200 TABLET ORAL at 09:12

## 2021-12-21 RX ADMIN — PREDNISONE 2.5 MG: 2.5 TABLET ORAL at 11:12

## 2021-12-21 RX ADMIN — Medication 10 ML: at 05:12

## 2021-12-21 RX ADMIN — PIPERACILLIN AND TAZOBACTAM 4.5 G: 4; .5 INJECTION, POWDER, LYOPHILIZED, FOR SOLUTION INTRAVENOUS; PARENTERAL at 05:12

## 2021-12-21 RX ADMIN — Medication 1 TABLET: at 09:12

## 2021-12-21 RX ADMIN — PIPERACILLIN AND TAZOBACTAM 4.5 G: 4; .5 INJECTION, POWDER, LYOPHILIZED, FOR SOLUTION INTRAVENOUS; PARENTERAL at 09:12

## 2021-12-21 RX ADMIN — METOPROLOL SUCCINATE 25 MG: 25 TABLET, EXTENDED RELEASE ORAL at 09:12

## 2021-12-21 RX ADMIN — MELATONIN TAB 3 MG 9 MG: 3 TAB at 09:12

## 2021-12-21 RX ADMIN — POTASSIUM CHLORIDE 10 MEQ: 750 TABLET, EXTENDED RELEASE ORAL at 09:12

## 2021-12-21 RX ADMIN — PIPERACILLIN AND TAZOBACTAM 4.5 G: 4; .5 INJECTION, POWDER, LYOPHILIZED, FOR SOLUTION INTRAVENOUS; PARENTERAL at 11:12

## 2021-12-21 RX ADMIN — Medication 10 ML: at 12:12

## 2021-12-21 RX ADMIN — SENNOSIDES AND DOCUSATE SODIUM 1 TABLET: 50; 8.6 TABLET ORAL at 09:12

## 2021-12-21 RX ADMIN — PIPERACILLIN AND TAZOBACTAM 4.5 G: 4; .5 INJECTION, POWDER, LYOPHILIZED, FOR SOLUTION INTRAVENOUS; PARENTERAL at 01:12

## 2021-12-21 RX ADMIN — Medication 10 ML: at 09:12

## 2021-12-21 RX ADMIN — CHOLECALCIFEROL (VITAMIN D3) 10 MCG (400 UNIT) TABLET 400 UNITS: at 09:12

## 2021-12-21 NOTE — NURSING
Patient tolerated clear liquid diet w/o any pain/discomfort, and n/v. Will continue with plan of care

## 2021-12-21 NOTE — ASSESSMENT & PLAN NOTE
Pt has had previous episodes of pancreatitis.    Lipase elevated in the range of more than 1000.   The pat started on bowel rest with being NPO for now.   Plan to treat conservatively and symptomatically.  Plan to restart clear liquid in the AM and assess for toleratbility  GI can be consulted if no improvement.

## 2021-12-21 NOTE — PROGRESS NOTES
Received phone call from lab staff. All 4 of patient's blood cultures preliminary positive for gram negative rods. Will inform [facility] team.

## 2021-12-21 NOTE — CARE UPDATE
I personally evaluated Ms Mcneal today. Presents with acute pancreatitis, GNR bacteremia and dilated CBD. Gastroenterology consulted. Is on empiric pip-tazo pending finalized BCx and IVFs. Pain seems to be well controlled. Lipase is better. Awaiting discussion between GI and [facility] for endoscopic evaluation. Discussed with patient and family at bedside. Labs in AM.

## 2021-12-21 NOTE — SUBJECTIVE & OBJECTIVE
Past Medical History:   Diagnosis Date    Arthritis     Atrial fibrillation     Bilateral carotid artery stenosis 7/13/2017    Coronary artery disease     Fibromyalgia     Hyperlipidemia     Hypertension     Myocardial infarction 03/21/2015    Pancreatitis     Thyroid disease        Past Surgical History:   Procedure Laterality Date    CHOLECYSTECTOMY      CORONARY STENT PLACEMENT  3/24/15    HYSTERECTOMY  10/28/2004       Review of patient's allergies indicates:   Allergen Reactions    Sulfa (sulfonamide antibiotics) Hives    Bactrim [sulfamethoxazole-trimethoprim] Other (See Comments)     Pt. Reports that it caused severe pain    Integrilin [eptifibatide]     Statins-hmg-coa reductase inhibitors Other (See Comments)     Muscle pain. Patient states some, not all statins    Xarelto [rivaroxaban]     Epinephrine Palpitations       No current facility-administered medications on file prior to encounter.     Current Outpatient Medications on File Prior to Encounter   Medication Sig    amiodarone (PACERONE) 200 MG Tab Take 1 tablet (200 mg total) by mouth 2 (two) times daily.    amLODIPine (NORVASC) 5 MG tablet Take 1 tablet (5 mg total) by mouth once daily.    aspirin (ECOTRIN) 81 MG EC tablet Take 81 mg by mouth once daily.    CALCIUM CARBONATE/VITAMIN D3 (CALCIUM 500 + D ORAL) Take 1 tablet by mouth once daily.     cholecalciferol, vitamin D3, 2,000 unit Cap Take 1 capsule by mouth once daily.    levothyroxine (SYNTHROID) 75 MCG tablet Take 50 mcg by mouth once daily.     metoprolol succinate (TOPROL-XL) 25 MG 24 hr tablet Take 25 mg by mouth.    potassium chloride (KLOR-CON) 10 MEQ TbSR Take 10 mEq by mouth once daily.    predniSONE (DELTASONE) 5 MG tablet Take 2.5 mg by mouth once daily.     ramipril (ALTACE) 5 MG capsule Take 5 mg by mouth 2 (two) times daily.     warfarin (COUMADIN) 2 MG tablet Take 2 mg by mouth once daily.    cyclobenzaprine (FEXMID) 7.5 MG Tab Take 7.5 mg by  mouth 3 (three) times daily as needed.    lorazepam (ATIVAN) 0.5 MG tablet Take 0.5 mg by mouth every 6 (six) hours as needed for Anxiety.    nitroGLYCERIN (NITROSTAT) 0.4 MG SL tablet Place 0.4 mg under the tongue every 5 (five) minutes as needed for Chest pain.    ondansetron (ZOFRAN-ODT) 4 MG TbDL Take 1 tablet (4 mg total) by mouth every 8 (eight) hours as needed (for nausea/vomiting). (Patient taking differently: Take 4 mg by mouth every 4 (four) hours as needed (for nausea/vomiting). )    pravastatin (PRAVACHOL) 20 MG tablet Take 1 tablet (20 mg total) by mouth once daily.     Family History    None       Tobacco Use    Smoking status: Former Smoker     Quit date: 1964     Years since quittin.4    Smokeless tobacco: Former User   Substance and Sexual Activity    Alcohol use: No     Alcohol/week: 0.0 standard drinks    Drug use: No    Sexual activity: Not Currently     Review of Systems   Constitutional: Positive for activity change, chills, fatigue and fever. Negative for unexpected weight change.   HENT: Negative.    Respiratory: Negative.    Gastrointestinal: Positive for abdominal pain, nausea and vomiting.   Genitourinary: Negative.    Musculoskeletal: Negative.    Skin: Negative.  Negative for color change.   Neurological: Negative.    Psychiatric/Behavioral: Negative.      Objective:     Vital Signs (Most Recent):  Temp: 98.2 °F (36.8 °C) (21 0132)  Pulse: 60 (21 0202)  Resp: 20 (21 0202)  BP: 128/62 (21 0202)  SpO2: 100 % (21 0202) Vital Signs (24h Range):  Temp:  [98.2 °F (36.8 °C)-102.9 °F (39.4 °C)] 98.2 °F (36.8 °C)  Pulse:  [] 60  Resp:  [16-25] 20  SpO2:  [91 %-100 %] 100 %  BP: ()/(44-66) 128/62     Weight: 40.3 kg (88 lb 12.8 oz)  Body mass index is 17.94 kg/m².    Physical Exam  Constitutional:       Appearance: She is ill-appearing.   HENT:      Head: Normocephalic and atraumatic.      Nose: Nose normal.   Eyes:      Extraocular  Movements: Extraocular movements intact.      Pupils: Pupils are equal, round, and reactive to light.   Cardiovascular:      Rate and Rhythm: Normal rate and regular rhythm.   Pulmonary:      Effort: Pulmonary effort is normal.      Breath sounds: Normal breath sounds.   Abdominal:      General: Abdomen is flat. Bowel sounds are normal.      Palpations: Abdomen is soft.      Tenderness: There is abdominal tenderness.   Musculoskeletal:         General: Swelling present.   Skin:     General: Skin is warm and dry.      Capillary Refill: Capillary refill takes less than 2 seconds.   Neurological:      General: No focal deficit present.      Mental Status: She is alert and oriented to person, place, and time.   Psychiatric:         Mood and Affect: Mood normal.         Thought Content: Thought content normal.         Judgment: Judgment normal.           CRANIAL NERVES     CN III, IV, VI   Pupils are equal, round, and reactive to light.       Significant Labs:   All pertinent labs within the past 24 hours have been reviewed.  CBC:   Recent Labs   Lab 12/20/21  1614   WBC 9.89   HGB 11.4*   HCT 35.8*        CMP:   Recent Labs   Lab 12/20/21  1614      K 4.0      CO2 22*   *   BUN 24*   CREATININE 1.1   CALCIUM 9.2   PROT 7.6   ALBUMIN 3.3*   BILITOT 0.8   ALKPHOS 125   AST 39   ALT 22   ANIONGAP 14   EGFRNONAA 48*       Significant Imaging: I have reviewed all pertinent imaging results/findings within the past 24 hours.  I have reviewed and interpreted all pertinent imaging results/findings within the past 24 hours.

## 2021-12-21 NOTE — PROCEDURES
"Katt Mcneal is a 81 y.o. female patient.    Temp: (!) 100.9 °F (38.3 °C) (12/20/21 1727)  Pulse: 77 (12/20/21 1817)  Resp: (!) 25 (12/20/21 1616)  BP: (!) 107/59 (12/20/21 1817)  SpO2: 100 % (12/20/21 1817)  Weight: 40.3 kg (88 lb 12.8 oz) (12/20/21 1555)  Height: 4' 11" (149.9 cm) (12/20/21 1550)    PICC  Date/Time: 12/20/2021 8:10 PM  Performed by: Oracio Melgoza RN  Consent Done: Yes  Time out: Immediately prior to procedure a time out was called to verify the correct patient, procedure, equipment, support staff and site/side marked as required  Indications: med administration and vascular access  Anesthesia: local infiltration  Local anesthetic: lidocaine 1% without epinephrine  Anesthetic Total (mL): 5  Preparation: skin prepped with ChloraPrep  Skin prep agent dried: skin prep agent completely dried prior to procedure  Sterile barriers: all five maximum sterile barriers used - cap, mask, sterile gown, sterile gloves, and large sterile sheet  Hand hygiene: hand hygiene performed prior to central venous catheter insertion  Location details: right basilic  Catheter type: double lumen  Catheter size: 5 Fr  Catheter Length: 32cm    Ultrasound guidance: yes  Vessel Caliber: large, compressibility normal  Needle advanced into vessel with real time Ultrasound guidance.  Guidewire confirmed in vessel.  Sterile sheath used.  Number of attempts: 1  Post-procedure: blood return through all ports, chlorhexidine patch and sterile dressing applied            Name oracio melgoza  12/20/2021  "

## 2021-12-21 NOTE — SIGNIFICANT EVENT
All 4 bottles of cultures positive for Gram Negative Rods. Pt is already on Zosyn. Waiting for sensitivity.

## 2021-12-21 NOTE — HPI
The patient is an 80yo woman who presents with abdominal pain, pancreatitis, and GNR bacteremia.    The patient informs me that she developed severe abdominal pain with associated nausea and vomiting yesterday.  She presented to [facility] where she developed fevers/chills, but was unable to be seen promptly.  She left with her daughter and presented to [facility] ED.  Upon arrival, she was febrile with labs/imaging demonstrating pancreatitis.  Overnight blood cultures have returned positive for GNR.    ERCP in [place] reviewed and notable for dilated PD, CBD of 12mm, and normal intrahepatics.  Her daughter is at bedside who helps provide clinical history.  They are not entirely clear what her pancreatitis was attributed to and say that one of her medications was thought to have contributed (azathioprine?).  However, after stopping this medication she continued to have pancreatitis.    She is taking Coumadin for afib, INR was 1.8.

## 2021-12-21 NOTE — HPI
is an 81 YOCF with a PMHx of Atrial fibrillation on Coumadine (due allergy to a DOAC) CAD S/P PCI, pancreatitis and diverticulosis presenting to the ED for severe abdominal pain x 24 hours, worsening this yesterday morning around 9 AM associated with an episode of vomiting as well as shivering and a temp of 102.9 .Pt denied any new or unsual food intake. She also denied diarrhea, or recent travel.   In the ER she was managed with IV fluid pain control and antipyretics. She underwent CT Abd which revealed findings concerning for possible pancreatitis involving the head of the pancreas and duodenum.    SHe is being admitted to the ICU for further cre of the suspected pancreatitis.

## 2021-12-21 NOTE — ASSESSMENT & PLAN NOTE
S/P PCI to LAD, PLB, RCA in 2017 with multiple TONY.  Continue ASA, BB, ACE-I and statin.   No complaints of angina.   Pt is also on AMIODARONE prbably for rhythm control  Pt is recommended for follow up with Cardiology to optimize the medical management.

## 2021-12-21 NOTE — ASSESSMENT & PLAN NOTE
In summary, the patient is an 80yo woman who is admitted with pancreatitis and GNR bacteremia.     The patient has recurrent acute pancreatitis of unclear etiology.  Given her GNR bacteremia and dilated biliary ducts, I worry about a pancreaticobiliary pathology.  She is being treated with antibiotics, in addition to IVF and pain control, and HD stable at present.      I will discuss her case with my [facility] colleagues to determine if we need to further assess/clear her bile ducts.  I discussed with the patient and her family that she may warrant an endoscopic procedure, but this decision will be made after discussion with the advanced endoscopy team.

## 2021-12-21 NOTE — H&P
[facility]  [facility] Medicine  History & Physical    Patient Name: Katt Mcneal  MRN: [id number]  Patient Class: IP- Inpatient  Admission Date: 12/20/2021  Attending Physician: Tan Vences MD  Primary Care Provider: Kareen Hodges MD         Patient information was obtained from patient and ER records.     Subjective:     Principal Problem:Acute recurrent pancreatitis    Chief Complaint:   Chief Complaint   Patient presents with    Fever    Abdominal Pain     Pt reports LLQ and chills since last night that worsened today. 1 vomiting episode reported today. Pt has a hx of diverticulitis and pancreatitis. Fever of 102.9 and O2 sat od 91 % on RA during triage         HPI:  is an 81 YOCF with a PMHx of Atrial fibrillation on Coumadine (due allergy to a DOAC) CAD S/P PCI, pancreatitis and diverticulosis presenting to the ED for severe abdominal pain x 24 hours, worsening this yesterday morning around 9 AM associated with an episode of vomiting as well as shivering and a temp of 102.9 .Pt denied any new or unsual food intake. She also denied diarrhea, or recent travel.   In the ER she was managed with IV fluid pain control and antipyretics. She underwent CT Abd which revealed findings concerning for possible pancreatitis involving the head of the pancreas and duodenum.    SHe is being admitted to the ICU for further cre of the suspected pancreatitis.       Past Medical History:   Diagnosis Date    Arthritis     Atrial fibrillation     Bilateral carotid artery stenosis 7/13/2017    Coronary artery disease     Fibromyalgia     Hyperlipidemia     Hypertension     Myocardial infarction 03/21/2015    Pancreatitis     Thyroid disease        Past Surgical History:   Procedure Laterality Date    CHOLECYSTECTOMY      CORONARY STENT PLACEMENT  3/24/15    HYSTERECTOMY  10/28/2004       Review of patient's allergies indicates:   Allergen Reactions    Sulfa (sulfonamide  antibiotics) Hives    Bactrim [sulfamethoxazole-trimethoprim] Other (See Comments)     Pt. Reports that it caused severe pain    Integrilin [eptifibatide]     Statins-hmg-coa reductase inhibitors Other (See Comments)     Muscle pain. Patient states some, not all statins    Xarelto [rivaroxaban]     Epinephrine Palpitations       No current facility-administered medications on file prior to encounter.     Current Outpatient Medications on File Prior to Encounter   Medication Sig    amiodarone (PACERONE) 200 MG Tab Take 1 tablet (200 mg total) by mouth 2 (two) times daily.    amLODIPine (NORVASC) 5 MG tablet Take 1 tablet (5 mg total) by mouth once daily.    aspirin (ECOTRIN) 81 MG EC tablet Take 81 mg by mouth once daily.    CALCIUM CARBONATE/VITAMIN D3 (CALCIUM 500 + D ORAL) Take 1 tablet by mouth once daily.     cholecalciferol, vitamin D3, 2,000 unit Cap Take 1 capsule by mouth once daily.    levothyroxine (SYNTHROID) 75 MCG tablet Take 50 mcg by mouth once daily.     metoprolol succinate (TOPROL-XL) 25 MG 24 hr tablet Take 25 mg by mouth.    potassium chloride (KLOR-CON) 10 MEQ TbSR Take 10 mEq by mouth once daily.    predniSONE (DELTASONE) 5 MG tablet Take 2.5 mg by mouth once daily.     ramipril (ALTACE) 5 MG capsule Take 5 mg by mouth 2 (two) times daily.     warfarin (COUMADIN) 2 MG tablet Take 2 mg by mouth once daily.    cyclobenzaprine (FEXMID) 7.5 MG Tab Take 7.5 mg by mouth 3 (three) times daily as needed.    lorazepam (ATIVAN) 0.5 MG tablet Take 0.5 mg by mouth every 6 (six) hours as needed for Anxiety.    nitroGLYCERIN (NITROSTAT) 0.4 MG SL tablet Place 0.4 mg under the tongue every 5 (five) minutes as needed for Chest pain.    ondansetron (ZOFRAN-ODT) 4 MG TbDL Take 1 tablet (4 mg total) by mouth every 8 (eight) hours as needed (for nausea/vomiting). (Patient taking differently: Take 4 mg by mouth every 4 (four) hours as needed (for nausea/vomiting). )    pravastatin (PRAVACHOL)  20 MG tablet Take 1 tablet (20 mg total) by mouth once daily.     Family History    None       Tobacco Use    Smoking status: Former Smoker     Quit date: 1964     Years since quittin.4    Smokeless tobacco: Former User   Substance and Sexual Activity    Alcohol use: No     Alcohol/week: 0.0 standard drinks    Drug use: No    Sexual activity: Not Currently     Review of Systems   Constitutional: Positive for activity change, chills, fatigue and fever. Negative for unexpected weight change.   HENT: Negative.    Respiratory: Negative.    Gastrointestinal: Positive for abdominal pain, nausea and vomiting.   Genitourinary: Negative.    Musculoskeletal: Negative.    Skin: Negative.  Negative for color change.   Neurological: Negative.    Psychiatric/Behavioral: Negative.      Objective:     Vital Signs (Most Recent):  Temp: 98.2 °F (36.8 °C) (21 013)  Pulse: 60 (21 020)  Resp: 20 (21)  BP: 128/62 (21)  SpO2: 100 % (21) Vital Signs (24h Range):  Temp:  [98.2 °F (36.8 °C)-102.9 °F (39.4 °C)] 98.2 °F (36.8 °C)  Pulse:  [] 60  Resp:  [16-25] 20  SpO2:  [91 %-100 %] 100 %  BP: ()/(44-66) 128/62     Weight: 40.3 kg (88 lb 12.8 oz)  Body mass index is 17.94 kg/m².    Physical Exam  Constitutional:       Appearance: She is ill-appearing.   HENT:      Head: Normocephalic and atraumatic.      Nose: Nose normal.   Eyes:      Extraocular Movements: Extraocular movements intact.      Pupils: Pupils are equal, round, and reactive to light.   Cardiovascular:      Rate and Rhythm: Normal rate and regular rhythm.   Pulmonary:      Effort: Pulmonary effort is normal.      Breath sounds: Normal breath sounds.   Abdominal:      General: Abdomen is flat. Bowel sounds are normal.      Palpations: Abdomen is soft.      Tenderness: There is abdominal tenderness.   Musculoskeletal:         General: Swelling present.   Skin:     General: Skin is warm and dry.      Capillary  Refill: Capillary refill takes less than 2 seconds.   Neurological:      General: No focal deficit present.      Mental Status: She is alert and oriented to person, place, and time.   Psychiatric:         Mood and Affect: Mood normal.         Thought Content: Thought content normal.         Judgment: Judgment normal.           CRANIAL NERVES     CN III, IV, VI   Pupils are equal, round, and reactive to light.       Significant Labs:   All pertinent labs within the past 24 hours have been reviewed.  CBC:   Recent Labs   Lab 12/20/21  1614   WBC 9.89   HGB 11.4*   HCT 35.8*        CMP:   Recent Labs   Lab 12/20/21  1614      K 4.0      CO2 22*   *   BUN 24*   CREATININE 1.1   CALCIUM 9.2   PROT 7.6   ALBUMIN 3.3*   BILITOT 0.8   ALKPHOS 125   AST 39   ALT 22   ANIONGAP 14   EGFRNONAA 48*       Significant Imaging: I have reviewed all pertinent imaging results/findings within the past 24 hours.  I have reviewed and interpreted all pertinent imaging results/findings within the past 24 hours.    Assessment/Plan:     * Acute recurrent pancreatitis  Pt has had previous episodes of pancreatitis.    Lipase elevated in the range of more than 1000.   The pat started on bowel rest with being NPO for now.   Plan to treat conservatively and symptomatically.  Plan to restart clear liquid in the AM and assess for toleratbility  GI can be consulted if no improvement.       Other specified hypothyroidism  Continue home medication of Levothyroxine at 75 mcg po daily.         Dyslipidemia  Continue statin       Essential hypertension  Chronic, stable,   Continue Amlodipine 5 mg po daily   Continue Lisinopril 5 mg po daily           Paroxysmal atrial fibrillation  Currently rate controlled.   Continue rate control with BB with Metoprolol Succinate.  Continue with Warfarin 2.5 mg po daily.         Coronary artery disease involving native coronary artery of native heart without angina pectoris  S/P PCI to LAD,  PLB, RCA in 2017 with multiple TONY.  Continue ASA, BB, ACE-I and statin.   No complaints of angina.   Pt is also on AMIODARONE prbably for rhythm control  Pt is recommended for follow up with Cardiology to optimize the medical management.         VTE Risk Mitigation (From admission, onward)         Ordered     warfarin (COUMADIN) tablet 2 mg  Daily         12/21/21 0215     IP VTE HIGH RISK PATIENT  Once         12/21/21 0215     Place sequential compression device  Until discontinued         12/21/21 0215     Reason for No Pharmacological VTE Prophylaxis  Once        Question:  Reasons:  Answer:  Already adequately anticoagulated on oral Anticoagulants    12/21/21 0215                   Tan Vences MD  Department of Hospital Medicine   [facility] - Emergency Dept

## 2021-12-21 NOTE — SUBJECTIVE & OBJECTIVE
Past Medical History:   Diagnosis Date    Arthritis     Atrial fibrillation     Bilateral carotid artery stenosis 2017    Coronary artery disease     Fibromyalgia     Hyperlipidemia     Hypertension     Myocardial infarction 2015    Pancreatitis     Thyroid disease        Past Surgical History:   Procedure Laterality Date    CHOLECYSTECTOMY      CORONARY STENT PLACEMENT  3/24/15    HYSTERECTOMY  10/28/2004       Review of patient's allergies indicates:   Allergen Reactions    Sulfa (sulfonamide antibiotics) Hives    Bactrim [sulfamethoxazole-trimethoprim] Other (See Comments)     Pt. Reports that it caused severe pain    Integrilin [eptifibatide]     Statins-hmg-coa reductase inhibitors Other (See Comments)     Muscle pain. Patient states some, not all statins    Xarelto [rivaroxaban]     Epinephrine Palpitations     Family History    None       Tobacco Use    Smoking status: Former Smoker     Quit date: 1964     Years since quittin.4    Smokeless tobacco: Former User   Substance and Sexual Activity    Alcohol use: No     Alcohol/week: 0.0 standard drinks    Drug use: No    Sexual activity: Not Currently     Review of Systems   Constitutional: Positive for activity change, appetite change and fever.   HENT: Negative.    Eyes: Negative.    Respiratory: Negative.    Cardiovascular: Negative.    Gastrointestinal: Positive for abdominal pain, nausea and vomiting.   Endocrine: Negative.    Genitourinary: Negative.    Musculoskeletal: Negative.    Neurological: Positive for weakness.   Psychiatric/Behavioral: Negative.      Objective:     Vital Signs (Most Recent):  Temp: 98.7 °F (37.1 °C) (21 0740)  Pulse: 60 (21 0740)  Resp: 18 (21 07)  BP: (!) 117/58 (21 0740)  SpO2: (!) 94 % (21 0740) Vital Signs (24h Range):  Temp:  [98.2 °F (36.8 °C)-102.9 °F (39.4 °C)] 98.7 °F (37.1 °C)  Pulse:  [] 60  Resp:  [16-25] 18  SpO2:  [91 %-100 %] 94 %  BP:  ()/(44-68) 117/58     Weight: 40.3 kg (88 lb 12.8 oz) (12/20/21 1555)  Body mass index is 17.94 kg/m².      Intake/Output Summary (Last 24 hours) at 12/21/2021 1122  Last data filed at 12/21/2021 0501  Gross per 24 hour   Intake 1644.17 ml   Output --   Net 1644.17 ml       Lines/Drains/Airways     Peripherally Inserted Central Catheter Line            PICC Double Lumen 12/20/21 2010 right basilic <1 day          Peripheral Intravenous Line                 Peripheral IV - Single Lumen Anterior;Distal;Left Forearm -- days         Peripheral IV - Single Lumen 12/20/21 1604 20 G Right Forearm <1 day                Physical Exam  Constitutional:       General: She is not in acute distress.     Appearance: Normal appearance.   HENT:      Head: Normocephalic.      Right Ear: Tympanic membrane normal.      Left Ear: Tympanic membrane normal.      Nose: Nose normal.      Mouth/Throat:      Mouth: Mucous membranes are moist.      Pharynx: Oropharynx is clear. No oropharyngeal exudate.   Eyes:      Extraocular Movements: Extraocular movements intact.      Conjunctiva/sclera: Conjunctivae normal.      Pupils: Pupils are equal, round, and reactive to light.   Cardiovascular:      Rate and Rhythm: Normal rate. Rhythm irregular.      Pulses: Normal pulses.   Pulmonary:      Effort: Pulmonary effort is normal.      Breath sounds: Normal breath sounds.   Abdominal:      General: Abdomen is flat. Bowel sounds are normal. There is no distension.      Palpations: Abdomen is soft.      Tenderness: There is abdominal tenderness.   Musculoskeletal:         General: Normal range of motion.      Cervical back: Normal range of motion.   Skin:     General: Skin is warm.   Neurological:      General: No focal deficit present.      Mental Status: She is alert and oriented to person, place, and time. Mental status is at baseline.   Psychiatric:         Mood and Affect: Mood normal.         Behavior: Behavior normal.         Significant  Labs:  All pertinent lab results from the last 24 hours have been reviewed.    Significant Imaging:  Imaging results within the past 24 hours have been reviewed.

## 2021-12-21 NOTE — NURSING TRANSFER
Nursing Transfer Note      12/21/2021     Reason patient is being transferred: further eval    Transfer From: ED    Transfer via stretcher    Transfer with cardiac monitoring and o2 therapy o2 at 2L    Transported by transport    Medicines sent: no    Any special needs or follow-up needed: no    Chart send with patient: No    Notified: daughter    Patient reassessed at: 0730 12/21/21    Upon arrival to floor: cardiac monitor applied, patient oriented to room, call bell in reach and bed in lowest position  Patient aaox4, denies any pain or discomfort. Daughter at bedside. Will continue with plan of care

## 2021-12-21 NOTE — ASSESSMENT & PLAN NOTE
Currently rate controlled.   Continue rate control with BB with Metoprolol Succinate.  Continue with Warfarin 2.5 mg po daily.

## 2021-12-21 NOTE — DISCHARGE INSTRUCTIONS

## 2021-12-21 NOTE — CONSULTS
[facility]  Gastroenterology  Consult Note    Patient Name: Katt Mcneal  MRN: [id number]  Admission Date: 12/20/2021  Hospital Length of Stay: 1 days  Code Status: Full Code   Attending Provider: Kalpana Crani MD   Consulting Provider: Osmany Jennings MD  Primary Care Physician: Kareen Hodges MD  Principal Problem:Acute recurrent pancreatitis    Inpatient consult to Gastroenterology  Consult performed by: Osmany Jennings MD  Consult ordered by: Kalpana Crain MD  Reason for consult: Acute pancreatitis        Subjective:     HPI:  The patient is an 82yo woman who presents with abdominal pain, pancreatitis, and GNR bacteremia.    The patient informs me that she developed severe abdominal pain with associated nausea and vomiting yesterday.  She presented to [facility] where she developed fevers/chills, but was unable to be seen promptly.  She left with her daughter and presented to [facility] ED.  Upon arrival, she was febrile with labs/imaging demonstrating pancreatitis.  Overnight blood cultures have returned positive for GNR.    ERCP in CE reviewed and notable for dilated PD, CBD of 12mm, and normal intrahepatics.  Her daughter is at bedside who helps provide clinical history.  They are not entirely clear what her pancreatitis was attributed to and say that one of her medications was thought to have contributed (azathioprine?).  However, after stopping this medication she continued to have pancreatitis.    She is taking Coumadin for afib, INR was 1.8.      Past Medical History:   Diagnosis Date    Arthritis     Atrial fibrillation     Bilateral carotid artery stenosis 7/13/2017    Coronary artery disease     Fibromyalgia     Hyperlipidemia     Hypertension     Myocardial infarction 03/21/2015    Pancreatitis     Thyroid disease        Past Surgical History:   Procedure Laterality Date    CHOLECYSTECTOMY      CORONARY STENT PLACEMENT  3/24/15    HYSTERECTOMY  10/28/2004        Review of patient's allergies indicates:   Allergen Reactions    Sulfa (sulfonamide antibiotics) Hives    Bactrim [sulfamethoxazole-trimethoprim] Other (See Comments)     Pt. Reports that it caused severe pain    Integrilin [eptifibatide]     Statins-hmg-coa reductase inhibitors Other (See Comments)     Muscle pain. Patient states some, not all statins    Xarelto [rivaroxaban]     Epinephrine Palpitations     Family History    None       Tobacco Use    Smoking status: Former Smoker     Quit date: 1964     Years since quittin.4    Smokeless tobacco: Former User   Substance and Sexual Activity    Alcohol use: No     Alcohol/week: 0.0 standard drinks    Drug use: No    Sexual activity: Not Currently     Review of Systems   Constitutional: Positive for activity change, appetite change and fever.   HENT: Negative.    Eyes: Negative.    Respiratory: Negative.    Cardiovascular: Negative.    Gastrointestinal: Positive for abdominal pain, nausea and vomiting.   Endocrine: Negative.    Genitourinary: Negative.    Musculoskeletal: Negative.    Neurological: Positive for weakness.   Psychiatric/Behavioral: Negative.      Objective:     Vital Signs (Most Recent):  Temp: 98.7 °F (37.1 °C) (21 07)  Pulse: 60 (21 07)  Resp: 18 (21)  BP: (!) 117/58 (21 0740)  SpO2: (!) 94 % (21) Vital Signs (24h Range):  Temp:  [98.2 °F (36.8 °C)-102.9 °F (39.4 °C)] 98.7 °F (37.1 °C)  Pulse:  [] 60  Resp:  [16-25] 18  SpO2:  [91 %-100 %] 94 %  BP: ()/(44-68) 117/58     Weight: 40.3 kg (88 lb 12.8 oz) (21 1555)  Body mass index is 17.94 kg/m².      Intake/Output Summary (Last 24 hours) at 2021 1122  Last data filed at 2021 0501  Gross per 24 hour   Intake 1644.17 ml   Output --   Net 1644.17 ml       Lines/Drains/Airways     Peripherally Inserted Central Catheter Line            PICC Double Lumen 21 right basilic <1 day          Peripheral  Intravenous Line                 Peripheral IV - Single Lumen Anterior;Distal;Left Forearm -- days         Peripheral IV - Single Lumen 12/20/21 1604 20 G Right Forearm <1 day                Physical Exam  Constitutional:       General: She is not in acute distress.     Appearance: Normal appearance.   HENT:      Head: Normocephalic.      Right Ear: Tympanic membrane normal.      Left Ear: Tympanic membrane normal.      Nose: Nose normal.      Mouth/Throat:      Mouth: Mucous membranes are moist.      Pharynx: Oropharynx is clear. No oropharyngeal exudate.   Eyes:      Extraocular Movements: Extraocular movements intact.      Conjunctiva/sclera: Conjunctivae normal.      Pupils: Pupils are equal, round, and reactive to light.   Cardiovascular:      Rate and Rhythm: Normal rate. Rhythm irregular.      Pulses: Normal pulses.   Pulmonary:      Effort: Pulmonary effort is normal.      Breath sounds: Normal breath sounds.   Abdominal:      General: Abdomen is flat. Bowel sounds are normal. There is no distension.      Palpations: Abdomen is soft.      Tenderness: There is abdominal tenderness.   Musculoskeletal:         General: Normal range of motion.      Cervical back: Normal range of motion.   Skin:     General: Skin is warm.   Neurological:      General: No focal deficit present.      Mental Status: She is alert and oriented to person, place, and time. Mental status is at baseline.   Psychiatric:         Mood and Affect: Mood normal.         Behavior: Behavior normal.         Significant Labs:  All pertinent lab results from the last 24 hours have been reviewed.    Significant Imaging:  Imaging results within the past 24 hours have been reviewed.    Assessment/Plan:     * Acute recurrent pancreatitis  In summary, the patient is an 80yo woman who is admitted with pancreatitis and GNR bacteremia.     The patient has recurrent acute pancreatitis of unclear etiology.  Given her GNR bacteremia and dilated biliary ducts,  I worry about a pancreaticobiliary pathology.  She is being treated with antibiotics, in addition to IVF and pain control, and HD stable at present.      I will discuss her case with my [facility] colleagues to determine if we need to further assess/clear her bile ducts.  I discussed with the patient and her family that she may warrant an endoscopic procedure, but this decision will be made after discussion with the advanced endoscopy team.        Thank you for your consult. I will sign off. Please contact us if you have any additional questions.    Osmany Jennings MD  Gastroenterology  [facility]

## 2021-12-22 PROBLEM — B96.20 E COLI BACTEREMIA: Status: ACTIVE | Noted: 2021-12-22

## 2021-12-22 PROBLEM — R78.81 E COLI BACTEREMIA: Status: ACTIVE | Noted: 2021-12-22

## 2021-12-22 LAB
ALBUMIN SERPL BCP-MCNC: 2.3 G/DL (ref 3.5–5.2)
ALP SERPL-CCNC: 113 U/L (ref 55–135)
ALT SERPL W/O P-5'-P-CCNC: 28 U/L (ref 10–44)
ANION GAP SERPL CALC-SCNC: 10 MMOL/L (ref 8–16)
AST SERPL-CCNC: 29 U/L (ref 10–40)
BASOPHILS # BLD AUTO: 0.01 K/UL (ref 0–0.2)
BASOPHILS NFR BLD: 0.1 % (ref 0–1.9)
BILIRUB SERPL-MCNC: 0.1 MG/DL (ref 0.1–1)
BNP SERPL-MCNC: 573 PG/ML (ref 0–99)
BUN SERPL-MCNC: 12 MG/DL (ref 8–23)
CALCIUM SERPL-MCNC: 8.1 MG/DL (ref 8.7–10.5)
CHLORIDE SERPL-SCNC: 111 MMOL/L (ref 95–110)
CO2 SERPL-SCNC: 22 MMOL/L (ref 23–29)
CREAT SERPL-MCNC: 1 MG/DL (ref 0.5–1.4)
DIFFERENTIAL METHOD: ABNORMAL
EOSINOPHIL # BLD AUTO: 0 K/UL (ref 0–0.5)
EOSINOPHIL NFR BLD: 0.3 % (ref 0–8)
ERYTHROCYTE [DISTWIDTH] IN BLOOD BY AUTOMATED COUNT: 15.4 % (ref 11.5–14.5)
EST. GFR  (AFRICAN AMERICAN): >60 ML/MIN/1.73 M^2
EST. GFR  (NON AFRICAN AMERICAN): 53 ML/MIN/1.73 M^2
GLUCOSE SERPL-MCNC: 254 MG/DL (ref 70–110)
HCT VFR BLD AUTO: 29.8 % (ref 37–48.5)
HGB BLD-MCNC: 9 G/DL (ref 12–16)
IMM GRANULOCYTES # BLD AUTO: 0.06 K/UL (ref 0–0.04)
IMM GRANULOCYTES NFR BLD AUTO: 0.8 % (ref 0–0.5)
INR PPP: 1.7 (ref 0.8–1.2)
LACTATE SERPL-SCNC: 2.5 MMOL/L (ref 0.5–2.2)
LYMPHOCYTES # BLD AUTO: 0.2 K/UL (ref 1–4.8)
LYMPHOCYTES NFR BLD: 2.4 % (ref 18–48)
MCH RBC QN AUTO: 26.9 PG (ref 27–31)
MCHC RBC AUTO-ENTMCNC: 30.2 G/DL (ref 32–36)
MCV RBC AUTO: 89 FL (ref 82–98)
MONOCYTES # BLD AUTO: 0.1 K/UL (ref 0.3–1)
MONOCYTES NFR BLD: 1.5 % (ref 4–15)
NEUTROPHILS # BLD AUTO: 7.4 K/UL (ref 1.8–7.7)
NEUTROPHILS NFR BLD: 94.9 % (ref 38–73)
NRBC BLD-RTO: 0 /100 WBC
PLATELET # BLD AUTO: 237 K/UL (ref 150–450)
PMV BLD AUTO: 10.8 FL (ref 9.2–12.9)
POTASSIUM SERPL-SCNC: 3.8 MMOL/L (ref 3.5–5.1)
PROT SERPL-MCNC: 6 G/DL (ref 6–8.4)
PROTHROMBIN TIME: 17.7 SEC (ref 9–12.5)
RBC # BLD AUTO: 3.35 M/UL (ref 4–5.4)
SODIUM SERPL-SCNC: 143 MMOL/L (ref 136–145)
TROPONIN I SERPL DL<=0.01 NG/ML-MCNC: 0.03 NG/ML (ref 0–0.03)
WBC # BLD AUTO: 7.82 K/UL (ref 3.9–12.7)

## 2021-12-22 PROCEDURE — 63600175 PHARM REV CODE 636 W HCPCS: Performed by: INTERNAL MEDICINE

## 2021-12-22 PROCEDURE — 63600175 PHARM REV CODE 636 W HCPCS: Performed by: HOSPITALIST

## 2021-12-22 PROCEDURE — 87040 BLOOD CULTURE FOR BACTERIA: CPT | Performed by: STUDENT IN AN ORGANIZED HEALTH CARE EDUCATION/TRAINING PROGRAM

## 2021-12-22 PROCEDURE — 25000242 PHARM REV CODE 250 ALT 637 W/ HCPCS: Performed by: INTERNAL MEDICINE

## 2021-12-22 PROCEDURE — 93010 EKG 12-LEAD: ICD-10-PCS | Mod: ,,, | Performed by: INTERNAL MEDICINE

## 2021-12-22 PROCEDURE — 25000003 PHARM REV CODE 250: Performed by: EMERGENCY MEDICINE

## 2021-12-22 PROCEDURE — 25000003 PHARM REV CODE 250: Performed by: INTERNAL MEDICINE

## 2021-12-22 PROCEDURE — 36415 COLL VENOUS BLD VENIPUNCTURE: CPT | Performed by: STUDENT IN AN ORGANIZED HEALTH CARE EDUCATION/TRAINING PROGRAM

## 2021-12-22 PROCEDURE — 21400001 HC TELEMETRY ROOM

## 2021-12-22 PROCEDURE — 93005 ELECTROCARDIOGRAM TRACING: CPT

## 2021-12-22 PROCEDURE — 85610 PROTHROMBIN TIME: CPT | Performed by: STUDENT IN AN ORGANIZED HEALTH CARE EDUCATION/TRAINING PROGRAM

## 2021-12-22 PROCEDURE — 63600175 PHARM REV CODE 636 W HCPCS: Performed by: STUDENT IN AN ORGANIZED HEALTH CARE EDUCATION/TRAINING PROGRAM

## 2021-12-22 PROCEDURE — 99232 SBSQ HOSP IP/OBS MODERATE 35: CPT | Mod: ,,, | Performed by: STUDENT IN AN ORGANIZED HEALTH CARE EDUCATION/TRAINING PROGRAM

## 2021-12-22 PROCEDURE — 93010 ELECTROCARDIOGRAM REPORT: CPT | Mod: ,,, | Performed by: INTERNAL MEDICINE

## 2021-12-22 PROCEDURE — 83880 ASSAY OF NATRIURETIC PEPTIDE: CPT | Performed by: HOSPITALIST

## 2021-12-22 PROCEDURE — A4216 STERILE WATER/SALINE, 10 ML: HCPCS | Performed by: EMERGENCY MEDICINE

## 2021-12-22 PROCEDURE — 83605 ASSAY OF LACTIC ACID: CPT | Performed by: HOSPITALIST

## 2021-12-22 PROCEDURE — 99232 PR SUBSEQUENT HOSPITAL CARE,LEVL II: ICD-10-PCS | Mod: ,,, | Performed by: STUDENT IN AN ORGANIZED HEALTH CARE EDUCATION/TRAINING PROGRAM

## 2021-12-22 PROCEDURE — 84484 ASSAY OF TROPONIN QUANT: CPT | Performed by: HOSPITALIST

## 2021-12-22 PROCEDURE — 85025 COMPLETE CBC W/AUTO DIFF WBC: CPT | Performed by: HOSPITALIST

## 2021-12-22 PROCEDURE — 80053 COMPREHEN METABOLIC PANEL: CPT | Performed by: HOSPITALIST

## 2021-12-22 RX ORDER — ENOXAPARIN SODIUM 100 MG/ML
40 INJECTION SUBCUTANEOUS ONCE
Status: COMPLETED | OUTPATIENT
Start: 2021-12-22 | End: 2021-12-22

## 2021-12-22 RX ADMIN — ENOXAPARIN SODIUM 40 MG: 100 INJECTION SUBCUTANEOUS at 08:12

## 2021-12-22 RX ADMIN — MUPIROCIN: 20 OINTMENT TOPICAL at 09:12

## 2021-12-22 RX ADMIN — PREDNISONE 2.5 MG: 2.5 TABLET ORAL at 09:12

## 2021-12-22 RX ADMIN — CEFTRIAXONE 2 G: 2 INJECTION, SOLUTION INTRAVENOUS at 09:12

## 2021-12-22 RX ADMIN — AMIODARONE HYDROCHLORIDE 200 MG: 200 TABLET ORAL at 08:12

## 2021-12-22 RX ADMIN — Medication 1 TABLET: at 09:12

## 2021-12-22 RX ADMIN — SENNOSIDES AND DOCUSATE SODIUM 1 TABLET: 50; 8.6 TABLET ORAL at 08:12

## 2021-12-22 RX ADMIN — Medication 10 ML: at 12:12

## 2021-12-22 RX ADMIN — AMIODARONE HYDROCHLORIDE 200 MG: 200 TABLET ORAL at 09:12

## 2021-12-22 RX ADMIN — PRAVASTATIN SODIUM 20 MG: 10 TABLET ORAL at 09:12

## 2021-12-22 RX ADMIN — Medication 10 ML: at 06:12

## 2021-12-22 RX ADMIN — MUPIROCIN: 20 OINTMENT TOPICAL at 08:12

## 2021-12-22 RX ADMIN — LEVOTHYROXINE SODIUM 75 MCG: 75 TABLET ORAL at 05:12

## 2021-12-22 RX ADMIN — CHOLECALCIFEROL (VITAMIN D3) 10 MCG (400 UNIT) TABLET 400 UNITS: at 09:12

## 2021-12-22 RX ADMIN — METOPROLOL SUCCINATE 25 MG: 25 TABLET, EXTENDED RELEASE ORAL at 09:12

## 2021-12-22 RX ADMIN — NITROGLYCERIN 0.4 MG: 0.4 TABLET, ORALLY DISINTEGRATING SUBLINGUAL at 06:12

## 2021-12-22 RX ADMIN — MORPHINE SULFATE 2 MG: 4 INJECTION, SOLUTION INTRAMUSCULAR; INTRAVENOUS at 06:12

## 2021-12-22 RX ADMIN — MELATONIN TAB 3 MG 9 MG: 3 TAB at 08:12

## 2021-12-22 RX ADMIN — ASPIRIN 81 MG: 81 TABLET, COATED ORAL at 09:12

## 2021-12-22 RX ADMIN — SENNOSIDES AND DOCUSATE SODIUM 1 TABLET: 50; 8.6 TABLET ORAL at 09:12

## 2021-12-22 RX ADMIN — WARFARIN SODIUM 2 MG: 2 TABLET ORAL at 07:12

## 2021-12-22 NOTE — PLAN OF CARE
Recommendations    1) Continue IDDSI 7 as tolerated by patient, encourage PO intake.  2) Add Cardiac restrictions per medical hx, elevated BP readings.  3) Add boost (+) to assist in meeting needs  4) Continue anti-emetics PRN  5) Monitor nutrition related labs    Goals:   1) Patient to tolerate diet advancements and consistently meet > 50% EEN/EPN via PO/ONS intake by next RD visit  2) Patient to have N/V managed.  3) Monitored labs to remain/trend toward target ranges    Nutrition Goal Status: new  Communication of RD Recs:  (POC)

## 2021-12-22 NOTE — SUBJECTIVE & OBJECTIVE
Interval Histor    Review of Systems   Constitutional: Positive for activity change, chills, fatigue and fever. Negative for unexpected weight change.   HENT: Negative.  Negative for sore throat and trouble swallowing.    Eyes: Negative for photophobia and visual disturbance.   Respiratory: Negative.  Negative for chest tightness and shortness of breath.    Cardiovascular: Negative for chest pain, palpitations and leg swelling.   Gastrointestinal: Positive for abdominal pain, nausea and vomiting. Negative for abdominal distention, blood in stool, constipation and diarrhea.   Endocrine: Negative for polyuria.   Genitourinary: Negative.  Negative for difficulty urinating, dysuria and hematuria.   Musculoskeletal: Negative.    Skin: Negative.  Negative for color change.   Allergic/Immunologic: Negative for immunocompromised state.   Neurological: Negative.  Negative for dizziness, seizures, syncope and facial asymmetry.   Psychiatric/Behavioral: Negative.  Negative for agitation, behavioral problems and confusion.     Objective:     Vital Signs (Most Recent):  Temp: 97.8 °F (36.6 °C) (12/22/21 0802)  Pulse: 60 (12/22/21 0802)  Resp: 18 (12/22/21 0802)  BP: 130/62 (12/22/21 0802)  SpO2: 97 % (12/22/21 0802) Vital Signs (24h Range):  Temp:  [97.7 °F (36.5 °C)-99.3 °F (37.4 °C)] 97.8 °F (36.6 °C)  Pulse:  [56-66] 60  Resp:  [17-20] 18  SpO2:  [96 %-100 %] 97 %  BP: (130-156)/(60-67) 130/62     Weight: 40.3 kg (88 lb 12.8 oz)  Body mass index is 17.94 kg/m².    Intake/Output Summary (Last 24 hours) at 12/22/2021 1103  Last data filed at 12/22/2021 0600  Gross per 24 hour   Intake 249.91 ml   Output 600 ml   Net -350.09 ml      Physical Exam  Vitals and nursing note reviewed.   Constitutional:       General: She is not in acute distress.     Appearance: She is well-developed and normal weight. She is ill-appearing. She is not diaphoretic.   HENT:      Head: Normocephalic and atraumatic.      Nose: Nose normal. No  congestion.      Mouth/Throat:      Mouth: Mucous membranes are moist.      Pharynx: No oropharyngeal exudate.   Eyes:      General: No scleral icterus.     Extraocular Movements: Extraocular movements intact.      Pupils: Pupils are equal, round, and reactive to light.   Neck:      Thyroid: No thyromegaly.   Cardiovascular:      Rate and Rhythm: Regular rhythm. Bradycardia present.      Heart sounds: Murmur heard.   No gallop.    Pulmonary:      Effort: Pulmonary effort is normal.      Breath sounds: Normal breath sounds. No stridor. No wheezing or rales.   Abdominal:      General: Abdomen is flat. Bowel sounds are normal. There is no distension.      Palpations: Abdomen is soft. There is no mass.      Tenderness: There is no abdominal tenderness. There is no guarding.   Musculoskeletal:         General: Normal range of motion.      Cervical back: Normal range of motion and neck supple. No rigidity.      Right lower leg: No edema.      Left lower leg: No edema.   Lymphadenopathy:      Cervical: No cervical adenopathy.   Skin:     General: Skin is warm and dry.      Capillary Refill: Capillary refill takes less than 2 seconds.      Findings: No bruising or lesion.   Neurological:      General: No focal deficit present.      Mental Status: She is alert and oriented to person, place, and time.      Cranial Nerves: No cranial nerve deficit.   Psychiatric:         Mood and Affect: Mood normal.         Behavior: Behavior normal.         Thought Content: Thought content normal.         Judgment: Judgment normal.           Recent Results (from the past 24 hour(s))   POCT glucose    Collection Time: 12/21/21 11:30 AM   Result Value Ref Range    POCT Glucose 74 70 - 110 mg/dL   POCT glucose    Collection Time: 12/21/21  3:36 PM   Result Value Ref Range    POCT Glucose 117 (H) 70 - 110 mg/dL   Protime-INR    Collection Time: 12/22/21  6:28 AM   Result Value Ref Range    Prothrombin Time 17.7 (H) 9.0 - 12.5 sec    INR 1.7 (H)  0.8 - 1.2       Microbiology Results (last 7 days)     Procedure Component Value Units Date/Time    Blood culture x two cultures. Draw prior to antibiotics [[id number]]  (Abnormal) Collected: 12/20/21 1617    Order Status: Completed Specimen: Blood from Peripheral, Forearm, Right Updated: 12/22/21 0759     Blood Culture, Routine Gram stain aer bottle: Gram negative rods      Gram stain nain bottle: Gram negative rods      Results called to and read back by: Alin Flor      12/21/2021 04:33 BML      PRESUMPTIVE E COLI    Narrative:      Aerobic and anaerobic    Blood culture x two cultures. Draw prior to antibiotics [[id number]]  (Abnormal) Collected: 12/20/21 1614    Order Status: Completed Specimen: Blood from Peripheral, Forearm, Right Updated: 12/22/21 0758     Blood Culture, Routine Gram stain aer bottle: Gram negative rods      Gram stain nain bottle: Gram negative rods      Results called to and read back by: Alin Flor      12/21/2021 04:33 BML      PRESUMPTIVE E COLI  Identification and susceptibility pending      Narrative:      Aerobic and anaerobic           Imaging Results          US Abdomen Complete (Final result)  Result time 12/20/21 21:59:34    Final result by Castillo Jimenez MD (12/20/21 21:59:34)                 Impression:      Echogenic appearance of the pancreas, suggestive of acute pancreatitis.    Focal 2.2 x 1.7 x 2.2 cm hypoechoic region within the pancreatic body.  Short-term follow-up with pancreatic CT protocol may be obtained, if there is concern for early pancreatic necrosis.    Status post cholecystectomy.  Intrahepatic and extrahepatic biliary dilatation.      Electronically signed by: Castillo Jimenez MD  Date:    12/20/2021  Time:    21:59             Narrative:    EXAMINATION:  US ABDOMEN COMPLETE    CLINICAL HISTORY:  Acute pancreatitis without necrosis or infection, unspecified    TECHNIQUE:  Complete abdominal ultrasound (including pancreas, aorta, liver, gallbladder, common bile  duct, IVC, kidneys, and spleen) was performed.    COMPARISON:  CT abdomen pelvis dated 12/20/2021.    FINDINGS:  The pancreas is echogenic, suggestive of acute inflammation.  There is a 2.2 x 1.7 x 2.2 cm hypoechoic area within the pancreatic body.    The patient is status post cholecystectomy.  There is no abnormality in the gallbladder fossa.    The CBD is enlarged measuring 1.3 cm.  There is also intrahepatic biliary dilatation.    The liver is normal in appearance.  There are no discrete hepatic masses.    There are unchanged bilateral renal cystic lesions.  There is no evidence of hydroureteronephrosis.    No evidence of free fluid is identified.                               X-Ray Chest AP Portable (Final result)  Result time 12/20/21 20:30:33    Final result by Vadim Humphreys MD (12/20/21 20:30:33)                 Impression:      As above.      Electronically signed by: Vadim Humphreys MD  Date:    12/20/2021  Time:    20:30             Narrative:    EXAMINATION:  XR CHEST AP PORTABLE    CLINICAL HISTORY:  picc placement;    TECHNIQUE:  Single frontal view of the chest was performed.    COMPARISON:  Chest radiograph earlier same day at 16:09 hours    FINDINGS:  Monitoring leads overlie the chest.  Patient is slightly rotated.    Interval placement of right-sided PICC line with tip overlying the mid SVC.  No pneumothorax or new focal opacity.  Cardiomediastinal silhouette is midline and stable.  Otherwise grossly stable chest.                                CT Abdomen Pelvis With Contrast (Final result)  Result time 12/20/21 19:30:51    Final result by Castillo Jimenez MD (12/20/21 19:30:51)                 Impression:      Inflammatory changes identified in the pancreas with marked inflammatory changes surrounding the pancreas, consistent acute pancreatitis.    Inflammatory changes involving the duodenum.  This is most likely secondary to the inflammatory changes in the pancreas.    Additional findings  above.    This report was flagged in Epic as abnormal.      Electronically signed by: Castillo Jimenez MD  Date:    12/20/2021  Time:    19:30             Narrative:    EXAMINATION:  CT ABDOMEN PELVIS WITH CONTRAST    CLINICAL HISTORY:  abd pain and sepsis;    TECHNIQUE:  Low dose axial images, sagittal and coronal reformations were obtained from the lung bases to the pubic symphysis following the IV administration of 75 cc of Omnipaque 350 .  Oral contrast was not given.    COMPARISON:  CT abdomen pelvis dated 05/16/2018.    FINDINGS:  There are no pleural effusions.  There is no evidence of a pneumothorax.  There are dependent changes in the lung bases.    The heart is enlarged.  There is normal tapering of the abdominal aorta.  There are extensive calcifications along the course of the abdominal aorta and its branch vessels.  The portal veins and mesenteric veins are within normal limits.    There is no evidence of lymphadenopathy in the abdomen or pelvis.    The esophagus and stomach are unremarkable.  There are hazy changes involving the proximal duodenum.  The small bowel loops are unremarkable.  The appendix is within normal limits.  There is colonic diverticula without evidence of acute diverticulitis.    There is a subcentimeter hypodensity in the right hepatic lobe.  This is too small complete characterization.  The patient is status post cholecystectomy.  There is stable appearance of mild intrahepatic biliary dilatation.    The spleen is enlarged.  There are hazy changes around the pancreas.  There is dilatation of the main pancreatic duct.  No peripancreatic fluid collection is identified.  The adrenal glands are unremarkable.    There is no evidence of nephrolithiasis.  There are stable simple cyst in the upper pole of both kidneys.  The ureters and urinary bladder are within normal limits.    There is no evidence of a drainable collection in the abdomen or pelvis.  There is no evidence of free air.  There  is no evidence of pneumatosis.  No portal venous air is identified.    The psoas margins are unremarkable.  There are chronic bilateral inferior pubic rami fractures present chronic right-sided superior pubic ramus fracture.  There is grade 1 anterolisthesis of L5 on S1.  No acute fractures identified.                               X-Ray Chest 1 View (Final result)  Result time 12/20/21 16:17:24    Final result by Channing Medina MD (12/20/21 16:17:24)                 Impression:      No acute chest disease identified.    Implanted cardiac loop recorder.      Electronically signed by: Channing Medina MD  Date:    12/20/2021  Time:    16:17             Narrative:    EXAMINATION:  XR CHEST 1 VIEW    CLINICAL HISTORY:  Sepsis;    TECHNIQUE:  Single frontal view of the chest was performed.    COMPARISON:  05/21/2018.    FINDINGS:  There is an implanted cardiac loop recorder present.  The heart is not enlarged.  Atherosclerotic calcification is present within the thoracic aorta.  Superior mediastinal structures are unremarkable.  Pulmonary vasculature is within normal limits.  The lungs are free of focal consolidations.  There is no evidence for pneumothorax or pleural effusions.  Bony structures are grossly intact.

## 2021-12-22 NOTE — PLAN OF CARE
[facility] [place] - Telemetry  Initial Discharge Assessment       Primary Care Provider: Panchito Perez MD    Admission Diagnosis: Pancreatitis [K85.90]  SIRS (systemic inflammatory response syndrome) [R65.10]  Acute pancreatitis, unspecified complication status, unspecified pancreatitis type [K85.90]    Admission Date: 12/20/2021  Expected Discharge Date: TBD    Discharge Barriers Identified: None    Payor: [employer]ARE / Plan: [employer] 65 / Product Type: Medicare Advantage /     Extended Emergency Contact Information  Primary Emergency Contact: Chano Mcneal  Address: [address] [place] [address] [facility]  Home Phone: [phone]  Mobile Phone: [phone]  Relation: Daughter  Preferred language: English   needed? No  Secondary Emergency Contact: Toño Mcneal  Home Phone: [phone]  Relation: Son    Discharge Plan A: Home with family  Discharge Plan B: Other (TBD)      [facility] [place] [facility] [address] [facility]  [address] [facility] [address]  Phone: [phone] Fax: [phone]      Initial Assessment (most recent)       Adult Discharge Assessment - 12/22/21 1450          Discharge Assessment    Assessment Type Discharge Planning Assessment     Confirmed/corrected address, phone number and insurance Yes     Confirmed Demographics Correct on Facesheet     Source of Information patient;family     When was your last doctors appointment? --   about 8 months ago    Communicated FRANCO with patient/caregiver Date not available/Unable to determine     Reason For Admission pancreatitis     Lives With child(boris), adult     Facility Arrived From: home     Do you expect to return to your current living situation? Yes     Do you have help at home or someone to help you manage your care at home? Yes     Who are your caregiver(s) and their phone number(s)? Chano Mcneal (Daughter)   [phone]     Prior to  hospitilization cognitive status: Alert/Oriented     Current cognitive status: Alert/Oriented     Walking or Climbing Stairs Difficulty ambulation difficulty, assistance 1 person;stair climbing difficulty, assistance 1 person     Dressing/Bathing Difficulty none     Equipment Currently Used at Home --   elevator lift - it is broken right now but daughter is trying to get it fixed    Readmission within 30 days? No     Patient currently being followed by outpatient case management? No     Do you currently have service(s) that help you manage your care at home? No     Do you take prescription medications? Yes     Do you have prescription coverage? Yes     [facility]     Do you have any problems affording any of your prescribed medications? No     Is the patient taking medications as prescribed? yes     Who is going to help you get home at discharge? Chano Mcneal (Daughter)   [phone]     How do you get to doctors appointments? family or friend will provide     Are you on dialysis? No     Do you take coumadin? Yes     Who monitors your labs? Dr. Travon Palomino     Discharge Plan A Home with family     Discharge Plan B Other   TBD    DME Needed Upon Discharge  other (see comments)   TBD    Discharge Plan discussed with: Patient;Adult children     Discharge Barriers Identified None        Relationship/Environment    Name(s) of Who Lives With Patient Chano Mcneal (Daughter)   [phone]                   Pt lives with daughter. Pt is independent, does not use DME. Pt has an elevator lift outside her house, however it is broken right now but pt's daughter is trying to get it fixed. Pt's daughter is her help at home and drives her to doctor appointments.   DC needs: none known at this time

## 2021-12-22 NOTE — PROGRESS NOTES
[facility] Medicine  Progress Note    Patient Name: Katt Mcneal  MRN: [id number]  Patient Class: IP- Inpatient   Admission Date: 12/20/2021  Length of Stay: 2 days  Attending Physician: Harvey Thompson MD  Primary Care Provider: Kareen Hodges MD        Subjective:     Principal Problem:Acute recurrent pancreatitis        HPI:   is an 81 YOCF with a PMHx of Atrial fibrillation on Coumadine (due allergy to a DOAC) CAD S/P PCI, pancreatitis and diverticulosis presenting to the ED for severe abdominal pain x 24 hours, worsening this yesterday morning around 9 AM associated with an episode of vomiting as well as shivering and a temp of 102.9 .Pt denied any new or unsual food intake. She also denied diarrhea, or recent travel.   In the ER she was managed with IV fluid pain control and antipyretics. She underwent CT Abd which revealed findings concerning for possible pancreatitis involving the head of the pancreas and duodenum.    SHe is being admitted to the ICU for further cre of the suspected pancreatitis.       Overview/Hospital Course:  Acute pancreatitis with dilated CBD (hx of cholecystectomy), with GNR bacteremia. Presumptive E.coli, will reduce to ceftriaxone. GI discussed with [facility]: no role for endoscopic intervention at this time given lack of suspected biliary obstruction, recommended outpatient eval with clinic visit and potential ERCP to assess PD. Will continue IV abx, antiemetics, and pain control.       Interval Histor    Review of Systems   Constitutional: Positive for activity change, chills, fatigue and fever. Negative for unexpected weight change.   HENT: Negative.  Negative for sore throat and trouble swallowing.    Eyes: Negative for photophobia and visual disturbance.   Respiratory: Negative.  Negative for chest tightness and shortness of breath.    Cardiovascular: Negative for chest pain, palpitations and leg swelling.   Gastrointestinal: Positive for  abdominal pain, nausea and vomiting. Negative for abdominal distention, blood in stool, constipation and diarrhea.   Endocrine: Negative for polyuria.   Genitourinary: Negative.  Negative for difficulty urinating, dysuria and hematuria.   Musculoskeletal: Negative.    Skin: Negative.  Negative for color change.   Allergic/Immunologic: Negative for immunocompromised state.   Neurological: Negative.  Negative for dizziness, seizures, syncope and facial asymmetry.   Psychiatric/Behavioral: Negative.  Negative for agitation, behavioral problems and confusion.     Objective:     Vital Signs (Most Recent):  Temp: 97.8 °F (36.6 °C) (12/22/21 0802)  Pulse: 60 (12/22/21 0802)  Resp: 18 (12/22/21 0802)  BP: 130/62 (12/22/21 0802)  SpO2: 97 % (12/22/21 0802) Vital Signs (24h Range):  Temp:  [97.7 °F (36.5 °C)-99.3 °F (37.4 °C)] 97.8 °F (36.6 °C)  Pulse:  [56-66] 60  Resp:  [17-20] 18  SpO2:  [96 %-100 %] 97 %  BP: (130-156)/(60-67) 130/62     Weight: 40.3 kg (88 lb 12.8 oz)  Body mass index is 17.94 kg/m².    Intake/Output Summary (Last 24 hours) at 12/22/2021 1103  Last data filed at 12/22/2021 0600  Gross per 24 hour   Intake 249.91 ml   Output 600 ml   Net -350.09 ml      Physical Exam  Vitals and nursing note reviewed.   Constitutional:       General: She is not in acute distress.     Appearance: She is well-developed and normal weight. She is ill-appearing. She is not diaphoretic.   HENT:      Head: Normocephalic and atraumatic.      Nose: Nose normal. No congestion.      Mouth/Throat:      Mouth: Mucous membranes are moist.      Pharynx: No oropharyngeal exudate.   Eyes:      General: No scleral icterus.     Extraocular Movements: Extraocular movements intact.      Pupils: Pupils are equal, round, and reactive to light.   Neck:      Thyroid: No thyromegaly.   Cardiovascular:      Rate and Rhythm: Regular rhythm. Bradycardia present.      Heart sounds: Murmur heard.   No gallop.    Pulmonary:      Effort: Pulmonary effort  is normal.      Breath sounds: Normal breath sounds. No stridor. No wheezing or rales.   Abdominal:      General: Abdomen is flat. Bowel sounds are normal. There is no distension.      Palpations: Abdomen is soft. There is no mass.      Tenderness: There is no abdominal tenderness. There is no guarding.   Musculoskeletal:         General: Normal range of motion.      Cervical back: Normal range of motion and neck supple. No rigidity.      Right lower leg: No edema.      Left lower leg: No edema.   Lymphadenopathy:      Cervical: No cervical adenopathy.   Skin:     General: Skin is warm and dry.      Capillary Refill: Capillary refill takes less than 2 seconds.      Findings: No bruising or lesion.   Neurological:      General: No focal deficit present.      Mental Status: She is alert and oriented to person, place, and time.      Cranial Nerves: No cranial nerve deficit.   Psychiatric:         Mood and Affect: Mood normal.         Behavior: Behavior normal.         Thought Content: Thought content normal.         Judgment: Judgment normal.           Recent Results (from the past 24 hour(s))   POCT glucose    Collection Time: 12/21/21 11:30 AM   Result Value Ref Range    POCT Glucose 74 70 - 110 mg/dL   POCT glucose    Collection Time: 12/21/21  3:36 PM   Result Value Ref Range    POCT Glucose 117 (H) 70 - 110 mg/dL   Protime-INR    Collection Time: 12/22/21  6:28 AM   Result Value Ref Range    Prothrombin Time 17.7 (H) 9.0 - 12.5 sec    INR 1.7 (H) 0.8 - 1.2       Microbiology Results (last 7 days)     Procedure Component Value Units Date/Time    Blood culture x two cultures. Draw prior to antibiotics [[id number]]  (Abnormal) Collected: 12/20/21 1617    Order Status: Completed Specimen: Blood from Peripheral, Forearm, Right Updated: 12/22/21 0750     Blood Culture, Routine Gram stain aer bottle: Gram negative rods      Gram stain nain bottle: Gram negative rods      Results called to and read back by: Alin Flor       12/21/2021 04:33 BML      PRESUMPTIVE E COLI    Narrative:      Aerobic and anaerobic    Blood culture x two cultures. Draw prior to antibiotics [[id number]]  (Abnormal) Collected: 12/20/21 1614    Order Status: Completed Specimen: Blood from Peripheral, Forearm, Right Updated: 12/22/21 0758     Blood Culture, Routine Gram stain aer bottle: Gram negative rods      Gram stain nain bottle: Gram negative rods      Results called to and read back by: Alin Flor      12/21/2021 04:33 BML      PRESUMPTIVE E COLI  Identification and susceptibility pending      Narrative:      Aerobic and anaerobic           Imaging Results          US Abdomen Complete (Final result)  Result time 12/20/21 21:59:34    Final result by Castillo Jimenez MD (12/20/21 21:59:34)                 Impression:      Echogenic appearance of the pancreas, suggestive of acute pancreatitis.    Focal 2.2 x 1.7 x 2.2 cm hypoechoic region within the pancreatic body.  Short-term follow-up with pancreatic CT protocol may be obtained, if there is concern for early pancreatic necrosis.    Status post cholecystectomy.  Intrahepatic and extrahepatic biliary dilatation.      Electronically signed by: Castillo Jimenez MD  Date:    12/20/2021  Time:    21:59             Narrative:    EXAMINATION:  US ABDOMEN COMPLETE    CLINICAL HISTORY:  Acute pancreatitis without necrosis or infection, unspecified    TECHNIQUE:  Complete abdominal ultrasound (including pancreas, aorta, liver, gallbladder, common bile duct, IVC, kidneys, and spleen) was performed.    COMPARISON:  CT abdomen pelvis dated 12/20/2021.    FINDINGS:  The pancreas is echogenic, suggestive of acute inflammation.  There is a 2.2 x 1.7 x 2.2 cm hypoechoic area within the pancreatic body.    The patient is status post cholecystectomy.  There is no abnormality in the gallbladder fossa.    The CBD is enlarged measuring 1.3 cm.  There is also intrahepatic biliary dilatation.    The liver is normal in appearance.   There are no discrete hepatic masses.    There are unchanged bilateral renal cystic lesions.  There is no evidence of hydroureteronephrosis.    No evidence of free fluid is identified.                               X-Ray Chest AP Portable (Final result)  Result time 12/20/21 20:30:33    Final result by Vadim Humphreys MD (12/20/21 20:30:33)                 Impression:      As above.      Electronically signed by: Vadim Humphreys MD  Date:    12/20/2021  Time:    20:30             Narrative:    EXAMINATION:  XR CHEST AP PORTABLE    CLINICAL HISTORY:  picc placement;    TECHNIQUE:  Single frontal view of the chest was performed.    COMPARISON:  Chest radiograph earlier same day at 16:09 hours    FINDINGS:  Monitoring leads overlie the chest.  Patient is slightly rotated.    Interval placement of right-sided PICC line with tip overlying the mid SVC.  No pneumothorax or new focal opacity.  Cardiomediastinal silhouette is midline and stable.  Otherwise grossly stable chest.                                CT Abdomen Pelvis With Contrast (Final result)  Result time 12/20/21 19:30:51    Final result by Castillo Jimenez MD (12/20/21 19:30:51)                 Impression:      Inflammatory changes identified in the pancreas with marked inflammatory changes surrounding the pancreas, consistent acute pancreatitis.    Inflammatory changes involving the duodenum.  This is most likely secondary to the inflammatory changes in the pancreas.    Additional findings above.    This report was flagged in Epic as abnormal.      Electronically signed by: Castillo Jimenez MD  Date:    12/20/2021  Time:    19:30             Narrative:    EXAMINATION:  CT ABDOMEN PELVIS WITH CONTRAST    CLINICAL HISTORY:  abd pain and sepsis;    TECHNIQUE:  Low dose axial images, sagittal and coronal reformations were obtained from the lung bases to the pubic symphysis following the IV administration of 75 cc of Omnipaque 350 .  Oral contrast was not  given.    COMPARISON:  CT abdomen pelvis dated 05/16/2018.    FINDINGS:  There are no pleural effusions.  There is no evidence of a pneumothorax.  There are dependent changes in the lung bases.    The heart is enlarged.  There is normal tapering of the abdominal aorta.  There are extensive calcifications along the course of the abdominal aorta and its branch vessels.  The portal veins and mesenteric veins are within normal limits.    There is no evidence of lymphadenopathy in the abdomen or pelvis.    The esophagus and stomach are unremarkable.  There are hazy changes involving the proximal duodenum.  The small bowel loops are unremarkable.  The appendix is within normal limits.  There is colonic diverticula without evidence of acute diverticulitis.    There is a subcentimeter hypodensity in the right hepatic lobe.  This is too small complete characterization.  The patient is status post cholecystectomy.  There is stable appearance of mild intrahepatic biliary dilatation.    The spleen is enlarged.  There are hazy changes around the pancreas.  There is dilatation of the main pancreatic duct.  No peripancreatic fluid collection is identified.  The adrenal glands are unremarkable.    There is no evidence of nephrolithiasis.  There are stable simple cyst in the upper pole of both kidneys.  The ureters and urinary bladder are within normal limits.    There is no evidence of a drainable collection in the abdomen or pelvis.  There is no evidence of free air.  There is no evidence of pneumatosis.  No portal venous air is identified.    The psoas margins are unremarkable.  There are chronic bilateral inferior pubic rami fractures present chronic right-sided superior pubic ramus fracture.  There is grade 1 anterolisthesis of L5 on S1.  No acute fractures identified.                               X-Ray Chest 1 View (Final result)  Result time 12/20/21 16:17:24    Final result by Channing Medina MD (12/20/21 16:17:24)                  Impression:      No acute chest disease identified.    Implanted cardiac loop recorder.      Electronically signed by: Channnig Medina MD  Date:    12/20/2021  Time:    16:17             Narrative:    EXAMINATION:  XR CHEST 1 VIEW    CLINICAL HISTORY:  Sepsis;    TECHNIQUE:  Single frontal view of the chest was performed.    COMPARISON:  05/21/2018.    FINDINGS:  There is an implanted cardiac loop recorder present.  The heart is not enlarged.  Atherosclerotic calcification is present within the thoracic aorta.  Superior mediastinal structures are unremarkable.  Pulmonary vasculature is within normal limits.  The lungs are free of focal consolidations.  There is no evidence for pneumothorax or pleural effusions.  Bony structures are grossly intact.                                      Assessment/Plan:      * Acute recurrent pancreatitis  Pt has had previous episodes of pancreatitis.    Lipase elevated in the range of more than 1000.   The pat started on bowel rest with being NPO for now.   Plan to treat conservatively and symptomatically.  Plan to restart clear liquid in the AM and assess for toleratbility  GI can be consulted if no improvement.     -GI discussed with [facility]: no role for endoscopic intervention at this time given lack of suspected biliary obstruction, recommended outpatient eval with clinic visit and potential ERCP to assess PD.   -Will continue IV abx, antiemetics, and pain control.       E coli bacteremia  Acute pancreatitis with dilated CBD (hx of cholecystectomy), with GNR bacteremia.   -Presumptive E.coli, will reduce to ceftriaxone.         Other specified hypothyroidism  Continue home medication of Levothyroxine at 75 mcg po daily.         Dyslipidemia  Continue statin       Essential hypertension  Chronic, stable,   Continue Amlodipine 5 mg po daily   Continue Lisinopril 5 mg po daily           Paroxysmal atrial fibrillation  Currently rate controlled.   Continue rate control with BB with  Metoprolol Succinate.  Continue with Warfarin 2.5 mg po daily.         Coronary artery disease involving native coronary artery of native heart without angina pectoris  S/P PCI to LAD, PLB, RCA in 2017 with multiple TONY.  Continue ASA, BB, ACE-I and statin.   No complaints of angina.   Pt is also on AMIODARONE prbably for rhythm control  Pt is recommended for follow up with Cardiology to optimize the medical management.         VTE Risk Mitigation (From admission, onward)         Ordered     warfarin (COUMADIN) tablet 2 mg  Daily         12/21/21 0215     IP VTE HIGH RISK PATIENT  Once         12/21/21 0215     Place sequential compression device  Until discontinued         12/21/21 0215     Reason for No Pharmacological VTE Prophylaxis  Once        Question:  Reasons:  Answer:  Already adequately anticoagulated on oral Anticoagulants    12/21/21 0215                Discharge Planning   FRANCO:      Code Status: Full Code   Is the patient medically ready for discharge?:     Reason for patient still in hospital (select all that apply): Treatment                     Harvey Thompson MD  Department of Hospital Medicine   [facility]

## 2021-12-22 NOTE — PROGRESS NOTES
[facility] [place] Telemetry  Adult Nutrition  Progress Note    SUMMARY       Recommendations    1) Continue IDDSI 7 as tolerated by patient, encourage PO intake.  2) Add Cardiac restrictions per medical hx, elevated BP readings.  3) Add boost (+) to assist in meeting needs  4) Continue anti-emetics PRN  5) Monitor nutrition related labs    Goals:   1) Patient to tolerate diet advancements and consistently meet > 50% EEN/EPN via PO/ONS intake by next RD visit  2) Patient to have N/V managed.  3) Monitored labs to remain/trend toward target ranges    Nutrition Goal Status: new  Communication of RD Recs:  (POC)    Dietitian Rounds Brief    12/22 - Pt assessed per MST>3, weight hx via office visits shows weight loss. Upon admit pt repots abdominal pain, N/V, presenting with generalized edema. Per imaging acute pancreatitis. Patient tolerated transition from NPO to clear liquids and has now advanced to full diet. Will monitor for tolerance. Current noted intake is 50%.    Assessment and Plan  Nutrition Problem  Inadequate oral intake    Related to (etiology):   Acute pancreatitis    Signs and Symptoms (as evidenced by):   Nausea, vomiting    Interventions/Recommendations (treatment strategy):  Mineral Modified Diet (Cardiac)  Commercial Beverage (Boost +)  Collaboration of care with other providers    Nutrition Diagnosis Status:   New      Malnutrition Assessment  Malnutrition Type: acute illness or injury  Severe Weight Loss (Malnutrition): greater than 10% in 6 months (20#, 18%)   Pt usual weight appears to be around 100# per chart hx, given CHF dx possible that weight may have to do with fluid volume. Per weight hx via reviewing office visits over last 6 months it does appear there has been weight loss of ~ 20#, 18%. NFPE to follow    Reason for Assessment  Reason For Assessment: identified at risk by screening criteria  Diagnosis: gastrointestinal disease (Acute reccurent pancreatitis)  Relevant Medical History:  "Pancreatitis, HTN, A-fib, Dyslipidemia, Hypothyroidism, CHF, LT-anticoag use, CAD    Nutrition Risk Screen    Nutrition Risk Screen: no indicators present    Nutrition/Diet History    Spiritual, Cultural Beliefs, Jehovah's witness Practices, Values that Affect Care: no  Food Allergies: NKFA  Factors Affecting Nutritional Intake: decreased appetite,abdominal pain    Anthropometrics    Temp: 97.8 °F (36.6 °C)  Height Method: Stated  Height: 4' 11.02" (149.9 cm)  Height (inches): 59.02 in  Weight Method: Bed Scale  Weight: 40.3 kg (88 lb 12.8 oz)  Weight (lb): 88.8 lb  Ideal Body Weight (IBW), Female: 95.1 lb  % Ideal Body Weight, Female (lb): 93.38 %  BMI (Calculated): 17.9  Usual Body Weight (UBW), k.6 kg  % Usual Body Weight: 83.05  % Weight Change From Usual Weight: -17.12 %       Lab/Procedures/Meds    Pertinent Labs Reviewed: reviewed  Pertinent Medications Reviewed: reviewed  Scheduled Meds:   amiodarone  200 mg Oral BID    aspirin  81 mg Oral Daily    calcium-vitamin D3  1 tablet Oral Daily    cefTRIAXone (ROCEPHIN) IVPB  2 g Intravenous Q24H    cholecalciferol (vitamin D3)  400 Units Oral Daily    levothyroxine  75 mcg Oral Daily    metoprolol succinate  25 mg Oral Daily    mupirocin   Nasal BID    pravastatin  20 mg Oral Daily    predniSONE  2.5 mg Oral Daily    senna-docusate 8.6-50 mg  1 tablet Oral BID    sodium chloride 0.9%  10 mL Intravenous Q6H    warfarin  2 mg Oral Daily     Continuous Infusions:   sodium chloride 0.9% 125 mL/hr at 21 2224     PRN Meds:.acetaminophen, dextrose 50%, dextrose 50%, glucagon (human recombinant), glucose, glucose, insulin aspart U-100, LORazepam, magnesium oxide, magnesium oxide, melatonin, morphine, naloxone, nitroGLYCERIN, ondansetron, ondansetron, potassium bicarbonate, potassium bicarbonate, potassium bicarbonate, Flushing PICC Protocol **AND** sodium chloride 0.9% **AND** sodium chloride 0.9%, sodium chloride 0.9%      Estimated/Assessed " Needs    Weight Used For Calorie Calculations: 40.3 kg (88 lb 13.5 oz)  Energy Calorie Requirements (kcal): 1410  Energy Need Method: Kcal/kg (35; per weight loss, BMI < 18)  Protein Requirements: 48 - 60g (1.2 - 1.5g/kg per acute pancreatitis)  Weight Used For Protein Calculations: 40.3 kg (88 lb 13.5 oz)     Estimated Fluid Requirement Method: RDA Method  RDA Method (mL): 1410  CHO Requirement: 176g      Nutrition Prescription Ordered    Current Diet Order: IDDSI 7    Evaluation of Received Nutrient/Fluid Intake    Energy Calories Required: not meeting needs  Protein Required: not meeting needs  Fluid Required: exceeds needs  % Intake of Estimated Energy Needs: 50 - 75 %  % Meal Intake: 25 - 50 %    Nutrition Risk    Level of Risk/Frequency of Follow-up:  (1-2x/week)     Monitor and Evaluation    Food and Nutrient Intake: energy intake,food and beverage intake  Food and Nutrient Adminstration: diet order  Knowledge/Beliefs/Attitudes: food and nutrition knowledge/skill,beliefs and attitudes  Physical Activity and Function: nutrition-related ADLs and IADLs  Anthropometric Measurements: weight,weight change,body mass index  Biochemical Data, Medical Tests and Procedures: glucose/endocrine profile,inflammatory profile,lipid profile,electrolyte and renal panel,gastrointestinal profile  Nutrition-Focused Physical Findings: overall appearance,extremities, muscles and bones,head and eyes,skin     Nutrition Follow-Up    RD Follow-up?: Yes

## 2021-12-22 NOTE — ASSESSMENT & PLAN NOTE
Pt has had previous episodes of pancreatitis.    Lipase elevated in the range of more than 1000.   The pat started on bowel rest with being NPO for now.   Plan to treat conservatively and symptomatically.  Plan to restart clear liquid in the AM and assess for toleratbility  GI can be consulted if no improvement.     -GI discussed with [facility]: no role for endoscopic intervention at this time given lack of suspected biliary obstruction, recommended outpatient eval with clinic visit and potential ERCP to assess PD.   -Will continue IV abx, antiemetics, and pain control.

## 2021-12-22 NOTE — ASSESSMENT & PLAN NOTE
Acute pancreatitis with dilated CBD (hx of cholecystectomy), with GNR bacteremia.   -Presumptive E.coli, will reduce to ceftriaxone.

## 2021-12-22 NOTE — PROGRESS NOTES
GI Treatment Plan    Katt Mcneal is a 81 y.o. female admitted to hospital 12/20/2021 (Hospital Day: 3) due to Acute recurrent pancreatitis.     Interval History  -Reports abdominal pain mildly improved.  Denies any nausea or vomiting.  Tolerating clear liquids.  -Afebrile overnight    - Case discussed with [facility] at [facility] [place].  No role for endoscopic intervention at this time given lack of suspected biliary obstruction.  Recommended outpatient eval with clinic visit and potential ERCP to assess PD.    Objective  Temp:  [97.7 °F (36.5 °C)-99.3 °F (37.4 °C)] 97.8 °F (36.6 °C) (12/22 0802)  Pulse:  [56-66] 60 (12/22 0802)  BP: (130-156)/(60-67) 130/62 (12/22 0802)  Resp:  [17-20] 18 (12/22 0802)  SpO2:  [96 %-100 %] 97 % (12/22 0802)    General: Alert, Oriented x3, no distress  Abdomen: Normoactive bowel sounds. Non-distended. Normal tympany. Soft. Non-tender. No peritoneal signs.    Laboratory    MELD-Na score: 12 at 12/22/2021  6:28 AM  MELD score: 12 at 12/22/2021  6:28 AM  Calculated from:  Serum Creatinine: 1.0 mg/dL at 12/21/2021  8:59 AM  Serum Sodium: 143 mmol/L (Using max of 137 mmol/L) at 12/21/2021  8:59 AM  Total Bilirubin: 0.3 mg/dL (Using min of 1 mg/dL) at 12/21/2021  8:59 AM  INR(ratio): 1.7 at 12/22/2021  6:28 AM  Age: 81 years    Recent Labs   Lab 12/20/21  1614   HGB 11.4*       Lab Results   Component Value Date    WBC 9.89 12/20/2021    HGB 11.4 (L) 12/20/2021    HCT 35.8 (L) 12/20/2021    MCV 84 12/20/2021     12/20/2021       Lab Results   Component Value Date     12/21/2021    K 4.2 12/21/2021     (H) 12/21/2021    CO2 25 12/21/2021    BUN 22 12/21/2021    CREATININE 1.0 12/21/2021    CALCIUM 8.2 (L) 12/21/2021    ANIONGAP 7 (L) 12/21/2021    ESTGFRAFRICA >60 12/21/2021    EGFRNONAA 53 (A) 12/21/2021       Lab Results   Component Value Date    ALT 41 12/21/2021    AST 55 (H) 12/21/2021    ALKPHOS 79 12/21/2021    BILITOT 0.3 12/21/2021       Lab Results   Component Value Date     INR 1.7 (H) 12/22/2021    INR 1.8 (H) 12/21/2021    INR 1.9 (H) 12/20/2021     The patient is an 82yo woman who presents with recurrent acute pancreatitis and GNR bacteremia.      Symptomatically she appears improved and has remained afebrile.  Cultures with E. Coli and susceptibility testing pending.      As above, patient would benefit from ERCP as outpatient to assess PD given history of PD abnormalities.  Have already requested outpatient follow-up for the patient.    Plan  -Continue IV antibiotics  -Continue anti-emetic and pain control  -Outpatient follow-up requested  -Advance diet as tolerated - ordered regular diet today  - Plan of care was discussed with primary team.  - We will continue to follow.    Thank you for involving us in the care of Katt Mcneal. Please call with any additional questions, concerns or changes in the patient's clinical status.    Osmany Jennings MD

## 2021-12-22 NOTE — NURSING
Patient awake, alert, oriented resting comfortably. No signs of distress observed. bed low and locked. Call light in reach. Report given to oncoming nurse, ANNETTE Padron. 12hour chart check complete. Will continue plan of care.

## 2021-12-22 NOTE — PLAN OF CARE
Problem: Fluid Imbalance (Pancreatitis)  Goal: Fluid Balance  Intervention: Monitor and Manage Fluid Balance  Flowsheets (Taken 12/21/2021 1839)  Fluid/Electrolyte Management: fluids provided     Problem: Infection (Pancreatitis)  Goal: Infection Symptom Resolution  Intervention: Prevent or Manage Infection  Flowsheets (Taken 12/21/2021 1839)  Fever Reduction/Comfort Measures: fluid intake increased  Infection Management: aseptic technique maintained     Problem: Respiratory Compromise (Pancreatitis)  Goal: Effective Oxygenation and Ventilation  Intervention: Optimize Oxygenation and Ventilation  Flowsheets (Taken 12/21/2021 1839)  Airway/Ventilation Management: calming measures promoted  Breathing Techniques/Airway Clearance: deep/controlled cough encouraged  Activity Management: Rolling - L1  Head of Bed (HOB) Positioning: HOB at 30-45 degrees

## 2021-12-22 NOTE — HOSPITAL COURSE
Ms. Mcneal is an 81y F w/ recurrent pancreatitis s/p cholecystectomy who was admitted for acute pancreatitis. Initial imaging showed dilated CBD c/w hx of cholecystectomy, GI/AES was consulted but did not feel there was obstruction and deferred endoscopy to the outpatient setting. Her blood cultures grew e coli sensitive to ceftriaxone. The symptoms of pancreatitis resolved, and by discharge she was tolerating a diet without issue and pain was well controlled on po medications. She was discharged to complete a 2 week course of CTX for E coli bacteremia. She will follow up with GI in the outpatient setting. PT/OT followed inpatient and was ordered for further therapy in the outpatient setting. All questions answered prior to discharge, patient in agreement with plan.

## 2021-12-23 LAB
ASCENDING AORTA: 0.87 CM
AV INDEX (PROSTH): 0.74
AV MEAN GRADIENT: 5 MMHG
AV PEAK GRADIENT: 8 MMHG
AV VALVE AREA: 1.75 CM2
AV VELOCITY RATIO: 0.72
BACTERIA BLD CULT: ABNORMAL
BSA FOR ECHO PROCEDURE: 1.29 M2
CV ECHO LV RWT: 0.44 CM
DOP CALC AO PEAK VEL: 1.38 M/S
DOP CALC AO VTI: 30.97 CM
DOP CALC LVOT AREA: 2.4 CM2
DOP CALC LVOT DIAMETER: 1.74 CM
DOP CALC LVOT PEAK VEL: 1 M/S
DOP CALC LVOT STROKE VOLUME: 54.14 CM3
DOP CALCLVOT PEAK VEL VTI: 22.78 CM
E WAVE DECELERATION TIME: 174.22 MSEC
E/A RATIO: 1.16
E/E' RATIO: 11.88 M/S
ECHO LV POSTERIOR WALL: 0.96 CM (ref 0.6–1.1)
EJECTION FRACTION: 60 %
FRACTIONAL SHORTENING: 30 % (ref 28–44)
INR PPP: 2.2 (ref 0.8–1.2)
INTERVENTRICULAR SEPTUM: 0.79 CM (ref 0.6–1.1)
IVRT: 121.11 MSEC
LA MAJOR: 5.68 CM
LA MINOR: 5.09 CM
LA WIDTH: 4.44 CM
LEFT ATRIUM SIZE: 4.4 CM
LEFT ATRIUM VOLUME INDEX: 68.6 ML/M2
LEFT ATRIUM VOLUME: 89.15 CM3
LEFT INTERNAL DIMENSION IN SYSTOLE: 3.04 CM (ref 2.1–4)
LEFT VENTRICLE DIASTOLIC VOLUME INDEX: 65.39 ML/M2
LEFT VENTRICLE DIASTOLIC VOLUME: 85.01 ML
LEFT VENTRICLE MASS INDEX: 93 G/M2
LEFT VENTRICLE SYSTOLIC VOLUME INDEX: 27.8 ML/M2
LEFT VENTRICLE SYSTOLIC VOLUME: 36.17 ML
LEFT VENTRICULAR INTERNAL DIMENSION IN DIASTOLE: 4.34 CM (ref 3.5–6)
LEFT VENTRICULAR MASS: 120.51 G
LV LATERAL E/E' RATIO: 11.22 M/S
LV SEPTAL E/E' RATIO: 12.63 M/S
MV PEAK A VEL: 0.87 M/S
MV PEAK E VEL: 1.01 M/S
MV STENOSIS PRESSURE HALF TIME: 50.52 MS
MV VALVE AREA P 1/2 METHOD: 4.35 CM2
PISA TR MAX VEL: 2.49 M/S
PROTHROMBIN TIME: 22.6 SEC (ref 9–12.5)
PV PEAK VELOCITY: 0.78 CM/S
RA MAJOR: 4.79 CM
RA PRESSURE: 3 MMHG
RA WIDTH: 3.52 CM
RIGHT VENTRICULAR END-DIASTOLIC DIMENSION: 3.33 CM
SINUS: 2.89 CM
STJ: 2.22 CM
TDI LATERAL: 0.09 M/S
TDI SEPTAL: 0.08 M/S
TDI: 0.09 M/S
TR MAX PG: 25 MMHG
TRICUSPID ANNULAR PLANE SYSTOLIC EXCURSION: 1.91 CM
TV REST PULMONARY ARTERY PRESSURE: 28 MMHG

## 2021-12-23 PROCEDURE — 21400001 HC TELEMETRY ROOM

## 2021-12-23 PROCEDURE — 63600175 PHARM REV CODE 636 W HCPCS: Performed by: INTERNAL MEDICINE

## 2021-12-23 PROCEDURE — 99232 PR SUBSEQUENT HOSPITAL CARE,LEVL II: ICD-10-PCS | Mod: ,,, | Performed by: INTERNAL MEDICINE

## 2021-12-23 PROCEDURE — A4216 STERILE WATER/SALINE, 10 ML: HCPCS | Performed by: INTERNAL MEDICINE

## 2021-12-23 PROCEDURE — A4216 STERILE WATER/SALINE, 10 ML: HCPCS | Performed by: EMERGENCY MEDICINE

## 2021-12-23 PROCEDURE — 25000003 PHARM REV CODE 250: Performed by: INTERNAL MEDICINE

## 2021-12-23 PROCEDURE — 63600175 PHARM REV CODE 636 W HCPCS: Performed by: STUDENT IN AN ORGANIZED HEALTH CARE EDUCATION/TRAINING PROGRAM

## 2021-12-23 PROCEDURE — 25000003 PHARM REV CODE 250: Performed by: EMERGENCY MEDICINE

## 2021-12-23 PROCEDURE — 85610 PROTHROMBIN TIME: CPT | Performed by: STUDENT IN AN ORGANIZED HEALTH CARE EDUCATION/TRAINING PROGRAM

## 2021-12-23 PROCEDURE — 99232 SBSQ HOSP IP/OBS MODERATE 35: CPT | Mod: ,,, | Performed by: INTERNAL MEDICINE

## 2021-12-23 RX ORDER — FUROSEMIDE 10 MG/ML
40 INJECTION INTRAMUSCULAR; INTRAVENOUS ONCE
Status: COMPLETED | OUTPATIENT
Start: 2021-12-23 | End: 2021-12-23

## 2021-12-23 RX ADMIN — LORAZEPAM 0.5 MG: 0.5 TABLET ORAL at 01:12

## 2021-12-23 RX ADMIN — PREDNISONE 2.5 MG: 2.5 TABLET ORAL at 09:12

## 2021-12-23 RX ADMIN — ACETAMINOPHEN 650 MG: 325 TABLET ORAL at 08:12

## 2021-12-23 RX ADMIN — MUPIROCIN: 20 OINTMENT TOPICAL at 09:12

## 2021-12-23 RX ADMIN — SENNOSIDES AND DOCUSATE SODIUM 1 TABLET: 50; 8.6 TABLET ORAL at 09:12

## 2021-12-23 RX ADMIN — AMIODARONE HYDROCHLORIDE 200 MG: 200 TABLET ORAL at 09:12

## 2021-12-23 RX ADMIN — MORPHINE SULFATE 2 MG: 4 INJECTION, SOLUTION INTRAMUSCULAR; INTRAVENOUS at 06:12

## 2021-12-23 RX ADMIN — METOPROLOL SUCCINATE 25 MG: 25 TABLET, EXTENDED RELEASE ORAL at 09:12

## 2021-12-23 RX ADMIN — Medication 10 ML: at 08:12

## 2021-12-23 RX ADMIN — MORPHINE SULFATE 2 MG: 4 INJECTION, SOLUTION INTRAMUSCULAR; INTRAVENOUS at 05:12

## 2021-12-23 RX ADMIN — Medication 10 ML: at 07:12

## 2021-12-23 RX ADMIN — AMIODARONE HYDROCHLORIDE 200 MG: 200 TABLET ORAL at 08:12

## 2021-12-23 RX ADMIN — CHOLECALCIFEROL (VITAMIN D3) 10 MCG (400 UNIT) TABLET 400 UNITS: at 09:12

## 2021-12-23 RX ADMIN — MELATONIN TAB 3 MG 9 MG: 3 TAB at 08:12

## 2021-12-23 RX ADMIN — Medication 10 ML: at 01:12

## 2021-12-23 RX ADMIN — Medication 10 ML: at 12:12

## 2021-12-23 RX ADMIN — LEVOTHYROXINE SODIUM 75 MCG: 75 TABLET ORAL at 07:12

## 2021-12-23 RX ADMIN — CEFTRIAXONE 2 G: 2 INJECTION, SOLUTION INTRAVENOUS at 09:12

## 2021-12-23 RX ADMIN — WARFARIN SODIUM 2 MG: 2 TABLET ORAL at 08:12

## 2021-12-23 RX ADMIN — MUPIROCIN: 20 OINTMENT TOPICAL at 08:12

## 2021-12-23 RX ADMIN — FUROSEMIDE 40 MG: 10 INJECTION, SOLUTION INTRAMUSCULAR; INTRAVENOUS at 12:12

## 2021-12-23 RX ADMIN — Medication 1 TABLET: at 09:12

## 2021-12-23 RX ADMIN — PRAVASTATIN SODIUM 20 MG: 10 TABLET ORAL at 09:12

## 2021-12-23 RX ADMIN — ASPIRIN 81 MG: 81 TABLET, COATED ORAL at 09:12

## 2021-12-23 RX ADMIN — SENNOSIDES AND DOCUSATE SODIUM 1 TABLET: 50; 8.6 TABLET ORAL at 08:12

## 2021-12-23 NOTE — PROGRESS NOTES
"[facility] Progress Note      HPI This is a  81 y.o. female who is admitted to the hospital with recurrent acute pancreatitis. She is found to have ecoli bacteremia. Her case was discussed with NIMISHA who recommends outpatient follow up.       Interval History:  -Rapid response called overnight due to chest pain, tachypnea and hypoxia. She was placed on a venti-mask. Labs notable for troponin of 0.028, elevated , lactic acid of 2.5. CXR with haziness in the right lung.     SUBJECTIVE  This morning, she denies chest pain or shortness of breath. She is tolerating a diet. Denies abdominal pain.    Physical Examination  BP (!) 105/56 (BP Location: Left arm, Patient Position: Lying)   Pulse 65   Temp 98.8 °F (37.1 °C) (Oral)   Resp 18   Ht 4' 11.02" (1.499 m)   Wt 40.3 kg (88 lb 12.8 oz)   SpO2 96%   Breastfeeding No   BMI 17.93 kg/m²   General appearance: alert, cooperative, no distress  HENT: Normocephalic, atraumatic, neck symmetrical, no nasal discharge   Lungs: clear to auscultation bilaterally, no dullness to percussion bilaterally  Heart: regular rate and rhythm without rub; no displacement of the PMI   Abdomen: soft, non-tender; bowel sounds normoactive; no organomegaly  Extremities: extremities symmetric; no clubbing, cyanosis, or edema  Neurologic: Alert and oriented X 3, normal strength, normal coordination and gait    Labs:  Lab Results   Component Value Date    WBC 7.82 12/22/2021    HGB 9.0 (L) 12/22/2021    HCT 29.8 (L) 12/22/2021    MCV 89 12/22/2021     12/22/2021         CMP  Sodium   Date Value Ref Range Status   12/22/2021 143 136 - 145 mmol/L Final     Potassium   Date Value Ref Range Status   12/22/2021 3.8 3.5 - 5.1 mmol/L Final     Chloride   Date Value Ref Range Status   12/22/2021 111 (H) 95 - 110 mmol/L Final     CO2   Date Value Ref Range Status   12/22/2021 22 (L) 23 - 29 mmol/L Final     Glucose   Date Value Ref Range Status   12/22/2021 254 (H) 70 - 110 " mg/dL Final     BUN   Date Value Ref Range Status   2021 12 8 - 23 mg/dL Final     Creatinine   Date Value Ref Range Status   2021 1.0 0.5 - 1.4 mg/dL Final     Calcium   Date Value Ref Range Status   2021 8.1 (L) 8.7 - 10.5 mg/dL Final     Total Protein   Date Value Ref Range Status   2021 6.0 6.0 - 8.4 g/dL Final     Albumin   Date Value Ref Range Status   2021 2.3 (L) 3.5 - 5.2 g/dL Final     Total Bilirubin   Date Value Ref Range Status   2021 0.1 0.1 - 1.0 mg/dL Final     Comment:     For infants and newborns, interpretation of results should be based  on gestational age, weight and in agreement with clinical  observations.    Premature Infant recommended reference ranges:  Up to 24 hours.............<8.0 mg/dL  Up to 48 hours............<12.0 mg/dL  3-5 days..................<15.0 mg/dL  6-29 days.................<15.0 mg/dL       Alkaline Phosphatase   Date Value Ref Range Status   2021 113 55 - 135 U/L Final     AST   Date Value Ref Range Status   2021 29 10 - 40 U/L Final     ALT   Date Value Ref Range Status   2021 28 10 - 44 U/L Final     Anion Gap   Date Value Ref Range Status   2021 10 8 - 16 mmol/L Final     eGFR if    Date Value Ref Range Status   2021 >60 >60 mL/min/1.73 m^2 Final     eGFR if non    Date Value Ref Range Status   2021 53 (A) >60 mL/min/1.73 m^2 Final     Comment:     Calculation used to obtain the estimated glomerular filtration  rate (eGFR) is the CKD-EPI equation.          Imagin2021 US ABDOMEN  Echogenic appearance of the pancreas, suggestive of acute pancreatitis.     Focal 2.2 x 1.7 x 2.2 cm hypoechoic region within the pancreatic body.  Short-term follow-up with pancreatic CT protocol may be obtained, if there is concern for early pancreatic necrosis.     Status post cholecystectomy.  Intrahepatic and extrahepatic biliary dilatation.     2021 CT  ABDOMEN  Inflammatory changes identified in the pancreas with marked inflammatory changes surrounding the pancreas, consistent acute pancreatitis.     Inflammatory changes involving the duodenum.  This is most likely secondary to the inflammatory changes in the pancreas.     Additional findings above.       Assessment:   Katt Galindo is an 82 yo F with recurrent acute pancreatitis and GNR bacteremia.     Her case was discussed with AES. Plan is for outpatient evaluation for ERCP to assess PD. Her outpatient follow up has already been requested.    Overnight, she developed chest pain, shortness of breath and hypoxia. She is hemodynamically stable this morning. She is receiving a dose of IV lasix.     Plan:  -Follow up repeat blood cultures; tailor antibiotics as appropriate  -Continue anti-emetic and pain control  -Advance diet as tolerated  -We will continue to follow along    Anil Russ MD

## 2021-12-23 NOTE — CARE UPDATE
Called to patient's bedside by nursing staff for evaluation of acute onset chest pain, dyspnea, and hypoxia.  Blood pressure noted to be markedly elevated.  Stat EKG obtained, no evidence of acute ischemia.  Sublingual nitroglycerin and PRN morphine were given, supplemental oxygen via venti mask was applied.  Blood pressure improved, symptoms also improved.  Stat chest xray and labs ordered.  CXR showed subtle increased density seen adjacent to the right heart border without obscuring it. This finding may represent increased atelectasis versus developing early consolidation. She has not had fever or cough, labs are still pending.  Breath sounds fairly clear.  Differential includes but is not limited to pneumonia, acute pulmonary edema, acute PE, MI, or hypertensive emergency.  She has a fib and is on coumadin, INR subtherapeutic.  Cannot obtain CTA chest due to renal function.  Will give a one time dose of full dose enoxaparin to cover for PE, continue coumadin and check INR daily.  Consider V/Q scan if further imaging needed.  CBC and lactic acid pending, continue antibiotics, consider broadening coverage if evidence of worsening infection.  EKG without evidence of acute ischemia, troponin pending, if elevated continue to trend, add antiplatelets and consult Cardiology.  Patient is now normotensive and stable on supplemental oxygen.    Critical Care time: 45 min    Critical Care time for the evaluation and treatment for severe organ dysfunction, review of pertinent labs and imaging studies discussions with consulting services and discussions with patient/family.    Family updated at bedside    Yusef Pierre Jr., APRN, AGACNP-BC  Hospitalist - Department of Hospital Medicine  [facility] [address] [place] [address]  Office #: [phone]; Pager #: [phone]

## 2021-12-23 NOTE — ASSESSMENT & PLAN NOTE
-Repeat ECHO, Stable since 2016  -keep euvolemic, BNP elevated >500 and on 2L O2, will give dose of IV lasix

## 2021-12-23 NOTE — NURSING
Patient complaining of pain and requested pain medication. Upon arrival to room to administer prn morphine patient complaining of shortness of breath, noted to be tachypnic and anxious appearing. Complained of sudden onset intense pain to her lower back and bilateral shoulder blades. Patient with visible tremors, complains of being cold and pain. Vitals obtained revealing elevated BP, hypoxia (sats 93 % on 2 L then decreased to 80% on 4 L. Placed on venti mask and encouraged slow deep breathing and sats increased to 97%). 12 lead EKG obtained, motion artifact due to patient tremors and clenching of her extremities. Primary nurse, Marian, and  Rapid response nurse, Eliza, at the bedside. ANDREAS Holbrook at bedside to access patient. PRN nitro administered and PRN morphine administered. Patient became calm and stable appearing. Will continue to monitor.

## 2021-12-23 NOTE — NURSING
RAPID RESPONSE NURSE PROACTIVE ROUNDING NOTE       Time of Visit:    Admit Date: 2021  LOS: 2  Code Status: Full Code   Date of Visit: 2021  : 1940  Age: 81 y.o.  Sex: female  Race: White  Bed: W305/W305 A:   MRN: [id number]  Was the patient discharged from an ICU this admission? No   Was the patient discharged from a PACU within last 24 hours? No   Did the patient receive conscious sedation/general anesthesia in last 24 hours? No   Was the patient in the ED within the past 24 hours? No   Was the patient on NIPPV within the past 24 hours? No   Attending Physician: Harvey Thompson MD  Primary Service: Hospitalist   Time spent at the bedside: 30 - 45 min    SITUATION    Notified by charge RN via phone call  Reason for alert: c/o chest pain and SOB; /102 sats 80% on venti mask.    Diagnosis: Acute recurrent pancreatitis   has a past medical history of Arthritis, Atrial fibrillation, Bilateral carotid artery stenosis, Coronary artery disease, Fibromyalgia, Hyperlipidemia, Hypertension, Myocardial infarction, Pancreatitis, and Thyroid disease.    Last Vitals:  Temp: 98.3 °F (36.8 °C) (1801)  Pulse: 100 (1801)  Resp: 20 (1829)  BP: 140/98 (1801)  SpO2: 93 % (1801)    24 Hour Vitals Range:  Temp:  [97.8 °F (36.6 °C)-99.3 °F (37.4 °C)]   Pulse:  []   Resp:  [17-20]   BP: (130-161)/(60-98)   SpO2:  [93 %-98 %]     Clinical Issues: Circulatory    ASSESSMENT/INTERVENTIONS    Pt anxious c/o pain; hypertensive; short of breath    Interventions: Respiratory Management: Supplemental O2  Monitoring: Other Monitoring (Specify): EKG ordered    RECOMMENDATIONS    Recommendations: Other interventions initiated during the events: 12 Lead ECG    Discussed plan of care with charge RN, Indigo    PROVIDER ESCALATION    Physician escalation: Yes    Orders received and case discussed with Yusef Pierre NP.    Disposition:Remain in room 305    FOLLOW UP    Call back the Rapid Response  NurseEliza at [phone] for additional questions or concerns.

## 2021-12-23 NOTE — PROGRESS NOTES
[facility]  Progress Note    Patient Name: Katt Mcneal  MRN: [id number]  Patient Class: IP- Inpatient   Admission Date: 12/20/2021  Length of Stay: 3 days  Attending Physician: Harvey Thompson MD  Primary Care Provider: Panchito Perez MD        Subjective:     Principal Problem:Acute recurrent pancreatitis        HPI:   is an 81 YOCF with a PMHx of Atrial fibrillation on Coumadine (due allergy to a DOAC) CAD S/P PCI, pancreatitis and diverticulosis presenting to the ED for severe abdominal pain x 24 hours, worsening this yesterday morning around 9 AM associated with an episode of vomiting as well as shivering and a temp of 102.9 .Pt denied any new or unsual food intake. She also denied diarrhea, or recent travel.   In the ER she was managed with IV fluid pain control and antipyretics. She underwent CT Abd which revealed findings concerning for possible pancreatitis involving the head of the pancreas and duodenum.    SHe is being admitted to the ICU for further cre of the suspected pancreatitis.       Overview/Hospital Course:  Acute pancreatitis with dilated CBD (hx of cholecystectomy), with GNR bacteremia. Presumptive E.coli, will reduce to ceftriaxone. GI discussed with AES: no role for endoscopic intervention at this time given lack of suspected biliary obstruction, recommended outpatient eval with clinic visit and potential ERCP to assess PD. Will continue IV abx, antiemetics, and pain control.       Review of Systems   Constitutional: Positive for activity change, chills, fatigue and fever. Negative for unexpected weight change.   HENT: Negative.  Negative for sore throat and trouble swallowing.    Eyes: Negative for photophobia and visual disturbance.   Respiratory: Negative.  Negative for chest tightness and shortness of breath.    Cardiovascular: Negative for chest pain, palpitations and leg swelling.   Gastrointestinal: Positive for abdominal pain, nausea and  vomiting. Negative for abdominal distention, blood in stool, constipation and diarrhea.   Endocrine: Negative for polyuria.   Genitourinary: Negative.  Negative for difficulty urinating, dysuria and hematuria.   Musculoskeletal: Negative.    Skin: Negative.  Negative for color change.   Allergic/Immunologic: Negative for immunocompromised state.   Neurological: Negative.  Negative for dizziness, seizures, syncope and facial asymmetry.   Psychiatric/Behavioral: Negative.  Negative for agitation, behavioral problems and confusion.      Objective:      Vital Signs (Most Recent):  Temp: 97.8 °F (36.6 °C) (12/22/21 0802)  Pulse: 60 (12/22/21 0802)  Resp: 18 (12/22/21 0802)  BP: 130/62 (12/22/21 0802)  SpO2: 97 % (12/22/21 0802) Vital Signs (24h Range):  Temp:  [97.7 °F (36.5 °C)-99.3 °F (37.4 °C)] 97.8 °F (36.6 °C)  Pulse:  [56-66] 60  Resp:  [17-20] 18  SpO2:  [96 %-100 %] 97 %  BP: (130-156)/(60-67) 130/62      Weight: 40.3 kg (88 lb 12.8 oz)  Body mass index is 17.94 kg/m².     Intake/Output Summary (Last 24 hours) at 12/22/2021 1103  Last data filed at 12/22/2021 0600      Gross per 24 hour   Intake 249.91 ml   Output 600 ml   Net -350.09 ml      Physical Exam  Vitals and nursing note reviewed.   Constitutional:       General: She is not in acute distress.     Appearance: She is well-developed and normal weight. She is ill-appearing. She is not diaphoretic.   HENT:      Head: Normocephalic and atraumatic.      Nose: Nose normal. No congestion.      Mouth/Throat:      Mouth: Mucous membranes are moist.      Pharynx: No oropharyngeal exudate.   Eyes:      General: No scleral icterus.     Extraocular Movements: Extraocular movements intact.      Pupils: Pupils are equal, round, and reactive to light.   Neck:      Thyroid: No thyromegaly.   Cardiovascular:      Rate and Rhythm: Regular rhythm. Bradycardia present.      Heart sounds: Murmur heard.   No gallop.    Pulmonary:      Effort: Pulmonary effort is normal.       Breath sounds: Normal breath sounds. No stridor. No wheezing or rales.   Abdominal:      General: Abdomen is flat. Bowel sounds are normal. There is no distension.      Palpations: Abdomen is soft. There is no mass.      Tenderness: There is no abdominal tenderness. There is no guarding.   Musculoskeletal:         General: Normal range of motion.      Cervical back: Normal range of motion and neck supple. No rigidity.      Right lower leg: No edema.      Left lower leg: No edema.   Lymphadenopathy:      Cervical: No cervical adenopathy.   Skin:     General: Skin is warm and dry.      Capillary Refill: Capillary refill takes less than 2 seconds.      Findings: No bruising or lesion.   Neurological:      General: No focal deficit present.      Mental Status: She is alert and oriented to person, place, and time.      Cranial Nerves: No cranial nerve deficit.   Psychiatric:         Mood and Affect: Mood normal.         Behavior: Behavior normal.         Thought Content: Thought content normal.         Judgment: Judgment normal.             Assessment/Plan:      * Acute recurrent pancreatitis  Pt has had previous episodes of pancreatitis.    Lipase elevated in the range of more than 1000.   The pat started on bowel rest with being NPO for now.   Plan to treat conservatively and symptomatically.  Plan to restart clear liquid in the AM and assess for toleratbility  GI can be consulted if no improvement.     -GI discussed with AES: no role for endoscopic intervention at this time given lack of suspected biliary obstruction, recommended outpatient eval with clinic visit and potential ERCP to assess PD.   -Will continue IV abx, antiemetics, and pain control.       E coli bacteremia  Acute pancreatitis with dilated CBD (hx of cholecystectomy), with GNR bacteremia.   -Presumptive E.coli, will reduce to ceftriaxone.         Other specified hypothyroidism  Continue home medication of Levothyroxine at 75 mcg po daily.          Dyslipidemia  Continue statin       Essential hypertension  Chronic, stable,   Continue Amlodipine 5 mg po daily   Continue Lisinopril 5 mg po daily           Paroxysmal atrial fibrillation  Currently rate controlled.   Continue rate control with BB with Metoprolol Succinate.  Continue with Warfarin 2.5 mg po daily.         Chronic heart failure with preserved ejection fraction  -Repeat ECHO, Stable since 2016  -keep euvolemic, BNP elevated >500 and on 2L O2, will give dose of IV lasix        Coronary artery disease involving native coronary artery of native heart without angina pectoris  S/P PCI to LAD, PLB, RCA in 2017 with multiple TONY.  Continue ASA, BB, ACE-I and statin.   No complaints of angina.   Pt is also on AMIODARONE prbably for rhythm control  Pt is recommended for follow up with Cardiology to optimize the medical management.         VTE Risk Mitigation (From admission, onward)         Ordered     warfarin (COUMADIN) tablet 2 mg  Daily         12/21/21 0215     IP VTE HIGH RISK PATIENT  Once         12/21/21 0215     Place sequential compression device  Until discontinued         12/21/21 0215     Reason for No Pharmacological VTE Prophylaxis  Once        Question:  Reasons:  Answer:  Already adequately anticoagulated on oral Anticoagulants    12/21/21 0215                Discharge Planning   FRANCO:      Code Status: Full Code   Is the patient medically ready for discharge?:     Reason for patient still in hospital (select all that apply): Treatment  Discharge Plan A: Home with family                  Harvey Thompson MD  Department of Hospital Medicine   [facility]

## 2021-12-24 PROBLEM — A41.9 SEVERE SEPSIS: Status: ACTIVE | Noted: 2021-12-24

## 2021-12-24 PROBLEM — R65.20 SEVERE SEPSIS: Status: ACTIVE | Noted: 2021-12-24

## 2021-12-24 LAB
ALBUMIN SERPL BCP-MCNC: 2.3 G/DL (ref 3.5–5.2)
ALP SERPL-CCNC: 100 U/L (ref 55–135)
ALT SERPL W/O P-5'-P-CCNC: 22 U/L (ref 10–44)
ANION GAP SERPL CALC-SCNC: 10 MMOL/L (ref 8–16)
ANISOCYTOSIS BLD QL SMEAR: SLIGHT
AST SERPL-CCNC: 21 U/L (ref 10–40)
BASOPHILS # BLD AUTO: ABNORMAL K/UL (ref 0–0.2)
BASOPHILS NFR BLD: 0 % (ref 0–1.9)
BILIRUB SERPL-MCNC: 0.3 MG/DL (ref 0.1–1)
BUN SERPL-MCNC: 11 MG/DL (ref 8–23)
CALCIUM SERPL-MCNC: 8.2 MG/DL (ref 8.7–10.5)
CHLORIDE SERPL-SCNC: 105 MMOL/L (ref 95–110)
CO2 SERPL-SCNC: 29 MMOL/L (ref 23–29)
CREAT SERPL-MCNC: 0.9 MG/DL (ref 0.5–1.4)
DIFFERENTIAL METHOD: ABNORMAL
EOSINOPHIL # BLD AUTO: ABNORMAL K/UL (ref 0–0.5)
EOSINOPHIL NFR BLD: 0 % (ref 0–8)
ERYTHROCYTE [DISTWIDTH] IN BLOOD BY AUTOMATED COUNT: 15.3 % (ref 11.5–14.5)
EST. GFR  (AFRICAN AMERICAN): >60 ML/MIN/1.73 M^2
EST. GFR  (NON AFRICAN AMERICAN): >60 ML/MIN/1.73 M^2
GLUCOSE SERPL-MCNC: 85 MG/DL (ref 70–110)
HCT VFR BLD AUTO: 27.4 % (ref 37–48.5)
HGB BLD-MCNC: 8.5 G/DL (ref 12–16)
IMM GRANULOCYTES # BLD AUTO: ABNORMAL K/UL (ref 0–0.04)
IMM GRANULOCYTES NFR BLD AUTO: ABNORMAL % (ref 0–0.5)
INR PPP: 2.2 (ref 0.8–1.2)
LYMPHOCYTES # BLD AUTO: ABNORMAL K/UL (ref 1–4.8)
LYMPHOCYTES NFR BLD: 11 % (ref 18–48)
MCH RBC QN AUTO: 27 PG (ref 27–31)
MCHC RBC AUTO-ENTMCNC: 31 G/DL (ref 32–36)
MCV RBC AUTO: 87 FL (ref 82–98)
MONOCYTES # BLD AUTO: ABNORMAL K/UL (ref 0.3–1)
MONOCYTES NFR BLD: 5 % (ref 4–15)
NEUTROPHILS NFR BLD: 68 % (ref 38–73)
NEUTS BAND NFR BLD MANUAL: 16 %
NRBC BLD-RTO: 0 /100 WBC
PLATELET # BLD AUTO: 216 K/UL (ref 150–450)
PLATELET BLD QL SMEAR: ABNORMAL
PMV BLD AUTO: 11.5 FL (ref 9.2–12.9)
POCT GLUCOSE: 112 MG/DL (ref 70–110)
POTASSIUM SERPL-SCNC: 3.3 MMOL/L (ref 3.5–5.1)
PROT SERPL-MCNC: 6 G/DL (ref 6–8.4)
PROTHROMBIN TIME: 22.2 SEC (ref 9–12.5)
RBC # BLD AUTO: 3.15 M/UL (ref 4–5.4)
SODIUM SERPL-SCNC: 144 MMOL/L (ref 136–145)
WBC # BLD AUTO: 13.55 K/UL (ref 3.9–12.7)

## 2021-12-24 PROCEDURE — 25000003 PHARM REV CODE 250: Performed by: STUDENT IN AN ORGANIZED HEALTH CARE EDUCATION/TRAINING PROGRAM

## 2021-12-24 PROCEDURE — 21400001 HC TELEMETRY ROOM

## 2021-12-24 PROCEDURE — 25000003 PHARM REV CODE 250: Performed by: INTERNAL MEDICINE

## 2021-12-24 PROCEDURE — 63600175 PHARM REV CODE 636 W HCPCS: Performed by: STUDENT IN AN ORGANIZED HEALTH CARE EDUCATION/TRAINING PROGRAM

## 2021-12-24 PROCEDURE — A4216 STERILE WATER/SALINE, 10 ML: HCPCS | Performed by: EMERGENCY MEDICINE

## 2021-12-24 PROCEDURE — 85027 COMPLETE CBC AUTOMATED: CPT | Performed by: STUDENT IN AN ORGANIZED HEALTH CARE EDUCATION/TRAINING PROGRAM

## 2021-12-24 PROCEDURE — B4185 PARENTERAL SOL 10 GM LIPIDS: HCPCS | Performed by: STUDENT IN AN ORGANIZED HEALTH CARE EDUCATION/TRAINING PROGRAM

## 2021-12-24 PROCEDURE — 25000003 PHARM REV CODE 250: Performed by: EMERGENCY MEDICINE

## 2021-12-24 PROCEDURE — 80053 COMPREHEN METABOLIC PANEL: CPT | Performed by: STUDENT IN AN ORGANIZED HEALTH CARE EDUCATION/TRAINING PROGRAM

## 2021-12-24 PROCEDURE — 63600175 PHARM REV CODE 636 W HCPCS: Performed by: INTERNAL MEDICINE

## 2021-12-24 PROCEDURE — 85610 PROTHROMBIN TIME: CPT | Performed by: STUDENT IN AN ORGANIZED HEALTH CARE EDUCATION/TRAINING PROGRAM

## 2021-12-24 PROCEDURE — 85007 BL SMEAR W/DIFF WBC COUNT: CPT | Performed by: STUDENT IN AN ORGANIZED HEALTH CARE EDUCATION/TRAINING PROGRAM

## 2021-12-24 RX ORDER — FUROSEMIDE 10 MG/ML
40 INJECTION INTRAMUSCULAR; INTRAVENOUS ONCE
Status: COMPLETED | OUTPATIENT
Start: 2021-12-25 | End: 2021-12-25

## 2021-12-24 RX ADMIN — AMIODARONE HYDROCHLORIDE 200 MG: 200 TABLET ORAL at 08:12

## 2021-12-24 RX ADMIN — SOYBEAN OIL 250 ML: 20 INJECTION, SOLUTION INTRAVENOUS at 10:12

## 2021-12-24 RX ADMIN — CHOLECALCIFEROL (VITAMIN D3) 10 MCG (400 UNIT) TABLET 400 UNITS: at 09:12

## 2021-12-24 RX ADMIN — Medication 10 ML: at 08:12

## 2021-12-24 RX ADMIN — MUPIROCIN: 20 OINTMENT TOPICAL at 08:12

## 2021-12-24 RX ADMIN — WARFARIN SODIUM 2 MG: 2 TABLET ORAL at 06:12

## 2021-12-24 RX ADMIN — PREDNISONE 2.5 MG: 2.5 TABLET ORAL at 09:12

## 2021-12-24 RX ADMIN — LORAZEPAM 0.5 MG: 0.5 TABLET ORAL at 10:12

## 2021-12-24 RX ADMIN — RETINOL, ERGOCALCIFEROL, .ALPHA.-TOCOPHEROL ACETATE, DL-, PHYTONADIONE, ASCORBIC ACID, NIACINAMIDE, RIBOFLAVIN 5-PHOSPHATE SODIUM, THIAMINE HYDROCHLORIDE, PYRIDOXINE HYDROCHLORIDE, DEXPANTHENOL, BIOTIN, FOLIC ACID, AND CYANOCOBALAMIN: KIT at 10:12

## 2021-12-24 RX ADMIN — Medication 10 ML: at 11:12

## 2021-12-24 RX ADMIN — PIPERACILLIN AND TAZOBACTAM 4.5 G: 4; .5 INJECTION, POWDER, LYOPHILIZED, FOR SOLUTION INTRAVENOUS; PARENTERAL at 08:12

## 2021-12-24 RX ADMIN — METOPROLOL SUCCINATE 25 MG: 25 TABLET, EXTENDED RELEASE ORAL at 09:12

## 2021-12-24 RX ADMIN — SENNOSIDES AND DOCUSATE SODIUM 1 TABLET: 50; 8.6 TABLET ORAL at 09:12

## 2021-12-24 RX ADMIN — AMIODARONE HYDROCHLORIDE 200 MG: 200 TABLET ORAL at 09:12

## 2021-12-24 RX ADMIN — MUPIROCIN: 20 OINTMENT TOPICAL at 09:12

## 2021-12-24 RX ADMIN — Medication 10 ML: at 06:12

## 2021-12-24 RX ADMIN — CEFTRIAXONE 2 G: 2 INJECTION, SOLUTION INTRAVENOUS at 09:12

## 2021-12-24 RX ADMIN — Medication 10 ML: at 05:12

## 2021-12-24 RX ADMIN — Medication 1 TABLET: at 09:12

## 2021-12-24 RX ADMIN — LEVOTHYROXINE SODIUM 75 MCG: 75 TABLET ORAL at 05:12

## 2021-12-24 RX ADMIN — ASPIRIN 81 MG: 81 TABLET, COATED ORAL at 09:12

## 2021-12-24 RX ADMIN — Medication 10 ML: at 01:12

## 2021-12-24 RX ADMIN — SENNOSIDES AND DOCUSATE SODIUM 1 TABLET: 50; 8.6 TABLET ORAL at 08:12

## 2021-12-24 RX ADMIN — PIPERACILLIN AND TAZOBACTAM 4.5 G: 4; .5 INJECTION, POWDER, LYOPHILIZED, FOR SOLUTION INTRAVENOUS; PARENTERAL at 11:12

## 2021-12-24 RX ADMIN — MELATONIN TAB 3 MG 9 MG: 3 TAB at 10:12

## 2021-12-24 RX ADMIN — MORPHINE SULFATE 2 MG: 4 INJECTION, SOLUTION INTRAMUSCULAR; INTRAVENOUS at 11:12

## 2021-12-24 NOTE — ASSESSMENT & PLAN NOTE
This patient does have evidence of infective focus  My overall impression is sepsis. Vital signs were reviewed and noted in progress note.  Antibiotics given-   Antibiotics (From admission, onward)            Start     Stop Route Frequency Ordered    12/22/21 1000  cefTRIAXone (ROCEPHIN) 2 g/50 mL D5W IVPB        Question:  Is the patient competent?  Answer:  Yes    -- IV Every 24 hours (non-standard times) 12/22/21 0856    12/21/21 0900  mupirocin 2 % ointment         12/26 0859 Nasl 2 times daily 12/21/21 0724        Cultures were taken-   Microbiology Results (last 7 days)     Procedure Component Value Units Date/Time    Blood culture [[id number]] Collected: 12/22/21 2015    Order Status: Completed Specimen: Blood Updated: 12/23/21 2303     Blood Culture, Routine No Growth to date      No Growth to date    Blood culture [[id number]] Collected: 12/22/21 2010    Order Status: Completed Specimen: Blood Updated: 12/23/21 2303     Blood Culture, Routine No Growth to date      No Growth to date    Blood culture x two cultures. Draw prior to antibiotics [[id number]]  (Abnormal) Collected: 12/20/21 1617    Order Status: Completed Specimen: Blood from Peripheral, Forearm, Right Updated: 12/23/21 0729     Blood Culture, Routine Gram stain aer bottle: Gram negative rods      Gram stain nain bottle: Gram negative rods      Results called to and read back by: Alin Flor      12/21/2021 04:33 BML      ESCHERICHIA COLI  For susceptibility see order #[id number]      Narrative:      Aerobic and anaerobic    Blood culture x two cultures. Draw prior to antibiotics [[id number]]  (Abnormal)  (Susceptibility) Collected: 12/20/21 1614    Order Status: Completed Specimen: Blood from Peripheral, Forearm, Right Updated: 12/23/21 0728     Blood Culture, Routine Gram stain aer bottle: Gram negative rods      Gram stain nain bottle: Gram negative rods      Results called to and read back by: Alin Flor      12/21/2021 04:33 BML       ESCHERICHIA COLI    Narrative:      Aerobic and anaerobic        Latest lactate reviewed, they are-  Recent Labs   Lab 12/22/21 2015   LACTATE 2.5*         Source- intra-abdominal/translocation    Source control Achieved by- abx and fluids  WBC elevated and patient not progressing as suspected, expanded back to Zosyn- she does have inflammation in duodenum that could represent infection, potentially point of E.coli translocation.

## 2021-12-24 NOTE — SUBJECTIVE & OBJECTIVE
Review of Systems   Constitutional: Positive for activity change, chills, fatigue and fever. Negative for unexpected weight change.   HENT: Negative.  Negative for sore throat and trouble swallowing.    Eyes: Negative for photophobia and visual disturbance.   Respiratory: Negative.  Negative for chest tightness and shortness of breath.    Cardiovascular: Negative for chest pain, palpitations and leg swelling.   Gastrointestinal: Positive for abdominal pain, nausea and vomiting. Negative for abdominal distention, blood in stool, constipation and diarrhea.   Endocrine: Negative for polyuria.   Genitourinary: Negative.  Negative for difficulty urinating, dysuria and hematuria.   Musculoskeletal: Negative.    Skin: Negative.  Negative for color change.   Allergic/Immunologic: Negative for immunocompromised state.   Neurological: Negative.  Negative for dizziness, seizures, syncope and facial asymmetry.   Psychiatric/Behavioral: Negative.  Negative for agitation, behavioral problems and confusion.     Objective:     Vital Signs (Most Recent):  Temp: 97.9 °F (36.6 °C) (12/24/21 0505)  Pulse: 70 (12/24/21 0505)  Resp: 18 (12/24/21 0505)  BP: (!) 179/78 (reported) (12/24/21 0505)  SpO2: (!) 92 % (12/24/21 0505) Vital Signs (24h Range):  Temp:  [97.9 °F (36.6 °C)-98.9 °F (37.2 °C)] 97.9 °F (36.6 °C)  Pulse:  [61-92] 70  Resp:  [16-19] 18  SpO2:  [92 %-96 %] 92 %  BP: (105-179)/(56-78) 179/78     Weight: 39.9 kg (88 lb)  Body mass index is 17.77 kg/m².    Intake/Output Summary (Last 24 hours) at 12/24/2021 0635  Last data filed at 12/23/2021 1730  Gross per 24 hour   Intake 400 ml   Output --   Net 400 ml      Physical Exam  Vitals and nursing note reviewed.   Constitutional:       General: She is not in acute distress.     Appearance: She is well-developed and normal weight. She is ill-appearing. She is not diaphoretic.   HENT:      Head: Normocephalic and atraumatic.      Nose: Nose normal. No congestion.       Mouth/Throat:      Mouth: Mucous membranes are moist.      Pharynx: No oropharyngeal exudate.   Eyes:      General: No scleral icterus.     Extraocular Movements: Extraocular movements intact.      Pupils: Pupils are equal, round, and reactive to light.   Neck:      Thyroid: No thyromegaly.   Cardiovascular:      Rate and Rhythm: Regular rhythm. Bradycardia present.      Heart sounds: Murmur heard.   No gallop.    Pulmonary:      Effort: Pulmonary effort is normal.      Breath sounds: Normal breath sounds. No stridor. No wheezing or rales.   Abdominal:      General: Abdomen is flat. Bowel sounds are normal. There is no distension.      Palpations: Abdomen is soft. There is no mass.      Tenderness: There is no abdominal tenderness. There is no guarding.   Musculoskeletal:         General: Normal range of motion.      Cervical back: Normal range of motion and neck supple. No rigidity.      Right lower leg: No edema.      Left lower leg: No edema.   Lymphadenopathy:      Cervical: No cervical adenopathy.   Skin:     General: Skin is warm and dry.      Capillary Refill: Capillary refill takes less than 2 seconds.      Findings: No bruising or lesion.   Neurological:      General: No focal deficit present.      Mental Status: She is alert and oriented to person, place, and time.      Cranial Nerves: No cranial nerve deficit.   Psychiatric:         Mood and Affect: Mood normal.         Behavior: Behavior normal.         Thought Content: Thought content normal.         Judgment: Judgment normal.           Recent Results (from the past 24 hour(s))   Echo    Collection Time: 12/23/21 11:07 AM   Result Value Ref Range    BSA 1.29 m2    TDI SEPTAL 0.08 m/s    LV LATERAL E/E' RATIO 11.22 m/s    LV SEPTAL E/E' RATIO 12.63 m/s    LA WIDTH 4.44 cm    TDI LATERAL 0.09 m/s    PV PEAK VELOCITY 0.78 cm/s    LVIDd 4.34 3.5 - 6.0 cm    IVS 0.79 0.6 - 1.1 cm    Posterior Wall 0.96 0.6 - 1.1 cm    LVIDs 3.04 2.1 - 4.0 cm    FS 30 28 - 44  %    LA volume 89.15 cm3    Sinus 2.89 cm    STJ 2.22 cm    Ascending aorta 0.87 cm    LV mass 120.51 g    LA size 4.40 cm    RVDD 3.33 cm    TAPSE 1.91 cm    Left Ventricle Relative Wall Thickness 0.44 cm    AV mean gradient 5 mmHg    AV valve area 1.75 cm2    AV Velocity Ratio 0.72     AV index (prosthetic) 0.74     MV valve area p 1/2 method 4.35 cm2    E/A ratio 1.16     Mean e' 0.09 m/s    E wave deceleration time 174.22 msec    IVRT 121.11 msec    LVOT diameter 1.74 cm    LVOT area 2.4 cm2    LVOT peak henrique 1.00 m/s    LVOT peak VTI 22.78 cm    Ao peak henrique 1.38 m/s    Ao VTI 30.97 cm    LVOT stroke volume 54.14 cm3    AV peak gradient 8 mmHg    E/E' ratio 11.88 m/s    MV Peak E Henrique 1.01 m/s    TR Max Henrique 2.49 m/s    MV stenosis pressure 1/2 time 50.52 ms    MV Peak A Henrique 0.87 m/s    LV Systolic Volume 36.17 mL    LV Systolic Volume Index 27.8 mL/m2    LV Diastolic Volume 85.01 mL    LV Diastolic Volume Index 65.39 mL/m2    LA Volume Index 68.6 mL/m2    LV Mass Index 93 g/m2    RA Major Axis 4.79 cm    Left Atrium Minor Axis 5.09 cm    Left Atrium Major Axis 5.68 cm    Triscuspid Valve Regurgitation Peak Gradient 25 mmHg    RA Width 3.52 cm    Right Atrial Pressure (from IVC) 3 mmHg    EF 60 %    TV rest pulmonary artery pressure 28 mmHg       Microbiology Results (last 7 days)     Procedure Component Value Units Date/Time    Blood culture [[id number]] Collected: 12/22/21 2015    Order Status: Completed Specimen: Blood Updated: 12/23/21 2303     Blood Culture, Routine No Growth to date      No Growth to date    Blood culture [[id number]] Collected: 12/22/21 2010    Order Status: Completed Specimen: Blood Updated: 12/23/21 2303     Blood Culture, Routine No Growth to date      No Growth to date    Blood culture x two cultures. Draw prior to antibiotics [[id number]]  (Abnormal) Collected: 12/20/21 1617    Order Status: Completed Specimen: Blood from Peripheral, Forearm, Right Updated: 12/23/21 0729     Blood Culture,  Routine Gram stain aer bottle: Gram negative rods      Gram stain nain bottle: Gram negative rods      Results called to and read back by: Alin Flor      12/21/2021 04:33 BML      ESCHERICHIA COLI  For susceptibility see order #[id number]      Narrative:      Aerobic and anaerobic    Blood culture x two cultures. Draw prior to antibiotics [[id number]]  (Abnormal)  (Susceptibility) Collected: 12/20/21 1614    Order Status: Completed Specimen: Blood from Peripheral, Forearm, Right Updated: 12/23/21 0728     Blood Culture, Routine Gram stain aer bottle: Gram negative rods      Gram stain nain bottle: Gram negative rods      Results called to and read back by: Alin Flor      12/21/2021 04:33 BML      ESCHERICHIA COLI    Narrative:      Aerobic and anaerobic           Imaging Results          US Abdomen Complete (Final result)  Result time 12/20/21 21:59:34    Final result by Castillo Jimenez MD (12/20/21 21:59:34)                 Impression:      Echogenic appearance of the pancreas, suggestive of acute pancreatitis.    Focal 2.2 x 1.7 x 2.2 cm hypoechoic region within the pancreatic body.  Short-term follow-up with pancreatic CT protocol may be obtained, if there is concern for early pancreatic necrosis.    Status post cholecystectomy.  Intrahepatic and extrahepatic biliary dilatation.      Electronically signed by: Castillo Jimenez MD  Date:    12/20/2021  Time:    21:59             Narrative:    EXAMINATION:  US ABDOMEN COMPLETE    CLINICAL HISTORY:  Acute pancreatitis without necrosis or infection, unspecified    TECHNIQUE:  Complete abdominal ultrasound (including pancreas, aorta, liver, gallbladder, common bile duct, IVC, kidneys, and spleen) was performed.    COMPARISON:  CT abdomen pelvis dated 12/20/2021.    FINDINGS:  The pancreas is echogenic, suggestive of acute inflammation.  There is a 2.2 x 1.7 x 2.2 cm hypoechoic area within the pancreatic body.    The patient is status post cholecystectomy.  There is no  abnormality in the gallbladder fossa.    The CBD is enlarged measuring 1.3 cm.  There is also intrahepatic biliary dilatation.    The liver is normal in appearance.  There are no discrete hepatic masses.    There are unchanged bilateral renal cystic lesions.  There is no evidence of hydroureteronephrosis.    No evidence of free fluid is identified.                               X-Ray Chest AP Portable (Final result)  Result time 12/20/21 20:30:33    Final result by Vadim Humphreys MD (12/20/21 20:30:33)                 Impression:      As above.      Electronically signed by: Vadim Humphreys MD  Date:    12/20/2021  Time:    20:30             Narrative:    EXAMINATION:  XR CHEST AP PORTABLE    CLINICAL HISTORY:  picc placement;    TECHNIQUE:  Single frontal view of the chest was performed.    COMPARISON:  Chest radiograph earlier same day at 16:09 hours    FINDINGS:  Monitoring leads overlie the chest.  Patient is slightly rotated.    Interval placement of right-sided PICC line with tip overlying the mid SVC.  No pneumothorax or new focal opacity.  Cardiomediastinal silhouette is midline and stable.  Otherwise grossly stable chest.                                CT Abdomen Pelvis With Contrast (Final result)  Result time 12/20/21 19:30:51    Final result by Castillo Jimenez MD (12/20/21 19:30:51)                 Impression:      Inflammatory changes identified in the pancreas with marked inflammatory changes surrounding the pancreas, consistent acute pancreatitis.    Inflammatory changes involving the duodenum.  This is most likely secondary to the inflammatory changes in the pancreas.    Additional findings above.    This report was flagged in [employer] as abnormal.      Electronically signed by: Castillo Jimenez MD  Date:    12/20/2021  Time:    19:30             Narrative:    EXAMINATION:  CT ABDOMEN PELVIS WITH CONTRAST    CLINICAL HISTORY:  abd pain and sepsis;    TECHNIQUE:  Low dose axial images, sagittal and coronal  reformations were obtained from the lung bases to the pubic symphysis following the IV administration of 75 cc of Omnipaque 350 .  Oral contrast was not given.    COMPARISON:  CT abdomen pelvis dated 05/16/2018.    FINDINGS:  There are no pleural effusions.  There is no evidence of a pneumothorax.  There are dependent changes in the lung bases.    The heart is enlarged.  There is normal tapering of the abdominal aorta.  There are extensive calcifications along the course of the abdominal aorta and its branch vessels.  The portal veins and mesenteric veins are within normal limits.    There is no evidence of lymphadenopathy in the abdomen or pelvis.    The esophagus and stomach are unremarkable.  There are hazy changes involving the proximal duodenum.  The small bowel loops are unremarkable.  The appendix is within normal limits.  There is colonic diverticula without evidence of acute diverticulitis.    There is a subcentimeter hypodensity in the right hepatic lobe.  This is too small complete characterization.  The patient is status post cholecystectomy.  There is stable appearance of mild intrahepatic biliary dilatation.    The spleen is enlarged.  There are hazy changes around the pancreas.  There is dilatation of the main pancreatic duct.  No peripancreatic fluid collection is identified.  The adrenal glands are unremarkable.    There is no evidence of nephrolithiasis.  There are stable simple cyst in the upper pole of both kidneys.  The ureters and urinary bladder are within normal limits.    There is no evidence of a drainable collection in the abdomen or pelvis.  There is no evidence of free air.  There is no evidence of pneumatosis.  No portal venous air is identified.    The psoas margins are unremarkable.  There are chronic bilateral inferior pubic rami fractures present chronic right-sided superior pubic ramus fracture.  There is grade 1 anterolisthesis of L5 on S1.  No acute fractures identified.                                X-Ray Chest 1 View (Final result)  Result time 12/20/21 16:17:24    Final result by Channing Medina MD (12/20/21 16:17:24)                 Impression:      No acute chest disease identified.    Implanted cardiac loop recorder.      Electronically signed by: Channing Medina MD  Date:    12/20/2021  Time:    16:17             Narrative:    EXAMINATION:  XR CHEST 1 VIEW    CLINICAL HISTORY:  Sepsis;    TECHNIQUE:  Single frontal view of the chest was performed.    COMPARISON:  05/21/2018.    FINDINGS:  There is an implanted cardiac loop recorder present.  The heart is not enlarged.  Atherosclerotic calcification is present within the thoracic aorta.  Superior mediastinal structures are unremarkable.  Pulmonary vasculature is within normal limits.  The lungs are free of focal consolidations.  There is no evidence for pneumothorax or pleural effusions.  Bony structures are grossly intact.

## 2021-12-24 NOTE — PROGRESS NOTES
[facility] Medicine  Progress Note    Patient Name: Katt Mcneal  MRN: [id number]  Patient Class: IP- Inpatient   Admission Date: 12/20/2021  Length of Stay: 4 days  Attending Physician: Harvey Thompson MD  Primary Care Provider: Panchito Perez MD        Subjective:     Principal Problem:Acute recurrent pancreatitis        HPI:   is an 81 YOCF with a PMHx of Atrial fibrillation on Coumadine (due allergy to a DOAC) CAD S/P PCI, pancreatitis and diverticulosis presenting to the ED for severe abdominal pain x 24 hours, worsening this yesterday morning around 9 AM associated with an episode of vomiting as well as shivering and a temp of 102.9 .Pt denied any new or unsual food intake. She also denied diarrhea, or recent travel.   In the ER she was managed with IV fluid pain control and antipyretics. She underwent CT Abd which revealed findings concerning for possible pancreatitis involving the head of the pancreas and duodenum.    SHe is being admitted to the ICU for further cre of the suspected pancreatitis.       Overview/Hospital Course:  Acute pancreatitis with dilated CBD (hx of cholecystectomy), with GNR bacteremia. Presumptive E.coli, will reduce to ceftriaxone. GI discussed with [facility]: no role for endoscopic intervention at this time given lack of suspected biliary obstruction, recommended outpatient eval with clinic visit and potential ERCP to assess PD. Will continue IV abx, antiemetics, and pain control. Received fluids for pancreatitis, a bit hypoxic on oxygen, will single dose IV lasix. Can switch to oral cefpodoxime at discharge, expected END thru 1/3/22 for E.coli bacteremia.           Review of Systems   Constitutional: Positive for activity change, chills, fatigue and fever. Negative for unexpected weight change.   HENT: Negative.  Negative for sore throat and trouble swallowing.    Eyes: Negative for photophobia and visual disturbance.   Respiratory: Negative.  Negative  for chest tightness and shortness of breath.    Cardiovascular: Negative for chest pain, palpitations and leg swelling.   Gastrointestinal: Positive for abdominal pain, nausea and vomiting. Negative for abdominal distention, blood in stool, constipation and diarrhea.   Endocrine: Negative for polyuria.   Genitourinary: Negative.  Negative for difficulty urinating, dysuria and hematuria.   Musculoskeletal: Negative.    Skin: Negative.  Negative for color change.   Allergic/Immunologic: Negative for immunocompromised state.   Neurological: Negative.  Negative for dizziness, seizures, syncope and facial asymmetry.   Psychiatric/Behavioral: Negative.  Negative for agitation, behavioral problems and confusion.     Objective:     Vital Signs (Most Recent):  Temp: 97.9 °F (36.6 °C) (12/24/21 0505)  Pulse: 70 (12/24/21 0505)  Resp: 18 (12/24/21 0505)  BP: (!) 179/78 (reported) (12/24/21 0505)  SpO2: (!) 92 % (12/24/21 0505) Vital Signs (24h Range):  Temp:  [97.9 °F (36.6 °C)-98.9 °F (37.2 °C)] 97.9 °F (36.6 °C)  Pulse:  [61-92] 70  Resp:  [16-19] 18  SpO2:  [92 %-96 %] 92 %  BP: (105-179)/(56-78) 179/78     Weight: 39.9 kg (88 lb)  Body mass index is 17.77 kg/m².    Intake/Output Summary (Last 24 hours) at 12/24/2021 0635  Last data filed at 12/23/2021 1730  Gross per 24 hour   Intake 400 ml   Output --   Net 400 ml      Physical Exam  Vitals and nursing note reviewed.   Constitutional:       General: She is not in acute distress.     Appearance: She is well-developed and normal weight. She is ill-appearing. She is not diaphoretic.   HENT:      Head: Normocephalic and atraumatic.      Nose: Nose normal. No congestion.      Mouth/Throat:      Mouth: Mucous membranes are moist.      Pharynx: No oropharyngeal exudate.   Eyes:      General: No scleral icterus.     Extraocular Movements: Extraocular movements intact.      Pupils: Pupils are equal, round, and reactive to light.   Neck:      Thyroid: No thyromegaly.    Cardiovascular:      Rate and Rhythm: Regular rhythm. Bradycardia present.      Heart sounds: Murmur heard.   No gallop.    Pulmonary:      Effort: Pulmonary effort is normal.      Breath sounds: Normal breath sounds. No stridor. No wheezing or rales.   Abdominal:      General: Abdomen is flat. Bowel sounds are normal. There is no distension.      Palpations: Abdomen is soft. There is no mass.      Tenderness: There is no abdominal tenderness. There is no guarding.   Musculoskeletal:         General: Normal range of motion.      Cervical back: Normal range of motion and neck supple. No rigidity.      Right lower leg: No edema.      Left lower leg: No edema.   Lymphadenopathy:      Cervical: No cervical adenopathy.   Skin:     General: Skin is warm and dry.      Capillary Refill: Capillary refill takes less than 2 seconds.      Findings: No bruising or lesion.   Neurological:      General: No focal deficit present.      Mental Status: She is alert and oriented to person, place, and time.      Cranial Nerves: No cranial nerve deficit.   Psychiatric:         Mood and Affect: Mood normal.         Behavior: Behavior normal.         Thought Content: Thought content normal.         Judgment: Judgment normal.           Recent Results (from the past 24 hour(s))   Echo    Collection Time: 12/23/21 11:07 AM   Result Value Ref Range    BSA 1.29 m2    TDI SEPTAL 0.08 m/s    LV LATERAL E/E' RATIO 11.22 m/s    LV SEPTAL E/E' RATIO 12.63 m/s    LA WIDTH 4.44 cm    TDI LATERAL 0.09 m/s    PV PEAK VELOCITY 0.78 cm/s    LVIDd 4.34 3.5 - 6.0 cm    IVS 0.79 0.6 - 1.1 cm    Posterior Wall 0.96 0.6 - 1.1 cm    LVIDs 3.04 2.1 - 4.0 cm    FS 30 28 - 44 %    LA volume 89.15 cm3    Sinus 2.89 cm    STJ 2.22 cm    Ascending aorta 0.87 cm    LV mass 120.51 g    LA size 4.40 cm    RVDD 3.33 cm    TAPSE 1.91 cm    Left Ventricle Relative Wall Thickness 0.44 cm    AV mean gradient 5 mmHg    AV valve area 1.75 cm2    AV Velocity Ratio 0.72     AV  index (prosthetic) 0.74     MV valve area p 1/2 method 4.35 cm2    E/A ratio 1.16     Mean e' 0.09 m/s    E wave deceleration time 174.22 msec    IVRT 121.11 msec    LVOT diameter 1.74 cm    LVOT area 2.4 cm2    LVOT peak henrique 1.00 m/s    LVOT peak VTI 22.78 cm    Ao peak henrique 1.38 m/s    Ao VTI 30.97 cm    LVOT stroke volume 54.14 cm3    AV peak gradient 8 mmHg    E/E' ratio 11.88 m/s    MV Peak E Henrique 1.01 m/s    TR Max Henrique 2.49 m/s    MV stenosis pressure 1/2 time 50.52 ms    MV Peak A Henrique 0.87 m/s    LV Systolic Volume 36.17 mL    LV Systolic Volume Index 27.8 mL/m2    LV Diastolic Volume 85.01 mL    LV Diastolic Volume Index 65.39 mL/m2    LA Volume Index 68.6 mL/m2    LV Mass Index 93 g/m2    RA Major Axis 4.79 cm    Left Atrium Minor Axis 5.09 cm    Left Atrium Major Axis 5.68 cm    Triscuspid Valve Regurgitation Peak Gradient 25 mmHg    RA Width 3.52 cm    Right Atrial Pressure (from IVC) 3 mmHg    EF 60 %    TV rest pulmonary artery pressure 28 mmHg       Microbiology Results (last 7 days)     Procedure Component Value Units Date/Time    Blood culture [[id number]] Collected: 12/22/21 2015    Order Status: Completed Specimen: Blood Updated: 12/23/21 2303     Blood Culture, Routine No Growth to date      No Growth to date    Blood culture [[id number]] Collected: 12/22/21 2010    Order Status: Completed Specimen: Blood Updated: 12/23/21 2303     Blood Culture, Routine No Growth to date      No Growth to date    Blood culture x two cultures. Draw prior to antibiotics [[id number]]  (Abnormal) Collected: 12/20/21 1617    Order Status: Completed Specimen: Blood from Peripheral, Forearm, Right Updated: 12/23/21 0729     Blood Culture, Routine Gram stain aer bottle: Gram negative rods      Gram stain nain bottle: Gram negative rods      Results called to and read back by: Alin Flor      12/21/2021 04:33 BML      ESCHERICHIA COLI  For susceptibility see order #[id number]      Narrative:      Aerobic and anaerobic     Blood culture x two cultures. Draw prior to antibiotics [[id number]]  (Abnormal)  (Susceptibility) Collected: 12/20/21 1614    Order Status: Completed Specimen: Blood from Peripheral, Forearm, Right Updated: 12/23/21 0728     Blood Culture, Routine Gram stain aer bottle: Gram negative rods      Gram stain nain bottle: Gram negative rods      Results called to and read back by: Alin Flor      12/21/2021 04:33 BML      ESCHERICHIA COLI    Narrative:      Aerobic and anaerobic           Imaging Results          US Abdomen Complete (Final result)  Result time 12/20/21 21:59:34    Final result by Castillo Jimenez MD (12/20/21 21:59:34)                 Impression:      Echogenic appearance of the pancreas, suggestive of acute pancreatitis.    Focal 2.2 x 1.7 x 2.2 cm hypoechoic region within the pancreatic body.  Short-term follow-up with pancreatic CT protocol may be obtained, if there is concern for early pancreatic necrosis.    Status post cholecystectomy.  Intrahepatic and extrahepatic biliary dilatation.      Electronically signed by: Castillo Jimenez MD  Date:    12/20/2021  Time:    21:59             Narrative:    EXAMINATION:  US ABDOMEN COMPLETE    CLINICAL HISTORY:  Acute pancreatitis without necrosis or infection, unspecified    TECHNIQUE:  Complete abdominal ultrasound (including pancreas, aorta, liver, gallbladder, common bile duct, IVC, kidneys, and spleen) was performed.    COMPARISON:  CT abdomen pelvis dated 12/20/2021.    FINDINGS:  The pancreas is echogenic, suggestive of acute inflammation.  There is a 2.2 x 1.7 x 2.2 cm hypoechoic area within the pancreatic body.    The patient is status post cholecystectomy.  There is no abnormality in the gallbladder fossa.    The CBD is enlarged measuring 1.3 cm.  There is also intrahepatic biliary dilatation.    The liver is normal in appearance.  There are no discrete hepatic masses.    There are unchanged bilateral renal cystic lesions.  There is no evidence of  hydroureteronephrosis.    No evidence of free fluid is identified.                               X-Ray Chest AP Portable (Final result)  Result time 12/20/21 20:30:33    Final result by Vadim Humphreys MD (12/20/21 20:30:33)                 Impression:      As above.      Electronically signed by: Vadim Humphreys MD  Date:    12/20/2021  Time:    20:30             Narrative:    EXAMINATION:  XR CHEST AP PORTABLE    CLINICAL HISTORY:  picc placement;    TECHNIQUE:  Single frontal view of the chest was performed.    COMPARISON:  Chest radiograph earlier same day at 16:09 hours    FINDINGS:  Monitoring leads overlie the chest.  Patient is slightly rotated.    Interval placement of right-sided PICC line with tip overlying the mid SVC.  No pneumothorax or new focal opacity.  Cardiomediastinal silhouette is midline and stable.  Otherwise grossly stable chest.                                CT Abdomen Pelvis With Contrast (Final result)  Result time 12/20/21 19:30:51    Final result by Castillo Jimenez MD (12/20/21 19:30:51)                 Impression:      Inflammatory changes identified in the pancreas with marked inflammatory changes surrounding the pancreas, consistent acute pancreatitis.    Inflammatory changes involving the duodenum.  This is most likely secondary to the inflammatory changes in the pancreas.    Additional findings above.    This report was flagged in Epic as abnormal.      Electronically signed by: Castillo Jimenez MD  Date:    12/20/2021  Time:    19:30             Narrative:    EXAMINATION:  CT ABDOMEN PELVIS WITH CONTRAST    CLINICAL HISTORY:  abd pain and sepsis;    TECHNIQUE:  Low dose axial images, sagittal and coronal reformations were obtained from the lung bases to the pubic symphysis following the IV administration of 75 cc of Omnipaque 350 .  Oral contrast was not given.    COMPARISON:  CT abdomen pelvis dated 05/16/2018.    FINDINGS:  There are no pleural effusions.  There is no evidence of a  pneumothorax.  There are dependent changes in the lung bases.    The heart is enlarged.  There is normal tapering of the abdominal aorta.  There are extensive calcifications along the course of the abdominal aorta and its branch vessels.  The portal veins and mesenteric veins are within normal limits.    There is no evidence of lymphadenopathy in the abdomen or pelvis.    The esophagus and stomach are unremarkable.  There are hazy changes involving the proximal duodenum.  The small bowel loops are unremarkable.  The appendix is within normal limits.  There is colonic diverticula without evidence of acute diverticulitis.    There is a subcentimeter hypodensity in the right hepatic lobe.  This is too small complete characterization.  The patient is status post cholecystectomy.  There is stable appearance of mild intrahepatic biliary dilatation.    The spleen is enlarged.  There are hazy changes around the pancreas.  There is dilatation of the main pancreatic duct.  No peripancreatic fluid collection is identified.  The adrenal glands are unremarkable.    There is no evidence of nephrolithiasis.  There are stable simple cyst in the upper pole of both kidneys.  The ureters and urinary bladder are within normal limits.    There is no evidence of a drainable collection in the abdomen or pelvis.  There is no evidence of free air.  There is no evidence of pneumatosis.  No portal venous air is identified.    The psoas margins are unremarkable.  There are chronic bilateral inferior pubic rami fractures present chronic right-sided superior pubic ramus fracture.  There is grade 1 anterolisthesis of L5 on S1.  No acute fractures identified.                               X-Ray Chest 1 View (Final result)  Result time 12/20/21 16:17:24    Final result by Channing Medina MD (12/20/21 16:17:24)                 Impression:      No acute chest disease identified.    Implanted cardiac loop recorder.      Electronically signed by: Channing  MD Carol  Date:    12/20/2021  Time:    16:17             Narrative:    EXAMINATION:  XR CHEST 1 VIEW    CLINICAL HISTORY:  Sepsis;    TECHNIQUE:  Single frontal view of the chest was performed.    COMPARISON:  05/21/2018.    FINDINGS:  There is an implanted cardiac loop recorder present.  The heart is not enlarged.  Atherosclerotic calcification is present within the thoracic aorta.  Superior mediastinal structures are unremarkable.  Pulmonary vasculature is within normal limits.  The lungs are free of focal consolidations.  There is no evidence for pneumothorax or pleural effusions.  Bony structures are grossly intact.                                      Assessment/Plan:      * Acute recurrent pancreatitis  Pt has had previous episodes of pancreatitis.    Lipase elevated in the range of more than 1000.   The pat started on bowel rest with being NPO for now.   Plan to treat conservatively and symptomatically.  Plan to restart clear liquid in the AM and assess for toleratbility  GI can be consulted if no improvement.     -GI discussed with [facility]: no role for endoscopic intervention at this time given lack of suspected biliary obstruction, recommended outpatient eval with clinic visit and potential ERCP to assess PD.   -Will continue IV abx, antiemetics, and pain control.       E coli bacteremia  Acute pancreatitis with dilated CBD (hx of cholecystectomy), with GNR bacteremia.   -Presumptive E.coli, will reduce to ceftriaxone.         Other specified hypothyroidism  Continue home medication of Levothyroxine at 75 mcg po daily.         Dyslipidemia  Continue statin       Essential hypertension  Chronic, stable,   Continue Amlodipine 5 mg po daily   Continue Lisinopril 5 mg po daily           Paroxysmal atrial fibrillation  Currently rate controlled.   Continue rate control with BB with Metoprolol Succinate.  Continue with Warfarin 2.5 mg po daily.         Chronic heart failure with preserved ejection  fraction  -Repeat ECHO, Stable since 2016  -keep euvolemic, BNP elevated >500 and on 2L O2, will give dose of IV lasix        Coronary artery disease involving native coronary artery of native heart without angina pectoris  S/P PCI to LAD, PLB, RCA in 2017 with multiple TONY.  Continue ASA, BB, ACE-I and statin.   No complaints of angina.   Pt is also on AMIODARONE prbably for rhythm control  Pt is recommended for follow up with Cardiology to optimize the medical management.         VTE Risk Mitigation (From admission, onward)         Ordered     warfarin (COUMADIN) tablet 2 mg  Daily         12/21/21 0215     IP VTE HIGH RISK PATIENT  Once         12/21/21 0215     Place sequential compression device  Until discontinued         12/21/21 0215     Reason for No Pharmacological VTE Prophylaxis  Once        Question:  Reasons:  Answer:  Already adequately anticoagulated on oral Anticoagulants    12/21/21 0215                Discharge Planning   FRANCO:      Code Status: Full Code   Is the patient medically ready for discharge?:     Reason for patient still in hospital (select all that apply): Treatment  Discharge Plan A: Home with family                  Harvey Thompson MD  Department of [facility] Medicine   [facility]

## 2021-12-25 LAB
INR PPP: 1.6 (ref 0.8–1.2)
POCT GLUCOSE: 169 MG/DL (ref 70–110)
POCT GLUCOSE: 181 MG/DL (ref 70–110)
POCT GLUCOSE: 191 MG/DL (ref 70–110)
POCT GLUCOSE: 198 MG/DL (ref 70–110)
PROTHROMBIN TIME: 17.2 SEC (ref 9–12.5)

## 2021-12-25 PROCEDURE — 25000003 PHARM REV CODE 250: Performed by: INTERNAL MEDICINE

## 2021-12-25 PROCEDURE — B4185 PARENTERAL SOL 10 GM LIPIDS: HCPCS | Performed by: STUDENT IN AN ORGANIZED HEALTH CARE EDUCATION/TRAINING PROGRAM

## 2021-12-25 PROCEDURE — A4216 STERILE WATER/SALINE, 10 ML: HCPCS | Performed by: EMERGENCY MEDICINE

## 2021-12-25 PROCEDURE — 25000003 PHARM REV CODE 250: Performed by: STUDENT IN AN ORGANIZED HEALTH CARE EDUCATION/TRAINING PROGRAM

## 2021-12-25 PROCEDURE — 21400001 HC TELEMETRY ROOM

## 2021-12-25 PROCEDURE — 63600175 PHARM REV CODE 636 W HCPCS: Performed by: STUDENT IN AN ORGANIZED HEALTH CARE EDUCATION/TRAINING PROGRAM

## 2021-12-25 PROCEDURE — 85610 PROTHROMBIN TIME: CPT | Performed by: STUDENT IN AN ORGANIZED HEALTH CARE EDUCATION/TRAINING PROGRAM

## 2021-12-25 PROCEDURE — 63600175 PHARM REV CODE 636 W HCPCS: Performed by: INTERNAL MEDICINE

## 2021-12-25 PROCEDURE — 25000003 PHARM REV CODE 250: Performed by: EMERGENCY MEDICINE

## 2021-12-25 RX ADMIN — RETINOL, ERGOCALCIFEROL, .ALPHA.-TOCOPHEROL ACETATE, DL-, PHYTONADIONE, ASCORBIC ACID, NIACINAMIDE, RIBOFLAVIN 5-PHOSPHATE SODIUM, THIAMINE HYDROCHLORIDE, PYRIDOXINE HYDROCHLORIDE, DEXPANTHENOL, BIOTIN, FOLIC ACID, AND CYANOCOBALAMIN: KIT at 09:12

## 2021-12-25 RX ADMIN — WARFARIN SODIUM 2 MG: 2 TABLET ORAL at 06:12

## 2021-12-25 RX ADMIN — PIPERACILLIN AND TAZOBACTAM 4.5 G: 4; .5 INJECTION, POWDER, LYOPHILIZED, FOR SOLUTION INTRAVENOUS; PARENTERAL at 06:12

## 2021-12-25 RX ADMIN — LEVOTHYROXINE SODIUM 75 MCG: 75 TABLET ORAL at 05:12

## 2021-12-25 RX ADMIN — Medication 1 TABLET: at 09:12

## 2021-12-25 RX ADMIN — MORPHINE SULFATE 2 MG: 4 INJECTION, SOLUTION INTRAMUSCULAR; INTRAVENOUS at 05:12

## 2021-12-25 RX ADMIN — Medication 10 ML: at 12:12

## 2021-12-25 RX ADMIN — ASPIRIN 81 MG: 81 TABLET, COATED ORAL at 09:12

## 2021-12-25 RX ADMIN — METOPROLOL SUCCINATE 25 MG: 25 TABLET, EXTENDED RELEASE ORAL at 09:12

## 2021-12-25 RX ADMIN — PREDNISONE 2.5 MG: 2.5 TABLET ORAL at 09:12

## 2021-12-25 RX ADMIN — Medication 10 ML: at 06:12

## 2021-12-25 RX ADMIN — PIPERACILLIN AND TAZOBACTAM 4.5 G: 4; .5 INJECTION, POWDER, LYOPHILIZED, FOR SOLUTION INTRAVENOUS; PARENTERAL at 04:12

## 2021-12-25 RX ADMIN — SOYBEAN OIL 250 ML: 20 INJECTION, SOLUTION INTRAVENOUS at 09:12

## 2021-12-25 RX ADMIN — MUPIROCIN: 20 OINTMENT TOPICAL at 09:12

## 2021-12-25 RX ADMIN — CHOLECALCIFEROL (VITAMIN D3) 10 MCG (400 UNIT) TABLET 400 UNITS: at 09:12

## 2021-12-25 RX ADMIN — AMIODARONE HYDROCHLORIDE 200 MG: 200 TABLET ORAL at 09:12

## 2021-12-25 RX ADMIN — MORPHINE SULFATE 2 MG: 4 INJECTION, SOLUTION INTRAMUSCULAR; INTRAVENOUS at 12:12

## 2021-12-25 RX ADMIN — MELATONIN TAB 3 MG 9 MG: 3 TAB at 09:12

## 2021-12-25 RX ADMIN — LORAZEPAM 0.5 MG: 0.5 TABLET ORAL at 09:12

## 2021-12-25 RX ADMIN — PIPERACILLIN AND TAZOBACTAM 4.5 G: 4; .5 INJECTION, POWDER, LYOPHILIZED, FOR SOLUTION INTRAVENOUS; PARENTERAL at 12:12

## 2021-12-25 RX ADMIN — FUROSEMIDE 40 MG: 10 INJECTION, SOLUTION INTRAMUSCULAR; INTRAVENOUS at 09:12

## 2021-12-25 NOTE — SUBJECTIVE & OBJECTIVE
Review of Systems   Constitutional: Positive for activity change, chills, fatigue and fever. Negative for unexpected weight change.   HENT: Negative.  Negative for sore throat and trouble swallowing.    Eyes: Negative for photophobia and visual disturbance.   Respiratory: Negative.  Negative for chest tightness and shortness of breath.    Cardiovascular: Negative for chest pain, palpitations and leg swelling.   Gastrointestinal: Positive for abdominal pain, nausea and vomiting. Negative for abdominal distention, blood in stool, constipation and diarrhea.   Endocrine: Negative for polyuria.   Genitourinary: Negative.  Negative for difficulty urinating, dysuria and hematuria.   Musculoskeletal: Negative.    Skin: Negative.  Negative for color change.   Allergic/Immunologic: Negative for immunocompromised state.   Neurological: Negative.  Negative for dizziness, seizures, syncope and facial asymmetry.   Psychiatric/Behavioral: Negative.  Negative for agitation, behavioral problems and confusion.     Objective:     Vital Signs (Most Recent):  Temp: 97.7 °F (36.5 °C) (12/25/21 0419)  Pulse: 63 (12/25/21 0419)  Resp: 19 (12/25/21 0419)  BP: (!) 169/79 (12/25/21 0419)  SpO2: 95 % (12/25/21 0419) Vital Signs (24h Range):  Temp:  [97.5 °F (36.4 °C)-98.4 °F (36.9 °C)] 97.7 °F (36.5 °C)  Pulse:  [58-76] 63  Resp:  [16-19] 19  SpO2:  [94 %-98 %] 95 %  BP: (108-169)/(59-79) 169/79     Weight: 39.9 kg (88 lb)  Body mass index is 17.77 kg/m².  No intake or output data in the 24 hours ending 12/25/21 0816   Physical Exam  Vitals and nursing note reviewed.   Constitutional:       General: She is not in acute distress.     Appearance: She is well-developed and normal weight. She is ill-appearing. She is not diaphoretic.   HENT:      Head: Normocephalic and atraumatic.      Nose: Nose normal. No congestion.      Mouth/Throat:      Mouth: Mucous membranes are moist.      Pharynx: No oropharyngeal exudate.   Eyes:      General: No  scleral icterus.     Extraocular Movements: Extraocular movements intact.      Pupils: Pupils are equal, round, and reactive to light.   Neck:      Thyroid: No thyromegaly.   Cardiovascular:      Rate and Rhythm: Regular rhythm. Bradycardia present.      Heart sounds: Murmur heard.   No gallop.    Pulmonary:      Effort: Pulmonary effort is normal.      Breath sounds: Normal breath sounds. No stridor. No wheezing or rales.   Abdominal:      General: Abdomen is flat. Bowel sounds are normal. There is no distension.      Palpations: Abdomen is soft. There is no mass.      Tenderness: There is no abdominal tenderness. There is no guarding.   Musculoskeletal:         General: Normal range of motion.      Cervical back: Normal range of motion and neck supple. No rigidity.      Right lower leg: No edema.      Left lower leg: No edema.   Lymphadenopathy:      Cervical: No cervical adenopathy.   Skin:     General: Skin is warm and dry.      Capillary Refill: Capillary refill takes less than 2 seconds.      Findings: No bruising or lesion.   Neurological:      General: No focal deficit present.      Mental Status: She is alert and oriented to person, place, and time.      Cranial Nerves: No cranial nerve deficit.   Psychiatric:         Mood and Affect: Mood normal.         Behavior: Behavior normal.         Thought Content: Thought content normal.         Judgment: Judgment normal.           Recent Results (from the past 24 hour(s))   POCT glucose    Collection Time: 12/24/21  8:37 PM   Result Value Ref Range    POCT Glucose 112 (H) 70 - 110 mg/dL   POCT glucose    Collection Time: 12/25/21  2:40 AM   Result Value Ref Range    POCT Glucose 169 (H) 70 - 110 mg/dL   Protime-INR    Collection Time: 12/25/21  4:11 AM   Result Value Ref Range    Prothrombin Time 17.2 (H) 9.0 - 12.5 sec    INR 1.6 (H) 0.8 - 1.2       Microbiology Results (last 7 days)     Procedure Component Value Units Date/Time    Blood culture [[id number]]  Collected: 12/22/21 2015    Order Status: Completed Specimen: Blood Updated: 12/24/21 2303     Blood Culture, Routine No Growth to date      No Growth to date      No Growth to date    Blood culture [[id number]] Collected: 12/22/21 2010    Order Status: Completed Specimen: Blood Updated: 12/24/21 2303     Blood Culture, Routine No Growth to date      No Growth to date      No Growth to date    Blood culture x two cultures. Draw prior to antibiotics [[id number]]  (Abnormal) Collected: 12/20/21 1617    Order Status: Completed Specimen: Blood from Peripheral, Forearm, Right Updated: 12/23/21 0729     Blood Culture, Routine Gram stain aer bottle: Gram negative rods      Gram stain nain bottle: Gram negative rods      Results called to and read back by: Alin Flor      12/21/2021 04:33 BML      ESCHERICHIA COLI  For susceptibility see order #[id number]      Narrative:      Aerobic and anaerobic    Blood culture x two cultures. Draw prior to antibiotics [[id number]]  (Abnormal)  (Susceptibility) Collected: 12/20/21 1614    Order Status: Completed Specimen: Blood from Peripheral, Forearm, Right Updated: 12/23/21 0728     Blood Culture, Routine Gram stain aer bottle: Gram negative rods      Gram stain nain bottle: Gram negative rods      Results called to and read back by: Alin Flor      12/21/2021 04:33 BML      ESCHERICHIA COLI    Narrative:      Aerobic and anaerobic           Imaging Results          US Abdomen Complete (Final result)  Result time 12/20/21 21:59:34    Final result by Castillo Jimenez MD (12/20/21 21:59:34)                 Impression:      Echogenic appearance of the pancreas, suggestive of acute pancreatitis.    Focal 2.2 x 1.7 x 2.2 cm hypoechoic region within the pancreatic body.  Short-term follow-up with pancreatic CT protocol may be obtained, if there is concern for early pancreatic necrosis.    Status post cholecystectomy.  Intrahepatic and extrahepatic biliary dilatation.      Electronically  signed by: Castillo Jimenez MD  Date:    12/20/2021  Time:    21:59             Narrative:    EXAMINATION:  US ABDOMEN COMPLETE    CLINICAL HISTORY:  Acute pancreatitis without necrosis or infection, unspecified    TECHNIQUE:  Complete abdominal ultrasound (including pancreas, aorta, liver, gallbladder, common bile duct, IVC, kidneys, and spleen) was performed.    COMPARISON:  CT abdomen pelvis dated 12/20/2021.    FINDINGS:  The pancreas is echogenic, suggestive of acute inflammation.  There is a 2.2 x 1.7 x 2.2 cm hypoechoic area within the pancreatic body.    The patient is status post cholecystectomy.  There is no abnormality in the gallbladder fossa.    The CBD is enlarged measuring 1.3 cm.  There is also intrahepatic biliary dilatation.    The liver is normal in appearance.  There are no discrete hepatic masses.    There are unchanged bilateral renal cystic lesions.  There is no evidence of hydroureteronephrosis.    No evidence of free fluid is identified.                               X-Ray Chest AP Portable (Final result)  Result time 12/20/21 20:30:33    Final result by Vadim Humphreys MD (12/20/21 20:30:33)                 Impression:      As above.      Electronically signed by: Vadim Humphreys MD  Date:    12/20/2021  Time:    20:30             Narrative:    EXAMINATION:  XR CHEST AP PORTABLE    CLINICAL HISTORY:  picc placement;    TECHNIQUE:  Single frontal view of the chest was performed.    COMPARISON:  Chest radiograph earlier same day at 16:09 hours    FINDINGS:  Monitoring leads overlie the chest.  Patient is slightly rotated.    Interval placement of right-sided PICC line with tip overlying the mid SVC.  No pneumothorax or new focal opacity.  Cardiomediastinal silhouette is midline and stable.  Otherwise grossly stable chest.                                CT Abdomen Pelvis With Contrast (Final result)  Result time 12/20/21 19:30:51    Final result by Castillo Jimenez MD (12/20/21 19:30:51)                  Impression:      Inflammatory changes identified in the pancreas with marked inflammatory changes surrounding the pancreas, consistent acute pancreatitis.    Inflammatory changes involving the duodenum.  This is most likely secondary to the inflammatory changes in the pancreas.    Additional findings above.    This report was flagged in [employer] as abnormal.      Electronically signed by: Castillo Jimenez MD  Date:    12/20/2021  Time:    19:30             Narrative:    EXAMINATION:  CT ABDOMEN PELVIS WITH CONTRAST    CLINICAL HISTORY:  abd pain and sepsis;    TECHNIQUE:  Low dose axial images, sagittal and coronal reformations were obtained from the lung bases to the pubic symphysis following the IV administration of 75 cc of Omnipaque 350 .  Oral contrast was not given.    COMPARISON:  CT abdomen pelvis dated 05/16/2018.    FINDINGS:  There are no pleural effusions.  There is no evidence of a pneumothorax.  There are dependent changes in the lung bases.    The heart is enlarged.  There is normal tapering of the abdominal aorta.  There are extensive calcifications along the course of the abdominal aorta and its branch vessels.  The portal veins and mesenteric veins are within normal limits.    There is no evidence of lymphadenopathy in the abdomen or pelvis.    The esophagus and stomach are unremarkable.  There are hazy changes involving the proximal duodenum.  The small bowel loops are unremarkable.  The appendix is within normal limits.  There is colonic diverticula without evidence of acute diverticulitis.    There is a subcentimeter hypodensity in the right hepatic lobe.  This is too small complete characterization.  The patient is status post cholecystectomy.  There is stable appearance of mild intrahepatic biliary dilatation.    The spleen is enlarged.  There are hazy changes around the pancreas.  There is dilatation of the main pancreatic duct.  No peripancreatic fluid collection is identified.  The adrenal glands  are unremarkable.    There is no evidence of nephrolithiasis.  There are stable simple cyst in the upper pole of both kidneys.  The ureters and urinary bladder are within normal limits.    There is no evidence of a drainable collection in the abdomen or pelvis.  There is no evidence of free air.  There is no evidence of pneumatosis.  No portal venous air is identified.    The psoas margins are unremarkable.  There are chronic bilateral inferior pubic rami fractures present chronic right-sided superior pubic ramus fracture.  There is grade 1 anterolisthesis of L5 on S1.  No acute fractures identified.                               X-Ray Chest 1 View (Final result)  Result time 12/20/21 16:17:24    Final result by Channing Medina MD (12/20/21 16:17:24)                 Impression:      No acute chest disease identified.    Implanted cardiac loop recorder.      Electronically signed by: Channing Medina MD  Date:    12/20/2021  Time:    16:17             Narrative:    EXAMINATION:  XR CHEST 1 VIEW    CLINICAL HISTORY:  Sepsis;    TECHNIQUE:  Single frontal view of the chest was performed.    COMPARISON:  05/21/2018.    FINDINGS:  There is an implanted cardiac loop recorder present.  The heart is not enlarged.  Atherosclerotic calcification is present within the thoracic aorta.  Superior mediastinal structures are unremarkable.  Pulmonary vasculature is within normal limits.  The lungs are free of focal consolidations.  There is no evidence for pneumothorax or pleural effusions.  Bony structures are grossly intact.

## 2021-12-25 NOTE — PROGRESS NOTES
"[facility]  Progress Note    Patient Name: Katt Mcneal  MRN: [id number]  Patient Class: IP- Inpatient   Admission Date: 12/20/2021  Length of Stay: 5 days  Attending Physician: Harvey Thompson MD  Primary Care Provider: Panchito Perez MD        Subjective:     Principal Problem:Acute recurrent pancreatitis        HPI:   is an 81 YOCF with a PMHx of Atrial fibrillation on Coumadine (due allergy to a DOAC) CAD S/P PCI, pancreatitis and diverticulosis presenting to the ED for severe abdominal pain x 24 hours, worsening this yesterday morning around 9 AM associated with an episode of vomiting as well as shivering and a temp of 102.9 .Pt denied any new or unsual food intake. She also denied diarrhea, or recent travel.   In the ER she was managed with IV fluid pain control and antipyretics. She underwent CT Abd which revealed findings concerning for possible pancreatitis involving the head of the pancreas and duodenum.    SHe is being admitted to the ICU for further cre of the suspected pancreatitis.       Overview/Hospital Course:  Acute pancreatitis with dilated CBD (hx of cholecystectomy), with GNR bacteremia. Presumptive E.coli, will reduce to ceftriaxone. GI discussed with [facility]: no role for endoscopic intervention at this time given lack of suspected biliary obstruction, recommended outpatient eval with clinic visit and potential ERCP to assess PD. Will continue IV abx, antiemetics, and pain control. Received fluids for pancreatitis, a bit hypoxic on oxygen, will single dose IV lasix. Can switch to oral cefpodoxime at discharge, expected END thru 1/3/22 for E.coli bacteremia. Patient still have sudden sharp episodes of "cold" pain in her abdomen that radiates to her back, especially after trying a piece of food. WBC elevated and patient not progressing as suspected, expanded back to Zosyn- she does have inflammation in duodenum that could represent infection, potentially " point of E.coli translocation. Will expand, make NPO another day or two, and give PPN.           Review of Systems   Constitutional: Positive for activity change, chills, fatigue and fever. Negative for unexpected weight change.   HENT: Negative.  Negative for sore throat and trouble swallowing.    Eyes: Negative for photophobia and visual disturbance.   Respiratory: Negative.  Negative for chest tightness and shortness of breath.    Cardiovascular: Negative for chest pain, palpitations and leg swelling.   Gastrointestinal: Positive for abdominal pain, nausea and vomiting. Negative for abdominal distention, blood in stool, constipation and diarrhea.   Endocrine: Negative for polyuria.   Genitourinary: Negative.  Negative for difficulty urinating, dysuria and hematuria.   Musculoskeletal: Negative.    Skin: Negative.  Negative for color change.   Allergic/Immunologic: Negative for immunocompromised state.   Neurological: Negative.  Negative for dizziness, seizures, syncope and facial asymmetry.   Psychiatric/Behavioral: Negative.  Negative for agitation, behavioral problems and confusion.     Objective:     Vital Signs (Most Recent):  Temp: 97.7 °F (36.5 °C) (12/25/21 0419)  Pulse: 63 (12/25/21 0419)  Resp: 19 (12/25/21 0419)  BP: (!) 169/79 (12/25/21 0419)  SpO2: 95 % (12/25/21 0419) Vital Signs (24h Range):  Temp:  [97.5 °F (36.4 °C)-98.4 °F (36.9 °C)] 97.7 °F (36.5 °C)  Pulse:  [58-76] 63  Resp:  [16-19] 19  SpO2:  [94 %-98 %] 95 %  BP: (108-169)/(59-79) 169/79     Weight: 39.9 kg (88 lb)  Body mass index is 17.77 kg/m².  No intake or output data in the 24 hours ending 12/25/21 0816   Physical Exam  Vitals and nursing note reviewed.   Constitutional:       General: She is not in acute distress.     Appearance: She is well-developed and normal weight. She is ill-appearing. She is not diaphoretic.   HENT:      Head: Normocephalic and atraumatic.      Nose: Nose normal. No congestion.      Mouth/Throat:      Mouth:  Mucous membranes are moist.      Pharynx: No oropharyngeal exudate.   Eyes:      General: No scleral icterus.     Extraocular Movements: Extraocular movements intact.      Pupils: Pupils are equal, round, and reactive to light.   Neck:      Thyroid: No thyromegaly.   Cardiovascular:      Rate and Rhythm: Regular rhythm. Bradycardia present.      Heart sounds: Murmur heard.   No gallop.    Pulmonary:      Effort: Pulmonary effort is normal.      Breath sounds: Normal breath sounds. No stridor. No wheezing or rales.   Abdominal:      General: Abdomen is flat. Bowel sounds are normal. There is no distension.      Palpations: Abdomen is soft. There is no mass.      Tenderness: There is no abdominal tenderness. There is no guarding.   Musculoskeletal:         General: Normal range of motion.      Cervical back: Normal range of motion and neck supple. No rigidity.      Right lower leg: No edema.      Left lower leg: No edema.   Lymphadenopathy:      Cervical: No cervical adenopathy.   Skin:     General: Skin is warm and dry.      Capillary Refill: Capillary refill takes less than 2 seconds.      Findings: No bruising or lesion.   Neurological:      General: No focal deficit present.      Mental Status: She is alert and oriented to person, place, and time.      Cranial Nerves: No cranial nerve deficit.   Psychiatric:         Mood and Affect: Mood normal.         Behavior: Behavior normal.         Thought Content: Thought content normal.         Judgment: Judgment normal.           Recent Results (from the past 24 hour(s))   POCT glucose    Collection Time: 12/24/21  8:37 PM   Result Value Ref Range    POCT Glucose 112 (H) 70 - 110 mg/dL   POCT glucose    Collection Time: 12/25/21  2:40 AM   Result Value Ref Range    POCT Glucose 169 (H) 70 - 110 mg/dL   Protime-INR    Collection Time: 12/25/21  4:11 AM   Result Value Ref Range    Prothrombin Time 17.2 (H) 9.0 - 12.5 sec    INR 1.6 (H) 0.8 - 1.2       Microbiology Results  (last 7 days)     Procedure Component Value Units Date/Time    Blood culture [[id number]] Collected: 12/22/21 2015    Order Status: Completed Specimen: Blood Updated: 12/24/21 2303     Blood Culture, Routine No Growth to date      No Growth to date      No Growth to date    Blood culture [[id number]] Collected: 12/22/21 2010    Order Status: Completed Specimen: Blood Updated: 12/24/21 2303     Blood Culture, Routine No Growth to date      No Growth to date      No Growth to date    Blood culture x two cultures. Draw prior to antibiotics [[id number]]  (Abnormal) Collected: 12/20/21 1617    Order Status: Completed Specimen: Blood from Peripheral, Forearm, Right Updated: 12/23/21 0729     Blood Culture, Routine Gram stain aer bottle: Gram negative rods      Gram stain nain bottle: Gram negative rods      Results called to and read back by: Alin Flor      12/21/2021 04:33 BML      ESCHERICHIA COLI  For susceptibility see order #[id number]      Narrative:      Aerobic and anaerobic    Blood culture x two cultures. Draw prior to antibiotics [[id number]]  (Abnormal)  (Susceptibility) Collected: 12/20/21 1614    Order Status: Completed Specimen: Blood from Peripheral, Forearm, Right Updated: 12/23/21 0728     Blood Culture, Routine Gram stain aer bottle: Gram negative rods      Gram stain nain bottle: Gram negative rods      Results called to and read back by: Alin Flor      12/21/2021 04:33 BML      ESCHERICHIA COLI    Narrative:      Aerobic and anaerobic           Imaging Results          US Abdomen Complete (Final result)  Result time 12/20/21 21:59:34    Final result by Castillo Jimenez MD (12/20/21 21:59:34)                 Impression:      Echogenic appearance of the pancreas, suggestive of acute pancreatitis.    Focal 2.2 x 1.7 x 2.2 cm hypoechoic region within the pancreatic body.  Short-term follow-up with pancreatic CT protocol may be obtained, if there is concern for early pancreatic necrosis.    Status post  cholecystectomy.  Intrahepatic and extrahepatic biliary dilatation.      Electronically signed by: Castillo Jimenez MD  Date:    12/20/2021  Time:    21:59             Narrative:    EXAMINATION:  US ABDOMEN COMPLETE    CLINICAL HISTORY:  Acute pancreatitis without necrosis or infection, unspecified    TECHNIQUE:  Complete abdominal ultrasound (including pancreas, aorta, liver, gallbladder, common bile duct, IVC, kidneys, and spleen) was performed.    COMPARISON:  CT abdomen pelvis dated 12/20/2021.    FINDINGS:  The pancreas is echogenic, suggestive of acute inflammation.  There is a 2.2 x 1.7 x 2.2 cm hypoechoic area within the pancreatic body.    The patient is status post cholecystectomy.  There is no abnormality in the gallbladder fossa.    The CBD is enlarged measuring 1.3 cm.  There is also intrahepatic biliary dilatation.    The liver is normal in appearance.  There are no discrete hepatic masses.    There are unchanged bilateral renal cystic lesions.  There is no evidence of hydroureteronephrosis.    No evidence of free fluid is identified.                               X-Ray Chest AP Portable (Final result)  Result time 12/20/21 20:30:33    Final result by Vadim Humphreys MD (12/20/21 20:30:33)                 Impression:      As above.      Electronically signed by: Vadim Humphreys MD  Date:    12/20/2021  Time:    20:30             Narrative:    EXAMINATION:  XR CHEST AP PORTABLE    CLINICAL HISTORY:  picc placement;    TECHNIQUE:  Single frontal view of the chest was performed.    COMPARISON:  Chest radiograph earlier same day at 16:09 hours    FINDINGS:  Monitoring leads overlie the chest.  Patient is slightly rotated.    Interval placement of right-sided PICC line with tip overlying the mid SVC.  No pneumothorax or new focal opacity.  Cardiomediastinal silhouette is midline and stable.  Otherwise grossly stable chest.                                CT Abdomen Pelvis With Contrast (Final result)  Result time  12/20/21 19:30:51    Final result by Castillo Jimenez MD (12/20/21 19:30:51)                 Impression:      Inflammatory changes identified in the pancreas with marked inflammatory changes surrounding the pancreas, consistent acute pancreatitis.    Inflammatory changes involving the duodenum.  This is most likely secondary to the inflammatory changes in the pancreas.    Additional findings above.    This report was flagged in Epic as abnormal.      Electronically signed by: Castillo Jimenez MD  Date:    12/20/2021  Time:    19:30             Narrative:    EXAMINATION:  CT ABDOMEN PELVIS WITH CONTRAST    CLINICAL HISTORY:  abd pain and sepsis;    TECHNIQUE:  Low dose axial images, sagittal and coronal reformations were obtained from the lung bases to the pubic symphysis following the IV administration of 75 cc of Omnipaque 350 .  Oral contrast was not given.    COMPARISON:  CT abdomen pelvis dated 05/16/2018.    FINDINGS:  There are no pleural effusions.  There is no evidence of a pneumothorax.  There are dependent changes in the lung bases.    The heart is enlarged.  There is normal tapering of the abdominal aorta.  There are extensive calcifications along the course of the abdominal aorta and its branch vessels.  The portal veins and mesenteric veins are within normal limits.    There is no evidence of lymphadenopathy in the abdomen or pelvis.    The esophagus and stomach are unremarkable.  There are hazy changes involving the proximal duodenum.  The small bowel loops are unremarkable.  The appendix is within normal limits.  There is colonic diverticula without evidence of acute diverticulitis.    There is a subcentimeter hypodensity in the right hepatic lobe.  This is too small complete characterization.  The patient is status post cholecystectomy.  There is stable appearance of mild intrahepatic biliary dilatation.    The spleen is enlarged.  There are hazy changes around the pancreas.  There is dilatation of the main  pancreatic duct.  No peripancreatic fluid collection is identified.  The adrenal glands are unremarkable.    There is no evidence of nephrolithiasis.  There are stable simple cyst in the upper pole of both kidneys.  The ureters and urinary bladder are within normal limits.    There is no evidence of a drainable collection in the abdomen or pelvis.  There is no evidence of free air.  There is no evidence of pneumatosis.  No portal venous air is identified.    The psoas margins are unremarkable.  There are chronic bilateral inferior pubic rami fractures present chronic right-sided superior pubic ramus fracture.  There is grade 1 anterolisthesis of L5 on S1.  No acute fractures identified.                               X-Ray Chest 1 View (Final result)  Result time 12/20/21 16:17:24    Final result by Channing Medina MD (12/20/21 16:17:24)                 Impression:      No acute chest disease identified.    Implanted cardiac loop recorder.      Electronically signed by: Channing Medina MD  Date:    12/20/2021  Time:    16:17             Narrative:    EXAMINATION:  XR CHEST 1 VIEW    CLINICAL HISTORY:  Sepsis;    TECHNIQUE:  Single frontal view of the chest was performed.    COMPARISON:  05/21/2018.    FINDINGS:  There is an implanted cardiac loop recorder present.  The heart is not enlarged.  Atherosclerotic calcification is present within the thoracic aorta.  Superior mediastinal structures are unremarkable.  Pulmonary vasculature is within normal limits.  The lungs are free of focal consolidations.  There is no evidence for pneumothorax or pleural effusions.  Bony structures are grossly intact.                                      Assessment/Plan:      * Acute recurrent pancreatitis  Pt has had previous episodes of pancreatitis.    Lipase elevated in the range of more than 1000.   The pat started on bowel rest with being NPO for now.   Plan to treat conservatively and symptomatically.  Plan to restart clear liquid  "in the AM and assess for toleratbility  GI can be consulted if no improvement.     -GI discussed with [facility]: no role for endoscopic intervention at this time given lack of suspected biliary obstruction, recommended outpatient eval with clinic visit and potential ERCP to assess PD.   -Patient still have sudden sharp episodes of "cold" pain in her abdomen that radiates to her back, especially after trying a piece of food  -Will make NPO another day or two, and give PPN.   -Will continue IV abx, antiemetics, and pain control.       E coli bacteremia  Acute pancreatitis with dilated CBD (hx of cholecystectomy), with GNR bacteremia.   -Presumptive E.coli, will reduce to ceftriaxone.         Severe sepsis  This patient does have evidence of infective focus  My overall impression is sepsis. Vital signs were reviewed and noted in progress note.  Antibiotics given-   Antibiotics (From admission, onward)            Start     Stop Route Frequency Ordered    12/22/21 1000  cefTRIAXone (ROCEPHIN) 2 g/50 mL D5W IVPB        Question:  Is the patient competent?  Answer:  Yes    -- IV Every 24 hours (non-standard times) 12/22/21 0856    12/21/21 0900  mupirocin 2 % ointment         12/26 0859 Nasl 2 times daily 12/21/21 0724        Cultures were taken-   Microbiology Results (last 7 days)     Procedure Component Value Units Date/Time    Blood culture [[id number]] Collected: 12/22/21 2015    Order Status: Completed Specimen: Blood Updated: 12/23/21 2303     Blood Culture, Routine No Growth to date      No Growth to date    Blood culture [[id number]] Collected: 12/22/21 2010    Order Status: Completed Specimen: Blood Updated: 12/23/21 2303     Blood Culture, Routine No Growth to date      No Growth to date    Blood culture x two cultures. Draw prior to antibiotics [[id number]]  (Abnormal) Collected: 12/20/21 1617    Order Status: Completed Specimen: Blood from Peripheral, Forearm, Right Updated: 12/23/21 0729     Blood Culture, Routine " Gram stain aer bottle: Gram negative rods      Gram stain nain bottle: Gram negative rods      Results called to and read back by: Alin Flor      12/21/2021 04:33 BML      ESCHERICHIA COLI  For susceptibility see order #[id number]      Narrative:      Aerobic and anaerobic    Blood culture x two cultures. Draw prior to antibiotics [[id number]]  (Abnormal)  (Susceptibility) Collected: 12/20/21 1614    Order Status: Completed Specimen: Blood from Peripheral, Forearm, Right Updated: 12/23/21 0728     Blood Culture, Routine Gram stain aer bottle: Gram negative rods      Gram stain nain bottle: Gram negative rods      Results called to and read back by: Alin Flor      12/21/2021 04:33 BM      ESCHERICHIA COLI    Narrative:      Aerobic and anaerobic        Latest lactate reviewed, they are-  Recent Labs   Lab 12/22/21 2015   LACTATE 2.5*         Source- intra-abdominal/translocation    Source control Achieved by- abx and fluids  WBC elevated and patient not progressing as suspected, expanded back to Zosyn- she does have inflammation in duodenum that could represent infection, potentially point of E.coli translocation.     Other specified hypothyroidism  Continue home medication of Levothyroxine at 75 mcg po daily.         Dyslipidemia  Continue statin       Essential hypertension  Chronic, stable,   Continue Amlodipine 5 mg po daily   Continue Lisinopril 5 mg po daily           Paroxysmal atrial fibrillation  Currently rate controlled.   Continue rate control with BB with Metoprolol Succinate.  Continue with Warfarin 2.5 mg po daily.         Chronic heart failure with preserved ejection fraction  -Repeat ECHO, Stable since 2016  -keep euvolemic, BNP elevated >500 and on 2L O2, will give dose of IV lasix        Coronary artery disease involving native coronary artery of native heart without angina pectoris  S/P PCI to LAD, PLB, RCA in 2017 with multiple TONY.  Continue ASA, BB, ACE-I and statin.   No complaints of  angina.   Pt is also on AMIODARONE prbably for rhythm control  Pt is recommended for follow up with Cardiology to optimize the medical management.         VTE Risk Mitigation (From admission, onward)         Ordered     warfarin (COUMADIN) tablet 2 mg  Daily         12/21/21 0215     IP VTE HIGH RISK PATIENT  Once         12/21/21 0215     Place sequential compression device  Until discontinued         12/21/21 0215     Reason for No Pharmacological VTE Prophylaxis  Once        Question:  Reasons:  Answer:  Already adequately anticoagulated on oral Anticoagulants    12/21/21 0215                Discharge Planning   FRANCO:      Code Status: Full Code   Is the patient medically ready for discharge?:     Reason for patient still in hospital (select all that apply): Treatment  Discharge Plan A: Home with family                  Harvey Thompson MD  Department of Hospital Medicine   [facility]

## 2021-12-25 NOTE — ASSESSMENT & PLAN NOTE
"Pt has had previous episodes of pancreatitis.    Lipase elevated in the range of more than 1000.   The pat started on bowel rest with being NPO for now.   Plan to treat conservatively and symptomatically.  Plan to restart clear liquid in the AM and assess for toleratbility  GI can be consulted if no improvement.     -GI discussed with [facility]: no role for endoscopic intervention at this time given lack of suspected biliary obstruction, recommended outpatient eval with clinic visit and potential ERCP to assess PD.   -Patient still have sudden sharp episodes of "cold" pain in her abdomen that radiates to her back, especially after trying a piece of food  -Will make NPO another day or two, and give PPN.   -Will continue IV abx, antiemetics, and pain control.     "

## 2021-12-26 LAB
BACTERIA BLD CULT: NORMAL
BACTERIA BLD CULT: NORMAL
INR PPP: 1.4 (ref 0.8–1.2)
POCT GLUCOSE: 169 MG/DL (ref 70–110)
POCT GLUCOSE: 216 MG/DL (ref 70–110)
PROTHROMBIN TIME: 14.8 SEC (ref 9–12.5)

## 2021-12-26 PROCEDURE — A4216 STERILE WATER/SALINE, 10 ML: HCPCS | Performed by: EMERGENCY MEDICINE

## 2021-12-26 PROCEDURE — 25000003 PHARM REV CODE 250: Performed by: STUDENT IN AN ORGANIZED HEALTH CARE EDUCATION/TRAINING PROGRAM

## 2021-12-26 PROCEDURE — 63600175 PHARM REV CODE 636 W HCPCS: Performed by: INTERNAL MEDICINE

## 2021-12-26 PROCEDURE — 25000003 PHARM REV CODE 250: Performed by: EMERGENCY MEDICINE

## 2021-12-26 PROCEDURE — 85610 PROTHROMBIN TIME: CPT | Performed by: STUDENT IN AN ORGANIZED HEALTH CARE EDUCATION/TRAINING PROGRAM

## 2021-12-26 PROCEDURE — 25000003 PHARM REV CODE 250: Performed by: INTERNAL MEDICINE

## 2021-12-26 PROCEDURE — 63600175 PHARM REV CODE 636 W HCPCS: Performed by: STUDENT IN AN ORGANIZED HEALTH CARE EDUCATION/TRAINING PROGRAM

## 2021-12-26 PROCEDURE — B4185 PARENTERAL SOL 10 GM LIPIDS: HCPCS | Performed by: STUDENT IN AN ORGANIZED HEALTH CARE EDUCATION/TRAINING PROGRAM

## 2021-12-26 PROCEDURE — 21400001 HC TELEMETRY ROOM

## 2021-12-26 RX ADMIN — PIPERACILLIN AND TAZOBACTAM 4.5 G: 4; .5 INJECTION, POWDER, LYOPHILIZED, FOR SOLUTION INTRAVENOUS; PARENTERAL at 07:12

## 2021-12-26 RX ADMIN — METOPROLOL SUCCINATE 25 MG: 25 TABLET, EXTENDED RELEASE ORAL at 09:12

## 2021-12-26 RX ADMIN — AMIODARONE HYDROCHLORIDE 200 MG: 200 TABLET ORAL at 09:12

## 2021-12-26 RX ADMIN — AMIODARONE HYDROCHLORIDE 200 MG: 200 TABLET ORAL at 10:12

## 2021-12-26 RX ADMIN — PIPERACILLIN AND TAZOBACTAM 4.5 G: 4; .5 INJECTION, POWDER, LYOPHILIZED, FOR SOLUTION INTRAVENOUS; PARENTERAL at 01:12

## 2021-12-26 RX ADMIN — PREDNISONE 2.5 MG: 2.5 TABLET ORAL at 09:12

## 2021-12-26 RX ADMIN — LEVOTHYROXINE SODIUM 75 MCG: 75 TABLET ORAL at 06:12

## 2021-12-26 RX ADMIN — CHOLECALCIFEROL (VITAMIN D3) 10 MCG (400 UNIT) TABLET 400 UNITS: at 09:12

## 2021-12-26 RX ADMIN — PIPERACILLIN AND TAZOBACTAM 4.5 G: 4; .5 INJECTION, POWDER, LYOPHILIZED, FOR SOLUTION INTRAVENOUS; PARENTERAL at 03:12

## 2021-12-26 RX ADMIN — LORAZEPAM 0.5 MG: 0.5 TABLET ORAL at 09:12

## 2021-12-26 RX ADMIN — Medication 10 ML: at 01:12

## 2021-12-26 RX ADMIN — ASPIRIN 81 MG: 81 TABLET, COATED ORAL at 09:12

## 2021-12-26 RX ADMIN — Medication 1 TABLET: at 09:12

## 2021-12-26 RX ADMIN — SOYBEAN OIL 250 ML: 20 INJECTION, SOLUTION INTRAVENOUS at 10:12

## 2021-12-26 RX ADMIN — MELATONIN TAB 3 MG 9 MG: 3 TAB at 10:12

## 2021-12-26 RX ADMIN — MORPHINE SULFATE 2 MG: 4 INJECTION, SOLUTION INTRAMUSCULAR; INTRAVENOUS at 10:12

## 2021-12-26 RX ADMIN — WARFARIN SODIUM 2 MG: 2 TABLET ORAL at 06:12

## 2021-12-26 RX ADMIN — Medication 10 ML: at 06:12

## 2021-12-26 RX ADMIN — Medication 10 ML: at 11:12

## 2021-12-26 RX ADMIN — RETINOL, ERGOCALCIFEROL, .ALPHA.-TOCOPHEROL ACETATE, DL-, PHYTONADIONE, ASCORBIC ACID, NIACINAMIDE, RIBOFLAVIN 5-PHOSPHATE SODIUM, THIAMINE HYDROCHLORIDE, PYRIDOXINE HYDROCHLORIDE, DEXPANTHENOL, BIOTIN, FOLIC ACID, AND CYANOCOBALAMIN: KIT at 10:12

## 2021-12-26 NOTE — PROGRESS NOTES
"[facility] Medicine  Progress Note    Patient Name: Katt Mcneal  MRN: [id number]  Patient Class: IP- Inpatient   Admission Date: 12/20/2021  Length of Stay: 6 days  Attending Physician: Harvey Thompson MD  Primary Care Provider: Panchito Perez MD        Subjective:     Principal Problem:Acute recurrent pancreatitis        HPI:   is an 81 YOCF with a PMHx of Atrial fibrillation on Coumadine (due allergy to a DOAC) CAD S/P PCI, pancreatitis and diverticulosis presenting to the ED for severe abdominal pain x 24 hours, worsening this yesterday morning around 9 AM associated with an episode of vomiting as well as shivering and a temp of 102.9 .Pt denied any new or unsual food intake. She also denied diarrhea, or recent travel.   In the ER she was managed with IV fluid pain control and antipyretics. She underwent CT Abd which revealed findings concerning for possible pancreatitis involving the head of the pancreas and duodenum.    SHe is being admitted to the ICU for further cre of the suspected pancreatitis.       Overview/Hospital Course:  Acute pancreatitis with dilated CBD (hx of cholecystectomy), with GNR bacteremia. Presumptive E.coli, will reduce to ceftriaxone. GI discussed with [facility]: no role for endoscopic intervention at this time given lack of suspected biliary obstruction, recommended outpatient eval with clinic visit and potential ERCP to assess PD. Will continue IV abx, antiemetics, and pain control. Received fluids for pancreatitis, a bit hypoxic on oxygen, will single dose IV lasix. Can switch to oral cefpodoxime at discharge, expected END thru 1/3/22 for E.coli bacteremia. Patient still have sudden sharp episodes of "cold" pain in her abdomen that radiates to her back, especially after trying a piece of food. WBC elevated and patient not progressing as suspected, expanded back to Zosyn- she does have inflammation in duodenum that could represent infection, potentially " point of E.coli translocation. Will expand, make NPO another day or two, and give PPN. Will advance diet today.          Review of Systems   Constitutional: Positive for activity change, chills, fatigue and fever. Negative for unexpected weight change.   HENT: Negative.  Negative for sore throat and trouble swallowing.    Eyes: Negative for photophobia and visual disturbance.   Respiratory: Negative.  Negative for chest tightness and shortness of breath.    Cardiovascular: Negative for chest pain, palpitations and leg swelling.   Gastrointestinal: Positive for abdominal pain, nausea and vomiting. Negative for abdominal distention, blood in stool, constipation and diarrhea.   Endocrine: Negative for polyuria.   Genitourinary: Negative.  Negative for difficulty urinating, dysuria and hematuria.   Musculoskeletal: Negative.    Skin: Negative.  Negative for color change.   Allergic/Immunologic: Negative for immunocompromised state.   Neurological: Negative.  Negative for dizziness, seizures, syncope and facial asymmetry.   Psychiatric/Behavioral: Negative.  Negative for agitation, behavioral problems and confusion.     Objective:     Vital Signs (Most Recent):  Temp: 98.7 °F (37.1 °C) (12/26/21 0455)  Pulse: 88 (12/26/21 0455)  Resp: 18 (12/26/21 0455)  BP: 119/69 (12/26/21 0455)  SpO2: 98 % (12/26/21 0455) Vital Signs (24h Range):  Temp:  [97.7 °F (36.5 °C)-98.7 °F (37.1 °C)] 98.7 °F (37.1 °C)  Pulse:  [58-88] 88  Resp:  [16-19] 18  SpO2:  [93 %-98 %] 98 %  BP: (119-176)/(65-81) 119/69     Weight: 40 kg (88 lb 2.9 oz)  Body mass index is 17.81 kg/m².    Intake/Output Summary (Last 24 hours) at 12/26/2021 0822  Last data filed at 12/26/2021 0455  Gross per 24 hour   Intake --   Output 450 ml   Net -450 ml      Physical Exam  Vitals and nursing note reviewed.   Constitutional:       General: She is not in acute distress.     Appearance: She is well-developed and normal weight. She is ill-appearing. She is not  diaphoretic.   HENT:      Head: Normocephalic and atraumatic.      Nose: Nose normal. No congestion.      Mouth/Throat:      Mouth: Mucous membranes are moist.      Pharynx: No oropharyngeal exudate.   Eyes:      General: No scleral icterus.     Extraocular Movements: Extraocular movements intact.      Pupils: Pupils are equal, round, and reactive to light.   Neck:      Thyroid: No thyromegaly.   Cardiovascular:      Rate and Rhythm: Regular rhythm. Bradycardia present.      Heart sounds: Murmur heard.   No gallop.    Pulmonary:      Effort: Pulmonary effort is normal.      Breath sounds: Normal breath sounds. No stridor. No wheezing or rales.   Abdominal:      General: Abdomen is flat. Bowel sounds are normal. There is no distension.      Palpations: Abdomen is soft. There is no mass.      Tenderness: There is no abdominal tenderness. There is no guarding.   Musculoskeletal:         General: Normal range of motion.      Cervical back: Normal range of motion and neck supple. No rigidity.      Right lower leg: No edema.      Left lower leg: No edema.   Lymphadenopathy:      Cervical: No cervical adenopathy.   Skin:     General: Skin is warm and dry.      Capillary Refill: Capillary refill takes less than 2 seconds.      Findings: No bruising or lesion.   Neurological:      General: No focal deficit present.      Mental Status: She is alert and oriented to person, place, and time.      Cranial Nerves: No cranial nerve deficit.   Psychiatric:         Mood and Affect: Mood normal.         Behavior: Behavior normal.         Thought Content: Thought content normal.         Judgment: Judgment normal.           Recent Results (from the past 24 hour(s))   POCT glucose    Collection Time: 12/25/21  8:59 AM   Result Value Ref Range    POCT Glucose 191 (H) 70 - 110 mg/dL   POCT glucose    Collection Time: 12/25/21 12:00 PM   Result Value Ref Range    POCT Glucose 181 (H) 70 - 110 mg/dL   POCT glucose    Collection Time:  12/25/21  8:25 PM   Result Value Ref Range    POCT Glucose 198 (H) 70 - 110 mg/dL   POCT glucose    Collection Time: 12/26/21 12:34 AM   Result Value Ref Range    POCT Glucose 169 (H) 70 - 110 mg/dL   POCT glucose    Collection Time: 12/26/21  6:39 AM   Result Value Ref Range    POCT Glucose 216 (H) 70 - 110 mg/dL       Microbiology Results (last 7 days)     Procedure Component Value Units Date/Time    Blood culture [[id number]] Collected: 12/22/21 2015    Order Status: Completed Specimen: Blood Updated: 12/25/21 2303     Blood Culture, Routine No Growth to date      No Growth to date      No Growth to date      No Growth to date    Blood culture [[id number]] Collected: 12/22/21 2010    Order Status: Completed Specimen: Blood Updated: 12/25/21 2303     Blood Culture, Routine No Growth to date      No Growth to date      No Growth to date      No Growth to date    Blood culture x two cultures. Draw prior to antibiotics [[id number]]  (Abnormal) Collected: 12/20/21 1617    Order Status: Completed Specimen: Blood from Peripheral, Forearm, Right Updated: 12/23/21 0729     Blood Culture, Routine Gram stain aer bottle: Gram negative rods      Gram stain nain bottle: Gram negative rods      Results called to and read back by: Alin Flor      12/21/2021 04:33 BML      ESCHERICHIA COLI  For susceptibility see order #[id number]      Narrative:      Aerobic and anaerobic    Blood culture x two cultures. Draw prior to antibiotics [[id number]]  (Abnormal)  (Susceptibility) Collected: 12/20/21 1614    Order Status: Completed Specimen: Blood from Peripheral, Forearm, Right Updated: 12/23/21 0728     Blood Culture, Routine Gram stain aer bottle: Gram negative rods      Gram stain nain bottle: Gram negative rods      Results called to and read back by: Alin Flor      12/21/2021 04:33 BML      ESCHERICHIA COLI    Narrative:      Aerobic and anaerobic           Imaging Results          US Abdomen Complete (Final result)  Result time  12/20/21 21:59:34    Final result by Castillo Jimenez MD (12/20/21 21:59:34)                 Impression:      Echogenic appearance of the pancreas, suggestive of acute pancreatitis.    Focal 2.2 x 1.7 x 2.2 cm hypoechoic region within the pancreatic body.  Short-term follow-up with pancreatic CT protocol may be obtained, if there is concern for early pancreatic necrosis.    Status post cholecystectomy.  Intrahepatic and extrahepatic biliary dilatation.      Electronically signed by: Castillo Jimenez MD  Date:    12/20/2021  Time:    21:59             Narrative:    EXAMINATION:  US ABDOMEN COMPLETE    CLINICAL HISTORY:  Acute pancreatitis without necrosis or infection, unspecified    TECHNIQUE:  Complete abdominal ultrasound (including pancreas, aorta, liver, gallbladder, common bile duct, IVC, kidneys, and spleen) was performed.    COMPARISON:  CT abdomen pelvis dated 12/20/2021.    FINDINGS:  The pancreas is echogenic, suggestive of acute inflammation.  There is a 2.2 x 1.7 x 2.2 cm hypoechoic area within the pancreatic body.    The patient is status post cholecystectomy.  There is no abnormality in the gallbladder fossa.    The CBD is enlarged measuring 1.3 cm.  There is also intrahepatic biliary dilatation.    The liver is normal in appearance.  There are no discrete hepatic masses.    There are unchanged bilateral renal cystic lesions.  There is no evidence of hydroureteronephrosis.    No evidence of free fluid is identified.                               X-Ray Chest AP Portable (Final result)  Result time 12/20/21 20:30:33    Final result by Vadim Humphreys MD (12/20/21 20:30:33)                 Impression:      As above.      Electronically signed by: Vadim Humphreys MD  Date:    12/20/2021  Time:    20:30             Narrative:    EXAMINATION:  XR CHEST AP PORTABLE    CLINICAL HISTORY:  picc placement;    TECHNIQUE:  Single frontal view of the chest was performed.    COMPARISON:  Chest radiograph earlier same day at  16:09 hours    FINDINGS:  Monitoring leads overlie the chest.  Patient is slightly rotated.    Interval placement of right-sided PICC line with tip overlying the mid SVC.  No pneumothorax or new focal opacity.  Cardiomediastinal silhouette is midline and stable.  Otherwise grossly stable chest.                                CT Abdomen Pelvis With Contrast (Final result)  Result time 12/20/21 19:30:51    Final result by Castillo Jimenez MD (12/20/21 19:30:51)                 Impression:      Inflammatory changes identified in the pancreas with marked inflammatory changes surrounding the pancreas, consistent acute pancreatitis.    Inflammatory changes involving the duodenum.  This is most likely secondary to the inflammatory changes in the pancreas.    Additional findings above.    This report was flagged in Epic as abnormal.      Electronically signed by: Castillo Jimenez MD  Date:    12/20/2021  Time:    19:30             Narrative:    EXAMINATION:  CT ABDOMEN PELVIS WITH CONTRAST    CLINICAL HISTORY:  abd pain and sepsis;    TECHNIQUE:  Low dose axial images, sagittal and coronal reformations were obtained from the lung bases to the pubic symphysis following the IV administration of 75 cc of Omnipaque 350 .  Oral contrast was not given.    COMPARISON:  CT abdomen pelvis dated 05/16/2018.    FINDINGS:  There are no pleural effusions.  There is no evidence of a pneumothorax.  There are dependent changes in the lung bases.    The heart is enlarged.  There is normal tapering of the abdominal aorta.  There are extensive calcifications along the course of the abdominal aorta and its branch vessels.  The portal veins and mesenteric veins are within normal limits.    There is no evidence of lymphadenopathy in the abdomen or pelvis.    The esophagus and stomach are unremarkable.  There are hazy changes involving the proximal duodenum.  The small bowel loops are unremarkable.  The appendix is within normal limits.  There is colonic  diverticula without evidence of acute diverticulitis.    There is a subcentimeter hypodensity in the right hepatic lobe.  This is too small complete characterization.  The patient is status post cholecystectomy.  There is stable appearance of mild intrahepatic biliary dilatation.    The spleen is enlarged.  There are hazy changes around the pancreas.  There is dilatation of the main pancreatic duct.  No peripancreatic fluid collection is identified.  The adrenal glands are unremarkable.    There is no evidence of nephrolithiasis.  There are stable simple cyst in the upper pole of both kidneys.  The ureters and urinary bladder are within normal limits.    There is no evidence of a drainable collection in the abdomen or pelvis.  There is no evidence of free air.  There is no evidence of pneumatosis.  No portal venous air is identified.    The psoas margins are unremarkable.  There are chronic bilateral inferior pubic rami fractures present chronic right-sided superior pubic ramus fracture.  There is grade 1 anterolisthesis of L5 on S1.  No acute fractures identified.                               X-Ray Chest 1 View (Final result)  Result time 12/20/21 16:17:24    Final result by Channing Medina MD (12/20/21 16:17:24)                 Impression:      No acute chest disease identified.    Implanted cardiac loop recorder.      Electronically signed by: Channing Medina MD  Date:    12/20/2021  Time:    16:17             Narrative:    EXAMINATION:  XR CHEST 1 VIEW    CLINICAL HISTORY:  Sepsis;    TECHNIQUE:  Single frontal view of the chest was performed.    COMPARISON:  05/21/2018.    FINDINGS:  There is an implanted cardiac loop recorder present.  The heart is not enlarged.  Atherosclerotic calcification is present within the thoracic aorta.  Superior mediastinal structures are unremarkable.  Pulmonary vasculature is within normal limits.  The lungs are free of focal consolidations.  There is no evidence for pneumothorax  "or pleural effusions.  Bony structures are grossly intact.                                      Assessment/Plan:      * Acute recurrent pancreatitis  Pt has had previous episodes of pancreatitis.    Lipase elevated in the range of more than 1000.   The pat started on bowel rest with being NPO for now.   Plan to treat conservatively and symptomatically.  Plan to restart clear liquid in the AM and assess for toleratbility  GI can be consulted if no improvement.     -GI discussed with [facility]: no role for endoscopic intervention at this time given lack of suspected biliary obstruction, recommended outpatient eval with clinic visit and potential ERCP to assess PD.   -Patient still have sudden sharp episodes of "cold" pain in her abdomen that radiates to her back, especially after trying a piece of food  -Will make NPO another day or two, and give PPN.   -Will continue IV abx, antiemetics, and pain control.       E coli bacteremia  Acute pancreatitis with dilated CBD (hx of cholecystectomy), with GNR bacteremia.   -Presumptive E.coli, will reduce to ceftriaxone.         Severe sepsis  This patient does have evidence of infective focus  My overall impression is sepsis. Vital signs were reviewed and noted in progress note.  Antibiotics given-   Antibiotics (From admission, onward)            Start     Stop Route Frequency Ordered    12/22/21 1000  cefTRIAXone (ROCEPHIN) 2 g/50 mL D5W IVPB        Question:  Is the patient competent?  Answer:  Yes    -- IV Every 24 hours (non-standard times) 12/22/21 0856    12/21/21 0900  mupirocin 2 % ointment         12/26 0859 Nasl 2 times daily 12/21/21 0724        Cultures were taken-   Microbiology Results (last 7 days)     Procedure Component Value Units Date/Time    Blood culture [[id number]] Collected: 12/22/21 2015    Order Status: Completed Specimen: Blood Updated: 12/23/21 2303     Blood Culture, Routine No Growth to date      No Growth to date    Blood culture [[id number]] Collected: " 12/22/21 2010    Order Status: Completed Specimen: Blood Updated: 12/23/21 2303     Blood Culture, Routine No Growth to date      No Growth to date    Blood culture x two cultures. Draw prior to antibiotics [[id number]]  (Abnormal) Collected: 12/20/21 1617    Order Status: Completed Specimen: Blood from Peripheral, Forearm, Right Updated: 12/23/21 0729     Blood Culture, Routine Gram stain aer bottle: Gram negative rods      Gram stain nain bottle: Gram negative rods      Results called to and read back by: Alin Flor      12/21/2021 04:33 [facility]      ESCHERICHIA COLI  For susceptibility see order #[id number]      Narrative:      Aerobic and anaerobic    Blood culture x two cultures. Draw prior to antibiotics [[id number]]  (Abnormal)  (Susceptibility) Collected: 12/20/21 1614    Order Status: Completed Specimen: Blood from Peripheral, Forearm, Right Updated: 12/23/21 0728     Blood Culture, Routine Gram stain aer bottle: Gram negative rods      Gram stain nain bottle: Gram negative rods      Results called to and read back by: Alin Flor      12/21/2021 04:33 [facility]      ESCHERICHIA COLI    Narrative:      Aerobic and anaerobic        Latest lactate reviewed, they are-  Recent Labs   Lab 12/22/21 2015   LACTATE 2.5*         Source- intra-abdominal/translocation    Source control Achieved by- abx and fluids  WBC elevated and patient not progressing as suspected, expanded back to Zosyn- she does have inflammation in duodenum that could represent infection, potentially point of E.coli translocation.     Other specified hypothyroidism  Continue home medication of Levothyroxine at 75 mcg po daily.         Dyslipidemia  Continue statin       Essential hypertension  Chronic, stable,   Continue Amlodipine 5 mg po daily   Continue Lisinopril 5 mg po daily           Paroxysmal atrial fibrillation  Currently rate controlled.   Continue rate control with BB with Metoprolol Succinate.  Continue with Warfarin 2.5 mg po daily.          Chronic heart failure with preserved ejection fraction  -Repeat ECHO, Stable since 2016  -keep euvolemic, BNP elevated >500 and on 2L O2, will give dose of IV lasix        Coronary artery disease involving native coronary artery of native heart without angina pectoris  S/P PCI to LAD, PLB, RCA in 2017 with multiple TONY.  Continue ASA, BB, ACE-I and statin.   No complaints of angina.   Pt is also on AMIODARONE prbably for rhythm control  Pt is recommended for follow up with Cardiology to optimize the medical management.         VTE Risk Mitigation (From admission, onward)         Ordered     warfarin (COUMADIN) tablet 2 mg  Daily         12/21/21 0215     IP VTE HIGH RISK PATIENT  Once         12/21/21 0215     Place sequential compression device  Until discontinued         12/21/21 0215     Reason for No Pharmacological VTE Prophylaxis  Once        Question:  Reasons:  Answer:  Already adequately anticoagulated on oral Anticoagulants    12/21/21 0215                Discharge Planning   FRANCO:      Code Status: Full Code   Is the patient medically ready for discharge?:     Reason for patient still in hospital (select all that apply): Treatment  Discharge Plan A: Home with family                  Harvey Thompson MD  Department of Hospital Medicine   [facility] - Telemetry

## 2021-12-26 NOTE — SUBJECTIVE & OBJECTIVE
Review of Systems   Constitutional: Positive for activity change, chills, fatigue and fever. Negative for unexpected weight change.   HENT: Negative.  Negative for sore throat and trouble swallowing.    Eyes: Negative for photophobia and visual disturbance.   Respiratory: Negative.  Negative for chest tightness and shortness of breath.    Cardiovascular: Negative for chest pain, palpitations and leg swelling.   Gastrointestinal: Positive for abdominal pain, nausea and vomiting. Negative for abdominal distention, blood in stool, constipation and diarrhea.   Endocrine: Negative for polyuria.   Genitourinary: Negative.  Negative for difficulty urinating, dysuria and hematuria.   Musculoskeletal: Negative.    Skin: Negative.  Negative for color change.   Allergic/Immunologic: Negative for immunocompromised state.   Neurological: Negative.  Negative for dizziness, seizures, syncope and facial asymmetry.   Psychiatric/Behavioral: Negative.  Negative for agitation, behavioral problems and confusion.     Objective:     Vital Signs (Most Recent):  Temp: 98.7 °F (37.1 °C) (12/26/21 0455)  Pulse: 88 (12/26/21 0455)  Resp: 18 (12/26/21 0455)  BP: 119/69 (12/26/21 0455)  SpO2: 98 % (12/26/21 0455) Vital Signs (24h Range):  Temp:  [97.7 °F (36.5 °C)-98.7 °F (37.1 °C)] 98.7 °F (37.1 °C)  Pulse:  [58-88] 88  Resp:  [16-19] 18  SpO2:  [93 %-98 %] 98 %  BP: (119-176)/(65-81) 119/69     Weight: 40 kg (88 lb 2.9 oz)  Body mass index is 17.81 kg/m².    Intake/Output Summary (Last 24 hours) at 12/26/2021 0822  Last data filed at 12/26/2021 0455  Gross per 24 hour   Intake --   Output 450 ml   Net -450 ml      Physical Exam  Vitals and nursing note reviewed.   Constitutional:       General: She is not in acute distress.     Appearance: She is well-developed and normal weight. She is ill-appearing. She is not diaphoretic.   HENT:      Head: Normocephalic and atraumatic.      Nose: Nose normal. No congestion.      Mouth/Throat:       Mouth: Mucous membranes are moist.      Pharynx: No oropharyngeal exudate.   Eyes:      General: No scleral icterus.     Extraocular Movements: Extraocular movements intact.      Pupils: Pupils are equal, round, and reactive to light.   Neck:      Thyroid: No thyromegaly.   Cardiovascular:      Rate and Rhythm: Regular rhythm. Bradycardia present.      Heart sounds: Murmur heard.   No gallop.    Pulmonary:      Effort: Pulmonary effort is normal.      Breath sounds: Normal breath sounds. No stridor. No wheezing or rales.   Abdominal:      General: Abdomen is flat. Bowel sounds are normal. There is no distension.      Palpations: Abdomen is soft. There is no mass.      Tenderness: There is no abdominal tenderness. There is no guarding.   Musculoskeletal:         General: Normal range of motion.      Cervical back: Normal range of motion and neck supple. No rigidity.      Right lower leg: No edema.      Left lower leg: No edema.   Lymphadenopathy:      Cervical: No cervical adenopathy.   Skin:     General: Skin is warm and dry.      Capillary Refill: Capillary refill takes less than 2 seconds.      Findings: No bruising or lesion.   Neurological:      General: No focal deficit present.      Mental Status: She is alert and oriented to person, place, and time.      Cranial Nerves: No cranial nerve deficit.   Psychiatric:         Mood and Affect: Mood normal.         Behavior: Behavior normal.         Thought Content: Thought content normal.         Judgment: Judgment normal.           Recent Results (from the past 24 hour(s))   POCT glucose    Collection Time: 12/25/21  8:59 AM   Result Value Ref Range    POCT Glucose 191 (H) 70 - 110 mg/dL   POCT glucose    Collection Time: 12/25/21 12:00 PM   Result Value Ref Range    POCT Glucose 181 (H) 70 - 110 mg/dL   POCT glucose    Collection Time: 12/25/21  8:25 PM   Result Value Ref Range    POCT Glucose 198 (H) 70 - 110 mg/dL   POCT glucose    Collection Time: 12/26/21 12:34  AM   Result Value Ref Range    POCT Glucose 169 (H) 70 - 110 mg/dL   POCT glucose    Collection Time: 12/26/21  6:39 AM   Result Value Ref Range    POCT Glucose 216 (H) 70 - 110 mg/dL       Microbiology Results (last 7 days)     Procedure Component Value Units Date/Time    Blood culture [[id number]] Collected: 12/22/21 2015    Order Status: Completed Specimen: Blood Updated: 12/25/21 2303     Blood Culture, Routine No Growth to date      No Growth to date      No Growth to date      No Growth to date    Blood culture [[id number]] Collected: 12/22/21 2010    Order Status: Completed Specimen: Blood Updated: 12/25/21 2303     Blood Culture, Routine No Growth to date      No Growth to date      No Growth to date      No Growth to date    Blood culture x two cultures. Draw prior to antibiotics [[id number]]  (Abnormal) Collected: 12/20/21 1617    Order Status: Completed Specimen: Blood from Peripheral, Forearm, Right Updated: 12/23/21 0729     Blood Culture, Routine Gram stain aer bottle: Gram negative rods      Gram stain nain bottle: Gram negative rods      Results called to and read back by: Alin Flor      12/21/2021 04:33 BML      ESCHERICHIA COLI  For susceptibility see order #[id number]      Narrative:      Aerobic and anaerobic    Blood culture x two cultures. Draw prior to antibiotics [[id number]]  (Abnormal)  (Susceptibility) Collected: 12/20/21 1614    Order Status: Completed Specimen: Blood from Peripheral, Forearm, Right Updated: 12/23/21 0728     Blood Culture, Routine Gram stain aer bottle: Gram negative rods      Gram stain nain bottle: Gram negative rods      Results called to and read back by: Alin Flor      12/21/2021 04:33 BML      ESCHERICHIA COLI    Narrative:      Aerobic and anaerobic           Imaging Results          US Abdomen Complete (Final result)  Result time 12/20/21 21:59:34    Final result by Castillo Jimenez MD (12/20/21 21:59:34)                 Impression:      Echogenic appearance of  the pancreas, suggestive of acute pancreatitis.    Focal 2.2 x 1.7 x 2.2 cm hypoechoic region within the pancreatic body.  Short-term follow-up with pancreatic CT protocol may be obtained, if there is concern for early pancreatic necrosis.    Status post cholecystectomy.  Intrahepatic and extrahepatic biliary dilatation.      Electronically signed by: Castillo Jimenez MD  Date:    12/20/2021  Time:    21:59             Narrative:    EXAMINATION:  US ABDOMEN COMPLETE    CLINICAL HISTORY:  Acute pancreatitis without necrosis or infection, unspecified    TECHNIQUE:  Complete abdominal ultrasound (including pancreas, aorta, liver, gallbladder, common bile duct, IVC, kidneys, and spleen) was performed.    COMPARISON:  CT abdomen pelvis dated 12/20/2021.    FINDINGS:  The pancreas is echogenic, suggestive of acute inflammation.  There is a 2.2 x 1.7 x 2.2 cm hypoechoic area within the pancreatic body.    The patient is status post cholecystectomy.  There is no abnormality in the gallbladder fossa.    The CBD is enlarged measuring 1.3 cm.  There is also intrahepatic biliary dilatation.    The liver is normal in appearance.  There are no discrete hepatic masses.    There are unchanged bilateral renal cystic lesions.  There is no evidence of hydroureteronephrosis.    No evidence of free fluid is identified.                               X-Ray Chest AP Portable (Final result)  Result time 12/20/21 20:30:33    Final result by Vadim Humphreys MD (12/20/21 20:30:33)                 Impression:      As above.      Electronically signed by: Vadim Humphreys MD  Date:    12/20/2021  Time:    20:30             Narrative:    EXAMINATION:  XR CHEST AP PORTABLE    CLINICAL HISTORY:  picc placement;    TECHNIQUE:  Single frontal view of the chest was performed.    COMPARISON:  Chest radiograph earlier same day at 16:09 hours    FINDINGS:  Monitoring leads overlie the chest.  Patient is slightly rotated.    Interval placement of right-sided PICC  line with tip overlying the mid SVC.  No pneumothorax or new focal opacity.  Cardiomediastinal silhouette is midline and stable.  Otherwise grossly stable chest.                                CT Abdomen Pelvis With Contrast (Final result)  Result time 12/20/21 19:30:51    Final result by Castillo Jimenez MD (12/20/21 19:30:51)                 Impression:      Inflammatory changes identified in the pancreas with marked inflammatory changes surrounding the pancreas, consistent acute pancreatitis.    Inflammatory changes involving the duodenum.  This is most likely secondary to the inflammatory changes in the pancreas.    Additional findings above.    This report was flagged in Epic as abnormal.      Electronically signed by: Castillo Jimenez MD  Date:    12/20/2021  Time:    19:30             Narrative:    EXAMINATION:  CT ABDOMEN PELVIS WITH CONTRAST    CLINICAL HISTORY:  abd pain and sepsis;    TECHNIQUE:  Low dose axial images, sagittal and coronal reformations were obtained from the lung bases to the pubic symphysis following the IV administration of 75 cc of Omnipaque 350 .  Oral contrast was not given.    COMPARISON:  CT abdomen pelvis dated 05/16/2018.    FINDINGS:  There are no pleural effusions.  There is no evidence of a pneumothorax.  There are dependent changes in the lung bases.    The heart is enlarged.  There is normal tapering of the abdominal aorta.  There are extensive calcifications along the course of the abdominal aorta and its branch vessels.  The portal veins and mesenteric veins are within normal limits.    There is no evidence of lymphadenopathy in the abdomen or pelvis.    The esophagus and stomach are unremarkable.  There are hazy changes involving the proximal duodenum.  The small bowel loops are unremarkable.  The appendix is within normal limits.  There is colonic diverticula without evidence of acute diverticulitis.    There is a subcentimeter hypodensity in the right hepatic lobe.  This is too  small complete characterization.  The patient is status post cholecystectomy.  There is stable appearance of mild intrahepatic biliary dilatation.    The spleen is enlarged.  There are hazy changes around the pancreas.  There is dilatation of the main pancreatic duct.  No peripancreatic fluid collection is identified.  The adrenal glands are unremarkable.    There is no evidence of nephrolithiasis.  There are stable simple cyst in the upper pole of both kidneys.  The ureters and urinary bladder are within normal limits.    There is no evidence of a drainable collection in the abdomen or pelvis.  There is no evidence of free air.  There is no evidence of pneumatosis.  No portal venous air is identified.    The psoas margins are unremarkable.  There are chronic bilateral inferior pubic rami fractures present chronic right-sided superior pubic ramus fracture.  There is grade 1 anterolisthesis of L5 on S1.  No acute fractures identified.                               X-Ray Chest 1 View (Final result)  Result time 12/20/21 16:17:24    Final result by Channing Medina MD (12/20/21 16:17:24)                 Impression:      No acute chest disease identified.    Implanted cardiac loop recorder.      Electronically signed by: Channing Medina MD  Date:    12/20/2021  Time:    16:17             Narrative:    EXAMINATION:  XR CHEST 1 VIEW    CLINICAL HISTORY:  Sepsis;    TECHNIQUE:  Single frontal view of the chest was performed.    COMPARISON:  05/21/2018.    FINDINGS:  There is an implanted cardiac loop recorder present.  The heart is not enlarged.  Atherosclerotic calcification is present within the thoracic aorta.  Superior mediastinal structures are unremarkable.  Pulmonary vasculature is within normal limits.  The lungs are free of focal consolidations.  There is no evidence for pneumothorax or pleural effusions.  Bony structures are grossly intact.

## 2021-12-27 LAB
ANISOCYTOSIS BLD QL SMEAR: SLIGHT
BASOPHILS # BLD AUTO: ABNORMAL K/UL (ref 0–0.2)
BASOPHILS NFR BLD: 0 % (ref 0–1.9)
DIFFERENTIAL METHOD: ABNORMAL
EOSINOPHIL # BLD AUTO: ABNORMAL K/UL (ref 0–0.5)
EOSINOPHIL NFR BLD: 6 % (ref 0–8)
ERYTHROCYTE [DISTWIDTH] IN BLOOD BY AUTOMATED COUNT: 14.4 % (ref 11.5–14.5)
HCT VFR BLD AUTO: 29.2 % (ref 37–48.5)
HGB BLD-MCNC: 9.5 G/DL (ref 12–16)
HYPOCHROMIA BLD QL SMEAR: ABNORMAL
IMM GRANULOCYTES # BLD AUTO: ABNORMAL K/UL (ref 0–0.04)
IMM GRANULOCYTES NFR BLD AUTO: ABNORMAL % (ref 0–0.5)
INR PPP: 1.6 (ref 0.8–1.2)
LYMPHOCYTES # BLD AUTO: ABNORMAL K/UL (ref 1–4.8)
LYMPHOCYTES NFR BLD: 16 % (ref 18–48)
MCH RBC QN AUTO: 27.9 PG (ref 27–31)
MCHC RBC AUTO-ENTMCNC: 32.5 G/DL (ref 32–36)
MCV RBC AUTO: 86 FL (ref 82–98)
MONOCYTES # BLD AUTO: ABNORMAL K/UL (ref 0.3–1)
MONOCYTES NFR BLD: 5 % (ref 4–15)
NEUTROPHILS NFR BLD: 71 % (ref 38–73)
NEUTS BAND NFR BLD MANUAL: 2 %
NRBC BLD-RTO: 0 /100 WBC
PLATELET # BLD AUTO: 235 K/UL (ref 150–450)
PLATELET BLD QL SMEAR: ABNORMAL
PMV BLD AUTO: 11.6 FL (ref 9.2–12.9)
POCT GLUCOSE: 147 MG/DL (ref 70–110)
POCT GLUCOSE: 189 MG/DL (ref 70–110)
POCT GLUCOSE: 217 MG/DL (ref 70–110)
PROTHROMBIN TIME: 16.6 SEC (ref 9–12.5)
RBC # BLD AUTO: 3.41 M/UL (ref 4–5.4)
WBC # BLD AUTO: 11.23 K/UL (ref 3.9–12.7)

## 2021-12-27 PROCEDURE — 85007 BL SMEAR W/DIFF WBC COUNT: CPT | Performed by: STUDENT IN AN ORGANIZED HEALTH CARE EDUCATION/TRAINING PROGRAM

## 2021-12-27 PROCEDURE — 21400001 HC TELEMETRY ROOM

## 2021-12-27 PROCEDURE — 25000003 PHARM REV CODE 250: Performed by: INTERNAL MEDICINE

## 2021-12-27 PROCEDURE — 99231 PR SUBSEQUENT HOSPITAL CARE,LEVL I: ICD-10-PCS | Mod: ,,, | Performed by: STUDENT IN AN ORGANIZED HEALTH CARE EDUCATION/TRAINING PROGRAM

## 2021-12-27 PROCEDURE — 97530 THERAPEUTIC ACTIVITIES: CPT

## 2021-12-27 PROCEDURE — 97161 PT EVAL LOW COMPLEX 20 MIN: CPT

## 2021-12-27 PROCEDURE — 99231 SBSQ HOSP IP/OBS SF/LOW 25: CPT | Mod: ,,, | Performed by: STUDENT IN AN ORGANIZED HEALTH CARE EDUCATION/TRAINING PROGRAM

## 2021-12-27 PROCEDURE — 85610 PROTHROMBIN TIME: CPT | Performed by: STUDENT IN AN ORGANIZED HEALTH CARE EDUCATION/TRAINING PROGRAM

## 2021-12-27 PROCEDURE — 63600175 PHARM REV CODE 636 W HCPCS: Performed by: INTERNAL MEDICINE

## 2021-12-27 PROCEDURE — 97165 OT EVAL LOW COMPLEX 30 MIN: CPT

## 2021-12-27 PROCEDURE — A4216 STERILE WATER/SALINE, 10 ML: HCPCS | Performed by: EMERGENCY MEDICINE

## 2021-12-27 PROCEDURE — 25000003 PHARM REV CODE 250: Performed by: STUDENT IN AN ORGANIZED HEALTH CARE EDUCATION/TRAINING PROGRAM

## 2021-12-27 PROCEDURE — B4185 PARENTERAL SOL 10 GM LIPIDS: HCPCS | Performed by: STUDENT IN AN ORGANIZED HEALTH CARE EDUCATION/TRAINING PROGRAM

## 2021-12-27 PROCEDURE — 63600175 PHARM REV CODE 636 W HCPCS: Performed by: STUDENT IN AN ORGANIZED HEALTH CARE EDUCATION/TRAINING PROGRAM

## 2021-12-27 PROCEDURE — 25000003 PHARM REV CODE 250: Performed by: EMERGENCY MEDICINE

## 2021-12-27 PROCEDURE — 85027 COMPLETE CBC AUTOMATED: CPT | Performed by: STUDENT IN AN ORGANIZED HEALTH CARE EDUCATION/TRAINING PROGRAM

## 2021-12-27 RX ORDER — WARFARIN 3 MG/1
3 TABLET ORAL DAILY
Status: DISCONTINUED | OUTPATIENT
Start: 2021-12-27 | End: 2021-12-30 | Stop reason: HOSPADM

## 2021-12-27 RX ORDER — SODIUM CHLORIDE 450 MG/100ML
INJECTION, SOLUTION INTRAVENOUS CONTINUOUS
Status: DISCONTINUED | OUTPATIENT
Start: 2021-12-27 | End: 2021-12-28

## 2021-12-27 RX ADMIN — MORPHINE SULFATE 2 MG: 4 INJECTION, SOLUTION INTRAMUSCULAR; INTRAVENOUS at 11:12

## 2021-12-27 RX ADMIN — SENNOSIDES AND DOCUSATE SODIUM 1 TABLET: 50; 8.6 TABLET ORAL at 09:12

## 2021-12-27 RX ADMIN — INSULIN ASPART 2 UNITS: 100 INJECTION, SOLUTION INTRAVENOUS; SUBCUTANEOUS at 12:12

## 2021-12-27 RX ADMIN — Medication 1 TABLET: at 09:12

## 2021-12-27 RX ADMIN — MELATONIN TAB 3 MG 9 MG: 3 TAB at 11:12

## 2021-12-27 RX ADMIN — Medication 10 ML: at 11:12

## 2021-12-27 RX ADMIN — SOYBEAN OIL 250 ML: 20 INJECTION, SOLUTION INTRAVENOUS at 11:12

## 2021-12-27 RX ADMIN — MORPHINE SULFATE 2 MG: 4 INJECTION, SOLUTION INTRAMUSCULAR; INTRAVENOUS at 04:12

## 2021-12-27 RX ADMIN — CEFTRIAXONE 2 G: 2 INJECTION, SOLUTION INTRAVENOUS at 08:12

## 2021-12-27 RX ADMIN — LEVOTHYROXINE SODIUM 75 MCG: 75 TABLET ORAL at 06:12

## 2021-12-27 RX ADMIN — WARFARIN SODIUM 3 MG: 3 TABLET ORAL at 04:12

## 2021-12-27 RX ADMIN — CHOLECALCIFEROL (VITAMIN D3) 10 MCG (400 UNIT) TABLET 400 UNITS: at 09:12

## 2021-12-27 RX ADMIN — Medication 10 ML: at 06:12

## 2021-12-27 RX ADMIN — PIPERACILLIN AND TAZOBACTAM 4.5 G: 4; .5 INJECTION, POWDER, LYOPHILIZED, FOR SOLUTION INTRAVENOUS; PARENTERAL at 11:12

## 2021-12-27 RX ADMIN — PREDNISONE 2.5 MG: 2.5 TABLET ORAL at 09:12

## 2021-12-27 RX ADMIN — AMIODARONE HYDROCHLORIDE 200 MG: 200 TABLET ORAL at 09:12

## 2021-12-27 RX ADMIN — AMIODARONE HYDROCHLORIDE 200 MG: 200 TABLET ORAL at 10:12

## 2021-12-27 RX ADMIN — PIPERACILLIN AND TAZOBACTAM 4.5 G: 4; .5 INJECTION, POWDER, LYOPHILIZED, FOR SOLUTION INTRAVENOUS; PARENTERAL at 02:12

## 2021-12-27 RX ADMIN — METOPROLOL SUCCINATE 25 MG: 25 TABLET, EXTENDED RELEASE ORAL at 09:12

## 2021-12-27 RX ADMIN — ASPIRIN 81 MG: 81 TABLET, COATED ORAL at 09:12

## 2021-12-27 RX ADMIN — RETINOL, ERGOCALCIFEROL, .ALPHA.-TOCOPHEROL ACETATE, DL-, PHYTONADIONE, ASCORBIC ACID, NIACINAMIDE, RIBOFLAVIN 5-PHOSPHATE SODIUM, THIAMINE HYDROCHLORIDE, PYRIDOXINE HYDROCHLORIDE, DEXPANTHENOL, BIOTIN, FOLIC ACID, AND CYANOCOBALAMIN: KIT at 10:12

## 2021-12-27 RX ADMIN — SODIUM CHLORIDE: 0.45 INJECTION, SOLUTION INTRAVENOUS at 06:12
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
## 2021-12-27 NOTE — NURSING
Received report from ANNETTE Rocha. Patient lying in bed resting, NAD noted. Safety Precautions maintained, Will Monitor. Family at bedside.

## 2021-12-27 NOTE — PT/OT/SLP EVAL
Physical Therapy Evaluation    Patient Name:  Katt Mcneal   MRN:  [id number]    Recommendations:     Discharge Recommendations:  home health PT (with family assistance)   Discharge Equipment Recommendations: walker, rolling   Barriers to discharge home: Inaccessible home    Assessment:     Katt Mcneal is a 81 y.o. female admitted with a medical diagnosis of Acute recurrent pancreatitis.  She presents with the following impairments/functional limitations:  weakness,impaired functional mobilty,gait instability,impaired balance,decreased lower extremity function,pain,impaired cardiopulmonary response to activity.    Rehab Prognosis: Good; patient would benefit from acute skilled PT services to address these deficits and reach maximum level of function.    Recent Surgery: * No surgery found *      Plan:     During this hospitalization, patient to be seen 3 x/week to address the identified rehab impairments via gait training,therapeutic activities,therapeutic exercises and progress toward the following goals:    · Plan of Care Expires:  01/10/22    Subjective     Chief Complaint: weakness  Patient/Family Comments/goals: Pt agreeable to therapy after education.   Pain/Comfort:  · Pain Rating 1:  (Pt c/o sore bottom when sitting up.)  · Pain Addressed 1: Reposition (seat cushion provided)    Living Environment:  Pt lives with dtr in a [facility] with ~14 RUBY and B HR, elevator currently not working.   Prior to admission, patients level of function was independent.  Dtr provides transportation.  Equipment used at home: none.  Upon discharge, patient will have assistance from dtr.    Objective:     Patient found right sidelying with oxygen,PICC line,telemetry  upon PT entry to room.    General Precautions: Standard, fall, respiratory   Orthopedic Precautions:N/A   Braces: N/A  Respiratory Status: Nasal cannula, flow 3 L/min    Exams:  · Cognitive Exam:  Patient was able to follow multiple commands.   · Gross Motor  Coordination:  WFL  · Postural Exam:  Patient presented with the following abnormalities:    · -       No postural abnormalities identified  · Sensation:    · -       Intact  light/touch BLE  · Skin Integrity/Edema:      · -       Skin integrity: Visible skin intact  · -       Edema: None noted BLE  · BLE ROM: WFL  · BLE Strength: WFL    Functional Mobility:  · Bed Mobility:     · Scooting: stand by assistance  · Supine to Sit: stand by assistance with HOB elevated   · Transfers:     · Sit to Stand:  stand by assistance and contact guard assistance with rolling walker  · Bed to Chair: stand by assistance and contact guard assistance with  rolling walker  using  Step Transfer  · Gait: Pt ambulated ~150 ft with CGA-SBA using RW.  Pt with decreased step length and sarah.   · Balance: Pt with fair dynamic standing balance.     V/S: spO2 on RA 88% after ambulation.  spO2 on RA 94% after pursed lip breathing.  Pt placed back on 3L O2 NC, nurse Evette notified.      Therapeutic Activities and Exercises:  Pt educated on acute skilled PT services and goals.  Pt received/educated on HEP for seated/supine LE therex, encouraged to perform 2x/day ~10-15 reps.  Pt/dtr educated on seat cushion for home.  Pt educated on pursed lip breathing exercises throughout the day.  Pt/dtr verbalized good understanding.     AM-PAC 6 CLICK MOBILITY  Total Score:21     Patient left up in chair on seat cushion reclined with all lines intact, call button in reach, nurse Latoya notified and dtr present.  Tray table close by.     GOALS:   Multidisciplinary Problems     Physical Therapy Goals        Problem: Physical Therapy Goal    Goal Priority Disciplines Outcome Goal Variances Interventions   Physical Therapy Goal     PT, PT/OT Ongoing, Progressing     Description: Goals to be met by: 1/10/22     Patient will increase functional independence with mobility by performin. Supine to sit with Modified [place]  2. Rolling to Left and  Right with Modified [place]  3. Sit to stand transfer with Modified [place]  4. Bed to chair transfer with Modified [place]   5. Gait >250 feet with Modified [place] with or without Rolling Walker  6. Ascend/descend 1 flight of stair with right Handrail Modified [place] using No Assistive Device  7. Lower extremity exercise program 2 sets x10 reps per handout, with independence                     History:     Past Medical History:   Diagnosis Date    Arthritis     Atrial fibrillation     Bilateral carotid artery stenosis 7/13/2017    Coronary artery disease     Fibromyalgia     Hyperlipidemia     Hypertension     Myocardial infarction 03/21/2015    Pancreatitis     Thyroid disease        Past Surgical History:   Procedure Laterality Date    CHOLECYSTECTOMY      CORONARY STENT PLACEMENT  3/24/15    HYSTERECTOMY  10/28/2004       Time Tracking:     PT Received On: 12/27/21  PT Start Time: 1004     PT Stop Time: 1028  PT Total Time (min): 24 min     Billable Minutes: Evaluation 14 min co-eval with OT and Therapeutic Activity 10 min      12/27/2021

## 2021-12-27 NOTE — PROGRESS NOTES
"[facility] Medicine  Progress Note    Patient Name: Katt Mcneal  MRN: [id number]  Patient Class: IP- Inpatient   Admission Date: 12/20/2021  Length of Stay: 7 days  Attending Physician: Harvey Thompson MD  Primary Care Provider: Panchito Perez MD        Subjective:     Principal Problem:Acute recurrent pancreatitis        HPI:   is an 81 YOCF with a PMHx of Atrial fibrillation on Coumadine (due allergy to a DOAC) CAD S/P PCI, pancreatitis and diverticulosis presenting to the ED for severe abdominal pain x 24 hours, worsening this yesterday morning around 9 AM associated with an episode of vomiting as well as shivering and a temp of 102.9 .Pt denied any new or unsual food intake. She also denied diarrhea, or recent travel.   In the ER she was managed with IV fluid pain control and antipyretics. She underwent CT Abd which revealed findings concerning for possible pancreatitis involving the head of the pancreas and duodenum.    SHe is being admitted to the ICU for further cre of the suspected pancreatitis.       Overview/Hospital Course:  Acute pancreatitis with dilated CBD (hx of cholecystectomy), with GNR bacteremia. Presumptive E.coli, will reduce to ceftriaxone. GI discussed with [facility]: no role for endoscopic intervention at this time given lack of suspected biliary obstruction, recommended outpatient eval with clinic visit and potential ERCP to assess PD. Will continue IV abx, antiemetics, and pain control. Received fluids for pancreatitis, a bit hypoxic on oxygen, will single dose IV lasix. Can switch to oral cefpodoxime at discharge, expected END thru 1/3/22 for E.coli bacteremia. Patient still have sudden sharp episodes of "cold" pain in her abdomen that radiates to her back, especially after trying a piece of food. WBC elevated and patient not progressing as suspected, expanded back to Zosyn- she does have inflammation in duodenum that could represent infection, potentially " point of E.coli translocation. Will expand, make NPO another day or two, and give PPN. Will advance diet today. Will increase warfarin, subtherapeutic.           Review of Systems   Constitutional: Positive for activity change, chills, fatigue and fever. Negative for unexpected weight change.   HENT: Negative.  Negative for sore throat and trouble swallowing.    Eyes: Negative for photophobia and visual disturbance.   Respiratory: Negative.  Negative for chest tightness and shortness of breath.    Cardiovascular: Negative for chest pain, palpitations and leg swelling.   Gastrointestinal: Positive for abdominal pain, nausea and vomiting. Negative for abdominal distention, blood in stool, constipation and diarrhea.   Endocrine: Negative for polyuria.   Genitourinary: Negative.  Negative for difficulty urinating, dysuria and hematuria.   Musculoskeletal: Negative.    Skin: Negative.  Negative for color change.   Allergic/Immunologic: Negative for immunocompromised state.   Neurological: Negative.  Negative for dizziness, seizures, syncope and facial asymmetry.   Psychiatric/Behavioral: Negative.  Negative for agitation, behavioral problems and confusion.     Objective:     Vital Signs (Most Recent):  Temp: 97.6 °F (36.4 °C) (12/27/21 0419)  Pulse: 64 (12/27/21 0419)  Resp: 18 (12/27/21 0419)  BP: 139/72 (12/27/21 0419)  SpO2: 97 % (12/27/21 0419) Vital Signs (24h Range):  Temp:  [97.6 °F (36.4 °C)-98.9 °F (37.2 °C)] 97.6 °F (36.4 °C)  Pulse:  [61-71] 64  Resp:  [16-20] 18  SpO2:  [97 %-100 %] 97 %  BP: (128-160)/(65-79) 139/72     Weight: 40 kg (88 lb 2.9 oz)  Body mass index is 17.81 kg/m².  No intake or output data in the 24 hours ending 12/27/21 0610   Physical Exam  Vitals and nursing note reviewed.   Constitutional:       General: She is not in acute distress.     Appearance: She is well-developed and normal weight. She is ill-appearing. She is not diaphoretic.   HENT:      Head: Normocephalic and atraumatic.       Nose: Nose normal. No congestion.      Mouth/Throat:      Mouth: Mucous membranes are moist.      Pharynx: No oropharyngeal exudate.   Eyes:      General: No scleral icterus.     Extraocular Movements: Extraocular movements intact.      Pupils: Pupils are equal, round, and reactive to light.   Neck:      Thyroid: No thyromegaly.   Cardiovascular:      Rate and Rhythm: Regular rhythm. Bradycardia present.      Heart sounds: Murmur heard.   No gallop.    Pulmonary:      Effort: Pulmonary effort is normal.      Breath sounds: Normal breath sounds. No stridor. No wheezing or rales.   Abdominal:      General: Abdomen is flat. Bowel sounds are normal. There is no distension.      Palpations: Abdomen is soft. There is no mass.      Tenderness: There is no abdominal tenderness. There is no guarding.   Musculoskeletal:         General: Normal range of motion.      Cervical back: Normal range of motion and neck supple. No rigidity.      Right lower leg: No edema.      Left lower leg: No edema.   Lymphadenopathy:      Cervical: No cervical adenopathy.   Skin:     General: Skin is warm and dry.      Capillary Refill: Capillary refill takes less than 2 seconds.      Findings: No bruising or lesion.   Neurological:      General: No focal deficit present.      Mental Status: She is alert and oriented to person, place, and time.      Cranial Nerves: No cranial nerve deficit.   Psychiatric:         Mood and Affect: Mood normal.         Behavior: Behavior normal.         Thought Content: Thought content normal.         Judgment: Judgment normal.           Recent Results (from the past 24 hour(s))   POCT glucose    Collection Time: 12/26/21  6:39 AM   Result Value Ref Range    POCT Glucose 216 (H) 70 - 110 mg/dL   Protime-INR    Collection Time: 12/27/21  4:49 AM   Result Value Ref Range    Prothrombin Time 16.6 (H) 9.0 - 12.5 sec    INR 1.6 (H) 0.8 - 1.2   CBC Auto Differential    Collection Time: 12/27/21  4:49 AM   Result Value Ref  Range    WBC 11.23 3.90 - 12.70 K/uL    RBC 3.41 (L) 4.00 - 5.40 M/uL    Hemoglobin 9.5 (L) 12.0 - 16.0 g/dL    Hematocrit 29.2 (L) 37.0 - 48.5 %    MCV 86 82 - 98 fL    MCH 27.9 27.0 - 31.0 pg    MCHC 32.5 32.0 - 36.0 g/dL    RDW 14.4 11.5 - 14.5 %    Platelets 235 150 - 450 K/uL    MPV 11.6 9.2 - 12.9 fL       Microbiology Results (last 7 days)     Procedure Component Value Units Date/Time    Blood culture [[id number]] Collected: 12/22/21 2015    Order Status: Completed Specimen: Blood Updated: 12/26/21 2303     Blood Culture, Routine No Growth after 4 days.     Blood culture [[id number]] Collected: 12/22/21 2010    Order Status: Completed Specimen: Blood Updated: 12/26/21 2303     Blood Culture, Routine No Growth after 4 days.     Blood culture x two cultures. Draw prior to antibiotics [[id number]]  (Abnormal) Collected: 12/20/21 1617    Order Status: Completed Specimen: Blood from Peripheral, Forearm, Right Updated: 12/23/21 0729     Blood Culture, Routine Gram stain aer bottle: Gram negative rods      Gram stain nain bottle: Gram negative rods      Results called to and read back by: Alin Flor      12/21/2021 04:33 BML      ESCHERICHIA COLI  For susceptibility see order #[id number]      Narrative:      Aerobic and anaerobic    Blood culture x two cultures. Draw prior to antibiotics [[id number]]  (Abnormal)  (Susceptibility) Collected: 12/20/21 1614    Order Status: Completed Specimen: Blood from Peripheral, Forearm, Right Updated: 12/23/21 0728     Blood Culture, Routine Gram stain aer bottle: Gram negative rods      Gram stain nain bottle: Gram negative rods      Results called to and read back by: Alin Flor      12/21/2021 04:33 BML      ESCHERICHIA COLI    Narrative:      Aerobic and anaerobic           Imaging Results          US Abdomen Complete (Final result)  Result time 12/20/21 21:59:34    Final result by Castillo Jimenez MD (12/20/21 21:59:34)                 Impression:      Echogenic appearance of  the pancreas, suggestive of acute pancreatitis.    Focal 2.2 x 1.7 x 2.2 cm hypoechoic region within the pancreatic body.  Short-term follow-up with pancreatic CT protocol may be obtained, if there is concern for early pancreatic necrosis.    Status post cholecystectomy.  Intrahepatic and extrahepatic biliary dilatation.      Electronically signed by: Castillo Jimenez MD  Date:    12/20/2021  Time:    21:59             Narrative:    EXAMINATION:  US ABDOMEN COMPLETE    CLINICAL HISTORY:  Acute pancreatitis without necrosis or infection, unspecified    TECHNIQUE:  Complete abdominal ultrasound (including pancreas, aorta, liver, gallbladder, common bile duct, IVC, kidneys, and spleen) was performed.    COMPARISON:  CT abdomen pelvis dated 12/20/2021.    FINDINGS:  The pancreas is echogenic, suggestive of acute inflammation.  There is a 2.2 x 1.7 x 2.2 cm hypoechoic area within the pancreatic body.    The patient is status post cholecystectomy.  There is no abnormality in the gallbladder fossa.    The CBD is enlarged measuring 1.3 cm.  There is also intrahepatic biliary dilatation.    The liver is normal in appearance.  There are no discrete hepatic masses.    There are unchanged bilateral renal cystic lesions.  There is no evidence of hydroureteronephrosis.    No evidence of free fluid is identified.                               X-Ray Chest AP Portable (Final result)  Result time 12/20/21 20:30:33    Final result by Vadim Humphreys MD (12/20/21 20:30:33)                 Impression:      As above.      Electronically signed by: Vadim Humphreys MD  Date:    12/20/2021  Time:    20:30             Narrative:    EXAMINATION:  XR CHEST AP PORTABLE    CLINICAL HISTORY:  picc placement;    TECHNIQUE:  Single frontal view of the chest was performed.    COMPARISON:  Chest radiograph earlier same day at 16:09 hours    FINDINGS:  Monitoring leads overlie the chest.  Patient is slightly rotated.    Interval placement of right-sided PICC  line with tip overlying the mid SVC.  No pneumothorax or new focal opacity.  Cardiomediastinal silhouette is midline and stable.  Otherwise grossly stable chest.                                CT Abdomen Pelvis With Contrast (Final result)  Result time 12/20/21 19:30:51    Final result by Castillo Jimenez MD (12/20/21 19:30:51)                 Impression:      Inflammatory changes identified in the pancreas with marked inflammatory changes surrounding the pancreas, consistent acute pancreatitis.    Inflammatory changes involving the duodenum.  This is most likely secondary to the inflammatory changes in the pancreas.    Additional findings above.    This report was flagged in Epic as abnormal.      Electronically signed by: Castillo Jimenez MD  Date:    12/20/2021  Time:    19:30             Narrative:    EXAMINATION:  CT ABDOMEN PELVIS WITH CONTRAST    CLINICAL HISTORY:  abd pain and sepsis;    TECHNIQUE:  Low dose axial images, sagittal and coronal reformations were obtained from the lung bases to the pubic symphysis following the IV administration of 75 cc of Omnipaque 350 .  Oral contrast was not given.    COMPARISON:  CT abdomen pelvis dated 05/16/2018.    FINDINGS:  There are no pleural effusions.  There is no evidence of a pneumothorax.  There are dependent changes in the lung bases.    The heart is enlarged.  There is normal tapering of the abdominal aorta.  There are extensive calcifications along the course of the abdominal aorta and its branch vessels.  The portal veins and mesenteric veins are within normal limits.    There is no evidence of lymphadenopathy in the abdomen or pelvis.    The esophagus and stomach are unremarkable.  There are hazy changes involving the proximal duodenum.  The small bowel loops are unremarkable.  The appendix is within normal limits.  There is colonic diverticula without evidence of acute diverticulitis.    There is a subcentimeter hypodensity in the right hepatic lobe.  This is too  small complete characterization.  The patient is status post cholecystectomy.  There is stable appearance of mild intrahepatic biliary dilatation.    The spleen is enlarged.  There are hazy changes around the pancreas.  There is dilatation of the main pancreatic duct.  No peripancreatic fluid collection is identified.  The adrenal glands are unremarkable.    There is no evidence of nephrolithiasis.  There are stable simple cyst in the upper pole of both kidneys.  The ureters and urinary bladder are within normal limits.    There is no evidence of a drainable collection in the abdomen or pelvis.  There is no evidence of free air.  There is no evidence of pneumatosis.  No portal venous air is identified.    The psoas margins are unremarkable.  There are chronic bilateral inferior pubic rami fractures present chronic right-sided superior pubic ramus fracture.  There is grade 1 anterolisthesis of L5 on S1.  No acute fractures identified.                               X-Ray Chest 1 View (Final result)  Result time 12/20/21 16:17:24    Final result by Channing Medina MD (12/20/21 16:17:24)                 Impression:      No acute chest disease identified.    Implanted cardiac loop recorder.      Electronically signed by: Channing Medina MD  Date:    12/20/2021  Time:    16:17             Narrative:    EXAMINATION:  XR CHEST 1 VIEW    CLINICAL HISTORY:  Sepsis;    TECHNIQUE:  Single frontal view of the chest was performed.    COMPARISON:  05/21/2018.    FINDINGS:  There is an implanted cardiac loop recorder present.  The heart is not enlarged.  Atherosclerotic calcification is present within the thoracic aorta.  Superior mediastinal structures are unremarkable.  Pulmonary vasculature is within normal limits.  The lungs are free of focal consolidations.  There is no evidence for pneumothorax or pleural effusions.  Bony structures are grossly intact.                                      Assessment/Plan:      * Acute  "recurrent pancreatitis  Pt has had previous episodes of pancreatitis.    Lipase elevated in the range of more than 1000.   The pat started on bowel rest with being NPO for now.   Plan to treat conservatively and symptomatically.  Plan to restart clear liquid in the AM and assess for toleratbility  GI can be consulted if no improvement.     -GI discussed with [facility]: no role for endoscopic intervention at this time given lack of suspected biliary obstruction, recommended outpatient eval with clinic visit and potential ERCP to assess PD.   -Patient still have sudden sharp episodes of "cold" pain in her abdomen that radiates to her back, especially after trying a piece of food  -Will make NPO another day or two, and give PPN.   -Will continue IV abx, antiemetics, and pain control.       E coli bacteremia  Acute pancreatitis with dilated CBD (hx of cholecystectomy), with GNR bacteremia.   -Presumptive E.coli, will reduce to ceftriaxone.         Severe sepsis  This patient does have evidence of infective focus  My overall impression is sepsis. Vital signs were reviewed and noted in progress note.  Antibiotics given-   Antibiotics (From admission, onward)            Start     Stop Route Frequency Ordered    12/22/21 1000  cefTRIAXone (ROCEPHIN) 2 g/50 mL D5W IVPB        Question:  Is the patient competent?  Answer:  Yes    -- IV Every 24 hours (non-standard times) 12/22/21 0856    12/21/21 0900  mupirocin 2 % ointment         12/26 0859 Nasl 2 times daily 12/21/21 0724        Cultures were taken-   Microbiology Results (last 7 days)     Procedure Component Value Units Date/Time    Blood culture [[id number]] Collected: 12/22/21 2015    Order Status: Completed Specimen: Blood Updated: 12/23/21 2303     Blood Culture, Routine No Growth to date      No Growth to date    Blood culture [[id number]] Collected: 12/22/21 2010    Order Status: Completed Specimen: Blood Updated: 12/23/21 2303     Blood Culture, Routine No Growth to date "      No Growth to date    Blood culture x two cultures. Draw prior to antibiotics [[id number]]  (Abnormal) Collected: 12/20/21 1617    Order Status: Completed Specimen: Blood from Peripheral, Forearm, Right Updated: 12/23/21 0729     Blood Culture, Routine Gram stain aer bottle: Gram negative rods      Gram stain nain bottle: Gram negative rods      Results called to and read back by: Alin Flor      12/21/2021 04:33 BML      ESCHERICHIA COLI  For susceptibility see order #[id number]      Narrative:      Aerobic and anaerobic    Blood culture x two cultures. Draw prior to antibiotics [[id number]]  (Abnormal)  (Susceptibility) Collected: 12/20/21 1614    Order Status: Completed Specimen: Blood from Peripheral, Forearm, Right Updated: 12/23/21 0728     Blood Culture, Routine Gram stain aer bottle: Gram negative rods      Gram stain nain bottle: Gram negative rods      Results called to and read back by: Alin Flor      12/21/2021 04:33 BML      ESCHERICHIA COLI    Narrative:      Aerobic and anaerobic        Latest lactate reviewed, they are-  Recent Labs   Lab 12/22/21 2015   LACTATE 2.5*         Source- intra-abdominal/translocation    Source control Achieved by- abx and fluids  WBC elevated and patient not progressing as suspected, expanded back to Zosyn- she does have inflammation in duodenum that could represent infection, potentially point of E.coli translocation.     Other specified hypothyroidism  Continue home medication of Levothyroxine at 75 mcg po daily.         Dyslipidemia  Continue statin       Essential hypertension  Chronic, stable,   Continue Amlodipine 5 mg po daily   Continue Lisinopril 5 mg po daily           Paroxysmal atrial fibrillation  Currently rate controlled.   Continue rate control with BB with Metoprolol Succinate.  Continue with Warfarin 2.5 mg po daily.         Chronic heart failure with preserved ejection fraction  -Repeat ECHO, Stable since 2016  -keep euvolemic, BNP elevated  >500 and on 2L O2, will give dose of IV lasix        Coronary artery disease involving native coronary artery of native heart without angina pectoris  S/P PCI to LAD, PLB, RCA in 2017 with multiple TONY.  Continue ASA, BB, ACE-I and statin.   No complaints of angina.   Pt is also on AMIODARONE prbably for rhythm control  Pt is recommended for follow up with Cardiology to optimize the medical management.         VTE Risk Mitigation (From admission, onward)         Ordered     warfarin (COUMADIN) tablet 3 mg  Daily         12/27/21 0608     IP VTE HIGH RISK PATIENT  Once         12/21/21 0215     Place sequential compression device  Until discontinued         12/21/21 0215     Reason for No Pharmacological VTE Prophylaxis  Once        Question:  Reasons:  Answer:  Already adequately anticoagulated on oral Anticoagulants    12/21/21 0215                Discharge Planning   FRANOC:      Code Status: Full Code   Is the patient medically ready for discharge?:     Reason for patient still in hospital (select all that apply): Treatment  Discharge Plan A: Home with family                  Harvey Thompson MD  Department of [facility] Medicine   [facility]

## 2021-12-27 NOTE — SUBJECTIVE & OBJECTIVE
Review of Systems   Constitutional: Positive for activity change, chills, fatigue and fever. Negative for unexpected weight change.   HENT: Negative.  Negative for sore throat and trouble swallowing.    Eyes: Negative for photophobia and visual disturbance.   Respiratory: Negative.  Negative for chest tightness and shortness of breath.    Cardiovascular: Negative for chest pain, palpitations and leg swelling.   Gastrointestinal: Positive for abdominal pain, nausea and vomiting. Negative for abdominal distention, blood in stool, constipation and diarrhea.   Endocrine: Negative for polyuria.   Genitourinary: Negative.  Negative for difficulty urinating, dysuria and hematuria.   Musculoskeletal: Negative.    Skin: Negative.  Negative for color change.   Allergic/Immunologic: Negative for immunocompromised state.   Neurological: Negative.  Negative for dizziness, seizures, syncope and facial asymmetry.   Psychiatric/Behavioral: Negative.  Negative for agitation, behavioral problems and confusion.     Objective:     Vital Signs (Most Recent):  Temp: 97.6 °F (36.4 °C) (12/27/21 0419)  Pulse: 64 (12/27/21 0419)  Resp: 18 (12/27/21 0419)  BP: 139/72 (12/27/21 0419)  SpO2: 97 % (12/27/21 0419) Vital Signs (24h Range):  Temp:  [97.6 °F (36.4 °C)-98.9 °F (37.2 °C)] 97.6 °F (36.4 °C)  Pulse:  [61-71] 64  Resp:  [16-20] 18  SpO2:  [97 %-100 %] 97 %  BP: (128-160)/(65-79) 139/72     Weight: 40 kg (88 lb 2.9 oz)  Body mass index is 17.81 kg/m².  No intake or output data in the 24 hours ending 12/27/21 0610   Physical Exam  Vitals and nursing note reviewed.   Constitutional:       General: She is not in acute distress.     Appearance: She is well-developed and normal weight. She is ill-appearing. She is not diaphoretic.   HENT:      Head: Normocephalic and atraumatic.      Nose: Nose normal. No congestion.      Mouth/Throat:      Mouth: Mucous membranes are moist.      Pharynx: No oropharyngeal exudate.   Eyes:      General: No  scleral icterus.     Extraocular Movements: Extraocular movements intact.      Pupils: Pupils are equal, round, and reactive to light.   Neck:      Thyroid: No thyromegaly.   Cardiovascular:      Rate and Rhythm: Regular rhythm. Bradycardia present.      Heart sounds: Murmur heard.   No gallop.    Pulmonary:      Effort: Pulmonary effort is normal.      Breath sounds: Normal breath sounds. No stridor. No wheezing or rales.   Abdominal:      General: Abdomen is flat. Bowel sounds are normal. There is no distension.      Palpations: Abdomen is soft. There is no mass.      Tenderness: There is no abdominal tenderness. There is no guarding.   Musculoskeletal:         General: Normal range of motion.      Cervical back: Normal range of motion and neck supple. No rigidity.      Right lower leg: No edema.      Left lower leg: No edema.   Lymphadenopathy:      Cervical: No cervical adenopathy.   Skin:     General: Skin is warm and dry.      Capillary Refill: Capillary refill takes less than 2 seconds.      Findings: No bruising or lesion.   Neurological:      General: No focal deficit present.      Mental Status: She is alert and oriented to person, place, and time.      Cranial Nerves: No cranial nerve deficit.   Psychiatric:         Mood and Affect: Mood normal.         Behavior: Behavior normal.         Thought Content: Thought content normal.         Judgment: Judgment normal.           Recent Results (from the past 24 hour(s))   POCT glucose    Collection Time: 12/26/21  6:39 AM   Result Value Ref Range    POCT Glucose 216 (H) 70 - 110 mg/dL   Protime-INR    Collection Time: 12/27/21  4:49 AM   Result Value Ref Range    Prothrombin Time 16.6 (H) 9.0 - 12.5 sec    INR 1.6 (H) 0.8 - 1.2   CBC Auto Differential    Collection Time: 12/27/21  4:49 AM   Result Value Ref Range    WBC 11.23 3.90 - 12.70 K/uL    RBC 3.41 (L) 4.00 - 5.40 M/uL    Hemoglobin 9.5 (L) 12.0 - 16.0 g/dL    Hematocrit 29.2 (L) 37.0 - 48.5 %    MCV 86  82 - 98 fL    MCH 27.9 27.0 - 31.0 pg    MCHC 32.5 32.0 - 36.0 g/dL    RDW 14.4 11.5 - 14.5 %    Platelets 235 150 - 450 K/uL    MPV 11.6 9.2 - 12.9 fL       Microbiology Results (last 7 days)     Procedure Component Value Units Date/Time    Blood culture [[id number]] Collected: 12/22/21 2015    Order Status: Completed Specimen: Blood Updated: 12/26/21 2303     Blood Culture, Routine No Growth after 4 days.     Blood culture [[id number]] Collected: 12/22/21 2010    Order Status: Completed Specimen: Blood Updated: 12/26/21 2303     Blood Culture, Routine No Growth after 4 days.     Blood culture x two cultures. Draw prior to antibiotics [[id number]]  (Abnormal) Collected: 12/20/21 1617    Order Status: Completed Specimen: Blood from Peripheral, Forearm, Right Updated: 12/23/21 0729     Blood Culture, Routine Gram stain aer bottle: Gram negative rods      Gram stain nain bottle: Gram negative rods      Results called to and read back by: Alin Flor      12/21/2021 04:33 BML      ESCHERICHIA COLI  For susceptibility see order #[id number]      Narrative:      Aerobic and anaerobic    Blood culture x two cultures. Draw prior to antibiotics [[id number]]  (Abnormal)  (Susceptibility) Collected: 12/20/21 1614    Order Status: Completed Specimen: Blood from Peripheral, Forearm, Right Updated: 12/23/21 0728     Blood Culture, Routine Gram stain aer bottle: Gram negative rods      Gram stain nain bottle: Gram negative rods      Results called to and read back by: Alin Flor      12/21/2021 04:33 BML      ESCHERICHIA COLI    Narrative:      Aerobic and anaerobic           Imaging Results          US Abdomen Complete (Final result)  Result time 12/20/21 21:59:34    Final result by Castillo Jimenez MD (12/20/21 21:59:34)                 Impression:      Echogenic appearance of the pancreas, suggestive of acute pancreatitis.    Focal 2.2 x 1.7 x 2.2 cm hypoechoic region within the pancreatic body.  Short-term follow-up with  pancreatic CT protocol may be obtained, if there is concern for early pancreatic necrosis.    Status post cholecystectomy.  Intrahepatic and extrahepatic biliary dilatation.      Electronically signed by: Castillo Jimenez MD  Date:    12/20/2021  Time:    21:59             Narrative:    EXAMINATION:  US ABDOMEN COMPLETE    CLINICAL HISTORY:  Acute pancreatitis without necrosis or infection, unspecified    TECHNIQUE:  Complete abdominal ultrasound (including pancreas, aorta, liver, gallbladder, common bile duct, IVC, kidneys, and spleen) was performed.    COMPARISON:  CT abdomen pelvis dated 12/20/2021.    FINDINGS:  The pancreas is echogenic, suggestive of acute inflammation.  There is a 2.2 x 1.7 x 2.2 cm hypoechoic area within the pancreatic body.    The patient is status post cholecystectomy.  There is no abnormality in the gallbladder fossa.    The CBD is enlarged measuring 1.3 cm.  There is also intrahepatic biliary dilatation.    The liver is normal in appearance.  There are no discrete hepatic masses.    There are unchanged bilateral renal cystic lesions.  There is no evidence of hydroureteronephrosis.    No evidence of free fluid is identified.                               X-Ray Chest AP Portable (Final result)  Result time 12/20/21 20:30:33    Final result by Vadim Humphreys MD (12/20/21 20:30:33)                 Impression:      As above.      Electronically signed by: Vadim Humphreys MD  Date:    12/20/2021  Time:    20:30             Narrative:    EXAMINATION:  XR CHEST AP PORTABLE    CLINICAL HISTORY:  picc placement;    TECHNIQUE:  Single frontal view of the chest was performed.    COMPARISON:  Chest radiograph earlier same day at 16:09 hours    FINDINGS:  Monitoring leads overlie the chest.  Patient is slightly rotated.    Interval placement of right-sided PICC line with tip overlying the mid SVC.  No pneumothorax or new focal opacity.  Cardiomediastinal silhouette is midline and stable.  Otherwise grossly  stable chest.                                CT Abdomen Pelvis With Contrast (Final result)  Result time 12/20/21 19:30:51    Final result by Castillo Jimenez MD (12/20/21 19:30:51)                 Impression:      Inflammatory changes identified in the pancreas with marked inflammatory changes surrounding the pancreas, consistent acute pancreatitis.    Inflammatory changes involving the duodenum.  This is most likely secondary to the inflammatory changes in the pancreas.    Additional findings above.    This report was flagged in Epic as abnormal.      Electronically signed by: Castillo Jimenez MD  Date:    12/20/2021  Time:    19:30             Narrative:    EXAMINATION:  CT ABDOMEN PELVIS WITH CONTRAST    CLINICAL HISTORY:  abd pain and sepsis;    TECHNIQUE:  Low dose axial images, sagittal and coronal reformations were obtained from the lung bases to the pubic symphysis following the IV administration of 75 cc of Omnipaque 350 .  Oral contrast was not given.    COMPARISON:  CT abdomen pelvis dated 05/16/2018.    FINDINGS:  There are no pleural effusions.  There is no evidence of a pneumothorax.  There are dependent changes in the lung bases.    The heart is enlarged.  There is normal tapering of the abdominal aorta.  There are extensive calcifications along the course of the abdominal aorta and its branch vessels.  The portal veins and mesenteric veins are within normal limits.    There is no evidence of lymphadenopathy in the abdomen or pelvis.    The esophagus and stomach are unremarkable.  There are hazy changes involving the proximal duodenum.  The small bowel loops are unremarkable.  The appendix is within normal limits.  There is colonic diverticula without evidence of acute diverticulitis.    There is a subcentimeter hypodensity in the right hepatic lobe.  This is too small complete characterization.  The patient is status post cholecystectomy.  There is stable appearance of mild intrahepatic biliary  dilatation.    The spleen is enlarged.  There are hazy changes around the pancreas.  There is dilatation of the main pancreatic duct.  No peripancreatic fluid collection is identified.  The adrenal glands are unremarkable.    There is no evidence of nephrolithiasis.  There are stable simple cyst in the upper pole of both kidneys.  The ureters and urinary bladder are within normal limits.    There is no evidence of a drainable collection in the abdomen or pelvis.  There is no evidence of free air.  There is no evidence of pneumatosis.  No portal venous air is identified.    The psoas margins are unremarkable.  There are chronic bilateral inferior pubic rami fractures present chronic right-sided superior pubic ramus fracture.  There is grade 1 anterolisthesis of L5 on S1.  No acute fractures identified.                               X-Ray Chest 1 View (Final result)  Result time 12/20/21 16:17:24    Final result by Channing Medina MD (12/20/21 16:17:24)                 Impression:      No acute chest disease identified.    Implanted cardiac loop recorder.      Electronically signed by: Channing Medina MD  Date:    12/20/2021  Time:    16:17             Narrative:    EXAMINATION:  XR CHEST 1 VIEW    CLINICAL HISTORY:  Sepsis;    TECHNIQUE:  Single frontal view of the chest was performed.    COMPARISON:  05/21/2018.    FINDINGS:  There is an implanted cardiac loop recorder present.  The heart is not enlarged.  Atherosclerotic calcification is present within the thoracic aorta.  Superior mediastinal structures are unremarkable.  Pulmonary vasculature is within normal limits.  The lungs are free of focal consolidations.  There is no evidence for pneumothorax or pleural effusions.  Bony structures are grossly intact.

## 2021-12-27 NOTE — PLAN OF CARE
[facility] - Telemetry  Discharge Reassessment  Patient previously independent . PT/OT recs home health Pt/OT r/w. DC when tolerating diet .  Primary Care Provider: Panchito Perez MD    Expected Discharge Date:     Reassessment (most recent)     Discharge Reassessment - 12/27/21 1620        Discharge Reassessment    Assessment Type Discharge Planning Reassessment     Discharge Plan discussed with: Adult children     Discharge Plan A Home with family;Home Health     Discharge Plan B Home with family;Home Health     DME Needed Upon Discharge  other (see comments)   r/w    Discharge Barriers Identified Other (see comments)     Why the patient remains in the hospital Requires continued medical care        Post-Acute Status    Post-Acute Authorization Home Health;HME     HME Status --   orders    Home Health Status --   orders    Coverage [facility]     Hospital Resources/Appts/Education Provided Post-Acute resouces added to AVS     Discharge Delays Orders Needed

## 2021-12-27 NOTE — PROGRESS NOTES
GI Progress Note - 12/27/2021   See GI consult note for full H&P      Subjective:   Patient seen and examined at bedside. Pain is improving. She was able to tolerate liquids without worsening pain or N/V yesterday. Afebrile with stable vitals.       Objective:    Vital signs in last 24 hours:  Temp:  [97.6 °F (36.4 °C)-98.9 °F (37.2 °C)] 97.6 °F (36.4 °C)  Pulse:  [61-71] 64  Resp:  [16-20] 18  SpO2:  [97 %-100 %] 97 %  BP: (128-160)/(65-79) 139/72    Intake/Output last 3 shifts:  I/O last 3 completed shifts:  In: -   Out: 450 [Urine:450]  Intake/Output this shift:  No intake/output data recorded.    Gen: no apparent distress, appears stated age  Heart: RRR, no murmurs, rub or gallops appreciated  Lung: No w/r/c. CTA bilaterally   Abdomen: NTND, BS present, soft   Ext: 2+ pulses, no lower ext. edema  Neuro: A&O X 3       Recent Labs   Lab 12/22/21 2015 12/24/21  0635 12/27/21  0449   WBC 7.82 13.55* 11.23   HGB 9.0* 8.5* 9.5*   HCT 29.8* 27.4* 29.2*    216 235   MCV 89 87 86      Recent Labs   Lab 12/20/21  1614 12/20/21  1614 12/21/21  0859 12/22/21 2015 12/24/21  0635      < > 143 143 144   K 4.0   < > 4.2 3.8 3.3*      < > 111* 111* 105   CO2 22*   < > 25 22* 29   BUN 24*   < > 22 12 11   CALCIUM 9.2   < > 8.2* 8.1* 8.2*   PHOS 3.2  --   --   --   --     < > = values in this interval not displayed.     Recent Labs   Lab 12/21/21  0859 12/22/21  0628 12/22/21 2015 12/23/21  0446 12/24/21  0635 12/24/21  0635 12/25/21  0411 12/26/21  0405 12/27/21  0449   AST 55*  --  29  --  21  --   --   --   --    ALT 41  --  28  --  22  --   --   --   --    ALKPHOS 79  --  113  --  100  --   --   --   --    BILITOT 0.3  --  0.1  --  0.3  --   --   --   --    INR 1.8*   < >  --    < > 2.2*   < > 1.6* 1.4* 1.6*    < > = values in this interval not displayed.       Scheduled Meds:   amiodarone  200 mg Oral BID    aspirin  81 mg Oral Daily    calcium-vitamin D3  1 tablet Oral Daily    cholecalciferol  (vitamin D3)  400 Units Oral Daily    fat emulsion  250 mL Intravenous Once    levothyroxine  75 mcg Oral Daily    metoprolol succinate  25 mg Oral Daily    piperacillin-tazobactam (ZOSYN) IVPB  4.5 g Intravenous Q8H    predniSONE  2.5 mg Oral Daily    senna-docusate 8.6-50 mg  1 tablet Oral BID    sodium chloride 0.9%  10 mL Intravenous Q6H    warfarin  3 mg Oral Daily     Continuous Infusions:   Amino acid 4.25% - dextrose 5% (CLINIMIX-E) solution with additives (1L provides 42.5 gm AA, 50 gm CHO (170 kcal/L dextrose), Na 35, K 30, Mg 5, Ca 4.5, Acetate 70, Cl 39, Phos 15) 100 mL/hr at 21     [facility]agin2021 US ABDOMEN  Echogenic appearance of the pancreas, suggestive of acute pancreatitis.     Focal 2.2 x 1.7 x 2.2 cm hypoechoic region within the pancreatic body.  Short-term follow-up with pancreatic CT protocol may be obtained, if there is concern for early pancreatic necrosis.     Status post cholecystectomy.  Intrahepatic and extrahepatic biliary dilatation.     2021 CT ABDOMEN  Inflammatory changes identified in the pancreas with marked inflammatory changes surrounding the pancreas, consistent acute pancreatitis.     Inflammatory changes involving the duodenum.  This is most likely secondary to the inflammatory changes in the pancreas.     Additional findings above.    Assessment:    1. Recurrent pancreatitis   2. GNR Bacteremia, resolved   3. Afib on coumadin     Plan:    - Patient clinically improved. Repeat blood cultures are negative   - Advance diet as tolerated   - Continue creon supplementation   - She takes opioids intermittently at home. Discussed low dose gabapentin on discharge to help with pain   - She has been discussed with [facility] and plan is for outpatient ERCP to assess PD. Outpatient follow up has been requested already   - No further inpatient recommendations will sign off, please call with any questions or concerns     Yoana Gibson   Gastroenterology   [facility]  [facility]

## 2021-12-27 NOTE — PLAN OF CARE
Problem: Occupational Therapy Goal  Goal: Occupational Therapy Goal  Description: Goals to be met by: 01/10/21     Patient will increase functional independence with ADLs by performing:    LE Dressing with Supervision.  Grooming while standing at sink with Supervision.  Toileting from toilet with Supervision for hygiene and clothing management.   Supine to sit with Modified [place].  Step transfer with Supervision  Toilet transfer to toilet with Supervision.  Upper extremity exercise program x15 reps per handout, with independence.    Outcome: Ongoing, Progressing    OT rec HHOT with family support and RW at d/c in order to increase safety and independence with ADLs and all aspects of functional mobility.

## 2021-12-27 NOTE — PLAN OF CARE
Problem: Physical Therapy Goal  Goal: Physical Therapy Goal  Description: Goals to be met by: 1/10/22     Patient will increase functional independence with mobility by performin. Supine to sit with Modified [place]  2. Rolling to Left and Right with Modified [place]  3. Sit to stand transfer with Modified [place]  4. Bed to chair transfer with Modified [place]   5. Gait >250 feet with Modified [place] with or without Rolling Walker  6. Ascend/descend 1 flight of stair with right Handrail Modified [place] using No Assistive Device  7. Lower extremity exercise program 2 sets x10 reps per handout, with independence    Outcome: Ongoing, Progressing     Pt ambulated ~150 ft with CGA-SBA using RW.

## 2021-12-28 LAB
INR PPP: 1.9 (ref 0.8–1.2)
POCT GLUCOSE: 104 MG/DL (ref 70–110)
POCT GLUCOSE: 125 MG/DL (ref 70–110)
POCT GLUCOSE: 166 MG/DL (ref 70–110)
POCT GLUCOSE: 177 MG/DL (ref 70–110)
PROTHROMBIN TIME: 19.8 SEC (ref 9–12.5)

## 2021-12-28 PROCEDURE — 97116 GAIT TRAINING THERAPY: CPT | Mod: CQ

## 2021-12-28 PROCEDURE — 63600175 PHARM REV CODE 636 W HCPCS: Performed by: INTERNAL MEDICINE

## 2021-12-28 PROCEDURE — 25000003 PHARM REV CODE 250: Performed by: STUDENT IN AN ORGANIZED HEALTH CARE EDUCATION/TRAINING PROGRAM

## 2021-12-28 PROCEDURE — 63600175 PHARM REV CODE 636 W HCPCS: Performed by: STUDENT IN AN ORGANIZED HEALTH CARE EDUCATION/TRAINING PROGRAM

## 2021-12-28 PROCEDURE — 85610 PROTHROMBIN TIME: CPT | Performed by: STUDENT IN AN ORGANIZED HEALTH CARE EDUCATION/TRAINING PROGRAM

## 2021-12-28 PROCEDURE — 99900035 HC TECH TIME PER 15 MIN (STAT)

## 2021-12-28 PROCEDURE — 94799 UNLISTED PULMONARY SVC/PX: CPT

## 2021-12-28 PROCEDURE — 97530 THERAPEUTIC ACTIVITIES: CPT | Mod: CQ

## 2021-12-28 PROCEDURE — A4216 STERILE WATER/SALINE, 10 ML: HCPCS | Performed by: EMERGENCY MEDICINE

## 2021-12-28 PROCEDURE — 25000003 PHARM REV CODE 250: Performed by: INTERNAL MEDICINE

## 2021-12-28 PROCEDURE — 21400001 HC TELEMETRY ROOM

## 2021-12-28 PROCEDURE — 25000003 PHARM REV CODE 250: Performed by: EMERGENCY MEDICINE

## 2021-12-28 RX ADMIN — Medication 10 ML: at 05:12

## 2021-12-28 RX ADMIN — SENNOSIDES AND DOCUSATE SODIUM 1 TABLET: 50; 8.6 TABLET ORAL at 10:12

## 2021-12-28 RX ADMIN — AMIODARONE HYDROCHLORIDE 200 MG: 200 TABLET ORAL at 09:12

## 2021-12-28 RX ADMIN — CEFTRIAXONE 2 G: 2 INJECTION, SOLUTION INTRAVENOUS at 09:12

## 2021-12-28 RX ADMIN — MELATONIN TAB 3 MG 9 MG: 3 TAB at 09:12

## 2021-12-28 RX ADMIN — METOPROLOL SUCCINATE 25 MG: 25 TABLET, EXTENDED RELEASE ORAL at 10:12

## 2021-12-28 RX ADMIN — SENNOSIDES AND DOCUSATE SODIUM 1 TABLET: 50; 8.6 TABLET ORAL at 09:12

## 2021-12-28 RX ADMIN — Medication 10 ML: at 12:12

## 2021-12-28 RX ADMIN — WARFARIN SODIUM 3 MG: 3 TABLET ORAL at 06:12

## 2021-12-28 RX ADMIN — AMIODARONE HYDROCHLORIDE 200 MG: 200 TABLET ORAL at 10:12

## 2021-12-28 RX ADMIN — ONDANSETRON 4 MG: 2 INJECTION INTRAMUSCULAR; INTRAVENOUS at 09:12

## 2021-12-28 RX ADMIN — ASPIRIN 81 MG: 81 TABLET, COATED ORAL at 10:12

## 2021-12-28 RX ADMIN — LEVOTHYROXINE SODIUM 75 MCG: 75 TABLET ORAL at 05:12

## 2021-12-28 RX ADMIN — CHOLECALCIFEROL (VITAMIN D3) 10 MCG (400 UNIT) TABLET 400 UNITS: at 10:12

## 2021-12-28 RX ADMIN — PREDNISONE 2.5 MG: 2.5 TABLET ORAL at 10:12

## 2021-12-28 RX ADMIN — Medication 1 TABLET: at 10:12

## 2021-12-28 RX ADMIN — Medication 10 ML: at 06:12

## 2021-12-28 NOTE — PLAN OF CARE
Problem: Infection  Goal: Absence of Infection Signs and Symptoms  Outcome: Ongoing, Progressing     Problem: Adult Inpatient Plan of Care  Goal: Plan of Care Review  Outcome: Ongoing, Progressing  Goal: Patient-Specific Goal (Individualized)  Outcome: Ongoing, Progressing  Goal: Absence of Hospital-Acquired Illness or Injury  Outcome: Ongoing, Progressing  Goal: Optimal Comfort and Wellbeing  Outcome: Ongoing, Progressing     Problem: Skin Injury Risk Increased  Goal: Skin Health and Integrity  Outcome: Ongoing, Progressing     Problem: Fluid Imbalance (Pancreatitis)  Goal: Fluid Balance  Outcome: Ongoing, Progressing     Problem: Pain (Pancreatitis)  Goal: Acceptable Pain Control  Outcome: Ongoing, Progressing     Problem: Respiratory Compromise (Pancreatitis)  Goal: Effective Oxygenation and Ventilation  Outcome: Ongoing, Progressing

## 2021-12-28 NOTE — PROGRESS NOTES
"[facility] Medicine  Progress Note    Patient Name: Katt Mcneal  MRN: [id number]  Patient Class: IP- Inpatient   Admission Date: 12/20/2021  Length of Stay: 8 days  Attending Physician: Arsenio Robison MD  Primary Care Provider: Panchito Perez MD        Subjective:     Principal Problem:Acute recurrent pancreatitis        HPI:   is an 81 YOCF with a PMHx of Atrial fibrillation on Coumadine (due allergy to a DOAC) CAD S/P PCI, pancreatitis and diverticulosis presenting to the ED for severe abdominal pain x 24 hours, worsening this yesterday morning around 9 AM associated with an episode of vomiting as well as shivering and a temp of 102.9 .Pt denied any new or unsual food intake. She also denied diarrhea, or recent travel.   In the ER she was managed with IV fluid pain control and antipyretics. She underwent CT Abd which revealed findings concerning for possible pancreatitis involving the head of the pancreas and duodenum.    SHe is being admitted to the ICU for further cre of the suspected pancreatitis.       Overview/Hospital Course:  Acute pancreatitis with dilated CBD (hx of cholecystectomy), with GNR bacteremia. Presumptive E.coli, will reduce to ceftriaxone. GI discussed with AES: no role for endoscopic intervention at this time given lack of suspected biliary obstruction, recommended outpatient eval with clinic visit and potential ERCP to assess PD. Will continue IV abx, antiemetics, and pain control. Received fluids for pancreatitis, a bit hypoxic on oxygen, will single dose IV lasix. Can switch to oral cefpodoxime at discharge, expected END thru 1/3/22 for E.coli bacteremia. Patient still have sudden sharp episodes of "cold" pain in her abdomen that radiates to her back, especially after trying a piece of food. WBC elevated and patient not progressing as suspected, expanded back to Zosyn- she does have inflammation in duodenum that could represent infection, " potentially point of E.coli translocation.     Restarted on clear liquids.       Interval History: This AM pt became nauseaus with po intake. Has some abdominal pain, primarily b/l lower quadrants.    Review of Systems   Constitutional: Negative for chills and fever.   Respiratory: Negative for cough and shortness of breath.    Cardiovascular: Negative for chest pain and leg swelling.   Gastrointestinal: Positive for abdominal distention, abdominal pain and nausea. Negative for vomiting.     Objective:     Vital Signs (Most Recent):  Temp: 97.7 °F (36.5 °C) (12/28/21 0820)  Pulse: 69 (12/28/21 0820)  Resp: 18 (12/28/21 0820)  BP: (!) 177/83 (12/28/21 0820)  SpO2: 99 % (12/28/21 0820) Vital Signs (24h Range):  Temp:  [97.7 °F (36.5 °C)-98.7 °F (37.1 °C)] 97.7 °F (36.5 °C)  Pulse:  [66-73] 69  Resp:  [16-18] 18  SpO2:  [96 %-100 %] 99 %  BP: (147-188)/(65-93) 177/83     Weight: 40 kg (88 lb 2.9 oz)  Body mass index is 17.81 kg/m².    Intake/Output Summary (Last 24 hours) at 12/28/2021 1015  Last data filed at 12/28/2021 0500  Gross per 24 hour   Intake 846.37 ml   Output --   Net 846.37 ml      Physical Exam  Constitutional:       General: She is not in acute distress.     Appearance: She is ill-appearing. She is not toxic-appearing or diaphoretic.   Cardiovascular:      Rate and Rhythm: Normal rate and regular rhythm.      Heart sounds: No murmur heard.  No gallop.    Pulmonary:      Effort: Pulmonary effort is normal.      Breath sounds: Normal breath sounds. No wheezing.   Abdominal:      General: Bowel sounds are normal. There is no distension.      Palpations: Abdomen is soft.      Tenderness: There is no abdominal tenderness.         Significant Labs: All pertinent labs within the past 24 hours have been reviewed.    Significant Imaging: I have reviewed all pertinent imaging results/findings within the past 24 hours.      Assessment/Plan:      * Acute recurrent pancreatitis  Admitted for recurrent pancreatitis.  Unclear etiology of pancreatitis - dilated CBD concerning but pt s/p cholecystectomy. Very slow improvement.   - advance diet as tolerated  - prn antiemetics, analgesics  - GI signed off    Severe sepsis  Resolved. See previous notes for more details.    E coli bacteremia  Presumed due to GI translocation. CBD dilated but patient is s/p cholecystectomy and has no other findings concerning for obstruction.   - continue ctx while inpatient        Other specified hypothyroidism  Continue home medication of Levothyroxine at 75 mcg po daily.         Dyslipidemia  Continue statin       Essential hypertension  Chronic, stable,   Continue Amlodipine 5 mg po daily   Continue Lisinopril 5 mg po daily           Paroxysmal atrial fibrillation  Currently rate controlled.   Continue rate control with BB with Metoprolol Succinate.  Continue with Warfarin 2.5 mg po daily.         Chronic heart failure with preserved ejection fraction  Echo repeated, LVEF preserved. Estimated CVP wnl arguing against hypervolemia despite elevated BNP.  - cont home furosemide        Coronary artery disease involving native coronary artery of native heart without angina pectoris  S/P PCI to LAD, PLB, RCA in 2017 with multiple TONY. No anginal complaints  - cont acei/bb/statin/asa      VTE Risk Mitigation (From admission, onward)         Ordered     warfarin (COUMADIN) tablet 3 mg  Daily         12/27/21 0608     IP VTE HIGH RISK PATIENT  Once         12/21/21 0215     Place sequential compression device  Until discontinued         12/21/21 0215     Reason for No Pharmacological VTE Prophylaxis  Once        Question:  Reasons:  Answer:  Already adequately anticoagulated on oral Anticoagulants    12/21/21 0215                Discharge Planning   FRANCO:      Code Status: Full Code   Is the patient medically ready for discharge?:     Reason for patient still in hospital (select all that apply): Patient trending condition, Laboratory test, Treatment, Consult  recommendations and Pending disposition  Discharge Plan A: Home with family,Home Health   Discharge Delays: Orders Needed              Arsenio Robison MD  Department of Hospital Medicine   [facility] [place] - Telemetry

## 2021-12-28 NOTE — SUBJECTIVE & OBJECTIVE
Interval History: This AM pt became nauseaus with po intake. Has some abdominal pain, primarily b/l lower quadrants.    Review of Systems   Constitutional: Negative for chills and fever.   Respiratory: Negative for cough and shortness of breath.    Cardiovascular: Negative for chest pain and leg swelling.   Gastrointestinal: Positive for abdominal distention, abdominal pain and nausea. Negative for vomiting.     Objective:     Vital Signs (Most Recent):  Temp: 97.7 °F (36.5 °C) (12/28/21 0820)  Pulse: 69 (12/28/21 0820)  Resp: 18 (12/28/21 0820)  BP: (!) 177/83 (12/28/21 0820)  SpO2: 99 % (12/28/21 0820) Vital Signs (24h Range):  Temp:  [97.7 °F (36.5 °C)-98.7 °F (37.1 °C)] 97.7 °F (36.5 °C)  Pulse:  [66-73] 69  Resp:  [16-18] 18  SpO2:  [96 %-100 %] 99 %  BP: (147-188)/(65-93) 177/83     Weight: 40 kg (88 lb 2.9 oz)  Body mass index is 17.81 kg/m².    Intake/Output Summary (Last 24 hours) at 12/28/2021 1015  Last data filed at 12/28/2021 0500  Gross per 24 hour   Intake 846.37 ml   Output --   Net 846.37 ml      Physical Exam  Constitutional:       General: She is not in acute distress.     Appearance: She is ill-appearing. She is not toxic-appearing or diaphoretic.   Cardiovascular:      Rate and Rhythm: Normal rate and regular rhythm.      Heart sounds: No murmur heard.  No gallop.    Pulmonary:      Effort: Pulmonary effort is normal.      Breath sounds: Normal breath sounds. No wheezing.   Abdominal:      General: Bowel sounds are normal. There is no distension.      Palpations: Abdomen is soft.      Tenderness: There is no abdominal tenderness.         Significant Labs: All pertinent labs within the past 24 hours have been reviewed.    Significant Imaging: I have reviewed all pertinent imaging results/findings within the past 24 hours.

## 2021-12-28 NOTE — PT/OT/SLP PROGRESS
Physical Therapy Treatment    Patient Name:  Katt Mcneal   MRN:  [id number]    Recommendations:     Discharge Recommendations:  home health PT (with family assistance)   Discharge Equipment Recommendations: walker, rolling   Barriers to discharge: Inaccessible home    Assessment:     Katt Mcneal is a 81 y.o. female admitted with a medical diagnosis of Acute recurrent pancreatitis.  She presents with the following impairments/functional limitations:  weakness,impaired functional mobilty,impaired endurance,impaired self care skills,decreased upper extremity function,decreased lower extremity function,decreased ROM,impaired balance,pain,decreased coordination,gait instability,impaired cardiopulmonary response to activity.    Pt tolerated treatment good today, despite increase in pain. Pt ambulated ~212ft with RW, SBA on RA with O2 sats dropping to ~88% requiring seated rest break and PLB to increase to 92% on RA. Pt displayed no signs of SOB/distress. Pt up in chair at end of session. Pt would continue to benefit from HHPT services with family assistance and RW at time of discharge.     Rehab Prognosis: Good; patient would benefit from acute skilled PT services to address these deficits and reach maximum level of function.    Recent Surgery: * No surgery found *      Plan:     During this hospitalization, patient to be seen 3 x/week to address the identified rehab impairments via gait training,therapeutic activities,therapeutic exercises and progress toward the following goals:    · Plan of Care Expires:  01/10/22    Subjective     Chief Complaint: pain and wanting to go home  Patient/Family Comments/goals: Pt reports she really wants to go home, but the doctor does no agree. She understands it is for the best  Pain/Comfort:  Pain Rating 1: 10/10  Location 1:  (abdominal and B lower quadrant pain)  Pain Addressed 1: Reposition,Distraction,Cessation of Activity,Nurse notified      Objective:     Communicated  with pt and thorough review of pt's chart by this writer (attempted via phone to contact pt's nurseEvette, however, unsuccessful prior to session.  Patient found R sidelying with oxygen,telemetry,PICC line (3LO2 NC upon entry) upon PT entry to room.     General Precautions: Standard, fall,respiratory   Orthopedic Precautions:N/A   Braces: N/A  Respiratory Status: 3L nasal cannula     Functional Mobility: O2 on RA at rest 95%. Pt requires extra time to perform bed mobility due to abdominal pain.   · Bed Mobility:     · Rolling Left:  supervision  · Scooting: supervision  · Supine to Sit: supervision  · Transfers: from EOB    · Sit to Stand:  stand by assistance with rolling walker  · Gait: Pt ambulated ~212ft with RW, SBA on level tile with no LOB. On RA with O2 sats dropping to 88% requiring cues and PLB to recover to 92% on RA. Pt requires cues to ambulate closer within RW and RW mgt/safety. Decreased sarah, step length, velocity of limb, endurance and postural control   · Balance: good sitting and fair+ standing      AM-PAC 6 CLICK MOBILITY  Turning over in bed (including adjusting bedclothes, sheets and blankets)?: 4  Sitting down on and standing up from a chair with arms (e.g., wheelchair, bedside commode, etc.): 4  Moving from lying on back to sitting on the side of the bed?: 4  Moving to and from a bed to a chair (including a wheelchair)?: 3  Need to walk in hospital room?: 3  Climbing 3-5 steps with a railing?: 3  Basic Mobility Total Score: 21       Therapeutic Activities and Exercises:   Pt placed back on 3LO2, however, pt's nurseEvette notified of pt's O2 sats during ambulation.   No therex performed this date due to pt with complaints of tailbone pain sitting in chair and increased abdominal pain.     · Pt educated on PTA role/POC.   · Importance of OOB activity with staff assistance.  · Importance of sitting up in the chair throughout the day as tolerated, especially for meals   · Safety during  functional t/f and mobility with use of nursing or rehab staff and AD  · Multiple self-care tasks/functional mobility completed- assistance level noted above   · All questions/concerns answered within scope of practice    Pt encouraged to use call button for assistance for all OOB/OOchair activity 2/2 fall risk. Pt verbalized understanding. Tray table and all needs within reach.    Patient left reclined in [place] with pressure cushion, tray table and all needs within reach (room phone, eye glasses etc) with all lines intact, call button in reach and pt's nurse, Evette, notified..    GOALS:   Multidisciplinary Problems     Physical Therapy Goals        Problem: Physical Therapy Goal    Goal Priority Disciplines Outcome Goal Variances Interventions   Physical Therapy Goal     PT, PT/OT Ongoing, Progressing     Description: Goals to be met by: 1/10/22     Patient will increase functional independence with mobility by performin. Supine to sit with Modified [place]  2. Rolling to Left and Right with Modified [place]  3. Sit to stand transfer with Modified [place]  4. Bed to chair transfer with Modified [place]   5. Gait >250 feet with Modified [place] with or without Rolling Walker  6. Ascend/descend 1 flight of stair with right Handrail Modified [place] using No Assistive Device  7. Lower extremity exercise program 2 sets x10 reps per handout, with independence                     Time Tracking:     PT Received On: 21  PT Start Time: 1111     PT Stop Time: 1135  PT Total Time (min): 24 min     Billable Minutes: Gait Training 16 and Therapeutic Activity 8    Treatment Type: Treatment  PT/PTA: PTA     PTA Visit Number: 1     2021

## 2021-12-28 NOTE — NURSING
Received report from ANNETTE Curtis. Patient lying in bed resting, NAD noted. Safety Precautions maintained, Will Monitor. Daughter at bedside.

## 2021-12-28 NOTE — NURSING
Reported off to oncoming nurse, patient resting in bed, aaox3. Patient can make needs known to staff, max assist required for adls and transfers, no acute distress noted, safety precautions maintained. Daughter at bedside.    Chart check completed.

## 2021-12-28 NOTE — PT/OT/SLP PROGRESS
Occupational Therapy      Patient Name:  Katt Mcneal   MRN:  [id number]    Patient not seen today secondary to Patient unwilling to participate,Pain,Patient fatigue. Pt found slid down in the bed- OT assisted pt with repositioning/ bridging to HOB with CGA for set-up. Nurse reports that pt just back to bed from sitting up in the chair. Nurse notified of pt's L lower abdominal pain. Will follow-up as able.    12/28/2021

## 2021-12-28 NOTE — ASSESSMENT & PLAN NOTE
Echo repeated, LVEF preserved. Estimated CVP wnl arguing against hypervolemia despite elevated BNP.  - cont home furosemide

## 2021-12-28 NOTE — ASSESSMENT & PLAN NOTE
S/P PCI to LAD, PLB, RCA in 2017 with multiple TONY. No anginal complaints  - cont acei/bb/statin/asa

## 2021-12-28 NOTE — PLAN OF CARE
Problem: Infection  Goal: Absence of Infection Signs and Symptoms  Outcome: Ongoing, Progressing     Problem: Adult Inpatient Plan of Care  Goal: Plan of Care Review  Outcome: Ongoing, Progressing     Problem: Skin Injury Risk Increased  Goal: Skin Health and Integrity  Outcome: Ongoing, Progressing     Problem: Fluid Imbalance (Pancreatitis)  Goal: Fluid Balance  Outcome: Ongoing, Progressing     Problem: Infection (Pancreatitis)  Goal: Infection Symptom Resolution  Outcome: Ongoing, Progressing     Problem: Nutrition Impaired (Pancreatitis)  Goal: Optimal Nutrition Intake  Outcome: Ongoing, Progressing     Problem: Pain (Pancreatitis)  Goal: Acceptable Pain Control  Outcome: Ongoing, Progressing     Problem: Respiratory Compromise (Pancreatitis)  Goal: Effective Oxygenation and Ventilation  Outcome: Ongoing, Progressing     Problem: Adjustment to Illness (Sepsis/Septic Shock)  Goal: Optimal Coping  Outcome: Ongoing, Progressing     Problem: Bleeding (Sepsis/Septic Shock)  Goal: Absence of Bleeding  Outcome: Ongoing, Progressing     Problem: Glycemic Control Impaired (Sepsis/Septic Shock)  Goal: Blood Glucose Level Within Desired Range  Outcome: Ongoing, Progressing     Problem: Infection Progression (Sepsis/Septic Shock)  Goal: Absence of Infection Signs and Symptoms  Outcome: Ongoing, Progressing     Problem: Nutrition Impaired (Sepsis/Septic Shock)  Goal: Optimal Nutrition Intake  Outcome: Ongoing, Progressing     Problem: Impaired Wound Healing  Goal: Optimal Wound Healing  Outcome: Ongoing, Progressing

## 2021-12-28 NOTE — ASSESSMENT & PLAN NOTE
Presumed due to GI translocation. CBD dilated but patient is s/p cholecystectomy and has no other findings concerning for obstruction.   - continue ctx while inpatient

## 2021-12-28 NOTE — PLAN OF CARE
Problem: Physical Therapy Goal  Goal: Physical Therapy Goal  Description: Goals to be met by: 1/10/22     Patient will increase functional independence with mobility by performin. Supine to sit with Modified [place]  2. Rolling to Left and Right with Modified [place]  3. Sit to stand transfer with Modified [place]  4. Bed to chair transfer with Modified [place]   5. Gait >250 feet with Modified [place] with or without Rolling Walker  6. Ascend/descend 1 flight of stair with right Handrail Modified [place] using No Assistive Device  7. Lower extremity exercise program 2 sets x10 reps per handout, with independence    Outcome: Ongoing, Progressing   Pt tolerated treatment good today, despite increase in pain. Pt ambulated ~212ft with RW, SBA on RA with O2 sats dropping to ~88% requiring seated rest break and PLB to increase to 92% on RA. Pt displayed no signs of SOB/distress. Pt up in chair at end of session. Pt would continue to benefit from HHPT services with family assistance and RW at time of discharge.

## 2021-12-28 NOTE — PHYSICIAN QUERY
PT Name: Katt Mcneal  MR #: [id number]     DOCUMENTATION CLARIFICATION     CDS: Aurelio NICOLE,RN        Contact information:[email or web address]  This form is a permanent document in the medical record.     Query Date: December 28, 2021    By submitting this query, we are merely seeking further clarification of documentation.  Please utilize your independent clinical judgment when addressing the question(s) below.  The Medical Record contains the following:  Indicators Supporting Clinical Findings Location in Medical Record   x HR         RR          BP        Temp Vital Signs (24h Range):  HR: 58----->111  RR: 16----->25  SBP: 68--->131  DBP: 44--->66  Temp: 98.2 F---->102.9 F    Vital Signs (24h Range):  HR: 56---->66  RR: 17---->20  SBP: 130--->156  DBP:60----->67  Temp: 97.7 F---->99.3 F        12/21 Hospital Medicine H&P            12/22  [facility]  PN/Dr. Thompson        x Lactic Acid          Procalcitonin Lactate, venous: 2.3--->2.1, 2.5   12/20,12/22 Labs   x WBC           Bands          CRP  WBC: 9.89, 7.82,13.55, 11.23       Bands: 6.0, 16.0,2.0       12/20, 12/22, 12/24, 12/27 Labs   x Culture(s) Blood Culture, Routine: Gram Stain aer bottle: Gram negative rods  Blood Culture, Routine: Gram Stain nain bottle: Gram negative rods  Blood Culture, Routine: Escherichia Coli    12/20 Blood Culture Lab    AMS, Confusion, LOC, etc.      Organ Dysfunction/Failure     x Bacteremia or Sepsis / Septic E coli bacteremia  Acute pancreatitis with dilated CBD (hx of cholecystectomy), with GNR bacteremia. Presumptive E.coli, will reduce to ceftriaxone.     Severe sepsis  This patient does have evidence of infective focus  My overall impression is sepsis. Vital signs were reviewed and noted in progress note.         12/25 Hospital Medicine PN   x Known or Suspected Source of Infection documented WBC elevated and patient not progressing as suspected, expanded back to Zosyn- she does have inflammation in  "duodenum that could represent infection, potentially point of E.coli translocation 12/25 Hospital Medicine PN    (Failed) Outpatient Treatment     x Medication Ceftriaxone 2 g/50 ml D5W  IVPB Intravenous Every 24 hours after 8 doses   Ceftriaxone 2g/50 ml  D5W IVPB Intravenous Every 24 hours   Lactated ringers bolus 1,200 ml  Intravenous ED 1 Time 1 dose given  Norepinephrine 4 mg in dextrose 5% 250 ml infusion Intravenous Continuous  Piperacillin- tazobactam 4.5 g in dextrose 5% 100 ml  IVPB Intravenous Every 8 hours x 2  Piperacillin-tazobactam 4.5 g in dextrose 5% 100 ml IVPB Intravenous ED 1 Time 1 dose given   Vancomycin 500 mg in dextrose 5% 100 ml  IVPB Intravenous ED 1 Time 1 dose given 12/27---->1/4 MAR    12/22---->12/24 MAR  12/20 MAR    12/20---->12/21 MAR    12/20--->12/22,12/24---->12/27 MAR    12/20 MAR  12/20 MAR    Treatment      Other          Provider, please clarify/confirm the "infection process" regarding severe sepsis vs bacteremia associated with above clinical findings.  [  x ] Sepsis due to (suspected) organism (please specify): ___E. coli_______   [   ] Severe Sepsis with Acute Organ Dysfunction/Failure (please specify organ dysfunction/failure): _______   [   ] Sepsis with Septic Shock   [   ] Bacteremia without Sepsis   [   ] Bacteremia with Sepsis   [   ] Other Infectious Disease (please specify): __________   [   ] Sepsis Ruled Out   [  ] Clinically Undetermined       Present on admission (POA) status:   [   ] Yes (Y)            [  ] Clinically Undetermined (W)  [   ] No (N)              [   ] Documentation insufficient to determine if condition is POA (U)     Please document in your progress notes daily for the duration of treatment until resolved and include in your discharge summary.     "

## 2021-12-28 NOTE — ASSESSMENT & PLAN NOTE
Admitted for recurrent pancreatitis. Unclear etiology of pancreatitis - dilated CBD concerning but pt s/p cholecystectomy. Very slow improvement.   - advance diet as tolerated  - prn antiemetics, analgesics  - GI signed off

## 2021-12-29 LAB
POCT GLUCOSE: 110 MG/DL (ref 70–110)
POCT GLUCOSE: 141 MG/DL (ref 70–110)

## 2021-12-29 PROCEDURE — 63600175 PHARM REV CODE 636 W HCPCS: Performed by: INTERNAL MEDICINE

## 2021-12-29 PROCEDURE — 63600175 PHARM REV CODE 636 W HCPCS: Performed by: STUDENT IN AN ORGANIZED HEALTH CARE EDUCATION/TRAINING PROGRAM

## 2021-12-29 PROCEDURE — 25000003 PHARM REV CODE 250: Performed by: INTERNAL MEDICINE

## 2021-12-29 PROCEDURE — 97110 THERAPEUTIC EXERCISES: CPT | Mod: CQ

## 2021-12-29 PROCEDURE — A4216 STERILE WATER/SALINE, 10 ML: HCPCS | Performed by: EMERGENCY MEDICINE

## 2021-12-29 PROCEDURE — 21400001 HC TELEMETRY ROOM

## 2021-12-29 PROCEDURE — A4216 STERILE WATER/SALINE, 10 ML: HCPCS | Performed by: INTERNAL MEDICINE

## 2021-12-29 PROCEDURE — 25000003 PHARM REV CODE 250: Performed by: STUDENT IN AN ORGANIZED HEALTH CARE EDUCATION/TRAINING PROGRAM

## 2021-12-29 PROCEDURE — 94799 UNLISTED PULMONARY SVC/PX: CPT

## 2021-12-29 PROCEDURE — 97535 SELF CARE MNGMENT TRAINING: CPT

## 2021-12-29 PROCEDURE — 97116 GAIT TRAINING THERAPY: CPT | Mod: CQ

## 2021-12-29 PROCEDURE — 25000003 PHARM REV CODE 250: Performed by: EMERGENCY MEDICINE

## 2021-12-29 RX ADMIN — LORAZEPAM 0.5 MG: 0.5 TABLET ORAL at 09:12

## 2021-12-29 RX ADMIN — AMIODARONE HYDROCHLORIDE 200 MG: 200 TABLET ORAL at 09:12

## 2021-12-29 RX ADMIN — LEVOTHYROXINE SODIUM 75 MCG: 75 TABLET ORAL at 05:12

## 2021-12-29 RX ADMIN — Medication 1 TABLET: at 09:12

## 2021-12-29 RX ADMIN — METOPROLOL SUCCINATE 25 MG: 25 TABLET, EXTENDED RELEASE ORAL at 09:12

## 2021-12-29 RX ADMIN — Medication 10 ML: at 06:12

## 2021-12-29 RX ADMIN — PREDNISONE 2.5 MG: 2.5 TABLET ORAL at 09:12

## 2021-12-29 RX ADMIN — CEFTRIAXONE 2 G: 2 INJECTION, SOLUTION INTRAVENOUS at 09:12

## 2021-12-29 RX ADMIN — WARFARIN SODIUM 3 MG: 3 TABLET ORAL at 06:12

## 2021-12-29 RX ADMIN — ASPIRIN 81 MG: 81 TABLET, COATED ORAL at 09:12

## 2021-12-29 RX ADMIN — SENNOSIDES AND DOCUSATE SODIUM 1 TABLET: 50; 8.6 TABLET ORAL at 09:12

## 2021-12-29 RX ADMIN — MELATONIN TAB 3 MG 9 MG: 3 TAB at 09:12

## 2021-12-29 RX ADMIN — CHOLECALCIFEROL (VITAMIN D3) 10 MCG (400 UNIT) TABLET 400 UNITS: at 09:12

## 2021-12-29 RX ADMIN — Medication 10 ML: at 12:12

## 2021-12-29 NOTE — PLAN OF CARE
Recommendations    1) Continue IDDSI 7; Cardiac Diet as tolerated by patient    2) Decrease PPN rate to 75 mL/hr while pt transitions to full PO   Provides 612 kcal, 76.5g P, 90g Dex    3) Monitor PO tolerance and intake, if PO remains poor add Boost (+) BID    Goals:   1) Patient to meet > 75% EEN/EPN via PO/ONS intake    Nutrition Goal Status: new  Communication of RD Recs:  (POC)

## 2021-12-29 NOTE — PLAN OF CARE
Problem: Adult Inpatient Plan of Care  Goal: Plan of Care Review  Flowsheets (Taken 12/28/2021 1804)  Plan of Care Reviewed With:   patient   daughter  Goal: Absence of Hospital-Acquired Illness or Injury  Intervention: Identify and Manage Fall Risk  Flowsheets (Taken 12/28/2021 1804)  Safety Promotion/Fall Prevention:   bed alarm set   room near unit station   assistive device/personal item within reach  Intervention: Prevent Skin Injury  Flowsheets (Taken 12/28/2021 1804)  Body Position: sitting up in bed  Skin Protection:   adhesive use limited   skin sealant/moisture barrier applied  Intervention: Prevent and Manage VTE (Venous Thromboembolism) Risk  Flowsheets (Taken 12/28/2021 1804)  Activity Management: Rolling - L1  VTE Prevention/Management:   bleeding risk assessed   bleeding risk factor(s) identified, provider notified  Range of Motion: active ROM (range of motion) encouraged  Intervention: Prevent Infection  Flowsheets (Taken 12/28/2021 1804)  Infection Prevention: single patient room provided  Goal: Optimal Comfort and Wellbeing  Intervention: Monitor Pain and Promote Comfort  Flowsheets (Taken 12/28/2021 1804)  Pain Management Interventions:   care clustered   position adjusted   pillow support provided  Intervention: Provide Person-Centered Care  Flowsheets (Taken 12/28/2021 1804)  Trust Relationship/Rapport:   care explained   questions answered     Problem: Skin Injury Risk Increased  Goal: Skin Health and Integrity  Intervention: Optimize Skin Protection  Flowsheets (Taken 12/28/2021 1804)  Pressure Reduction Techniques: frequent weight shift encouraged  Skin Protection:   adhesive use limited   skin sealant/moisture barrier applied  Head of Bed (HOB) Positioning: HOB elevated     Problem: Infection (Pancreatitis)  Goal: Infection Symptom Resolution  Intervention: Prevent or Manage Infection  Flowsheets (Taken 12/28/2021 1804)  Infection Management: aseptic technique maintained     Problem: Pain  (Pancreatitis)  Goal: Acceptable Pain Control  Intervention: Monitor and Manage Pain  Flowsheets (Taken 12/28/2021 1804)  Pain Management Interventions:   care clustered   position adjusted   pillow support provided     Problem: Respiratory Compromise (Pancreatitis)  Goal: Effective Oxygenation and Ventilation  Intervention: Optimize Oxygenation and Ventilation  Flowsheets (Taken 12/28/2021 1804)  Airway/Ventilation Management: airway patency maintained  Breathing Techniques/Airway Clearance: deep/controlled cough encouraged  Cough And Deep Breathing: done independently per patient  Activity Management: Rolling - L1  Head of Bed (HOB) Positioning: HOB elevated     Problem: Fall Injury Risk  Goal: Absence of Fall and Fall-Related Injury  Intervention: Identify and Manage Contributors  Flowsheets (Taken 12/28/2021 1804)  Self-Care Promotion: independence encouraged  Intervention: Promote Injury-Free Environment  Flowsheets (Taken 12/28/2021 1804)  Safety Promotion/Fall Prevention:   bed alarm set   room near unit station   assistive device/personal item within reach

## 2021-12-29 NOTE — PLAN OF CARE
Patient appears to be resting on left side in bed with eyes closed will continue to monitor patient.

## 2021-12-29 NOTE — SUBJECTIVE & OBJECTIVE
Interval History: Improvement in nausea, tolerating small bits of po.    Review of Systems   Constitutional: Negative for chills and fever.   Respiratory: Negative for cough and shortness of breath.    Cardiovascular: Negative for chest pain and leg swelling.   Gastrointestinal: Positive for abdominal distention, abdominal pain and nausea. Negative for vomiting.     Objective:     Vital Signs (Most Recent):  Temp: 98.1 °F (36.7 °C) (12/29/21 0801)  Pulse: 62 (12/29/21 0801)  Resp: 16 (12/29/21 0801)  BP: (!) 181/86 (12/29/21 0801)  SpO2: 98 % (12/29/21 0801) Vital Signs (24h Range):  Temp:  [97.8 °F (36.6 °C)-98.6 °F (37 °C)] 98.1 °F (36.7 °C)  Pulse:  [59-81] 62  Resp:  [16-19] 16  SpO2:  [95 %-100 %] 98 %  BP: (109-181)/(60-86) 181/86     Weight: 40 kg (88 lb 2.9 oz)  Body mass index is 17.81 kg/m².    Intake/Output Summary (Last 24 hours) at 12/29/2021 1000  Last data filed at 12/29/2021 0000  Gross per 24 hour   Intake 10 ml   Output --   Net 10 ml      Physical Exam  Constitutional:       General: She is not in acute distress.     Appearance: She is ill-appearing. She is not toxic-appearing or diaphoretic.   Cardiovascular:      Rate and Rhythm: Normal rate and regular rhythm.      Heart sounds: No murmur heard.  No gallop.    Pulmonary:      Effort: Pulmonary effort is normal.      Breath sounds: Normal breath sounds. No wheezing.   Abdominal:      General: Bowel sounds are normal. There is no distension.      Palpations: Abdomen is soft.      Tenderness: There is no abdominal tenderness.         Significant Labs: All pertinent labs within the past 24 hours have been reviewed.    Significant Imaging: I have reviewed all pertinent imaging results/findings within the past 24 hours.

## 2021-12-29 NOTE — PLAN OF CARE
Problem: Physical Therapy Goal  Goal: Physical Therapy Goal  Description: Goals to be met by: 1/10/22     Patient will increase functional independence with mobility by performin. Supine to sit with Modified [place]- met   2. Rolling to Left and Right with Modified [place] - Met   3. Sit to stand transfer with Modified [place]  4. Bed to chair transfer with Modified [place]   5. Gait >250 feet with Modified [place] with or without Rolling Walker  6. Ascend/descend 1 flight of stair with right Handrail Modified [place] using No Assistive Device  7. Lower extremity exercise program 2 sets x10 reps per handout, with independence    Outcome: Ongoing, Progressing   Pt tolerated treatment good today, despite increase in pain. Pt ambulated ~252ft with no AD, CGA-SBA on RA with O2 sats ~93% with no signs of distress. Complains of sacral and abdominal pain. Pt up in chair at end of session. Pt would continue to benefit from HHPT services with family assistance and RW at time of discharge.

## 2021-12-29 NOTE — PT/OT/SLP PROGRESS
"Occupational Therapy   Treatment    Name: Katt Mcneal  MRN: [id number]  Admitting Diagnosis:  Acute recurrent pancreatitis       Recommendations:     Discharge Recommendations: home health OT (with family supervision)  Discharge Equipment Recommendations:  walker, rolling  Barriers to discharge:  None    Assessment:     Katt Mcneal is a 81 y.o. female with a medical diagnosis of Acute recurrent pancreatitis. Performance deficits affecting function are weakness,impaired endurance,impaired self care skills,decreased lower extremity function,impaired functional mobilty,gait instability,pain,decreased safety awareness,impaired cognition.     SBA for grooming ADLs at the sink for ~15 min. spO2 on room air after - 98%    Rehab Prognosis:  Good; patient would benefit from acute skilled OT services to address these deficits and reach maximum level of function.       Plan:     Patient to be seen 2 x/week to address the above listed problems via self-care/home management,therapeutic activities,therapeutic exercises  · Plan of Care Expires: 01/10/22  · Plan of Care Reviewed with: patient    Subjective     Chief complaint: abdominal pain   Patient/family comments/ goals: "you got it good. It's tough being 81"      Pain/Comfort:  · Pain Rating 1:  (abdominal pain- did not rate)  · Pain Addressed 1: Reposition,Distraction,Cessation of Activity    Objective:     Communicated with: nurseGenie, during session.  Patient found seated EOB with telemetry,PICC line upon OT entry to room.    General Precautions: Standard, fall   Orthopedic Precautions:N/A   Braces: N/A  Respiratory Status: Room air     Occupational Performance:     Bed Mobility:    · Patient completed Scooting anteriorly/laterally/posteriorly with supervision  · Patient completed Sit to Supine with supervision     Functional Mobility/Transfers:  · Patient completed Sit <> Stand Transfer with stand by assistance  with  rolling walker   · Functional Mobility: pt " completed in-room functional mobility with RW and SBA.     Activities of Daily Living:  · Grooming: stand by assistance standing at the sink for ~15 min to brush teeth, clean dentures, wash hands/face, and clean the counter      AMPAC 6 Click ADL: 23    Treatment & Education:  · Pt re-educated on OT role/POC.   · Importance of OOB activity with staff assistance.  · Safety during functional t/f and mobility   · Handout provided and reviewed on pursed lip breathing and safe pacing of functional tasks/ADLs  · BUE AROM HEP provided and reviewed to complete 2-3x/day, 10-15 reps to all planes   · Self-care tasks/functional mobility completed- assistance level noted above   · All questions/concerns answered within OT scope of practice       Patient left with bed in chair position with all lines intact, call button in reach, bed alarm on, nurse, Genie, notified and all needs met/within reachEducation:  . Door left open.     GOALS:   Multidisciplinary Problems     Occupational Therapy Goals        Problem: Occupational Therapy Goal    Goal Priority Disciplines Outcome Interventions   Occupational Therapy Goal     OT, PT/OT Ongoing, Progressing    Description: Goals to be met by: 01/10/21     Patient will increase functional independence with ADLs by performing:    LE Dressing with Supervision.  Grooming while standing at sink with Supervision.  Toileting from toilet with Supervision for hygiene and clothing management.   Supine to sit with Modified [place].  Step transfer with Supervision  Toilet transfer to toilet with Supervision.  Upper extremity exercise program x15 reps per handout, with independence.                     Time Tracking:     OT Date of Treatment: 12/29/21  OT Start Time: 1540  OT Stop Time: 1604  OT Total Time (min): 24 min    Billable Minutes:Self Care/Home Management 24 min     OT/JEREMIAH: OT          12/29/2021

## 2021-12-29 NOTE — PROGRESS NOTES
"[facility]  Progress Note    Patient Name: Katt Mcneal  MRN: [id number]  Patient Class: IP- Inpatient   Admission Date: 12/20/2021  Length of Stay: 9 days  Attending Physician: Arsenio Robison MD  Primary Care Provider: Panchito Perez MD        Subjective:     Principal Problem:Acute recurrent pancreatitis        HPI:   is an 81 YOCF with a PMHx of Atrial fibrillation on Coumadine (due allergy to a DOAC) CAD S/P PCI, pancreatitis and diverticulosis presenting to the ED for severe abdominal pain x 24 hours, worsening this yesterday morning around 9 AM associated with an episode of vomiting as well as shivering and a temp of 102.9 .Pt denied any new or unsual food intake. She also denied diarrhea, or recent travel.   In the ER she was managed with IV fluid pain control and antipyretics. She underwent CT Abd which revealed findings concerning for possible pancreatitis involving the head of the pancreas and duodenum.    SHe is being admitted to the ICU for further cre of the suspected pancreatitis.       Overview/Hospital Course:  Acute pancreatitis with dilated CBD (hx of cholecystectomy), with GNR bacteremia. Presumptive E.coli, will reduce to ceftriaxone. GI discussed with AES: no role for endoscopic intervention at this time given lack of suspected biliary obstruction, recommended outpatient eval with clinic visit and potential ERCP to assess PD. Will continue IV abx, antiemetics, and pain control. Received fluids for pancreatitis, a bit hypoxic on oxygen, will single dose IV lasix. Can switch to oral cefpodoxime at discharge, expected END thru 1/3/22 for E.coli bacteremia. Patient still have sudden sharp episodes of "cold" pain in her abdomen that radiates to her back, especially after trying a piece of food. WBC elevated and patient not progressing as suspected, expanded back to Zosyn- she does have inflammation in duodenum that could represent infection, " potentially point of E.coli translocation.     Restarted on clear liquids.       Interval History: Improvement in nausea, tolerating small bits of po.    Review of Systems   Constitutional: Negative for chills and fever.   Respiratory: Negative for cough and shortness of breath.    Cardiovascular: Negative for chest pain and leg swelling.   Gastrointestinal: Positive for abdominal distention, abdominal pain and nausea. Negative for vomiting.     Objective:     Vital Signs (Most Recent):  Temp: 98.1 °F (36.7 °C) (12/29/21 0801)  Pulse: 62 (12/29/21 0801)  Resp: 16 (12/29/21 0801)  BP: (!) 181/86 (12/29/21 0801)  SpO2: 98 % (12/29/21 0801) Vital Signs (24h Range):  Temp:  [97.8 °F (36.6 °C)-98.6 °F (37 °C)] 98.1 °F (36.7 °C)  Pulse:  [59-81] 62  Resp:  [16-19] 16  SpO2:  [95 %-100 %] 98 %  BP: (109-181)/(60-86) 181/86     Weight: 40 kg (88 lb 2.9 oz)  Body mass index is 17.81 kg/m².    Intake/Output Summary (Last 24 hours) at 12/29/2021 1000  Last data filed at 12/29/2021 0000  Gross per 24 hour   Intake 10 ml   Output --   Net 10 ml      Physical Exam  Constitutional:       General: She is not in acute distress.     Appearance: She is ill-appearing. She is not toxic-appearing or diaphoretic.   Cardiovascular:      Rate and Rhythm: Normal rate and regular rhythm.      Heart sounds: No murmur heard.  No gallop.    Pulmonary:      Effort: Pulmonary effort is normal.      Breath sounds: Normal breath sounds. No wheezing.   Abdominal:      General: Bowel sounds are normal. There is no distension.      Palpations: Abdomen is soft.      Tenderness: There is no abdominal tenderness.         Significant Labs: All pertinent labs within the past 24 hours have been reviewed.    Significant Imaging: I have reviewed all pertinent imaging results/findings within the past 24 hours.      Assessment/Plan:      * Acute recurrent pancreatitis  Admitted for recurrent pancreatitis. Unclear etiology of pancreatitis - dilated CBD concerning  but pt s/p cholecystectomy. Very slow improvement.   - advance diet as tolerated  - prn antiemetics, analgesics  - GI signed off    Severe sepsis  Resolved. See previous notes for more details.    E coli bacteremia  Presumed due to GI translocation. CBD dilated but patient is s/p cholecystectomy and has no other findings concerning for obstruction.   - continue ctx while inpatient        Other specified hypothyroidism  Continue home medication of Levothyroxine at 75 mcg po daily.         Dyslipidemia  Continue statin       Essential hypertension  Chronic, stable,   Continue Amlodipine 5 mg po daily   Continue Lisinopril 5 mg po daily           Paroxysmal atrial fibrillation  Currently rate controlled.   Continue rate control with BB with Metoprolol Succinate.  Continue with Warfarin 2.5 mg po daily.         Chronic heart failure with preserved ejection fraction  Echo repeated, LVEF preserved. Estimated CVP wnl arguing against hypervolemia despite elevated BNP.  - cont home furosemide        Coronary artery disease involving native coronary artery of native heart without angina pectoris  S/P PCI to LAD, PLB, RCA in 2017 with multiple TONY. No anginal complaints  - cont acei/bb/statin/asa      VTE Risk Mitigation (From admission, onward)         Ordered     warfarin (COUMADIN) tablet 3 mg  Daily         12/27/21 0608     IP VTE HIGH RISK PATIENT  Once         12/21/21 0215     Place sequential compression device  Until discontinued         12/21/21 0215     Reason for No Pharmacological VTE Prophylaxis  Once        Question:  Reasons:  Answer:  Already adequately anticoagulated on oral Anticoagulants    12/21/21 0215                Discharge Planning   FRANCO:      Code Status: Full Code   Is the patient medically ready for discharge?:     Reason for patient still in hospital (select all that apply): Patient trending condition, Laboratory test, Treatment, Consult recommendations and Pending disposition  Discharge Plan A:  Home with family,Home Health   Discharge Delays: Orders Needed              Arsenio Robison MD  Department of Hospital Medicine   [facility] [place]

## 2021-12-29 NOTE — NURSING
Reported off to oncoming nurse, patient resting in bed, aaox3. Patient can make needs known to staff, max assist required for adls and transfers, no acute distress noted, safety precautions maintained.    Chart check completed.

## 2021-12-29 NOTE — PHYSICIAN QUERY
PT Name: Katt Mcneal  MR #: [id number]    DOCUMENTATION CLARIFICATION     CDS: Aurelio NICOLE,RN        Contact information:[email or web address]  This form is a permanent document in the medical record.     Query Date: December 29, 2021    By submitting this query, we are merely seeking further clarification of documentation.. Please utilize your independent clinical judgment when addressing the question(s) below.    The medical record contains the following:   Indicators  Supporting Clinical Findings Location in Medical Record    % of Estimated Energy Intake over a time frame from p.o., TF, or TPN     x Weight Status over a time frame Severe Weight Loss (Malnutrition): greater than 10% in 6 months (20#, 18%)    12/27 Adult Nutrition PN    Subcutaneous Fat and/or Muscle Loss      Fluid Accumulation or Edema     x Wt/BMI/Usual Body Weight   WT: 88.18 lb  BMI: 17.8  12/27 Adult Nutrition PN    Delayed Wound Healing/Failure to Thrive     x Acute or Chronic Illness Acute recurrent  Pancreatitis, pancreatitis, HTN, A-fib, dyslipidemia, hypothyroidism, CHF, LT-anticoag use, CAD, nausea/vomiting,decreased appetite,abdominal pain,abdominal distension    12/27 Adult Nutrition PN    Medication     x Treatment  1) Continue IDDSI 7; Cardiac Diet as tolerated by patient     2) Decrease PPN rate to 75 mL/hr while pt transitions to full PO, continue IV Lipids (+500 kcal)              Provides 612 kcal, 76.5g P, 90g Dex     3) Monitor PO tolerance and intake, if PO remains poor add Boost (+) BID     Mineral Modified Diet (Cardiac)  Commercial Beverage (Boost +)         12/27 Adult Nutrition PN   x Other PO intake for first meal ~ 25%, PPN continues to run while transitioning.  12/27 Adult Nutrition PN     AND / ASPEN Clinical Characteristics (October 2011)  A minimum of two characteristics is recommended for diagnosing either moderate or severe malnutrition   Mild Malnutrition Moderate Malnutrition Severe Malnutrition    Energy Intake from p.o., TF or TPN. < 75% intake of estimated energy needs for less than 7 days < 75% intake of estimated energy needs for greater than 7 days < 50% intake of estimated energy needs for > 5 days   Weight Loss 1-2% in 1 month  5% in 3 months  7.5% in 6 months  10% in 1 year 1-2 % in 1 week  5% in 1 month  7.5% in 3 months  10% in 6 months  20% in 1 year > 2% in 1 week  > 5% in 1 month  > 7.5% in 3 months  > 10% in 6 months  > 20% in 1 year   Physical Findings     None *Mild subcutaneous fat and/or muscle loss  *Mild fluid accumulation  *Stage II decubitus  *Surgical wound or non-healing wound *Mod/severe subcutaneous fat and/or muscle loss  *Mod/severe fluid accumulation  *Stage III or IV decubitus  *Non-healing surgical wound     Provider, please specify diagnosis or diagnoses associated with above clinical findings.    [ x ] Moderate Protein-Calorie Malnutrition   [  ] Severe Protein-Calorie Malnutrition   [  ] Malnutrition, Unspecified degree   [  ] Other Nutritional Diagnosis (please specify): _______   [  ] Malnutrition ruled out   [  ] Clinically Undetermined     Please document in your progress notes daily for the duration of treatment until resolved and include in your discharge summary.

## 2021-12-29 NOTE — PLAN OF CARE
Problem: Occupational Therapy Goal  Goal: Occupational Therapy Goal  Description: Goals to be met by: 01/10/21     Patient will increase functional independence with ADLs by performing:    LE Dressing with Supervision.  Grooming while standing at sink with Supervision.  Toileting from toilet with Supervision for hygiene and clothing management.   Supine to sit with Modified [place].  Step transfer with Supervision  Toilet transfer to toilet with Supervision.  Upper extremity exercise program x15 reps per handout, with independence.    Outcome: Ongoing, Progressing     SBA for grooming ADLs at the sink for ~15 min. spO2 on room air after - 98%

## 2021-12-29 NOTE — PROGRESS NOTES
[facility] - Telemetry  Adult Nutrition  Progress Note    SUMMARY       Recommendations    1) Continue IDDSI 7; Cardiac Diet as tolerated by patient    2) Decrease PPN rate to 75 mL/hr while pt transitions to full PO, continue IV Lipids (+500 kcal)   Provides 612 kcal, 76.5g P, 90g Dex    3) Monitor PO tolerance and intake, if PO remains poor add Boost (+) BID    Goals:   1) Patient to meet > 75% EEN/EPN via PO/ONS intake    Nutrition Goal Status: new  Communication of RD Recs:  (POC)    Dietitian Rounds Brief    12/27 - Pt transitioned back to NPO 12/24, receiving PPN Clinimix-E 4.25/5 @ 100 mL/hr during NPO. 12/27 pt transitioned back to PO diet, IDDSI 7;Cardiac. PO intake for first meal ~ 25%, PPN continues to run while transitioning. Pt has reported some nausea, abdominal pain, and distention to physician. Will continue to monitor.     12/22 - Pt assessed per MST>3, weight hx via office visits shows weight loss. Upon admit pt repots abdominal pain, N/V, presenting with generalized edema. Per imaging acute pancreatitis. Patient tolerated transition from NPO to clear liquids and has now advanced to full diet. Will monitor for tolerance. Current noted intake is 50%.     Assessment and Plan  Nutrition Problem  Inadequate oral intake     Related to (etiology):   Acute pancreatitis     Signs and Symptoms (as evidenced by):   Inconsistent Diet orders, NPO status  Nausea, abdominal pain/distention     Interventions/Recommendations (treatment strategy):  Mineral Modified Diet (Cardiac)  Commercial Beverage (Boost +)  Collaboration of care with other providers     Nutrition Diagnosis Status:   New       Malnutrition Assessment  Malnutrition Type: acute illness or injury  Severe Weight Loss (Malnutrition): greater than 10% in 6 months (20#, 18%)    Reason for Assessment    Reason For Assessment: RD follow-up  Diagnosis: gastrointestinal disease (Acute reccurent pancreatitis)  Relevant Medical History: Pancreatitis, HTN,  "A-fib, Dyslipidemia, Hypothyroidism, CHF, LT-anticoag use, CAD  Nutrition Discharge Planning: Discharge on low sodium Cardiac diet    Nutrition Risk Screen    Nutrition Risk Screen: other (see comments) (pancreatitis)    Nutrition/Diet History    Spiritual, Cultural Beliefs, Druze Practices, Values that Affect Care: no  Food Allergies: NKFA  Factors Affecting Nutritional Intake: nausea/vomiting,decreased appetite,abdominal pain,abdominal distension    Anthropometrics    Temp: 97.8 °F (36.6 °C)  Height Method: Stated  Height: 4' 11" (149.9 cm)  Height (inches): 59 in  Weight Method: Bed Scale  Weight: 40 kg (88 lb 2.9 oz)  Weight (lb): 88.18 lb  Ideal Body Weight (IBW), Female: 95 lb  % Ideal Body Weight, Female (lb): 92.63 %  BMI (Calculated): 17.8  BMI Grade: 17 - 18.4 protein-energy malnutrition grade I  Usual Body Weight (UBW), k.6 kg  % Usual Body Weight: 83.05  % Weight Change From Usual Weight: -17.12 %       Lab/Procedures/Meds    Pertinent Labs Reviewed: reviewed  Pertinent Medications Reviewed: reviewed  Scheduled Meds:   amiodarone  200 mg Oral BID    aspirin  81 mg Oral Daily    calcium-vitamin D3  1 tablet Oral Daily    cefTRIAXone (ROCEPHIN) IVPB  2 g Intravenous Q24H    cholecalciferol (vitamin D3)  400 Units Oral Daily    levothyroxine  75 mcg Oral Daily    metoprolol succinate  25 mg Oral Daily    predniSONE  2.5 mg Oral Daily    senna-docusate 8.6-50 mg  1 tablet Oral BID    sodium chloride 0.9%  10 mL Intravenous Q6H    warfarin  3 mg Oral Daily     Continuous Infusions:  PRN Meds:.acetaminophen, dextrose 50%, dextrose 50%, glucagon (human recombinant), glucose, glucose, insulin aspart U-100, LORazepam, magnesium oxide, magnesium oxide, melatonin, morphine, naloxone, nitroGLYCERIN, ondansetron, ondansetron, potassium bicarbonate, potassium bicarbonate, potassium bicarbonate, Flushing PICC Protocol **AND** sodium chloride 0.9% **AND** sodium chloride 0.9%, sodium chloride " 0.9%    Estimated/Assessed Needs    Weight Used For Calorie Calculations: 40.3 kg (88 lb 13.5 oz)  Energy Calorie Requirements (kcal): 1410  Energy Need Method: Kcal/kg (35; per weight loss, BMI < 18)  Protein Requirements: 48 - 60g (1.2 - 1.5g/kg per acute pancreatitis)  Weight Used For Protein Calculations: 40.3 kg (88 lb 13.5 oz)     Estimated Fluid Requirement Method: RDA Method  RDA Method (mL): 1410  CHO Requirement: 176g      Nutrition Prescription Ordered    Current Diet Order: IDDSI 7; Cardiac  Nutrition Order Comments: Peripheral Parenteral Nutrition  Current Nutrition Support Formula Ordered: Clinimix 4.25/5  Current Nutrition Support Rate Ordered: 100 (ml)  Current Nutrition Support Frequency Ordered: mL/hr    Evaluation of Received Nutrient/Fluid Intake    Parenteral Calories (kcal): 816  Parenteral Protein (gm): 102  Parenteral Fluid (mL): 2400  Lipid Calories (kcals): 500 kcals  GIR (Glucose Infusion Rate) (mg/kg/min): 2.08 mg/kg/min  Total Calories (kcal): 1316  Energy Calories Required: not meeting needs  Protein Required: not meeting needs  Fluid Required: meeting needs  % Intake of Estimated Energy Needs: 75 - 100 %  % Meal Intake: 0 - 25 %    Nutrition Risk    Level of Risk/Frequency of Follow-up:  (1-2x/week)     Monitor and Evaluation    Food and Nutrient Intake: energy intake,food and beverage intake,parenteral nutrition intake  Food and Nutrient Adminstration: diet order,enteral and parenteral nutrition administration  Knowledge/Beliefs/Attitudes: food and nutrition knowledge/skill,beliefs and attitudes  Physical Activity and Function: nutrition-related ADLs and IADLs  Anthropometric Measurements: weight,weight change,body mass index  Biochemical Data, Medical Tests and Procedures: glucose/endocrine profile,inflammatory profile,lipid profile,electrolyte and renal panel,gastrointestinal profile  Nutrition-Focused Physical Findings: overall appearance,extremities, muscles and bones,head and  eyes,skin     Nutrition Follow-Up    RD Follow-up?: Yes

## 2021-12-29 NOTE — PT/OT/SLP PROGRESS
"  Physical Therapy Treatment    Patient Name:  Katt Mcneal   MRN:  [id number]    Recommendations:     Discharge Recommendations:  home health PT (with family assistance)   Discharge Equipment Recommendations: walker, rolling   Barriers to discharge: Inaccessible home    Assessment:     Katt Mcneal is a 81 y.o. female admitted with a medical diagnosis of Acute recurrent pancreatitis.  She presents with the following impairments/functional limitations:  weakness,impaired functional mobilty,impaired endurance,gait instability,impaired self care skills,decreased lower extremity function,impaired balance.    Pt tolerated treatment good today, despite increase in pain. Pt ambulated ~252ft with no AD, CGA-SBA on RA with O2 sats ~93% with no signs of distress. Complains of sacral and abdominal pain. Pt up in chair at end of session. Plan to trial stairs next session, if able Pt would continue to benefit from HHPT services with family assistance and RW at time of discharge.     Rehab Prognosis: Good; patient would benefit from acute skilled PT services to address these deficits and reach maximum level of function.    Recent Surgery: * No surgery found *      Plan:     During this hospitalization, patient to be seen 3 x/week to address the identified rehab impairments via gait training,therapeutic activities,therapeutic exercises and progress toward the following goals:    · Plan of Care Expires:  01/10/22    Subjective     Chief Complaint: pain   Patient/Family Comments/goals: Pt agreeable to ambulation. Daughter states " Everything in this room is broken."  Pain/Comfort:  Pain Rating 1: 6/10  Location 1: abdomen (sacrum)      Objective:     Communicated with pt's nurse, Genie.  Patient found HOB elevated with Genie RN and daughter present with telemetry,oxygen,PICC line (3LO2 NC upon entry) upon PT entry to room.     General Precautions: Standard, fall,respiratory   Orthopedic Precautions:N/A   Braces: " N/A  Respiratory Status: 3L nasal cannula     Functional Mobility: O2 on RA at rest 96-97%.   · Bed Mobility:     · Rolling Left:  Mod I  · Scooting: Mod I  · Supine to Sit: Mod I  · Transfers: from EOB    · Sit to Stand:  stand by assistance with no AD  · Gait: Pt ambulated ~252ft with no AD, CGA- SBA on level tile with no LOB. On RA with O2 sats 93-94%. Pt requires cues to increase step length and foot clearance. Decreased sarah, step length, velocity of limb, endurance and postural control   · Balance: good sitting and fair+ standing      AM-PAC 6 CLICK MOBILITY  Turning over in bed (including adjusting bedclothes, sheets and blankets)?: 4  Sitting down on and standing up from a chair with arms (e.g., wheelchair, bedside commode, etc.): 4  Moving from lying on back to sitting on the side of the bed?: 4  Moving to and from a bed to a chair (including a wheelchair)?: 4  Need to walk in hospital room?: 3  Climbing 3-5 steps with a railing?: 3  Basic Mobility Total Score: 22       Therapeutic Activities and Exercises:   End of session pt's O2 sats on RA was 94%. Pt's nurseGenie was notified of pt's O2 sats during ambulation and at rest. Verbalized it was okay to keep pt on RA at this time.    Seated therex to BLEs x15 reps AROM: Marching, LAQs, Hip abduction/adduction, Heel raises/ toe raises    · Pt educated on PTA role/POC.   · Importance of OOB activity with staff assistance.  · Importance of sitting up in the chair throughout the day as tolerated, especially for meals   · Safety during functional t/f and mobility with use of nursing or rehab staff and AD  · Multiple self-care tasks/functional mobility completed- assistance level noted above   · All questions/concerns answered within scope of practice    Pt encouraged to use call button for assistance for all OOB/OOchair activity 2/2 fall risk. Pt verbalized understanding. Tray table and all needs within reach.    Patient left reclined in BSchair on R side  with wedge + with pressure cushion and all needs within reach with all lines intact, call button in reach and pt's nurse, Genie, notified and daughter present.    GOALS:   Multidisciplinary Problems     Physical Therapy Goals        Problem: Physical Therapy Goal    Goal Priority Disciplines Outcome Goal Variances Interventions   Physical Therapy Goal     PT, PT/OT Ongoing, Progressing     Description: Goals to be met by: 1/10/22     Patient will increase functional independence with mobility by performin. Supine to sit with Modified [place]  2. Rolling to Left and Right with Modified [place]  3. Sit to stand transfer with Modified [place]  4. Bed to chair transfer with Modified [place]   5. Gait >250 feet with Modified [place] with or without Rolling Walker  6. Ascend/descend 1 flight of stair with right Handrail Modified [place] using No Assistive Device  7. Lower extremity exercise program 2 sets x10 reps per handout, with independence                     Time Tracking:     PT Received On: 21  PT Start Time: 1205     PT Stop Time: 1235  PT Total Time (min): 30 min     Billable Minutes: Gait Training 20 and Therapeutic Exercises 10    Treatment Type: Treatment  PT/PTA: PTA     PTA Visit Number: 2     2021

## 2021-12-30 VITALS
BODY MASS INDEX: 18.66 KG/M2 | WEIGHT: 92.56 LBS | HEART RATE: 71 BPM | TEMPERATURE: 98 F | DIASTOLIC BLOOD PRESSURE: 83 MMHG | SYSTOLIC BLOOD PRESSURE: 167 MMHG | HEIGHT: 59 IN | RESPIRATION RATE: 17 BRPM | OXYGEN SATURATION: 97 %

## 2021-12-30 LAB
POCT GLUCOSE: 100 MG/DL (ref 70–110)
POCT GLUCOSE: 109 MG/DL (ref 70–110)
POCT GLUCOSE: 214 MG/DL (ref 70–110)

## 2021-12-30 PROCEDURE — A4216 STERILE WATER/SALINE, 10 ML: HCPCS | Performed by: EMERGENCY MEDICINE

## 2021-12-30 PROCEDURE — 63600175 PHARM REV CODE 636 W HCPCS: Performed by: INTERNAL MEDICINE

## 2021-12-30 PROCEDURE — 94799 UNLISTED PULMONARY SVC/PX: CPT

## 2021-12-30 PROCEDURE — 25000003 PHARM REV CODE 250: Performed by: STUDENT IN AN ORGANIZED HEALTH CARE EDUCATION/TRAINING PROGRAM

## 2021-12-30 PROCEDURE — A4216 STERILE WATER/SALINE, 10 ML: HCPCS | Performed by: INTERNAL MEDICINE

## 2021-12-30 PROCEDURE — 25000003 PHARM REV CODE 250: Performed by: INTERNAL MEDICINE

## 2021-12-30 PROCEDURE — 94761 N-INVAS EAR/PLS OXIMETRY MLT: CPT

## 2021-12-30 PROCEDURE — 25000003 PHARM REV CODE 250: Performed by: EMERGENCY MEDICINE

## 2021-12-30 RX ADMIN — AMIODARONE HYDROCHLORIDE 200 MG: 200 TABLET ORAL at 08:12

## 2021-12-30 RX ADMIN — PREDNISONE 2.5 MG: 2.5 TABLET ORAL at 08:12

## 2021-12-30 RX ADMIN — ASPIRIN 81 MG: 81 TABLET, COATED ORAL at 08:12

## 2021-12-30 RX ADMIN — SENNOSIDES AND DOCUSATE SODIUM 1 TABLET: 50; 8.6 TABLET ORAL at 08:12

## 2021-12-30 RX ADMIN — WARFARIN SODIUM 3 MG: 3 TABLET ORAL at 05:12

## 2021-12-30 RX ADMIN — METOPROLOL SUCCINATE 25 MG: 25 TABLET, EXTENDED RELEASE ORAL at 08:12

## 2021-12-30 RX ADMIN — LEVOTHYROXINE SODIUM 75 MCG: 75 TABLET ORAL at 05:12

## 2021-12-30 RX ADMIN — Medication 1 TABLET: at 08:12

## 2021-12-30 RX ADMIN — Medication 10 ML: at 01:12

## 2021-12-30 RX ADMIN — Medication 10 ML: at 05:12

## 2021-12-30 RX ADMIN — ACETAMINOPHEN 650 MG: 325 TABLET ORAL at 08:12

## 2021-12-30 RX ADMIN — CHOLECALCIFEROL (VITAMIN D3) 10 MCG (400 UNIT) TABLET 400 UNITS: at 08:12

## 2021-12-30 NOTE — PLAN OF CARE
13[facility]- [facility] received a message from [facility] stating that an order for a wheelchair was placed for this pt for approval to pull.     I called daughter Chano, [phone] to inform that wheelchair cost would be an out of pocket expense of $456 due to patient already receiving a RW today and wheelchair not being recommended for use by PT/OT. Daughter and pt both expressed that they do not wish to get a wheelchair at this time and the pt feels that she would do fine at home with the RW. Gail MOORE, charge nurse Blanca and Ofelia with [facility] made aware of patient choice.

## 2021-12-30 NOTE — PLAN OF CARE
[facility] Telemetry      HOME HEALTH ORDERS  FACE TO FACE ENCOUNTER    Patient Name: Katt Mcneal  YOB: 1940    PCP: Panchito Perez MD   PCP Address: [address] [place] [address] / [id number]  PCP Phone Number: [phone]  PCP Fax: [phone]    Encounter Date: 12/20/21    Admit to Home Health    Diagnoses:  Active Hospital Problems    Diagnosis  POA    *Acute recurrent pancreatitis [K85.90]  Yes    Severe sepsis [A41.9, R65.20]  Yes    E coli bacteremia [R78.81, B96.20]  Yes    Other specified hypothyroidism [E03.8]  Yes    Paroxysmal atrial fibrillation [I48.0]  Yes    Essential hypertension [I10]  Yes    Dyslipidemia [E78.5]  Yes    Chronic heart failure with preserved ejection fraction [I50.32]  Yes    Coronary artery disease involving native coronary artery of native heart without angina pectoris [I25.10]  Yes      Resolved Hospital Problems   No resolved problems to display.       Follow Up Appointments:  No future appointments.    Allergies:  Review of patient's allergies indicates:   Allergen Reactions    Sulfa (sulfonamide antibiotics) Hives    Bactrim [sulfamethoxazole-trimethoprim] Other (See Comments)     Pt. Reports that it caused severe pain    Integrilin [eptifibatide]     Statins-hmg-coa reductase inhibitors Other (See Comments)     Muscle pain. Patient states some, not all statins    Xarelto [rivaroxaban]     Epinephrine Palpitations       Medications: Review discharge medications with patient and family and provide education.    Current Facility-Administered Medications   Medication Dose Route Frequency Provider Last Rate Last Admin    acetaminophen tablet 650 mg  650 mg Oral Q6H PRN Tan Vences MD   650 mg at 12/23/21 2034    amiodarone tablet 200 mg  200 mg Oral BID Tan Vences MD   200 mg at 12/29/21 2135    aspirin EC tablet 81 mg  81 mg Oral Daily Tan Vences MD   81 mg at 12/29/21 0915    calcium-vitamin D3 500 mg-5 mcg (200 unit) per  tablet 1 tablet  1 tablet Oral Daily Tan Vences MD   1 tablet at 12/29/21 0915    cefTRIAXone (ROCEPHIN) 2 g/50 mL D5W IVPB  2 g Intravenous Q24H Harvey Thompson MD   Stopped at 12/29/21 2235    cholecalciferol (vitamin D3) capsule/tablet 400 Units  400 Units Oral Daily Tan Vences MD   400 Units at 12/29/21 0915    dextrose 50% injection 12.5 g  12.5 g Intravenous PRN Tan Vences MD        dextrose 50% injection 25 g  25 g Intravenous PRN Tan Vences MD        glucagon (human recombinant) injection 1 mg  1 mg Intramuscular PRN Tan Vences MD        glucose chewable tablet 16 g  16 g Oral PRN Tan Vences MD        glucose chewable tablet 24 g  24 g Oral PRN Tan Vences MD        insulin aspart U-100 pen 0-5 Units  0-5 Units Subcutaneous QID (AC + HS) PRN Tan Vences MD   2 Units at 12/27/21 1200    levothyroxine tablet 75 mcg  75 mcg Oral Daily Tan Vences MD   75 mcg at 12/30/21 0531    LORazepam tablet 0.5 mg  0.5 mg Oral Q6H PRN Tan Vences MD   0.5 mg at 12/29/21 2143    magnesium oxide tablet 800 mg  800 mg Oral PRN Tan Vences MD        magnesium oxide tablet 800 mg  800 mg Oral PRN Tan Vences MD        melatonin tablet 9 mg  9 mg Oral Nightly PRN Tan Vences MD   9 mg at 12/29/21 2134    metoprolol succinate (TOPROL-XL) 24 hr tablet 25 mg  25 mg Oral Daily Tan Vences MD   25 mg at 12/29/21 0915    morphine injection 2 mg  2 mg Intravenous Q4H PRN Kalpana Crain MD   2 mg at 12/27/21 2308    naloxone 0.4 mg/mL injection 0.02 mg  0.02 mg Intravenous PRN Tan Vences MD        nitroGLYCERIN SL tablet 0.4 mg  0.4 mg Sublingual Q5 Min PRN Tan Vences MD   0.4 mg at 12/22/21 1827    ondansetron injection 4 mg  4 mg Intravenous Q8H PRN Tan Vences MD        ondansetron injection 4 mg  4 mg Intravenous Q8H PRN Tan Vences MD   4 mg at 12/28/21 2122    potassium bicarbonate disintegrating tablet 35 mEq  35 mEq Oral PRN Tan Vences MD        potassium bicarbonate disintegrating  tablet 50 mEq  50 mEq Oral PRN Tan Vences MD        potassium bicarbonate disintegrating tablet 60 mEq  60 mEq Oral PRN Tan Vences MD        predniSONE tablet 2.5 mg  2.5 mg Oral Daily Tan Vences MD   2.5 mg at 12/29/21 0914    senna-docusate 8.6-50 mg per tablet 1 tablet  1 tablet Oral BID Tan Vences MD   1 tablet at 12/29/21 2134    sodium chloride 0.9% flush 10 mL  10 mL Intravenous Q6H Edwin Vaughn MD   10 mL at 12/30/21 0532    And    sodium chloride 0.9% flush 10 mL  10 mL Intravenous PRN Edwin Vaughn MD   10 mL at 12/24/21 2019    sodium chloride 0.9% flush 10 mL  10 mL Intravenous Q12H PRN Tan Vences MD   10 mL at 12/29/21 1810    warfarin (COUMADIN) tablet 3 mg  3 mg Oral Daily Harvey Thompson MD   3 mg at 12/29/21 1810     Current Discharge Medication List      START taking these medications    Details   cefTRIAXone (ROCEPHIN) 2 g/50 mL PgBk IVPB Inject 50 mLs (2 g total) into the vein once daily. for 3 days  Qty: 150 mL, Refills: 0         CONTINUE these medications which have NOT CHANGED    Details   amiodarone (PACERONE) 200 MG Tab Take 1 tablet (200 mg total) by mouth 2 (two) times daily.  Qty: 60 tablet, Refills: 11      amLODIPine (NORVASC) 5 MG tablet Take 1 tablet (5 mg total) by mouth once daily.  Qty: 30 tablet, Refills: 11      aspirin (ECOTRIN) 81 MG EC tablet Take 81 mg by mouth once daily.      CALCIUM CARBONATE/VITAMIN D3 (CALCIUM 500 + D ORAL) Take 1 tablet by mouth once daily.       cholecalciferol, vitamin D3, 2,000 unit Cap Take 1 capsule by mouth once daily.      levothyroxine (SYNTHROID) 75 MCG tablet Take 50 mcg by mouth once daily.       metoprolol succinate (TOPROL-XL) 25 MG 24 hr tablet Take 25 mg by mouth.      potassium chloride (KLOR-CON) 10 MEQ TbSR Take 10 mEq by mouth once daily.      predniSONE (DELTASONE) 5 MG tablet Take 2.5 mg by mouth once daily.       ramipril (ALTACE) 5 MG capsule Take 5 mg by mouth 2 (two) times daily.        warfarin (COUMADIN) 2 MG tablet Take 2 mg by mouth once daily.      cyclobenzaprine (FEXMID) 7.5 MG Tab Take 7.5 mg by mouth 3 (three) times daily as needed.      lorazepam (ATIVAN) 0.5 MG tablet Take 0.5 mg by mouth every 6 (six) hours as needed for Anxiety.      nitroGLYCERIN (NITROSTAT) 0.4 MG SL tablet Place 0.4 mg under the tongue every 5 (five) minutes as needed for Chest pain.      ondansetron (ZOFRAN-ODT) 4 MG TbDL Take 1 tablet (4 mg total) by mouth every 8 (eight) hours as needed (for nausea/vomiting).  Qty: 30 tablet, Refills: 3      pravastatin (PRAVACHOL) 20 MG tablet Take 1 tablet (20 mg total) by mouth once daily.  Qty: 90 tablet, Refills: 3               I have seen and examined this patient within the last 30 days. My clinical findings that support the need for the home health skilled services and home bound status are the following:no   Weakness/numbness causing balance and gait disturbance due to Infection and Weakness/Debility making it taxing to leave home.  Requiring assistive device to leave home due to unsteady gait caused by  Infection and Weakness/Debility.     Diet:   regular diet    Labs:  N/a    Referrals/ Consults  Physical Therapy to evaluate and treat. Evaluate for home safety and equipment needs; Establish/upgrade home exercise program. Perform / instruct on therapeutic exercises, gait training, transfer training, and Range of Motion.  Occupational Therapy to evaluate and treat. Evaluate home environment for safety and equipment needs. Perform/Instruct on transfers, ADL training, ROM, and therapeutic exercises.  Aide to provide assistance with personal care, ADLs, and vital signs.    Activities:   activity as tolerated    Nursing:   Agency to admit patient within 24 hours of hospital discharge unless specified on physician order or at patient request    SN to complete comprehensive assessment including routine vital signs. Instruct on disease process and s/s of complications to  report to MD. Review/verify medication list sent home with the patient at time of discharge  and instruct patient/caregiver as needed. Frequency may be adjusted depending on start of care date.     Skilled nurse to perform up to 3 visits PRN for symptoms related to diagnosis    Notify MD if SBP > 160 or < 90; DBP > 90 or < 50; HR > 120 or < 50; Temp > 101; O2 < 88%; Other:       Ok to schedule additional visits based on staff availability and patient request on consecutive days within the home health episode.    When multiple disciplines ordered:    Start of Care occurs on Sunday - Wednesday schedule remaining discipline evaluations as ordered on separate consecutive days following the start of care.    Thursday SOC -schedule subsequent evaluations Friday and Monday the following week.     Friday - Saturday SOC - schedule subsequent discipline evaluations on consecutive days starting Monday of the following week.        Miscellaneous   Home Infusion Therapy:   SN to perform Infusion Therapy/Central Line Care.  Review Central Line Care & Central Line Flush with patient.    Administer (drug and dose): Ceftriaxone 2mg/day   Last dose given: 12/30                         Home dose due: 12/31    Last day 1/2    Scrub the Hub: Prior to accessing the line, always perform a 30 second alcohol scrub  Each lumen of the central line is to be flushed at least daily with 10 mL Normal Saline and 3 mL Heparin flush (10 units/mL)  Skilled Nurse (SN) may draw blood from IV access  Blood Draw Procedure:   - Aspirate at least 5 mL of blood   - Discard   - Obtain specimen   - Change injection cap   - Flush with 20 mL Normal Saline followed by a                 3-5 mL Heparin flush (10 units/mL)  Central :   - Sterile dressing changes are done weekly and as needed.   - Use chlor-hexadine scrub to cleanse site, apply Biopatch to insertion site,       apply securement device dressing   - Injection caps are changed weekly  and after EVERY lab draw.   - If sterile gauze is under dressing to control oozing,                 dressing change must be performed every 24 hours until gauze is not needed.    Remove PICC after completion of antibiotics      Wound Care Orders  n/a    I certify that this patient is confined to her home and needs intermittent skilled nursing care, physical therapy and occupational therapy.

## 2021-12-30 NOTE — PLAN OF CARE
Problem: Infection  Goal: Absence of Infection Signs and Symptoms  Outcome: Ongoing, Progressing     Problem: Adult Inpatient Plan of Care  Goal: Plan of Care Review  Outcome: Ongoing, Progressing  Goal: Absence of Hospital-Acquired Illness or Injury  Outcome: Ongoing, Progressing  Goal: Optimal Comfort and Wellbeing  Outcome: Ongoing, Progressing  Goal: Readiness for Transition of Care  Outcome: Ongoing, Progressing     Problem: Skin Injury Risk Increased  Goal: Skin Health and Integrity  Outcome: Ongoing, Progressing     Problem: Fluid Imbalance (Pancreatitis)  Goal: Fluid Balance  Outcome: Ongoing, Progressing     Problem: Infection (Pancreatitis)  Goal: Infection Symptom Resolution  Outcome: Ongoing, Progressing     Problem: Fluid Imbalance (Pancreatitis)  Goal: Fluid Balance  Outcome: Ongoing, Progressing

## 2021-12-30 NOTE — PLAN OF CARE
N to set home health and IV abxs for home use daughter, Chano Mcneal (Daughter)   [phone] (Mobile), will need teaching regarding IVabxs.

## 2021-12-30 NOTE — PROGRESS NOTES
RW delivered to pt's bedside, family in the room inquired if pt is to bring RW home or bring back to hospital. SSC informed family member that pt owns the walker now and it is to remain with the pt. Nurse Prescott and TERESA Reynolds notified of DME delivery.

## 2021-12-30 NOTE — PLAN OF CARE
Rosa with [facility] says no home health's available to go to [facility].  Requesting out pt Ppt/ot.  N will secure Infusion company ot teach daughter and provide equipment.  [facility] and tele nurse.  Awaiting call back from [facility].   12/30/21 1955   Post-Acute Status   Post-Acute Authorization Home Health;IV Infusion   Home Health Status Discharge Plan Changed   Coverage PHN   IV Infusion Status Pending payor review/awaiting authorization (if required)   Hospital Resources/Appts/Education Provided Provided patient/caregiver with written discharge plan information;Provided education on problems/symptoms using teachback;Appointments scheduled by Navigator/Coordinator   Discharge Delays (!) Payor Issues   Discharge Plan   Discharge Plan A Home with family;Other   Discharge Plan B Other;Home with family

## 2021-12-30 NOTE — PROGRESS NOTES
[facility] WRITTEN HEALTHCARE AND DISCHARGE INFORMATION:   [facility] says they will set up home health and IV abxs   Follow-up Information     Trent Lebron NP On 1/5/2022.    Why: out patient services:Trent Lebron NP at 2:45PM, hospital follow up  Contact information:  [address]  [address]  [place] [address]  [phone]             [facility].    Why: Home Health:  PHN will call patient with provider  Contact information:  Home Health  [phone]                       Help at Home           [phone]  After discharge for assistance [facility] On Call Nurse Care Line 24/7 Assistance.   Things You are responsible For To Manage Your Care At Home:  1.    Getting your prescriptions filled   2.    Taking your medications as directed, DO NOT MISS ANY DOSES!  3.    Going to your follow-up doctor appointment. This is important because it  allow the doctor to monitor your progress and determine if  any changes need to made to your treatment plan.   If you experience any symptoms or problems please call your pcp and for severe symptoms or problems 911.    Thank you for choosing [facility] for your care. [facility] is happy to have the opportunity to serve you. If you receive a call from [facility] in the next 48 hours please answer.   Sincerely,  Your [facility] Team,  Gail

## 2021-12-30 NOTE — PLAN OF CARE
12/29/21 1313   Medicare Message   Important Message from Medicare regarding Discharge Appeal Rights Given to patient/caregiver;Explained to patient/caregiver;Signed/date by patient/caregiver;Other (comments)   Date IMM was signed 12/29/21   Time IMM was signed 1313   [facility] mail [phone]

## 2021-12-31 NOTE — PLAN OF CARE
Problem: Adult Inpatient Plan of Care  Goal: Plan of Care Review  12/30/2021 1825 by Genie Duffy RN  Outcome: Met  12/30/2021 1823 by Genie Duffy RN  Outcome: Ongoing, Progressing  Goal: Patient-Specific Goal (Individualized)  12/30/2021 1825 by Genie Duffy RN  Outcome: Met  12/30/2021 1823 by Genie Duffy RN  Outcome: Ongoing, Progressing  Goal: Absence of Hospital-Acquired Illness or Injury  12/30/2021 1825 by Genie Duffy RN  Outcome: Met  12/30/2021 1823 by Genie Duffy RN  Outcome: Ongoing, Progressing  Goal: Optimal Comfort and Wellbeing  12/30/2021 1825 by Genie Duffy RN  Outcome: Met  12/30/2021 1823 by Genie Duffy RN  Outcome: Ongoing, Progressing  Goal: Readiness for Transition of Care  12/30/2021 1825 by Genie Duffy RN  Outcome: Met  12/30/2021 1823 by Genie Duffy RN  Outcome: Ongoing, Progressing     Problem: Skin Injury Risk Increased  Goal: Skin Health and Integrity  12/30/2021 1825 by Genie Duffy RN  Outcome: Met  12/30/2021 1823 by Genie Duffy RN  Outcome: Ongoing, Progressing     Problem: Fluid Imbalance (Pancreatitis)  Goal: Fluid Balance  12/30/2021 1825 by Genie Duffy RN  Outcome: Met  12/30/2021 1823 by Genie Duffy RN  Outcome: Ongoing, Progressing     Problem: Infection (Pancreatitis)  Goal: Infection Symptom Resolution  12/30/2021 1825 by Genie Duffy RN  Outcome: Adequate for Care Transition  12/30/2021 1823 by Genie Duffy RN  Outcome: Ongoing, Progressing     Problem: Nutrition Impaired (Pancreatitis)  Goal: Optimal Nutrition Intake  12/30/2021 1825 by Genie Duffy RN  Outcome: Met  12/30/2021 1823 by Genie Duffy RN  Outcome: Ongoing, Progressing     Problem: Pain (Pancreatitis)  Goal: Acceptable Pain Control  12/30/2021 1825 by Genie Duffy RN  Outcome: Met  12/30/2021 1823 by Genie Duffy RN  Outcome: Ongoing, Progressing     Problem: Respiratory Compromise  (Pancreatitis)  Goal: Effective Oxygenation and Ventilation  12/30/2021 1825 by Genie Duffy RN  Outcome: Met  12/30/2021 1823 by Genie Duffy RN  Outcome: Ongoing, Progressing     Problem: Adjustment to Illness (Sepsis/Septic Shock)  Goal: Optimal Coping  12/30/2021 1825 by Genie Duffy RN  Outcome: Met  12/30/2021 1823 by Genie Duffy RN  Outcome: Ongoing, Progressing     Problem: Bleeding (Sepsis/Septic Shock)  Goal: Absence of Bleeding  12/30/2021 1825 by Genie Duffy RN  Outcome: Met  12/30/2021 1823 by Genie Duffy RN  Outcome: Ongoing, Progressing     Problem: Glycemic Control Impaired (Sepsis/Septic Shock)  Goal: Blood Glucose Level Within Desired Range  12/30/2021 1825 by Genie Duffy RN  Outcome: Met  12/30/2021 1823 by Genie Duffy RN  Outcome: Ongoing, Progressing     Problem: Infection Progression (Sepsis/Septic Shock)  Goal: Absence of Infection Signs and Symptoms  12/30/2021 1825 by Genie Duffy RN  Outcome: Met  12/30/2021 1823 by Genie Duffy RN  Outcome: Ongoing, Progressing     Problem: Nutrition Impaired (Sepsis/Septic Shock)  Goal: Optimal Nutrition Intake  12/30/2021 1825 by Genie Duffy RN  Outcome: Met  12/30/2021 1823 by Genie Duffy RN  Outcome: Ongoing, Progressing     Problem: Impaired Wound Healing  Goal: Optimal Wound Healing  12/30/2021 1825 by Genie Duffy RN  Outcome: Met  12/30/2021 1823 by Genie Duffy RN  Outcome: Ongoing, Progressing     Problem: Fall Injury Risk  Goal: Absence of Fall and Fall-Related Injury  12/30/2021 1825 by Genie Duffy RN  Outcome: Met  12/30/2021 1823 by Genie Duffy RN  Outcome: Ongoing, Progressing     Problem: Fall Injury Risk  Goal: Absence of Fall and Fall-Related Injury  12/30/2021 1825 by Genie Duffy RN  Outcome: Met  12/30/2021 1823 by Genie Duffy RN  Outcome: Ongoing, Progressing

## 2021-12-31 NOTE — PLAN OF CARE
[facility] - Telemetry  Discharge Final Note  TN spoke with [facility] [employer] [facility]. Patient has medicaid and lives in [place], [place] , no home health companies available. TN informed telemetry nurse Genie that when [employer] finishes teaching daughter and set up delivery on meds patient can discharge from cm standpoint.  Primary Care Provider: Panchito Perez MD    Expected Discharge Date: 12/30/2021    Final Discharge Note (most recent)     Final Note - 12/30/21 1804        Final Note    Assessment Type Final Discharge Note     Anticipated Discharge Disposition IV Therapy Provider     What phone number can be called within the next 1-3 days to see how you are doing after discharge? --   see chart    Hospital Resources/Appts/Education Provided Provided patient/caregiver with written discharge plan information;Provided education on problems/symptoms using teachback;Appointments scheduled by Navigator/Coordinator        Post-Acute Status    Post-Acute Authorization Home Health;IV Infusion     Home Health Status Discharge Plan Changed     Coverage phn     IV Infusion Status Set-up Complete/Auth obtained     Discharge Delays None known at this time                 Important Message from Medicare  Important Message from Medicare regarding Discharge Appeal Rights: Given to patient/caregiver,Explained to patient/caregiver,Signed/date by patient/caregiver,Other (comments)     Date IMM was signed: 12/29/21  Time IMM was signed: 1313    Contact Info     Trent Lebron NP    [address] [id number] [phone]         Next Steps: Follow up on 1/5/2022    Instructions: out patient services:Trent Lebron NP at 2:45PM, hospital follow up    Things to do        Next Steps: Follow up    Instructions: Things You are responsible For To Manage Your Care At Home:1.    Getting your prescriptions filled

## 2021-12-31 NOTE — NURSING
Patient discharge completed. Patients given discharge instructions and medication record educated on all contents. Verbalized understanding all instructions.

## 2022-01-03 ENCOUNTER — HOSPITAL ENCOUNTER (INPATIENT)
Facility: HOSPITAL | Age: 82
LOS: 3 days | Discharge: HOME HEALTH | DRG: 438 | End: 2022-01-06
Attending: EMERGENCY MEDICINE | Admitting: HOSPITALIST
Payer: MEDICARE

## 2022-01-03 DIAGNOSIS — K85.02 IDIOPATHIC ACUTE PANCREATITIS WITH INFECTED NECROSIS: ICD-10-CM

## 2022-01-03 DIAGNOSIS — D64.9 SEVERE ANEMIA: ICD-10-CM

## 2022-01-03 DIAGNOSIS — R06.02 SHORTNESS OF BREATH: ICD-10-CM

## 2022-01-03 DIAGNOSIS — K85.91 ACUTE NECROTIZING PANCREATITIS: ICD-10-CM

## 2022-01-03 DIAGNOSIS — K85.90 PANCREATITIS: ICD-10-CM

## 2022-01-03 DIAGNOSIS — U07.1 COVID-19 VIRUS INFECTION: Primary | ICD-10-CM

## 2022-01-03 PROBLEM — I48.91 ATRIAL FIBRILLATION WITH RVR: Status: ACTIVE | Noted: 2022-01-03

## 2022-01-03 PROBLEM — D63.8 ANEMIA OF CHRONIC DISEASE: Status: ACTIVE | Noted: 2022-01-03

## 2022-01-03 LAB
ABO + RH BLD: NORMAL
ALBUMIN SERPL BCP-MCNC: 2.4 G/DL (ref 3.5–5.2)
ALBUMIN SERPL BCP-MCNC: 2.6 G/DL (ref 3.5–5.2)
ALP SERPL-CCNC: 80 U/L (ref 55–135)
ALP SERPL-CCNC: 82 U/L (ref 55–135)
ALT SERPL W/O P-5'-P-CCNC: 20 U/L (ref 10–44)
ALT SERPL W/O P-5'-P-CCNC: 23 U/L (ref 10–44)
ANION GAP SERPL CALC-SCNC: 5 MMOL/L (ref 8–16)
ANION GAP SERPL CALC-SCNC: 7 MMOL/L (ref 8–16)
AST SERPL-CCNC: 39 U/L (ref 10–40)
AST SERPL-CCNC: 43 U/L (ref 10–40)
BASOPHILS # BLD AUTO: 0.06 K/UL (ref 0–0.2)
BASOPHILS NFR BLD: 0.5 % (ref 0–1.9)
BILIRUB SERPL-MCNC: 0.1 MG/DL (ref 0.1–1)
BILIRUB SERPL-MCNC: 0.1 MG/DL (ref 0.1–1)
BILIRUB UR QL STRIP: NEGATIVE
BLD GP AB SCN CELLS X3 SERPL QL: NORMAL
BUN SERPL-MCNC: 7 MG/DL (ref 8–23)
BUN SERPL-MCNC: 9 MG/DL (ref 8–23)
CALCIUM SERPL-MCNC: 8.1 MG/DL (ref 8.7–10.5)
CALCIUM SERPL-MCNC: 8.7 MG/DL (ref 8.7–10.5)
CHLORIDE SERPL-SCNC: 105 MMOL/L (ref 95–110)
CHLORIDE SERPL-SCNC: 106 MMOL/L (ref 95–110)
CLARITY UR: CLEAR
CO2 SERPL-SCNC: 28 MMOL/L (ref 23–29)
CO2 SERPL-SCNC: 28 MMOL/L (ref 23–29)
COLOR UR: COLORLESS
CREAT SERPL-MCNC: 0.8 MG/DL (ref 0.5–1.4)
CREAT SERPL-MCNC: 0.9 MG/DL (ref 0.5–1.4)
CTP QC/QA: YES
DIFFERENTIAL METHOD: ABNORMAL
EOSINOPHIL # BLD AUTO: 0 K/UL (ref 0–0.5)
EOSINOPHIL NFR BLD: 0.1 % (ref 0–8)
ERYTHROCYTE [DISTWIDTH] IN BLOOD BY AUTOMATED COUNT: 15.9 % (ref 11.5–14.5)
EST. GFR  (AFRICAN AMERICAN): >60 ML/MIN/1.73 M^2
EST. GFR  (AFRICAN AMERICAN): >60 ML/MIN/1.73 M^2
EST. GFR  (NON AFRICAN AMERICAN): >60 ML/MIN/1.73 M^2
EST. GFR  (NON AFRICAN AMERICAN): >60 ML/MIN/1.73 M^2
GLUCOSE SERPL-MCNC: 108 MG/DL (ref 70–110)
GLUCOSE SERPL-MCNC: 97 MG/DL (ref 70–110)
GLUCOSE UR QL STRIP: NEGATIVE
HCT VFR BLD AUTO: 24.5 % (ref 37–48.5)
HGB BLD-MCNC: 7.6 G/DL (ref 12–16)
HGB UR QL STRIP: NEGATIVE
IMM GRANULOCYTES # BLD AUTO: 0.08 K/UL (ref 0–0.04)
IMM GRANULOCYTES NFR BLD AUTO: 0.6 % (ref 0–0.5)
INR PPP: 3.8 (ref 0.8–1.2)
KETONES UR QL STRIP: NEGATIVE
LACTATE SERPL-SCNC: 0.5 MMOL/L (ref 0.5–2.2)
LEUKOCYTE ESTERASE UR QL STRIP: NEGATIVE
LIPASE SERPL-CCNC: 792 U/L (ref 4–60)
LYMPHOCYTES # BLD AUTO: 1 K/UL (ref 1–4.8)
LYMPHOCYTES NFR BLD: 8.1 % (ref 18–48)
MAGNESIUM SERPL-MCNC: 1.9 MG/DL (ref 1.6–2.6)
MCH RBC QN AUTO: 26.4 PG (ref 27–31)
MCHC RBC AUTO-ENTMCNC: 31 G/DL (ref 32–36)
MCV RBC AUTO: 85 FL (ref 82–98)
MONOCYTES # BLD AUTO: 0.8 K/UL (ref 0.3–1)
MONOCYTES NFR BLD: 6.6 % (ref 4–15)
NEUTROPHILS # BLD AUTO: 10.6 K/UL (ref 1.8–7.7)
NEUTROPHILS NFR BLD: 84.1 % (ref 38–73)
NITRITE UR QL STRIP: NEGATIVE
NRBC BLD-RTO: 0 /100 WBC
PH UR STRIP: 6 [PH] (ref 5–8)
PLATELET # BLD AUTO: 307 K/UL (ref 150–450)
PMV BLD AUTO: 11.1 FL (ref 9.2–12.9)
POTASSIUM SERPL-SCNC: 3.6 MMOL/L (ref 3.5–5.1)
POTASSIUM SERPL-SCNC: 3.7 MMOL/L (ref 3.5–5.1)
PROT SERPL-MCNC: 6.9 G/DL (ref 6–8.4)
PROT SERPL-MCNC: 7.2 G/DL (ref 6–8.4)
PROT UR QL STRIP: ABNORMAL
PROTHROMBIN TIME: 37.7 SEC (ref 9–12.5)
RBC # BLD AUTO: 2.88 M/UL (ref 4–5.4)
SARS-COV-2 RDRP RESP QL NAA+PROBE: POSITIVE
SODIUM SERPL-SCNC: 138 MMOL/L (ref 136–145)
SODIUM SERPL-SCNC: 141 MMOL/L (ref 136–145)
SP GR UR STRIP: 1.01 (ref 1–1.03)
URN SPEC COLLECT METH UR: ABNORMAL
UROBILINOGEN UR STRIP-ACNC: NEGATIVE EU/DL
WBC # BLD AUTO: 12.63 K/UL (ref 3.9–12.7)

## 2022-01-03 PROCEDURE — 25000003 PHARM REV CODE 250: Performed by: SURGERY

## 2022-01-03 PROCEDURE — 63600175 PHARM REV CODE 636 W HCPCS: Performed by: HOSPITALIST

## 2022-01-03 PROCEDURE — 99291 CRITICAL CARE FIRST HOUR: CPT | Mod: 25

## 2022-01-03 PROCEDURE — 93010 EKG 12-LEAD: ICD-10-PCS | Mod: ,,, | Performed by: INTERNAL MEDICINE

## 2022-01-03 PROCEDURE — 99223 PR INITIAL HOSPITAL CARE,LEVL III: ICD-10-PCS | Mod: GC,,, | Performed by: SURGERY

## 2022-01-03 PROCEDURE — 96374 THER/PROPH/DIAG INJ IV PUSH: CPT

## 2022-01-03 PROCEDURE — 83605 ASSAY OF LACTIC ACID: CPT | Performed by: PHYSICIAN ASSISTANT

## 2022-01-03 PROCEDURE — 99222 1ST HOSP IP/OBS MODERATE 55: CPT | Mod: ,,, | Performed by: INTERNAL MEDICINE

## 2022-01-03 PROCEDURE — 96375 TX/PRO/DX INJ NEW DRUG ADDON: CPT

## 2022-01-03 PROCEDURE — 86900 BLOOD TYPING SEROLOGIC ABO: CPT | Performed by: PHYSICIAN ASSISTANT

## 2022-01-03 PROCEDURE — 20000000 HC ICU ROOM

## 2022-01-03 PROCEDURE — 83735 ASSAY OF MAGNESIUM: CPT | Performed by: PHYSICIAN ASSISTANT

## 2022-01-03 PROCEDURE — 93010 ELECTROCARDIOGRAM REPORT: CPT | Mod: ,,, | Performed by: INTERNAL MEDICINE

## 2022-01-03 PROCEDURE — 93005 ELECTROCARDIOGRAM TRACING: CPT

## 2022-01-03 PROCEDURE — 25500020 PHARM REV CODE 255: Performed by: EMERGENCY MEDICINE

## 2022-01-03 PROCEDURE — 83690 ASSAY OF LIPASE: CPT | Performed by: PHYSICIAN ASSISTANT

## 2022-01-03 PROCEDURE — 87040 BLOOD CULTURE FOR BACTERIA: CPT | Performed by: PHYSICIAN ASSISTANT

## 2022-01-03 PROCEDURE — 25000003 PHARM REV CODE 250: Performed by: PHYSICIAN ASSISTANT

## 2022-01-03 PROCEDURE — 96361 HYDRATE IV INFUSION ADD-ON: CPT

## 2022-01-03 PROCEDURE — 63600175 PHARM REV CODE 636 W HCPCS: Performed by: PHYSICIAN ASSISTANT

## 2022-01-03 PROCEDURE — 85025 COMPLETE CBC W/AUTO DIFF WBC: CPT | Performed by: PHYSICIAN ASSISTANT

## 2022-01-03 PROCEDURE — 99223 1ST HOSP IP/OBS HIGH 75: CPT | Mod: GC,,, | Performed by: SURGERY

## 2022-01-03 PROCEDURE — 86901 BLOOD TYPING SEROLOGIC RH(D): CPT | Performed by: PHYSICIAN ASSISTANT

## 2022-01-03 PROCEDURE — 27000207 HC ISOLATION

## 2022-01-03 PROCEDURE — 80053 COMPREHEN METABOLIC PANEL: CPT | Mod: 91 | Performed by: PHYSICIAN ASSISTANT

## 2022-01-03 PROCEDURE — U0002 COVID-19 LAB TEST NON-CDC: HCPCS | Performed by: PHYSICIAN ASSISTANT

## 2022-01-03 PROCEDURE — 81003 URINALYSIS AUTO W/O SCOPE: CPT | Performed by: PHYSICIAN ASSISTANT

## 2022-01-03 PROCEDURE — 85610 PROTHROMBIN TIME: CPT | Performed by: PHYSICIAN ASSISTANT

## 2022-01-03 PROCEDURE — 25000003 PHARM REV CODE 250: Performed by: HOSPITALIST

## 2022-01-03 PROCEDURE — 80053 COMPREHEN METABOLIC PANEL: CPT | Performed by: HOSPITALIST

## 2022-01-03 PROCEDURE — 99222 PR INITIAL HOSPITAL CARE,LEVL II: ICD-10-PCS | Mod: ,,, | Performed by: INTERNAL MEDICINE

## 2022-01-03 RX ORDER — ENALAPRILAT 1.25 MG/ML
0.62 INJECTION INTRAVENOUS EVERY 6 HOURS
Status: DISCONTINUED | OUTPATIENT
Start: 2022-01-03 | End: 2022-01-03

## 2022-01-03 RX ORDER — DEXTROSE MONOHYDRATE AND SODIUM CHLORIDE 5; .9 G/100ML; G/100ML
INJECTION, SOLUTION INTRAVENOUS CONTINUOUS
Status: DISCONTINUED | OUTPATIENT
Start: 2022-01-03 | End: 2022-01-03

## 2022-01-03 RX ORDER — MORPHINE SULFATE 4 MG/ML
4 INJECTION, SOLUTION INTRAMUSCULAR; INTRAVENOUS EVERY 4 HOURS PRN
Status: DISCONTINUED | OUTPATIENT
Start: 2022-01-03 | End: 2022-01-06 | Stop reason: HOSPADM

## 2022-01-03 RX ORDER — CEPHALEXIN 250 MG/1
250 CAPSULE ORAL EVERY 12 HOURS
Qty: 20 CAPSULE | Refills: 0 | Status: SHIPPED | OUTPATIENT
Start: 2022-01-03 | End: 2022-01-03 | Stop reason: CLARIF

## 2022-01-03 RX ORDER — ONDANSETRON 2 MG/ML
4 INJECTION INTRAMUSCULAR; INTRAVENOUS EVERY 6 HOURS PRN
Status: DISCONTINUED | OUTPATIENT
Start: 2022-01-03 | End: 2022-01-06 | Stop reason: HOSPADM

## 2022-01-03 RX ORDER — MEROPENEM AND SODIUM CHLORIDE 1 G/50ML
1 INJECTION, SOLUTION INTRAVENOUS
Status: DISCONTINUED | OUTPATIENT
Start: 2022-01-03 | End: 2022-01-06 | Stop reason: HOSPADM

## 2022-01-03 RX ORDER — HYDRALAZINE HYDROCHLORIDE 20 MG/ML
10 INJECTION INTRAMUSCULAR; INTRAVENOUS EVERY 6 HOURS PRN
Status: DISCONTINUED | OUTPATIENT
Start: 2022-01-03 | End: 2022-01-06 | Stop reason: HOSPADM

## 2022-01-03 RX ORDER — SODIUM CHLORIDE, SODIUM LACTATE, POTASSIUM CHLORIDE, CALCIUM CHLORIDE 600; 310; 30; 20 MG/100ML; MG/100ML; MG/100ML; MG/100ML
INJECTION, SOLUTION INTRAVENOUS CONTINUOUS
Status: ACTIVE | OUTPATIENT
Start: 2022-01-04 | End: 2022-01-05

## 2022-01-03 RX ORDER — ONDANSETRON 2 MG/ML
4 INJECTION INTRAMUSCULAR; INTRAVENOUS
Status: COMPLETED | OUTPATIENT
Start: 2022-01-03 | End: 2022-01-03

## 2022-01-03 RX ORDER — ENALAPRILAT 1.25 MG/ML
0.62 INJECTION INTRAVENOUS EVERY 6 HOURS PRN
Status: DISCONTINUED | OUTPATIENT
Start: 2022-01-03 | End: 2022-01-06 | Stop reason: HOSPADM

## 2022-01-03 RX ORDER — MORPHINE SULFATE 4 MG/ML
1 INJECTION, SOLUTION INTRAMUSCULAR; INTRAVENOUS
Status: COMPLETED | OUTPATIENT
Start: 2022-01-03 | End: 2022-01-03

## 2022-01-03 RX ORDER — MUPIROCIN 20 MG/G
OINTMENT TOPICAL 2 TIMES DAILY
Status: DISCONTINUED | OUTPATIENT
Start: 2022-01-03 | End: 2022-01-06 | Stop reason: HOSPADM

## 2022-01-03 RX ORDER — HYDRALAZINE HYDROCHLORIDE 20 MG/ML
10 INJECTION INTRAMUSCULAR; INTRAVENOUS EVERY 4 HOURS PRN
Status: DISCONTINUED | OUTPATIENT
Start: 2022-01-03 | End: 2022-01-03

## 2022-01-03 RX ORDER — DICYCLOMINE HYDROCHLORIDE 20 MG/1
20 TABLET ORAL 2 TIMES DAILY
Status: ON HOLD | COMMUNITY
Start: 2021-12-16 | End: 2022-01-16 | Stop reason: HOSPADM

## 2022-01-03 RX ADMIN — IOHEXOL 75 ML: 350 INJECTION, SOLUTION INTRAVENOUS at 10:01

## 2022-01-03 RX ADMIN — MORPHINE SULFATE 4 MG: 4 INJECTION, SOLUTION INTRAMUSCULAR; INTRAVENOUS at 07:01

## 2022-01-03 RX ADMIN — HYDRALAZINE HYDROCHLORIDE 10 MG: 20 INJECTION INTRAMUSCULAR; INTRAVENOUS at 08:01

## 2022-01-03 RX ADMIN — MEROPENEM AND SODIUM CHLORIDE 1 G: 1 INJECTION, SOLUTION INTRAVENOUS at 02:01

## 2022-01-03 RX ADMIN — MUPIROCIN: 20 OINTMENT TOPICAL at 09:01

## 2022-01-03 RX ADMIN — ENALAPRILAT 0.62 MG: 2.5 INJECTION INTRAVENOUS at 10:01

## 2022-01-03 RX ADMIN — AMIODARONE HYDROCHLORIDE 1 MG/MIN: 1.8 INJECTION, SOLUTION INTRAVENOUS at 02:01

## 2022-01-03 RX ADMIN — MORPHINE SULFATE 1 MG: 4 INJECTION, SOLUTION INTRAMUSCULAR; INTRAVENOUS at 09:01

## 2022-01-03 RX ADMIN — DEXTROSE AND SODIUM CHLORIDE: 5; .9 INJECTION, SOLUTION INTRAVENOUS at 03:01

## 2022-01-03 RX ADMIN — SODIUM CHLORIDE 1000 ML: 0.9 INJECTION, SOLUTION INTRAVENOUS at 09:01

## 2022-01-03 RX ADMIN — AMIODARONE HYDROCHLORIDE 150 MG: 1.5 INJECTION, SOLUTION INTRAVENOUS at 02:01

## 2022-01-03 RX ADMIN — AMIODARONE HYDROCHLORIDE 0.5 MG/MIN: 1.8 INJECTION, SOLUTION INTRAVENOUS at 08:01

## 2022-01-03 RX ADMIN — ONDANSETRON 4 MG: 2 INJECTION, SOLUTION INTRAMUSCULAR; INTRAVENOUS at 07:01

## 2022-01-03 NOTE — PHARMACY MED REC
"Admission Medication History     The home medication history was taken by Blanca Em CPhT.      You may go to "Admission" then "Reconcile Home Medications" tabs to review and/or act upon these items.      The home medication list has been updated by the Pharmacy department.    Please read ALL comments highlighted in yellow.    Please address this information as you see fit.     Feel free to contact us if you have any questions or require assistance.      The medications listed below were removed from the home medication list. Please reorder if appropriate:  Patient reports no longer taking the following medication(s):   Calcium 500 + D oral    Medications verified by daughter which provided a medication list.    Daughter stated patient takes Percocet po q12h  (last fill 9-22-21 10 day supply)      Daughter stated patient took last dose of Creon on Friday 12-31-21      Blanca Em CPhT.  [phone]                  .          "

## 2022-01-03 NOTE — ASSESSMENT & PLAN NOTE
CT abdomen show,Relative to 12/20/2021 CT, there are known findings of peripancreatic inflammatory change compatible with persistent pancreatitis.  Relative that examination, there is slightly increased conspicuity of focal area of hypoattenuation within the body of the pancreas measuring up to 2 cm which could indicate developing necrosis.  No definite gas with this collection to suggest superinfection the present time.  No well-defined capsule to suggest mature pseudocyst formation at the present time.    Since the prior examination, there is increased degree of dilatation of the common bile duct, which may relate to ongoing inflammatory changes with the pancreas.  There is similar degree of dilatation of the pancreatic duct.   3. Since the prior examination, there is apparent organizing fluid collection which appears centered in the subcapsular region versus adjacent to the splenic hilum adjacent to the tail the pancreas, measuring up to 3.5 cm, which may relate to a nonspecific peripancreatic fluid collection, or possibly extra pancreatic fat necrosis.   Additional details, as provided in the body of report. GI and suregrty is consulted,keep  NPO,stared on IVF and IV pain medications,

## 2022-01-03 NOTE — ED TRIAGE NOTES
Represents after discharge on 12/30 with ongoing abdominal pain.Denies V/D. Pain radiates to the back. Pain lower abdomen. Pt was being treated for E.Coli at home with antibiotics. Last dose was yesterday.

## 2022-01-03 NOTE — SUBJECTIVE & OBJECTIVE
Past Medical History:   Diagnosis Date    Arthritis     Atrial fibrillation     Bilateral carotid artery stenosis 7/13/2017    Coronary artery disease     Fibromyalgia     Hyperlipidemia     Hypertension     Myocardial infarction 03/21/2015    Pancreatitis     Thyroid disease        Past Surgical History:   Procedure Laterality Date    CHOLECYSTECTOMY      CORONARY STENT PLACEMENT  3/24/15    HYSTERECTOMY  10/28/2004       Review of patient's allergies indicates:   Allergen Reactions    Sulfa (sulfonamide antibiotics) Hives    Bactrim [sulfamethoxazole-trimethoprim] Other (See Comments)     Pt. Reports that it caused severe pain    Integrilin [eptifibatide]     Percocet [oxycodone-acetaminophen] Itching    Statins-hmg-coa reductase inhibitors Other (See Comments)     Muscle pain. Patient states some, not all statins    Xarelto [rivaroxaban]     Epinephrine Palpitations       No current facility-administered medications on file prior to encounter.     Current Outpatient Medications on File Prior to Encounter   Medication Sig    amiodarone (PACERONE) 200 MG Tab Take 1 tablet (200 mg total) by mouth 2 (two) times daily. (Patient taking differently: Take 200 mg by mouth once daily.)    amLODIPine (NORVASC) 5 MG tablet Take 1 tablet (5 mg total) by mouth once daily.    aspirin (ECOTRIN) 81 MG EC tablet Take 81 mg by mouth once daily.    CALCIUM CARBONATE/VITAMIN D3 (CALCIUM 500 + D ORAL) Take 1 tablet by mouth once daily.     cefTRIAXone (ROCEPHIN) 2 g/50 mL PgBk IVPB Inject 50 mLs (2 g total) into the vein once daily. for 3 days    cholecalciferol, vitamin D3, 2,000 unit Cap Take 1 capsule by mouth once daily.    cyclobenzaprine (FEXMID) 7.5 MG Tab Take 7.5 mg by mouth 3 (three) times daily as needed.    levothyroxine (SYNTHROID) 75 MCG tablet Take 50 mcg by mouth once daily.     lorazepam (ATIVAN) 0.5 MG tablet Take 0.5 mg by mouth every 6 (six) hours as needed for Anxiety.    metoprolol  succinate (TOPROL-XL) 25 MG 24 hr tablet Take 25 mg by mouth.    nitroGLYCERIN (NITROSTAT) 0.4 MG SL tablet Place 0.4 mg under the tongue every 5 (five) minutes as needed for Chest pain.    ondansetron (ZOFRAN-ODT) 4 MG TbDL Take 1 tablet (4 mg total) by mouth every 8 (eight) hours as needed (for nausea/vomiting). (Patient taking differently: Take 4 mg by mouth every 4 (four) hours as needed (for nausea/vomiting). )    potassium chloride (KLOR-CON) 10 MEQ TbSR Take 10 mEq by mouth once daily.    pravastatin (PRAVACHOL) 20 MG tablet Take 1 tablet (20 mg total) by mouth once daily.    predniSONE (DELTASONE) 5 MG tablet Take 2.5 mg by mouth once daily.     ramipril (ALTACE) 5 MG capsule Take 5 mg by mouth 2 (two) times daily.     warfarin (COUMADIN) 2 MG tablet Take 2 mg by mouth once daily.     Family History    None       Tobacco Use    Smoking status: Former Smoker     Quit date: 1964     Years since quittin.5    Smokeless tobacco: Former User   Substance and Sexual Activity    Alcohol use: No     Alcohol/week: 0.0 standard drinks    Drug use: No    Sexual activity: Not Currently     Review of Systems   Constitutional: Positive for activity change and appetite change.   HENT: Negative for congestion and dental problem.    Eyes: Negative for pain.   Respiratory: Negative for apnea and choking.    Cardiovascular: Negative for chest pain and leg swelling.   Gastrointestinal: Positive for abdominal pain.   Endocrine: Negative for cold intolerance and heat intolerance.   Genitourinary: Negative for difficulty urinating and dyspareunia.   Musculoskeletal: Negative for arthralgias and back pain.   Skin: Negative for color change and pallor.   Allergic/Immunologic: Negative for environmental allergies and food allergies.   Neurological: Positive for weakness. Negative for dizziness.   Hematological: Negative for adenopathy. Does not bruise/bleed easily.   Psychiatric/Behavioral: Negative for agitation.      Objective:     Vital Signs (Most Recent):  Temp: 99.4 °F (37.4 °C) (01/03/22 0710)  Pulse: 108 (01/03/22 0710)  Resp: 18 (01/03/22 0902)  BP: (!) 178/83 (01/03/22 0710)  SpO2: 96 % (01/03/22 0710) Vital Signs (24h Range):  Temp:  [99.4 °F (37.4 °C)] 99.4 °F (37.4 °C)  Pulse:  [108] 108  Resp:  [18] 18  SpO2:  [96 %] 96 %  BP: (178)/(83) 178/83     Weight: 39.9 kg (88 lb)  Body mass index is 17.77 kg/m².    Physical Exam  Constitutional:       Appearance: Normal appearance.   HENT:      Head: Normocephalic.      Nose: Nose normal.      Mouth/Throat:      Mouth: Mucous membranes are dry.   Eyes:      Extraocular Movements: Extraocular movements intact.      Pupils: Pupils are equal, round, and reactive to light.   Cardiovascular:      Rate and Rhythm: Tachycardia present. Rhythm irregular.      Heart sounds: No murmur heard.      Pulmonary:      Effort: Pulmonary effort is normal.   Abdominal:      General: Abdomen is flat. There is distension.      Palpations: Abdomen is soft.      Tenderness: There is abdominal tenderness.   Musculoskeletal:         General: No swelling or deformity. Normal range of motion.      Cervical back: Normal range of motion and neck supple.   Skin:     General: Skin is warm and dry.   Neurological:      Mental Status: She is alert and oriented to person, place, and time.      Cranial Nerves: No cranial nerve deficit.   Psychiatric:         Mood and Affect: Mood normal.         Behavior: Behavior normal.           CRANIAL NERVES     CN III, IV, VI   Pupils are equal, round, and reactive to light.       Significant Labs:   All pertinent labs within the past 24 hours have been reviewed.  BMP:   Recent Labs   Lab 01/03/22  0853         K 3.6      CO2 28   BUN 9   CREATININE 0.9   CALCIUM 8.7   MG 1.9     CBC:   Recent Labs   Lab 01/03/22  0853   WBC 12.63   HGB 7.6*   HCT 24.5*        CMP:   Recent Labs   Lab 01/03/22  0853      K 3.6      CO2 28   GLU  108   BUN 9   CREATININE 0.9   CALCIUM 8.7   PROT 7.2   ALBUMIN 2.6*   BILITOT 0.1   ALKPHOS 80   AST 39   ALT 20   ANIONGAP 7*   EGFRNONAA >60     Lipase:   Recent Labs   Lab 01/03/22  0853   LIPASE 792*       Significant Imaging: I have reviewed all pertinent imaging results/findings within the past 24 hours.

## 2022-01-03 NOTE — PROGRESS NOTES
Pharmacist Renal Dose Adjustment Note    Katt Mcneal is a 81 y.o. female being treated with the medication meropenem    Patient Data:    Vital Signs (Most Recent):  Temp: 99.4 °F (37.4 °C) (01/03/22 0710)  Pulse: 108 (01/03/22 0710)  Resp: 18 (01/03/22 0902)  BP: (!) 178/83 (01/03/22 0710)  SpO2: 96 % (01/03/22 0710)   Vital Signs (72h Range):  Temp:  [99.4 °F (37.4 °C)]   Pulse:  [108]   Resp:  [18]   BP: (178)/(83)   SpO2:  [96 %]      Recent Labs   Lab 01/03/22 0853   CREATININE 0.9     Serum creatinine: 0.9 mg/dL 01/03/22 0853  Estimated creatinine clearance: 31.4 mL/min    Medication:meropenem dose: 1 gm frequency q8h will be changed to medication:meropenem dose:1 gm  frequency:q12h    Pharmacist's Name: Hernandez Jacobs  Pharmacist's Extension: [phone]

## 2022-01-03 NOTE — H&P
"[facility] Medicine  History & Physical    Patient Name: Katt Mcneal  MRN: [id number]  Patient Class: Emergency  Admission Date: 1/3/2022  Attending Physician: Kody Corona    Primary Care Provider: Panchito Perez MD         Patient information was obtained from patient and ER records.     Subjective:     Principal Problem:Acute necrotizing pancreatitis    Chief Complaint:   Chief Complaint   Patient presents with    Abdominal Pain     The patient reports lower abdominal pain since 1/1/22. Denies nausea, vomiting, diarrhea, or constipation. Denies dysuria, hematuria. Patient reports having lower back pain yesterday. Reports hx of pancreatitis.         HPI: Katt Mcneal is a 81 y.o. female wit history of pancreatitis,Afib on coumadin,CKD,malnutrition,,who presents to the Emergency Department for evaluation of cramping abdominal pain that began worsening 3 days ago. Patient is also complaining of decreased appetite, lower back pain that began yesterday, and sore throat that began this morning. She rates the severity of her pain at "15/10". Patient explains that she was discharged from the hospital on 12/30 for pancreatitis and e. Coli in her blood. She states she finished her course of IV antibiotics yesterday. She explains that her abdominal pain never resolved, but began worsening 3 days ago. Patient denies any fever, nausea, vomiting, diarrhea, cough, rhinorrhea, urinary problems, or other associated symptoms. She states she is unsure of when she last had a bowel movement.CT abdomen show,Relative to 12/20/2021 CT, there are known findings of peripancreatic inflammatory change compatible with persistent pancreatitis.  Relative that examination, there is slightly increased conspicuity of focal area of hypoattenuation within the body of the pancreas measuring up to 2 cm which could indicate developing necrosis.  No definite gas with this collection to suggest " superinfection the present time.  No well-defined capsule to suggest mature pseudocyst formation at the present time.    Since the prior examination, there is increased degree of dilatation of the common bile duct, which may relate to ongoing inflammatory changes with the pancreas.  There is similar degree of dilatation of the pancreatic duct.   3. Since the prior examination, there is apparent organizing fluid collection which appears centered in the subcapsular region versus adjacent to the splenic hilum adjacent to the tail the pancreas, measuring up to 3.5 cm, which may relate to a nonspecific peripancreatic fluid collection, or possibly extra pancreatic fat necrosis.   Additional details, as provided in the body of report. GI and suregrty is consulted,  She is also in Afib with RVR started on amiodaron drip.  She is covid positive,but stable on RA.      Past Medical History:   Diagnosis Date    Arthritis     Atrial fibrillation     Bilateral carotid artery stenosis 7/13/2017    Coronary artery disease     Fibromyalgia     Hyperlipidemia     Hypertension     Myocardial infarction 03/21/2015    Pancreatitis     Thyroid disease        Past Surgical History:   Procedure Laterality Date    CHOLECYSTECTOMY      CORONARY STENT PLACEMENT  3/24/15    HYSTERECTOMY  10/28/2004       Review of patient's allergies indicates:   Allergen Reactions    Sulfa (sulfonamide antibiotics) Hives    Bactrim [sulfamethoxazole-trimethoprim] Other (See Comments)     Pt. Reports that it caused severe pain    Integrilin [eptifibatide]     Percocet [oxycodone-acetaminophen] Itching    Statins-hmg-coa reductase inhibitors Other (See Comments)     Muscle pain. Patient states some, not all statins    Xarelto [rivaroxaban]     Epinephrine Palpitations       No current facility-administered medications on file prior to encounter.     Current Outpatient Medications on File Prior to Encounter   Medication Sig    amiodarone  (PACERONE) 200 MG Tab Take 1 tablet (200 mg total) by mouth 2 (two) times daily. (Patient taking differently: Take 200 mg by mouth once daily.)    amLODIPine (NORVASC) 5 MG tablet Take 1 tablet (5 mg total) by mouth once daily.    aspirin (ECOTRIN) 81 MG EC tablet Take 81 mg by mouth once daily.    CALCIUM CARBONATE/VITAMIN D3 (CALCIUM 500 + D ORAL) Take 1 tablet by mouth once daily.     cefTRIAXone (ROCEPHIN) 2 g/50 mL PgBk IVPB Inject 50 mLs (2 g total) into the vein once daily. for 3 days    cholecalciferol, vitamin D3, 2,000 unit Cap Take 1 capsule by mouth once daily.    cyclobenzaprine (FEXMID) 7.5 MG Tab Take 7.5 mg by mouth 3 (three) times daily as needed.    levothyroxine (SYNTHROID) 75 MCG tablet Take 50 mcg by mouth once daily.     lorazepam (ATIVAN) 0.5 MG tablet Take 0.5 mg by mouth every 6 (six) hours as needed for Anxiety.    metoprolol succinate (TOPROL-XL) 25 MG 24 hr tablet Take 25 mg by mouth.    nitroGLYCERIN (NITROSTAT) 0.4 MG SL tablet Place 0.4 mg under the tongue every 5 (five) minutes as needed for Chest pain.    ondansetron (ZOFRAN-ODT) 4 MG TbDL Take 1 tablet (4 mg total) by mouth every 8 (eight) hours as needed (for nausea/vomiting). (Patient taking differently: Take 4 mg by mouth every 4 (four) hours as needed (for nausea/vomiting). )    potassium chloride (KLOR-CON) 10 MEQ TbSR Take 10 mEq by mouth once daily.    pravastatin (PRAVACHOL) 20 MG tablet Take 1 tablet (20 mg total) by mouth once daily.    predniSONE (DELTASONE) 5 MG tablet Take 2.5 mg by mouth once daily.     ramipril (ALTACE) 5 MG capsule Take 5 mg by mouth 2 (two) times daily.     warfarin (COUMADIN) 2 MG tablet Take 2 mg by mouth once daily.     Family History    None       Tobacco Use    Smoking status: Former Smoker     Quit date: 1964     Years since quittin.5    Smokeless tobacco: Former User   Substance and Sexual Activity    Alcohol use: No     Alcohol/week: 0.0 standard drinks    Drug  use: No    Sexual activity: Not Currently     Review of Systems   Constitutional: Positive for activity change and appetite change.   HENT: Negative for congestion and dental problem.    Eyes: Negative for pain.   Respiratory: Negative for apnea and choking.    Cardiovascular: Negative for chest pain and leg swelling.   Gastrointestinal: Positive for abdominal pain.   Endocrine: Negative for cold intolerance and heat intolerance.   Genitourinary: Negative for difficulty urinating and dyspareunia.   Musculoskeletal: Negative for arthralgias and back pain.   Skin: Negative for color change and pallor.   Allergic/Immunologic: Negative for environmental allergies and food allergies.   Neurological: Positive for weakness. Negative for dizziness.   Hematological: Negative for adenopathy. Does not bruise/bleed easily.   Psychiatric/Behavioral: Negative for agitation.     Objective:     Vital Signs (Most Recent):  Temp: 99.4 °F (37.4 °C) (01/03/22 0710)  Pulse: 108 (01/03/22 0710)  Resp: 18 (01/03/22 0902)  BP: (!) 178/83 (01/03/22 0710)  SpO2: 96 % (01/03/22 0710) Vital Signs (24h Range):  Temp:  [99.4 °F (37.4 °C)] 99.4 °F (37.4 °C)  Pulse:  [108] 108  Resp:  [18] 18  SpO2:  [96 %] 96 %  BP: (178)/(83) 178/83     Weight: 39.9 kg (88 lb)  Body mass index is 17.77 kg/m².    Physical Exam  Constitutional:       Appearance: Normal appearance.   HENT:      Head: Normocephalic.      Nose: Nose normal.      Mouth/Throat:      Mouth: Mucous membranes are dry.   Eyes:      Extraocular Movements: Extraocular movements intact.      Pupils: Pupils are equal, round, and reactive to light.   Cardiovascular:      Rate and Rhythm: Tachycardia present. Rhythm irregular.      Heart sounds: No murmur heard.      Pulmonary:      Effort: Pulmonary effort is normal.   Abdominal:      General: Abdomen is flat. There is distension.      Palpations: Abdomen is soft.      Tenderness: There is abdominal tenderness.   Musculoskeletal:          General: No swelling or deformity. Normal range of motion.      Cervical back: Normal range of motion and neck supple.   Skin:     General: Skin is warm and dry.   Neurological:      Mental Status: She is alert and oriented to person, place, and time.      Cranial Nerves: No cranial nerve deficit.   Psychiatric:         Mood and Affect: Mood normal.         Behavior: Behavior normal.           CRANIAL NERVES     CN III, IV, VI   Pupils are equal, round, and reactive to light.       Significant Labs:   All pertinent labs within the past 24 hours have been reviewed.  BMP:   Recent Labs   Lab 01/03/22  0853         K 3.6      CO2 28   BUN 9   CREATININE 0.9   CALCIUM 8.7   MG 1.9     CBC:   Recent Labs   Lab 01/03/22  0853   WBC 12.63   HGB 7.6*   HCT 24.5*        CMP:   Recent Labs   Lab 01/03/22  0853      K 3.6      CO2 28      BUN 9   CREATININE 0.9   CALCIUM 8.7   PROT 7.2   ALBUMIN 2.6*   BILITOT 0.1   ALKPHOS 80   AST 39   ALT 20   ANIONGAP 7*   EGFRNONAA >60     Lipase:   Recent Labs   Lab 01/03/22  0853   LIPASE 792*       Significant Imaging: I have reviewed all pertinent imaging results/findings within the past 24 hours.    Assessment/Plan:     * Acute necrotizing pancreatitis  CT abdomen show,Relative to 12/20/2021 CT, there are known findings of peripancreatic inflammatory change compatible with persistent pancreatitis.  Relative that examination, there is slightly increased conspicuity of focal area of hypoattenuation within the body of the pancreas measuring up to 2 cm which could indicate developing necrosis.  No definite gas with this collection to suggest superinfection the present time.  No well-defined capsule to suggest mature pseudocyst formation at the present time.    Since the prior examination, there is increased degree of dilatation of the common bile duct, which may relate to ongoing inflammatory changes with the pancreas.  There is similar degree  of dilatation of the pancreatic duct.   3. Since the prior examination, there is apparent organizing fluid collection which appears centered in the subcapsular region versus adjacent to the splenic hilum adjacent to the tail the pancreas, measuring up to 3.5 cm, which may relate to a nonspecific peripancreatic fluid collection, or possibly extra pancreatic fat necrosis.   Additional details, as provided in the body of report. GI and suregrty is consulted,keep  NPO,stared on IVF and IV pain medications,        Anemia of chronic disease  some drop in HH without bledeing,will monitor.      Atrial fibrillation with RVR    She is also in Afib with RVR started on amiodaron drip.      Chronic anticoagulation  INR is elevated,jhold coumadin,check daily INR.      Dyslipidemia  On statin,has NPO status a this time.      Bilateral carotid artery stenosis  On medical treatment       Essential hypertension  Will use prn IV Hydralazine duo to NPO status.      Chronic heart failure with preserved ejection fraction  Will monitor.      Coronary artery disease involving native coronary artery of native heart without angina pectoris  Denies  chest pain,will monitor.        VTE Risk Mitigation (From admission, onward)    None             Kdoy Corona MD  Department of Hospital Medicine   [facility] - Emergency Dept

## 2022-01-03 NOTE — ED PROVIDER NOTES
"Encounter Date: 1/3/2022    SCRIBE #1 NOTE: I, Mitchsilviano Archer, am scribing for, and in the presence of, Vicik Sagastume PA-C.       History     Chief Complaint   Patient presents with    Abdominal Pain     The patient reports lower abdominal pain since 1/1/22. Denies nausea, vomiting, diarrhea, or constipation. Denies dysuria, hematuria. Patient reports having lower back pain yesterday. Reports hx of pancreatitis.      Katt Mcneal is a 81 y.o. female who presents to the Emergency Department for evaluation of cramping abdominal pain that began worsening 3 days ago. Patient is also complaining of decreased appetite, lower back pain that began yesterday, and sore throat that began this morning. She rates the severity of her pain at "15/10". Patient explains that she was discharged from the hospital on 12/30 for pancreatitis and e. Coli in her blood. She states she finished her course of antibiotics yesterday. She explains that her abdominal pain never resolved, but began worsening 3 days ago. Patient denies any fever, nausea, vomiting, diarrhea, cough, rhinorrhea, urinary problems, or other associated symptoms. She states she is unsure of when she last had a bowel movement.    The history is provided by the patient.     Review of patient's allergies indicates:   Allergen Reactions    Sulfa (sulfonamide antibiotics) Hives    Bactrim [sulfamethoxazole-trimethoprim] Other (See Comments)     Pt. Reports that it caused severe pain    Integrilin [eptifibatide]     Percocet [oxycodone-acetaminophen] Itching    Statins-hmg-coa reductase inhibitors Other (See Comments)     Muscle pain. Patient states some, not all statins    Xarelto [rivaroxaban]     Epinephrine Palpitations     Past Medical History:   Diagnosis Date    Arthritis     Atrial fibrillation     Bilateral carotid artery stenosis 7/13/2017    Coronary artery disease     Fibromyalgia     Hyperlipidemia     Hypertension     Myocardial " infarction 2015    Pancreatitis     Thyroid disease      Past Surgical History:   Procedure Laterality Date    CHOLECYSTECTOMY      CORONARY STENT PLACEMENT  3/24/15    HYSTERECTOMY  10/28/2004     History reviewed. No pertinent family history.  Social History     Tobacco Use    Smoking status: Former Smoker     Quit date: 1964     Years since quittin.5    Smokeless tobacco: Former User   Substance Use Topics    Alcohol use: No     Alcohol/week: 0.0 standard drinks    Drug use: No     Review of Systems   Constitutional: Positive for appetite change.   HENT: Positive for sore throat.    Eyes: Negative for redness.   Respiratory: Negative for cough and shortness of breath.    Cardiovascular: Negative for chest pain.   Gastrointestinal: Positive for abdominal pain. Negative for diarrhea, nausea and vomiting.   Genitourinary: Negative for difficulty urinating, dysuria, frequency and hematuria.   Musculoskeletal: Positive for back pain.   Skin: Negative for rash.   Neurological: Negative for headaches.       Physical Exam     Initial Vitals [22 0710]   BP Pulse Resp Temp SpO2   (!) 178/83 108 18 99.4 °F (37.4 °C) 96 %      MAP       --         Physical Exam    Nursing note and vitals reviewed.  Constitutional: She appears well-developed. She appears distressed (She appears in pain.).   HENT:   Head: Atraumatic.   Mouth/Throat: Oropharynx is clear and moist.   Eyes: EOM are normal.   Neck: Neck supple.   Normal range of motion.  Cardiovascular: Regular rhythm.   Tachycardic   Pulmonary/Chest: Breath sounds normal. No respiratory distress.   Abdominal: Abdomen is soft. Bowel sounds are normal. She exhibits distension (epigastric abdominal pain). There is abdominal tenderness (diffuse tenderness to palpation).   Genitourinary: Rectum:      Guaiac result negative.   Guaiac negative stool.    Genitourinary Comments: Hemorrhoids noted.  There is a large area of redness around the buttocks.   Please see picture for further description.     Musculoskeletal:         General: No edema. Normal range of motion.      Cervical back: Normal range of motion and neck supple.     Neurological: She is alert and oriented to person, place, and time.   Skin: Capillary refill takes less than 2 seconds.     The patient stool is guaiac-negative.        ED Course   Critical Care    Date/Time: 1/3/2022 12:56 PM  Performed by: Kehinde Pulliam MD  Authorized by: Kehinde Pulliam MD   Direct patient critical care time: 22 minutes  Additional history critical care time: 11 minutes  Ordering / reviewing critical care time: 11 minutes  Documentation critical care time: 11 minutes  Consulting other physicians critical care time: 11 minutes  Consult with family critical care time: 5 minutes  Total critical care time (exclusive of procedural time) : 71 minutes  Critical care time was exclusive of separately billable procedures and treating other patients and teaching time.  Critical care was necessary to treat or prevent imminent or life-threatening deterioration of the following conditions: metabolic crisis.  Critical care was time spent personally by me on the following activities: development of treatment plan with patient or surrogate, discussions with primary provider, discussions with consultants, evaluation of patient's response to treatment, examination of patient, obtaining history from patient or surrogate, ordering and performing treatments and interventions, ordering and review of laboratory studies, ordering and review of radiographic studies, pulse oximetry, re-evaluation of patient's condition and review of old charts.        Labs Reviewed   CBC W/ AUTO DIFFERENTIAL - Abnormal; Notable for the following components:       Result Value    RBC 2.88 (*)     Hemoglobin 7.6 (*)     Hematocrit 24.5 (*)     MCH 26.4 (*)     MCHC 31.0 (*)     RDW 15.9 (*)     Immature Granulocytes 0.6 (*)     Gran # (ANC) 10.6 (*)      Immature Grans (Abs) 0.08 (*)     Gran % 84.1 (*)     Lymph % 8.1 (*)     All other components within normal limits   COMPREHENSIVE METABOLIC PANEL - Abnormal; Notable for the following components:    Albumin 2.6 (*)     Anion Gap 7 (*)     All other components within normal limits   LIPASE - Abnormal; Notable for the following components:    Lipase 792 (*)     All other components within normal limits   URINALYSIS, REFLEX TO URINE CULTURE - Abnormal; Notable for the following components:    Color, UA Colorless (*)     Protein, UA Trace (*)     All other components within normal limits    Narrative:     Specimen Source->Urine   PROTIME-INR - Abnormal; Notable for the following components:    Prothrombin Time 37.7 (*)     INR 3.8 (*)     All other components within normal limits   SARS-COV-2 RDRP GENE - Abnormal; Notable for the following components:    POC Rapid COVID Positive (*)     All other components within normal limits    Narrative:     This test utilizes isothermal nucleic acid amplification   technology to detect the SARS-CoV-2 RdRp nucleic acid segment.   The analytical sensitivity (limit of detection) is 125 genome   equivalents/mL.   A POSITIVE result implies infection with the SARS-CoV-2 virus;   the patient is presumed to be contagious.     A NEGATIVE result means that SARS-CoV-2 nucleic acids are not   present above the limit of detection. A NEGATIVE result should be   treated as presumptive. It does not rule out the possibility of   COVID-19 and should not be the sole basis for treatment decisions.   If COVID-19 is strongly suspected based on clinical and exposure   history, re-testing using an alternate molecular assay should be   considered.   This test is only for use under the Food and Drug   Administration s Emergency Use Authorization (EUA).   Commercial kits are provided by [employer].   Performance characteristics of the EUA have been independently   verified by [facility]  Department of   Pathology and Laboratory Medicine.   _________________________________________________________________   The authorized Fact Sheet for Healthcare Providers and the authorized Fact   Sheet for Patients of the ID NOW COVID-19 are available on the FDA   website:     https://www.fda.gov/media/991651/download  https://www.fda.gov/media/566359/download       CULTURE, BLOOD   CULTURE, BLOOD   LACTIC ACID, PLASMA   MAGNESIUM   URINALYSIS, REFLEX TO URINE CULTURE   TYPE & SCREEN     EKG Readings: (Independently Interpreted)   This patient is in atrial fibrillation with rapid ventricular response.  There are nonspecific ST segment and T-wave changes.  There is no evidence of acute myocardial infarction or malignant arrhythmia.  This patient may have left ventricular hypertrophy.       Imaging Results          X-Ray Chest 1 View (Final result)  Result time 01/03/22 12:39:49    Final result by Vadim Humphreys MD (01/03/22 12:39:49)                 Impression:      As above.      Electronically signed by: Vadim Humphreys MD  Date:    01/03/2022  Time:    12:39             Narrative:    EXAMINATION:  XR CHEST 1 VIEW    CLINICAL HISTORY:  Shortness of breath    TECHNIQUE:  Single frontal view of the chest was performed.    COMPARISON:  Chest radiograph 12/22/2021    FINDINGS:  Patient is slightly rotated.  Monitoring leads overlie the chest.    Right-sided PICC line is stable.  Lower left chest loop recorder device in place.  Cardiomediastinal silhouette is midline and prominent similar to prior.  Chronic mild nonspecific elevation the right hemidiaphragm similar to prior.  Bilateral mild diffuse nonspecific interstitial coarsening improved from prior, and may reflect residual minimal pulmonary edema versus interstitial type pneumonia or chronic interstitial lung changes.  The lungs are well expanded with scattered bandlike opacities suggesting platelike scarring versus atelectasis greatest at the mid to lower lung  zones.  No large consolidation, pleural effusion or pneumothorax.  Cholecystectomy clips noted.  No acute osseous process seen.                               CT Abdomen Pelvis With Contrast (Final result)  Result time 01/03/22 11:40:09    Final result by Xavier Swartz MD (01/03/22 11:40:09)                 Impression:      1. Relative to 12/20/2021 CT, there are known findings of peripancreatic inflammatory change compatible with persistent pancreatitis.  Relative that examination, there is slightly increased conspicuity of focal area of hypoattenuation within the body of the pancreas measuring up to 2 cm which could indicate developing necrosis.  No definite gas with this collection to suggest superinfection the present time.  No well-defined capsule to suggest mature pseudocyst formation at the present time.  2. Since the prior examination, there is increased degree of dilatation of the common bile duct, which may relate to ongoing inflammatory changes with the pancreas.  There is similar degree of dilatation of the pancreatic duct.  3. Since the prior examination, there is apparent organizing fluid collection which appears centered in the subcapsular region versus adjacent to the splenic hilum adjacent to the tail the pancreas, measuring up to 3.5 cm, which may relate to a nonspecific peripancreatic fluid collection, or possibly extra pancreatic fat necrosis.  4. Additional details, as provided in the body of report.      Electronically signed by: Xavier Swartz  Date:    01/03/2022  Time:    11:40             Narrative:    EXAMINATION:  CT ABDOMEN PELVIS WITH CONTRAST    CLINICAL HISTORY:  Pancreatitis, acute, severe;    TECHNIQUE:  Low dose axial images, sagittal and coronal reformations were obtained from the lung bases to the pubic symphysis following the IV administration of 75 mL of Omnipaque 350    COMPARISON:  CT of the abdomen pelvis performed 12/20/2021    FINDINGS:  Lower chest: Atelectasis and  fibrosis noted at the lung bases, as before. Heart appears enlarged.    Liver: Normal contour.  Nonspecific hypoattenuating lesions are again noted, too small to definitely characterize.    Gallbladder and bile ducts: Gallbladder surgically absent.  Similar prominent intrahepatic bile ducts when compared to 12/20/2021 CT.  Interval, there appears to be in enlargement of the common duct, currently measuring approximately 16 mm compared to 12 mm when measured at a similar level previously.    Pancreas: There are no inflammatory changes in the peripancreatic region, as seen on the 12/21 CT.  There is similar degree of pancreatic ductal dilatation.  Slightly more conspicuous focal area of hypoattenuation within the body of the pancreas measuring approximately 19 x 20 mm (series 2, image 47).  No definite air is noted within this region.    Spleen: Extensive perisplenic and gastric varices are noted.  Organizing fluid collection at the splenic hilum measures approximately 3.5 x 0.9 cm in transaxial dimensions (series 2, image 42).  This appears more organized when compared to the prior CT of 12/20/2021.    Adrenals: Unremarkable.    Kidneys: No acute change.  Numerous cysts are again seen in the kidneys.    Lymph nodes: Mildly prominent number presumed reactive upper mesenteric and retroperitoneal lymph nodes are noted.    Bowel and mesentery: Hiatal hernia.  No definite evidence of acute bowel obstruction.  Extensive colonic diverticulosis is noted without definite evidence of acute diverticulitis at the present time.  There is normal caliber the appendix.    Abdominal aorta: Normal caliber.  Atherosclerotic calcification.    Inferior vena cava: Unremarkable.    Free fluid or free air: None.    Pelvis: Unremarkable.    Body wall: Unremarkable.    Bones: No acute change.  Osseous demineralization again noted.  There are degenerative changes of the spine and hip joints, as before.  Chronic bilateral inferior pubic rami  fractures and chronic right-sided superior pubic ramus fracture.  Grade 1 anterolisthesis of L5 on S1, as before.                                 Medications   morphine injection 1 mg (1 mg Intravenous Given 1/3/22 0902)   ondansetron injection 4 mg (4 mg Intravenous Given 1/3/22 0845)   sodium chloride 0.9% bolus 1,000 mL (1,000 mLs Intravenous New Bag 1/3/22 0901)   iohexoL (OMNIPAQUE 350) injection 75 mL (75 mLs Intravenous Given 1/3/22 1053)     Medical Decision Making:   History:   Old Medical Records: I decided to obtain old medical records.  Clinical Tests:   Lab Tests: Ordered and Reviewed    Medical decision making:  Given the above this patient presents to the emergency room with worsening pain.  Patient has a history of pancreatitis.  She also has had positive blood cultures.  She is just completing a 10 day course of vancomycin and Zosyn.  CT of the abdomen reveals changes consistent with acute pancreatitis with possible necrosis.  No cysts or masses are identified.  Consultation was obtained with Gastroenterology, , who feels that the patient does not require general surgery, currently not available at this hospital at this time.  Consultation was obtained with , who would like to consider admission to the ICU.  I will speak with Dr. Torres. Dr. Torres is capped. Dr. Ny will accepts the patient.  Will admit to the ICU.          Scribe Attestation:   Scribe #1: I performed the above scribed service and the documentation accurately describes the services I performed. I attest to the accuracy of the note.                 Clinical Impression:   Final diagnoses:  [R06.02] Shortness of breath  [K85.90] Pancreatitis  [U07.1] COVID-19 virus infection (Primary)  [K85.02] Idiopathic acute pancreatitis with infected necrosis  [D64.9] Severe anemia          ED Disposition Condition    Admit             I, Vicki Sagastume PA-C, personally performed the services described in this  documentation. All medical record entries made by the scribe were at my direction and in my presence. I have reviewed the chart and agree that the record reflects my personal performance and is accurate and complete.     Kehinde Pulliam MD  01/03/22 1257       Vicki Sagastume PA-C  01/03/22 1305       Kehinde Pulliam MD  01/03/22 6122

## 2022-01-03 NOTE — CONSULTS
[facility]  General Surgery Consultation Report  2022 1:52 PM     Katt Mcneal  [id number]    Consultant:     CC/Reason for Consultation: Necrotizing pancreatitis    HPI:  81 y.o. female with pmh afib on warfrin, CAD, recently discharged on  for pancreatitis.  Presenting to the ED today after 3 days of worsening abdominal pain.  Patient says the pain is located around the umbilicus but radiates all around the abdomen.  Has anorexia, but denies nausea/vomiting.  Unable to say whether she has passed gas or when her last bowel movement was.  Denies chest pain and shortness of breath.  No fever/chills    Past Medical History:   Diagnosis Date    Arthritis     Atrial fibrillation     Bilateral carotid artery stenosis 2017    Coronary artery disease     Fibromyalgia     Hyperlipidemia     Hypertension     Myocardial infarction 2015    Pancreatitis     Thyroid disease        Past Surgical History:   Procedure Laterality Date    CHOLECYSTECTOMY      CORONARY STENT PLACEMENT  3/24/15    HYSTERECTOMY  10/28/2004       Social History     Socioeconomic History    Marital status:    Tobacco Use    Smoking status: Former Smoker     Quit date: 1964     Years since quittin.5    Smokeless tobacco: Former User   Substance and Sexual Activity    Alcohol use: No     Alcohol/week: 0.0 standard drinks    Drug use: No    Sexual activity: Not Currently       History reviewed. No pertinent family history.    Review of patient's allergies indicates:   Allergen Reactions    Sulfa (sulfonamide antibiotics) Hives    Bactrim [sulfamethoxazole-trimethoprim] Other (See Comments)     Pt. Reports that it caused severe pain    Integrilin [eptifibatide]     Percocet [oxycodone-acetaminophen] Itching    Statins-hmg-coa reductase inhibitors Other (See Comments)     Muscle pain. Patient states some, not all statins    Xarelto [rivaroxaban]     Epinephrine Palpitations       ROS -    Review of Systems - General ROS: negative  ENT ROS: negative  Allergy and Immunology ROS: negative  Hematological and Lymphatic ROS: negative  Endocrine ROS: negative  Respiratory ROS: positive for - cough  Cardiovascular ROS: no chest pain or dyspnea on exertion  Gastrointestinal ROS: positive for - abdominal pain and appetite loss  Genito-Urinary ROS: no dysuria, trouble voiding, or hematuria  Musculoskeletal ROS: negative  Neurological ROS: negative      Objective    Vitals:    01/03/22 1331   BP: (!) 148/80   Pulse: (!) 120   Resp:    Temp:        CBC   Recent Labs   Lab 01/03/22  0853   WBC 12.63   HGB 7.6*   HCT 24.5*          CHEM   Recent Labs   Lab 01/03/22  0853      K 3.6      CO2 28   BUN 9   CREATININE 0.9          GEN: Awake, alert, resting comfortable in bed   HEENT: NCAT  RESP: Normal WOB, no distress  CV: tachycardia, irregular rhythm  ABD: Soft, ttp diffusely, no rebound tenderness  EXT: WWP, no edema  SKIN: warm, dry  NEURO: alert, normal speech  PSYCH: normal mood and affect    Imaging Results          X-Ray Chest 1 View (Final result)  Result time 01/03/22 12:39:49    Final result by Vadim Humphreys MD (01/03/22 12:39:49)                 Impression:      As above.      Electronically signed by: Vadim Humphreys MD  Date:    01/03/2022  Time:    12:39             Narrative:    EXAMINATION:  XR CHEST 1 VIEW    CLINICAL HISTORY:  Shortness of breath    TECHNIQUE:  Single frontal view of the chest was performed.    COMPARISON:  Chest radiograph 12/22/2021    FINDINGS:  Patient is slightly rotated.  Monitoring leads overlie the chest.    Right-sided PICC line is stable.  Lower left chest loop recorder device in place.  Cardiomediastinal silhouette is midline and prominent similar to prior.  Chronic mild nonspecific elevation the right hemidiaphragm similar to prior.  Bilateral mild diffuse nonspecific interstitial coarsening improved from prior, and may reflect residual  minimal pulmonary edema versus interstitial type pneumonia or chronic interstitial lung changes.  The lungs are well expanded with scattered bandlike opacities suggesting platelike scarring versus atelectasis greatest at the mid to lower lung zones.  No large consolidation, pleural effusion or pneumothorax.  Cholecystectomy clips noted.  No acute osseous process seen.                               CT Abdomen Pelvis With Contrast (Final result)  Result time 01/03/22 11:40:09    Final result by Xavier Swartz MD (01/03/22 11:40:09)                 Impression:      1. Relative to 12/20/2021 CT, there are known findings of peripancreatic inflammatory change compatible with persistent pancreatitis.  Relative that examination, there is slightly increased conspicuity of focal area of hypoattenuation within the body of the pancreas measuring up to 2 cm which could indicate developing necrosis.  No definite gas with this collection to suggest superinfection the present time.  No well-defined capsule to suggest mature pseudocyst formation at the present time.  2. Since the prior examination, there is increased degree of dilatation of the common bile duct, which may relate to ongoing inflammatory changes with the pancreas.  There is similar degree of dilatation of the pancreatic duct.  3. Since the prior examination, there is apparent organizing fluid collection which appears centered in the subcapsular region versus adjacent to the splenic hilum adjacent to the tail the pancreas, measuring up to 3.5 cm, which may relate to a nonspecific peripancreatic fluid collection, or possibly extra pancreatic fat necrosis.  4. Additional details, as provided in the body of report.      Electronically signed by: Xavier Swartz  Date:    01/03/2022  Time:    11:40             Narrative:    EXAMINATION:  CT ABDOMEN PELVIS WITH CONTRAST    CLINICAL HISTORY:  Pancreatitis, acute, severe;    TECHNIQUE:  Low dose axial images, sagittal and  coronal reformations were obtained from the lung bases to the pubic symphysis following the IV administration of 75 mL of Omnipaque 350    COMPARISON:  CT of the abdomen pelvis performed 12/20/2021    FINDINGS:  Lower chest: Atelectasis and fibrosis noted at the lung bases, as before. Heart appears enlarged.    Liver: Normal contour.  Nonspecific hypoattenuating lesions are again noted, too small to definitely characterize.    Gallbladder and bile ducts: Gallbladder surgically absent.  Similar prominent intrahepatic bile ducts when compared to 12/20/2021 CT.  Interval, there appears to be in enlargement of the common duct, currently measuring approximately 16 mm compared to 12 mm when measured at a similar level previously.    Pancreas: There are no inflammatory changes in the peripancreatic region, as seen on the 12/21 CT.  There is similar degree of pancreatic ductal dilatation.  Slightly more conspicuous focal area of hypoattenuation within the body of the pancreas measuring approximately 19 x 20 mm (series 2, image 47).  No definite air is noted within this region.    Spleen: Extensive perisplenic and gastric varices are noted.  Organizing fluid collection at the splenic hilum measures approximately 3.5 x 0.9 cm in transaxial dimensions (series 2, image 42).  This appears more organized when compared to the prior CT of 12/20/2021.    Adrenals: Unremarkable.    Kidneys: No acute change.  Numerous cysts are again seen in the kidneys.    Lymph nodes: Mildly prominent number presumed reactive upper mesenteric and retroperitoneal lymph nodes are noted.    Bowel and mesentery: Hiatal hernia.  No definite evidence of acute bowel obstruction.  Extensive colonic diverticulosis is noted without definite evidence of acute diverticulitis at the present time.  There is normal caliber the appendix.    Abdominal aorta: Normal caliber.  Atherosclerotic calcification.    Inferior vena cava: Unremarkable.    Free fluid or free  air: None.    Pelvis: Unremarkable.    Body wall: Unremarkable.    Bones: No acute change.  Osseous demineralization again noted.  There are degenerative changes of the spine and hip joints, as before.  Chronic bilateral inferior pubic rami fractures and chronic right-sided superior pubic ramus fracture.  Grade 1 anterolisthesis of L5 on S1, as before.                                  A/P: 81 y.o. female with recurrent pancreatitis.  On CT scan there is a concerning area of hypodensity at the body of the pancreas that is concerning for necrotic tissue.  No air fluid levels which would be concerning for infected necrotizing pancreatitis at this time.  Would recommend admission to medicine, iv abx, and fluid resuscitation    -No surgical intervention indicated at this time  -will order a ct abd/pelvis to re-evaluate the fluid collection in 3 days  -please call with any concerns for changes in the abdominal exam      Luis Alberto Morales MD  [facility] Surgery PGY-V

## 2022-01-03 NOTE — HPI
"Katt Mcneal is a 81 y.o. female wit history of pancreatitis,Afib on coumadin,CKD,malnutrition,,who presents to the Emergency Department for evaluation of cramping abdominal pain that began worsening 3 days ago. Patient is also complaining of decreased appetite, lower back pain that began yesterday, and sore throat that began this morning. She rates the severity of her pain at "15/10". Patient explains that she was discharged from the hospital on 12/30 for pancreatitis and e. Coli in her blood. She states she finished her course of IV antibiotics yesterday. She explains that her abdominal pain never resolved, but began worsening 3 days ago. Patient denies any fever, nausea, vomiting, diarrhea, cough, rhinorrhea, urinary problems, or other associated symptoms. She states she is unsure of when she last had a bowel movement.CT abdomen show,Relative to 12/20/2021 CT, there are known findings of peripancreatic inflammatory change compatible with persistent pancreatitis.  Relative that examination, there is slightly increased conspicuity of focal area of hypoattenuation within the body of the pancreas measuring up to 2 cm which could indicate developing necrosis.  No definite gas with this collection to suggest superinfection the present time.  No well-defined capsule to suggest mature pseudocyst formation at the present time.    Since the prior examination, there is increased degree of dilatation of the common bile duct, which may relate to ongoing inflammatory changes with the pancreas.  There is similar degree of dilatation of the pancreatic duct.   3. Since the prior examination, there is apparent organizing fluid collection which appears centered in the subcapsular region versus adjacent to the splenic hilum adjacent to the tail the pancreas, measuring up to 3.5 cm, which may relate to a nonspecific peripancreatic fluid collection, or possibly extra pancreatic fat necrosis.   Additional details, as provided in " the body of report. GI and suregrty is consulted,  She is also in Afib with RVR started on amiodaron drip.  She is covid positive,but stable on RA.

## 2022-01-03 NOTE — CONSULTS
"[facility] Gastro[facility]ology Note    CC: abdominal pain    HPI 81 y.o. female with past medical history of CAD, afib on warfarin and recurrent pancreatitis who presents with recurrent, worsening, generalized abdominal pain without nausea or vomiting.  The patient was recently discharged  after being hospitalized for pancreatitis but her abdominal pain never really improved.  Her daughter denies fevers and chills at home.    She started having a sore throat this morning and tested positive today 1/3/22 for Covid 19.    Chart reviewed and summarized here.  Collateral information provided by her daughter who is present at the bedside.    Past Medical History  Past Medical History:   Diagnosis Date    Arthritis     Atrial fibrillation     Bilateral carotid artery stenosis 2017    Coronary artery disease     Fibromyalgia     Hyperlipidemia     Hypertension     Myocardial infarction 2015    Pancreatitis     Thyroid disease        Past Surgical History  Past Surgical History:   Procedure Laterality Date    CHOLECYSTECTOMY      CORONARY STENT PLACEMENT  3/24/15    HYSTERECTOMY  10/28/2004       Social History  Social History     Tobacco Use    Smoking status: Former Smoker     Quit date: 1964     Years since quittin.5    Smokeless tobacco: Former User   Substance Use Topics    Alcohol use: No     Alcohol/week: 0.0 standard drinks    Drug use: No       Family History  History reviewed. No pertinent family history.    Review of Systems  Unable to obtain, patient is very sleepy during the patient interview    Physical Examination  BP (!) 155/79   Pulse 89   Temp 99.4 °F (37.4 °C) (Oral)   Resp (!) 36   Ht 4' 11" (1.499 m)   Wt 39.9 kg (88 lb)   SpO2 (!) 91%   Breastfeeding No   BMI 17.77 kg/m²   General appearance: awakens to verbal stimuli, cooperative, no distress  HENT: Normocephalic, atraumatic, neck symmetrical, no nasal discharge   Eyes: conjunctivae/corneas clear, PERRL, " EOM's intact  Lungs: clear to auscultation bilaterally, no dullness to percussion bilaterally  Heart: regular rate and rhythm without rub; no displacement of the PMI   Abdomen: soft, non-tender; bowel sounds normoactive; no organomegaly  Extremities: extremities symmetric; no clubbing, cyanosis, or edema  Integument: Skin color, texture, turgor normal; no rashes; hair distrubution normal  Neurologic: awakens and is oriented to self, not to place or time.  Moves extremities, intact sensation to light touch  Psychiatric: no pressured speech; normal affect; There is evidence of impaired cognition     Labs:  Lab Results   Component Value Date    WBC 12.63 01/03/2022    HGB 7.6 (L) 01/03/2022    HCT 24.5 (L) 01/03/2022    MCV 85 01/03/2022     01/03/2022         CMP  Sodium   Date Value Ref Range Status   01/03/2022 138 136 - 145 mmol/L Final     Potassium   Date Value Ref Range Status   01/03/2022 3.7 3.5 - 5.1 mmol/L Final     Chloride   Date Value Ref Range Status   01/03/2022 105 95 - 110 mmol/L Final     CO2   Date Value Ref Range Status   01/03/2022 28 23 - 29 mmol/L Final     Glucose   Date Value Ref Range Status   01/03/2022 97 70 - 110 mg/dL Final     BUN   Date Value Ref Range Status   01/03/2022 7 (L) 8 - 23 mg/dL Final     Creatinine   Date Value Ref Range Status   01/03/2022 0.8 0.5 - 1.4 mg/dL Final     Calcium   Date Value Ref Range Status   01/03/2022 8.1 (L) 8.7 - 10.5 mg/dL Final     Total Protein   Date Value Ref Range Status   01/03/2022 6.9 6.0 - 8.4 g/dL Final     Albumin   Date Value Ref Range Status   01/03/2022 2.4 (L) 3.5 - 5.2 g/dL Final     Total Bilirubin   Date Value Ref Range Status   01/03/2022 0.1 0.1 - 1.0 mg/dL Final     Comment:     For infants and newborns, interpretation of results should be based  on gestational age, weight and in agreement with clinical  observations.    Premature Infant recommended reference ranges:  Up to 24 hours.............<8.0 mg/dL  Up to 48  hours............<12.0 mg/dL  3-5 days..................<15.0 mg/dL  6-29 days.................<15.0 mg/dL       Alkaline Phosphatase   Date Value Ref Range Status   01/03/2022 82 55 - 135 U/L Final     AST   Date Value Ref Range Status   01/03/2022 43 (H) 10 - 40 U/L Final     ALT   Date Value Ref Range Status   01/03/2022 23 10 - 44 U/L Final     Anion Gap   Date Value Ref Range Status   01/03/2022 5 (L) 8 - 16 mmol/L Final     eGFR if    Date Value Ref Range Status   01/03/2022 >60 >60 mL/min/1.73 m^2 Final     eGFR if non    Date Value Ref Range Status   01/03/2022 >60 >60 mL/min/1.73 m^2 Final     Comment:     Calculation used to obtain the estimated glomerular filtration  rate (eGFR) is the CKD-EPI equation.          Imaging: CT abdomen and pelvis 1/3/22-dilated pancreatic and biliary duct with peripancreatic inflammation noted    I have personally reviewed and interpreted these images.    Assessment: The patient is an 80 yo female with Covid 19 diagnosed today 1/3/22, afib on warfarin with supra therapeutic INR, CAD who presents with abdominal pain in the setting of recurrent pancreatitis.  Abdominal imaging today shows chronic dilation of the PD and CBD but with possible further dilation of the biliary duct. It also shows possible developing pancreatic necrosis. The patient has had a previous cholecystectomy and pancreatic sphincterotomy.    Plan:   -Start lactated ringers.  Will start at 100ml/hr due to the patient's diastolic heart failure.  -Agree with holding warfarin.  -MRI abdomen/MRCP ordered to evaluate for choledocholithiasis as a cause for the worsened dilation of her biliary duct noted on CT abdomen today.  -Agree with antibiotics for now.  -Continue supportive care.    Nadia Branham MD

## 2022-01-04 ENCOUNTER — TELEPHONE (OUTPATIENT)
Dept: ENDOSCOPY | Facility: HOSPITAL | Age: 82
End: 2022-01-04
Payer: MEDICARE

## 2022-01-04 DIAGNOSIS — R93.89 ABNORMAL FINDING ON IMAGING: Primary | ICD-10-CM

## 2022-01-04 PROBLEM — E03.9 HYPOTHYROID: Status: ACTIVE | Noted: 2022-01-04

## 2022-01-04 LAB
BASOPHILS # BLD AUTO: 0.03 K/UL (ref 0–0.2)
BASOPHILS NFR BLD: 0.2 % (ref 0–1.9)
DIFFERENTIAL METHOD: ABNORMAL
EOSINOPHIL # BLD AUTO: 0 K/UL (ref 0–0.5)
EOSINOPHIL NFR BLD: 0 % (ref 0–8)
ERYTHROCYTE [DISTWIDTH] IN BLOOD BY AUTOMATED COUNT: 15.9 % (ref 11.5–14.5)
HCT VFR BLD AUTO: 24.2 % (ref 37–48.5)
HGB BLD-MCNC: 7.6 G/DL (ref 12–16)
IMM GRANULOCYTES # BLD AUTO: 0.1 K/UL (ref 0–0.04)
IMM GRANULOCYTES NFR BLD AUTO: 0.6 % (ref 0–0.5)
INR PPP: 2.8 (ref 0.8–1.2)
LYMPHOCYTES # BLD AUTO: 0.8 K/UL (ref 1–4.8)
LYMPHOCYTES NFR BLD: 4.3 % (ref 18–48)
MCH RBC QN AUTO: 26.5 PG (ref 27–31)
MCHC RBC AUTO-ENTMCNC: 31.4 G/DL (ref 32–36)
MCV RBC AUTO: 84 FL (ref 82–98)
MONOCYTES # BLD AUTO: 0.7 K/UL (ref 0.3–1)
MONOCYTES NFR BLD: 4.1 % (ref 4–15)
NEUTROPHILS # BLD AUTO: 15.9 K/UL (ref 1.8–7.7)
NEUTROPHILS NFR BLD: 90.8 % (ref 38–73)
NRBC BLD-RTO: 0 /100 WBC
PLATELET # BLD AUTO: 317 K/UL (ref 150–450)
PMV BLD AUTO: 11 FL (ref 9.2–12.9)
POCT GLUCOSE: 81 MG/DL (ref 70–110)
POCT GLUCOSE: 91 MG/DL (ref 70–110)
PROTHROMBIN TIME: 28.4 SEC (ref 9–12.5)
RBC # BLD AUTO: 2.87 M/UL (ref 4–5.4)
WBC # BLD AUTO: 17.52 K/UL (ref 3.9–12.7)

## 2022-01-04 PROCEDURE — 99232 SBSQ HOSP IP/OBS MODERATE 35: CPT | Mod: ,,, | Performed by: INTERNAL MEDICINE

## 2022-01-04 PROCEDURE — 63600175 PHARM REV CODE 636 W HCPCS: Performed by: INTERNAL MEDICINE

## 2022-01-04 PROCEDURE — 21400001 HC TELEMETRY ROOM

## 2022-01-04 PROCEDURE — 27000221 HC OXYGEN, UP TO 24 HOURS

## 2022-01-04 PROCEDURE — 63600175 PHARM REV CODE 636 W HCPCS: Performed by: HOSPITALIST

## 2022-01-04 PROCEDURE — 94761 N-INVAS EAR/PLS OXIMETRY MLT: CPT

## 2022-01-04 PROCEDURE — 25000003 PHARM REV CODE 250: Performed by: HOSPITALIST

## 2022-01-04 PROCEDURE — 27000207 HC ISOLATION

## 2022-01-04 PROCEDURE — 99232 PR SUBSEQUENT HOSPITAL CARE,LEVL II: ICD-10-PCS | Mod: ,,, | Performed by: INTERNAL MEDICINE

## 2022-01-04 PROCEDURE — 85025 COMPLETE CBC W/AUTO DIFF WBC: CPT | Performed by: HOSPITALIST

## 2022-01-04 PROCEDURE — 85610 PROTHROMBIN TIME: CPT | Performed by: HOSPITALIST

## 2022-01-04 PROCEDURE — 99233 PR SUBSEQUENT HOSPITAL CARE,LEVL III: ICD-10-PCS | Mod: GC,,, | Performed by: SURGERY

## 2022-01-04 PROCEDURE — 99233 SBSQ HOSP IP/OBS HIGH 50: CPT | Mod: GC,,, | Performed by: SURGERY

## 2022-01-04 RX ORDER — METOPROLOL TARTRATE 50 MG/1
50 TABLET ORAL 2 TIMES DAILY
Status: DISCONTINUED | OUTPATIENT
Start: 2022-01-04 | End: 2022-01-06 | Stop reason: HOSPADM

## 2022-01-04 RX ORDER — LOSARTAN POTASSIUM 25 MG/1
50 TABLET ORAL DAILY
Status: DISCONTINUED | OUTPATIENT
Start: 2022-01-04 | End: 2022-01-06 | Stop reason: HOSPADM

## 2022-01-04 RX ORDER — GLUCAGON 1 MG
1 KIT INJECTION
Status: DISCONTINUED | OUTPATIENT
Start: 2022-01-04 | End: 2022-01-06 | Stop reason: HOSPADM

## 2022-01-04 RX ORDER — LORAZEPAM 0.5 MG/1
0.5 TABLET ORAL EVERY 6 HOURS PRN
Status: DISCONTINUED | OUTPATIENT
Start: 2022-01-04 | End: 2022-01-06 | Stop reason: HOSPADM

## 2022-01-04 RX ORDER — LEVOTHYROXINE SODIUM 75 UG/1
75 TABLET ORAL
Status: DISCONTINUED | OUTPATIENT
Start: 2022-01-04 | End: 2022-01-06 | Stop reason: HOSPADM

## 2022-01-04 RX ORDER — INSULIN ASPART 100 [IU]/ML
0-5 INJECTION, SOLUTION INTRAVENOUS; SUBCUTANEOUS EVERY 6 HOURS PRN
Status: DISCONTINUED | OUTPATIENT
Start: 2022-01-04 | End: 2022-01-05

## 2022-01-04 RX ORDER — METOPROLOL TARTRATE 25 MG/1
25 TABLET, FILM COATED ORAL 2 TIMES DAILY
Status: DISCONTINUED | OUTPATIENT
Start: 2022-01-04 | End: 2022-01-04

## 2022-01-04 RX ORDER — AMIODARONE HYDROCHLORIDE 200 MG/1
200 TABLET ORAL 2 TIMES DAILY
Status: DISCONTINUED | OUTPATIENT
Start: 2022-01-04 | End: 2022-01-06 | Stop reason: HOSPADM

## 2022-01-04 RX ADMIN — HYDRALAZINE HYDROCHLORIDE 10 MG: 20 INJECTION, SOLUTION INTRAMUSCULAR; INTRAVENOUS at 10:01

## 2022-01-04 RX ADMIN — LOSARTAN POTASSIUM 50 MG: 25 TABLET, FILM COATED ORAL at 11:01

## 2022-01-04 RX ADMIN — SODIUM CHLORIDE, SODIUM LACTATE, POTASSIUM CHLORIDE, AND CALCIUM CHLORIDE: .6; .31; .03; .02 INJECTION, SOLUTION INTRAVENOUS at 12:01

## 2022-01-04 RX ADMIN — MEROPENEM AND SODIUM CHLORIDE 1 G: 1 INJECTION, SOLUTION INTRAVENOUS at 02:01

## 2022-01-04 RX ADMIN — SODIUM CHLORIDE, SODIUM LACTATE, POTASSIUM CHLORIDE, AND CALCIUM CHLORIDE: .6; .31; .03; .02 INJECTION, SOLUTION INTRAVENOUS at 11:01

## 2022-01-04 RX ADMIN — MORPHINE SULFATE 4 MG: 4 INJECTION, SOLUTION INTRAMUSCULAR; INTRAVENOUS at 03:01

## 2022-01-04 RX ADMIN — AMIODARONE HYDROCHLORIDE 200 MG: 200 TABLET ORAL at 08:01

## 2022-01-04 RX ADMIN — METOPROLOL TARTRATE 50 MG: 50 TABLET, FILM COATED ORAL at 08:01

## 2022-01-04 RX ADMIN — LEVOTHYROXINE SODIUM 75 MCG: 75 TABLET ORAL at 08:01

## 2022-01-04 RX ADMIN — MUPIROCIN: 20 OINTMENT TOPICAL at 08:01

## 2022-01-04 RX ADMIN — MORPHINE SULFATE 4 MG: 4 INJECTION, SOLUTION INTRAMUSCULAR; INTRAVENOUS at 11:01

## 2022-01-04 RX ADMIN — METOPROLOL TARTRATE 25 MG: 25 TABLET, FILM COATED ORAL at 08:01

## 2022-01-04 NOTE — ASSESSMENT & PLAN NOTE
She is also in Afib with RVR started on amiodaron drip.converted to sinus,changed to po amiodarone.

## 2022-01-04 NOTE — PROGRESS NOTES
[facility]    CC: abdominal pain    HPI 81 y.o. female with past medical history of CAD, afib on warfarin and recurrent pancreatitis who presents with recurrent, worsening, generalized abdominal pain without nausea or vomiting.    The patient continues to report abdominal pain but it does improve with pain medications.  She did not have an appetite for her clear liquid diet today.    She continues to have a sore throat.    Past Medical History  Past Medical History:   Diagnosis Date    Arthritis     Atrial fibrillation     Bilateral carotid artery stenosis 7/13/2017    Coronary artery disease     Fibromyalgia     Hyperlipidemia     Hypertension     Myocardial infarction 03/21/2015    Pancreatitis     Thyroid disease          Review of Systems  General ROS: negative for chills, fever or weight loss  Cardiovascular ROS: no chest pain or dyspnea on exertion  Gastrointestinal ROS: positive for abdominal pain, no melena or hematochezia    Physical Examination  BP (!) 155/70   Pulse 72   Temp 99.4 °F (37.4 °C) (Oral)   Resp 20   Ht 4' 11" (1.499 m)   Wt 41 kg (90 lb 6.2 oz)   SpO2 98%   Breastfeeding No   BMI 18.26 kg/m²   General appearance: awakens to vocal stimuli, cooperative, no distress  HENT: Normocephalic, atraumatic, neck symmetrical, no nasal discharge   Lungs: coarse breath sounds to auscultation bilaterally, no dullness to percussion bilaterally  Heart: regular rate and rhythm without rub; no displacement of the PMI   Abdomen: soft, mildly TTP; bowel sounds normoactive; no organomegaly  Extremities: extremities symmetric; no clubbing, cyanosis, or edema  Neurologic: Alert and oriented to person, moves extremities, intact sensation to light touch    Labs:  Lab Results   Component Value Date    WBC 17.52 (H) 01/04/2022    HGB 7.6 (L) 01/04/2022    HCT 24.2 (L) 01/04/2022    MCV 84 01/04/2022     01/04/2022         CMP  Sodium   Date Value Ref Range Status   01/03/2022 " 138 136 - 145 mmol/L Final     Potassium   Date Value Ref Range Status   01/03/2022 3.7 3.5 - 5.1 mmol/L Final     Chloride   Date Value Ref Range Status   01/03/2022 105 95 - 110 mmol/L Final     CO2   Date Value Ref Range Status   01/03/2022 28 23 - 29 mmol/L Final     Glucose   Date Value Ref Range Status   01/03/2022 97 70 - 110 mg/dL Final     BUN   Date Value Ref Range Status   01/03/2022 7 (L) 8 - 23 mg/dL Final     Creatinine   Date Value Ref Range Status   01/03/2022 0.8 0.5 - 1.4 mg/dL Final     Calcium   Date Value Ref Range Status   01/03/2022 8.1 (L) 8.7 - 10.5 mg/dL Final     Total Protein   Date Value Ref Range Status   01/03/2022 6.9 6.0 - 8.4 g/dL Final     Albumin   Date Value Ref Range Status   01/03/2022 2.4 (L) 3.5 - 5.2 g/dL Final     Total Bilirubin   Date Value Ref Range Status   01/03/2022 0.1 0.1 - 1.0 mg/dL Final     Comment:     For infants and newborns, interpretation of results should be based  on gestational age, weight and in agreement with clinical  observations.    Premature Infant recommended reference ranges:  Up to 24 hours.............<8.0 mg/dL  Up to 48 hours............<12.0 mg/dL  3-5 days..................<15.0 mg/dL  6-29 days.................<15.0 mg/dL       Alkaline Phosphatase   Date Value Ref Range Status   01/03/2022 82 55 - 135 U/L Final     AST   Date Value Ref Range Status   01/03/2022 43 (H) 10 - 40 U/L Final     ALT   Date Value Ref Range Status   01/03/2022 23 10 - 44 U/L Final     Anion Gap   Date Value Ref Range Status   01/03/2022 5 (L) 8 - 16 mmol/L Final     eGFR if    Date Value Ref Range Status   01/03/2022 >60 >60 mL/min/1.73 m^2 Final     eGFR if non    Date Value Ref Range Status   01/03/2022 >60 >60 mL/min/1.73 m^2 Final     Comment:     Calculation used to obtain the estimated glomerular filtration  rate (eGFR) is the CKD-EPI equation.          Imaging: MRI abdomen/MRCP 1/3/21-dilated CBD and PD with possible  pancreatic mass noted    I have personally reviewed and interpreted these images.    Assessment:   The patient is an 82 yo female with afib on warfarin (currently held), recurrent pancreatitis with chronically dilated CBD and PD who presented with recurrent abdominal pain.  MRI abdomen is concerning for a pancreatic mass.    Plan:  -Continue Lactated ringer's until the patient is able to tolerate oral intake.  -Continue clear liquids as tolerated.  -Continue pain control.  -We will plan for an outpatient EUS in 2 weeks to further evaluate her pancreatic mass.    Nadia Branham MD

## 2022-01-04 NOTE — NURSING
Pt arrived to ICU connected to cardiac monitor. Moved to ICU bed with assist. NS on monitor. Amio infusing. PT on RA sats > 90%. Covid precautions maintained. Daughter at bedside. Updated on plan of care.

## 2022-01-04 NOTE — PROGRESS NOTES
[facility] [place]      General Surgery Progress Note    2022  10:26 AM      Patient: Katt Mcneal  MRN: [id number]  Admit Date: 1/3/2022  LOS: 1      Subjective                                                                                                        Interval Events: NAEON, patient remains in ICU, abdominal pain stable, MRI yesterday showed concern for mass located in the body of the pancreas    Patient Active Problem List   Diagnosis    Acute recurrent pancreatitis    Chronic recurrent pancreatitis    Coronary artery disease involving native coronary artery of native heart without angina pectoris    Chronic heart failure with preserved ejection fraction    Encounter for removal of pancreatic stent    Paroxysmal atrial fibrillation    Essential hypertension    Bilateral carotid artery stenosis    Dyslipidemia    Coronary artery disease of native artery of native heart with stable angina pectoris    Acute biliary pancreatitis without infection or necrosis    Chronic anticoagulation    Other specified hypothyroidism    A-fib    E coli bacteremia    Severe sepsis    Acute necrotizing pancreatitis    Atrial fibrillation with RVR    Anemia of chronic disease       No current facility-administered medications on file prior to encounter.     Current Outpatient Medications on File Prior to Encounter   Medication Sig Dispense Refill    [] cefTRIAXone (ROCEPHIN) 2 g/50 mL PgBk IVPB Inject 50 mLs (2 g total) into the vein once daily. for 3 days 150 mL 0    cholecalciferol, vitamin D3, 2,000 unit Cap Take 1 capsule by mouth once daily.      cyclobenzaprine (FEXMID) 7.5 MG Tab Take 7.5 mg by mouth 3 (three) times daily as needed.      levothyroxine (SYNTHROID) 75 MCG tablet Take 50 mcg by mouth once daily.       lorazepam (ATIVAN) 0.5 MG tablet Take 0.5 mg by mouth every 6 (six) hours as needed for Anxiety.      metoprolol succinate (TOPROL-XL) 25 MG 24 hr  tablet Take 25 mg by mouth.      nitroGLYCERIN (NITROSTAT) 0.4 MG SL tablet Place 0.4 mg under the tongue every 5 (five) minutes as needed for Chest pain.      ramipril (ALTACE) 5 MG capsule Take 5 mg by mouth every Tues, Thurs, Sat. Do not take if below <120      warfarin (COUMADIN) 2 MG tablet Take 2 mg by mouth every Tuesday, Thursday, Saturday, Sunday. 2 mg Mon, Wed, Fri     0.5 mg on Tues, Thurs, Sat, Sun      amiodarone (PACERONE) 200 MG Tab Take 1 tablet (200 mg total) by mouth 2 (two) times daily. (Patient taking differently: Take 200 mg by mouth once daily.) 60 tablet 11    amLODIPine (NORVASC) 5 MG tablet Take 1 tablet (5 mg total) by mouth once daily. 30 tablet 11    aspirin (ECOTRIN) 81 MG EC tablet Take 81 mg by mouth once daily.      dicyclomine (BENTYL) 20 mg tablet Take 20 mg by mouth 2 (two) times daily.      ondansetron (ZOFRAN-ODT) 4 MG TbDL Take 1 tablet (4 mg total) by mouth every 8 (eight) hours as needed (for nausea/vomiting). (Patient taking differently: Take 4 mg by mouth every 4 (four) hours as needed (for nausea/vomiting).) 30 tablet 3    potassium chloride (KLOR-CON) 10 MEQ TbSR Take 10 mEq by mouth once daily.      predniSONE (DELTASONE) 5 MG tablet Take 2.5 mg by mouth once daily.          Objective                                                                                                          Vitals:    01/04/22 0830 01/04/22 0845 01/04/22 0851 01/04/22 0900   BP: (!) 160/76  (!) 160/76 (!) 169/77   Pulse: 90 84 84 88   Resp: 20 20  20   Temp:       TempSrc:       SpO2: 97% 98%  (!) 94%   Weight:       Height:           CBC   Recent Labs   Lab 01/03/22  0853 01/04/22  0055   WBC 12.63 17.52*   HGB 7.6* 7.6*   HCT 24.5* 24.2*    317       CHEM   Recent Labs   Lab 01/03/22  0853 01/03/22  1400    138   K 3.6 3.7    105   CO2 28 28   BUN 9 7*   CREATININE 0.9 0.8    97       PHYSICAL EXAM:    GEN: Alert and Awake, NAD  HEENT: NC/AT, EOMI  RESP:  CTAB, good air movement, no resp distress  CV: on amiodarone ggt, regular rhythm  ABD: Soft, moderate ttp, no guarding or rebound  EXT:  Moves all, full ROM  Neuro: A&Ox3, CNII-XII intact  Skin: Warm and dry    Imaging Results          MRI MRCP Without Contrast (Final result)  Result time 01/03/22 19:06:26    Final result by Felipe Prescott DO (01/03/22 19:06:26)                 Impression:      1. Examination is severely limited by motion artifact.  2. Severe pancreatic ductal dilation with a suspected pancreatic mass in the body segment measuring up to 2.7 cm.  Recommend further evaluation with EUS and tissue sampling.  3. 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  4. Small organized fluid collection in the region of the splenic hilum, stable from prior.  5. Intra and extrahepatic biliary ductal dilation, similar to prior and likely secondary to post cholecystectomy changes.      Electronically signed by: Felipe Prescott  Date:    01/03/2022  Time:    19:06             Narrative:    EXAMINATION:  MRI ABDOMEN WITHOUT CONTRAST MRCP    CLINICAL HISTORY:  80 yo female with pancreatitis s/p cholecystectomy with chronic dilation of CBD now worsened.  Please evaluate for CBD stone;  Idiopathic acute pancreatitis with infected necrosis    TECHNIQUE:  Multiplanar, multisequence magnetic resonance images of the liver and upper abdomen without intravenous contrast. Additionally 2D and 3D MRCP sequences are performed as well as MIP images.    COMPARISON:  CT of the abdomen and pelvis from 01/03/2022.  Abdominal ultrasound from 12/20/2021.    FINDINGS:  Examination is severely limited by motion artifact.    Pulmonary Bases: No gross abnormality.    Liver: Normal in size without focal lesion.    Bile Ducts: There is dilation of the common bile duct which measures up to 1.2 cm in maximal diameter.  There is no evidence of a filling defect or  choledocholithiasis.  There is intrahepatic biliary ductal dilation.    Gallbladder: Surgically absent.    Pancreas: There is pancreatic ductal dilation in the region of the pancreatic body and tail.  The pancreatic duct measures up to approximately 8 mm.  There is a cut off of the pancreatic duct in the region of the pancreatic body.  There is a suspected pancreatic mass in the body segment measuring approximately 2.7 x 2.2 x 1.8 cm (best seen on coronal series 4, image 25).  Please note that motion artifact limits evaluation, and the mass is not well identified.    Spleen: The spleen is normal in size.  No focal splenic lesions are seen.  There is a small organized fluid collection in the region of the splenic hilum, similar to prior.    Gastrointestinal tract: No high-grade obstruction.    Mesentery and retroperitoneum: Unremarkable.  Motion artifact limits evaluation for small mesenteric/retroperitoneal lymph nodes.    Adrenal Glands: Unremarkable.    Kidneys: There are simple cysts within the bilateral superior poles.  There is no hydronephrosis.  Anterior rotation of the right kidney.    Aorta and Abdominal Vasculature: There are multiple perisplenic and perigastric varices.  There is suspected narrowing of the splenic vein just proximal to the confluence, better visualized on prior CT..    Body wall: Unremarkable.    Musculoskeletal: Normal marrow signal.                               X-Ray Chest 1 View (Final result)  Result time 01/03/22 12:39:49    Final result by Vadim Humphreys MD (01/03/22 12:39:49)                 Impression:      As above.      Electronically signed by: Vadim Humphreys MD  Date:    01/03/2022  Time:    12:39             Narrative:    EXAMINATION:  XR CHEST 1 VIEW    CLINICAL HISTORY:  Shortness of breath    TECHNIQUE:  Single frontal view of the chest was performed.    COMPARISON:  Chest radiograph 12/22/2021    FINDINGS:  Patient is slightly rotated.  Monitoring leads overlie the  chest.    Right-sided PICC line is stable.  Lower left chest loop recorder device in place.  Cardiomediastinal silhouette is midline and prominent similar to prior.  Chronic mild nonspecific elevation the right hemidiaphragm similar to prior.  Bilateral mild diffuse nonspecific interstitial coarsening improved from prior, and may reflect residual minimal pulmonary edema versus interstitial type pneumonia or chronic interstitial lung changes.  The lungs are well expanded with scattered bandlike opacities suggesting platelike scarring versus atelectasis greatest at the mid to lower lung zones.  No large consolidation, pleural effusion or pneumothorax.  Cholecystectomy clips noted.  No acute osseous process seen.                               CT Abdomen Pelvis With Contrast (Final result)  Result time 01/03/22 11:40:09    Final result by Xavier Swartz MD (01/03/22 11:40:09)                 Impression:      1. Relative to 12/20/2021 CT, there are known findings of peripancreatic inflammatory change compatible with persistent pancreatitis.  Relative that examination, there is slightly increased conspicuity of focal area of hypoattenuation within the body of the pancreas measuring up to 2 cm which could indicate developing necrosis.  No definite gas with this collection to suggest superinfection the present time.  No well-defined capsule to suggest mature pseudocyst formation at the present time.  2. Since the prior examination, there is increased degree of dilatation of the common bile duct, which may relate to ongoing inflammatory changes with the pancreas.  There is similar degree of dilatation of the pancreatic duct.  3. Since the prior examination, there is apparent organizing fluid collection which appears centered in the subcapsular region versus adjacent to the splenic hilum adjacent to the tail the pancreas, measuring up to 3.5 cm, which may relate to a nonspecific peripancreatic fluid collection, or  possibly extra pancreatic fat necrosis.  4. Additional details, as provided in the body of report.      Electronically signed by: Xavier Mcdermottjacob  Date:    01/03/2022  Time:    11:40             Narrative:    EXAMINATION:  CT ABDOMEN PELVIS WITH CONTRAST    CLINICAL HISTORY:  Pancreatitis, acute, severe;    TECHNIQUE:  Low dose axial images, sagittal and coronal reformations were obtained from the lung bases to the pubic symphysis following the IV administration of 75 mL of Omnipaque 350    COMPARISON:  CT of the abdomen pelvis performed 12/20/2021    FINDINGS:  Lower chest: Atelectasis and fibrosis noted at the lung bases, as before. Heart appears enlarged.    Liver: Normal contour.  Nonspecific hypoattenuating lesions are again noted, too small to definitely characterize.    Gallbladder and bile ducts: Gallbladder surgically absent.  Similar prominent intrahepatic bile ducts when compared to 12/20/2021 CT.  Interval, there appears to be in enlargement of the common duct, currently measuring approximately 16 mm compared to 12 mm when measured at a similar level previously.    Pancreas: There are no inflammatory changes in the peripancreatic region, as seen on the 12/21 CT.  There is similar degree of pancreatic ductal dilatation.  Slightly more conspicuous focal area of hypoattenuation within the body of the pancreas measuring approximately 19 x 20 mm (series 2, image 47).  No definite air is noted within this region.    Spleen: Extensive perisplenic and gastric varices are noted.  Organizing fluid collection at the splenic hilum measures approximately 3.5 x 0.9 cm in transaxial dimensions (series 2, image 42).  This appears more organized when compared to the prior CT of 12/20/2021.    Adrenals: Unremarkable.    Kidneys: No acute change.  Numerous cysts are again seen in the kidneys.    Lymph nodes: Mildly prominent number presumed reactive upper mesenteric and retroperitoneal lymph nodes are noted.    Bowel and  mesentery: Hiatal hernia.  No definite evidence of acute bowel obstruction.  Extensive colonic diverticulosis is noted without definite evidence of acute diverticulitis at the present time.  There is normal caliber the appendix.    Abdominal aorta: Normal caliber.  Atherosclerotic calcification.    Inferior vena cava: Unremarkable.    Free fluid or free air: None.    Pelvis: Unremarkable.    Body wall: Unremarkable.    Bones: No acute change.  Osseous demineralization again noted.  There are degenerative changes of the spine and hip joints, as before.  Chronic bilateral inferior pubic rami fractures and chronic right-sided superior pubic ramus fracture.  Grade 1 anterolisthesis of L5 on S1, as before.                                Assessment / Plan:                                                                                           A/P: Case of 81 y.o. with  Complicated pmh presenting with concern for pancreatitis and covid-19 infection.  MRI/MRCP done yesterday shows concern for a mass in the body of the pancreas with splenic and gastric varices.      -continue management per primary team, will need workup by GI to further delineate the mass with either biopsy or EUS  -will see the patient following acute setting and workup to determine need for resection  -please call with any questions or concerns      Luis Alberto Morales MD  [facility] Surgery PGY-V

## 2022-01-04 NOTE — SUBJECTIVE & OBJECTIVE
Past Medical History:   Diagnosis Date    Arthritis     Atrial fibrillation     Bilateral carotid artery stenosis 2017    Coronary artery disease     Fibromyalgia     Hyperlipidemia     Hypertension     Myocardial infarction 2015    Pancreatitis     Thyroid disease        Past Surgical History:   Procedure Laterality Date    CHOLECYSTECTOMY      CORONARY STENT PLACEMENT  3/24/15    HYSTERECTOMY  10/28/2004       Review of patient's allergies indicates:   Allergen Reactions    Sulfa (sulfonamide antibiotics) Hives    Bactrim [sulfamethoxazole-trimethoprim] Other (See Comments)     Pt. Reports that it caused severe pain    Integrilin [eptifibatide]     Percocet [oxycodone-acetaminophen] Itching    Statins-hmg-coa reductase inhibitors Other (See Comments)     Muscle pain. Patient states some, not all statins    Xarelto [rivaroxaban]     Epinephrine Palpitations       No current facility-administered medications on file prior to encounter.     Current Outpatient Medications on File Prior to Encounter   Medication Sig    [] cefTRIAXone (ROCEPHIN) 2 g/50 mL PgBk IVPB Inject 50 mLs (2 g total) into the vein once daily. for 3 days    cholecalciferol, vitamin D3, 2,000 unit Cap Take 1 capsule by mouth once daily.    cyclobenzaprine (FEXMID) 7.5 MG Tab Take 7.5 mg by mouth 3 (three) times daily as needed.    levothyroxine (SYNTHROID) 75 MCG tablet Take 50 mcg by mouth once daily.     lorazepam (ATIVAN) 0.5 MG tablet Take 0.5 mg by mouth every 6 (six) hours as needed for Anxiety.    metoprolol succinate (TOPROL-XL) 25 MG 24 hr tablet Take 25 mg by mouth.    nitroGLYCERIN (NITROSTAT) 0.4 MG SL tablet Place 0.4 mg under the tongue every 5 (five) minutes as needed for Chest pain.    ramipril (ALTACE) 5 MG capsule Take 5 mg by mouth every Tues, Thurs, Sat. Do not take if below <120    warfarin (COUMADIN) 2 MG tablet Take 2 mg by mouth every Tuesday, Thursday, Saturday, . 2 mg  Mon, Wed, Fri     0.5 mg on Tues, Thurs, Sat, Sun    amiodarone (PACERONE) 200 MG Tab Take 1 tablet (200 mg total) by mouth 2 (two) times daily. (Patient taking differently: Take 200 mg by mouth once daily.)    amLODIPine (NORVASC) 5 MG tablet Take 1 tablet (5 mg total) by mouth once daily.    aspirin (ECOTRIN) 81 MG EC tablet Take 81 mg by mouth once daily.    dicyclomine (BENTYL) 20 mg tablet Take 20 mg by mouth 2 (two) times daily.    ondansetron (ZOFRAN-ODT) 4 MG TbDL Take 1 tablet (4 mg total) by mouth every 8 (eight) hours as needed (for nausea/vomiting). (Patient taking differently: Take 4 mg by mouth every 4 (four) hours as needed (for nausea/vomiting).)    potassium chloride (KLOR-CON) 10 MEQ TbSR Take 10 mEq by mouth once daily.    predniSONE (DELTASONE) 5 MG tablet Take 2.5 mg by mouth once daily.      Family History    None       Tobacco Use    Smoking status: Former Smoker     Quit date: 1964     Years since quittin.5    Smokeless tobacco: Former User   Substance and Sexual Activity    Alcohol use: No     Alcohol/week: 0.0 standard drinks    Drug use: No    Sexual activity: Not Currently     Review of Systems   Constitutional: Positive for activity change and appetite change.   HENT: Negative for congestion and dental problem.    Eyes: Negative for pain.   Respiratory: Negative for apnea and choking.    Cardiovascular: Negative for chest pain and leg swelling.   Gastrointestinal: Negative for abdominal pain.   Endocrine: Negative for cold intolerance and heat intolerance.   Genitourinary: Negative for difficulty urinating and dyspareunia.   Musculoskeletal: Negative for arthralgias and back pain.   Skin: Negative for color change and pallor.   Allergic/Immunologic: Negative for environmental allergies and food allergies.   Neurological: Positive for weakness. Negative for dizziness.   Hematological: Negative for adenopathy. Does not bruise/bleed easily.   Psychiatric/Behavioral:  Negative for agitation.     Objective:     Vital Signs (Most Recent):  Temp: 97.4 °F (36.3 °C) (01/04/22 0730)  Pulse: 88 (01/04/22 0900)  Resp: 20 (01/04/22 0900)  BP: (!) 169/77 (01/04/22 0900)  SpO2: (!) 94 % (01/04/22 0900) Vital Signs (24h Range):  Temp:  [97.4 °F (36.3 °C)-100.6 °F (38.1 °C)] 97.4 °F (36.3 °C)  Pulse:  [] 88  Resp:  [18-36] 20  SpO2:  [89 %-99 %] 94 %  BP: (117-200)/(61-92) 169/77     Weight: 41 kg (90 lb 6.2 oz)  Body mass index is 18.26 kg/m².    Physical Exam  Constitutional:       Appearance: Normal appearance.   HENT:      Head: Normocephalic.      Nose: Nose normal.      Mouth/Throat:      Mouth: Mucous membranes are dry.   Eyes:      Extraocular Movements: Extraocular movements intact.      Pupils: Pupils are equal, round, and reactive to light.   Cardiovascular:      Rate and Rhythm: Tachycardia present. Rhythm irregular.      Heart sounds: No murmur heard.      Pulmonary:      Effort: Pulmonary effort is normal.   Abdominal:      General: Abdomen is flat. There is distension.      Palpations: Abdomen is soft.      Tenderness: There is abdominal tenderness.   Musculoskeletal:         General: No swelling or deformity. Normal range of motion.      Cervical back: Normal range of motion and neck supple.   Skin:     General: Skin is warm and dry.   Neurological:      Mental Status: She is alert and oriented to person, place, and time.      Cranial Nerves: No cranial nerve deficit.   Psychiatric:         Mood and Affect: Mood normal.         Behavior: Behavior normal.           CRANIAL NERVES     CN III, IV, VI   Pupils are equal, round, and reactive to light.       Significant Labs:   All pertinent labs within the past 24 hours have been reviewed.  BMP:   Recent Labs   Lab 01/03/22  0853 01/03/22  0853 01/03/22  1400      < > 97      < > 138   K 3.6   < > 3.7      < > 105   CO2 28   < > 28   BUN 9   < > 7*   CREATININE 0.9   < > 0.8   CALCIUM 8.7   < > 8.1*   MG  1.9  --   --     < > = values in this interval not displayed.     CBC:   Recent Labs   Lab 01/03/22  0853 01/04/22  0055   WBC 12.63 17.52*   HGB 7.6* 7.6*   HCT 24.5* 24.2*    317     CMP:   Recent Labs   Lab 01/03/22  0853 01/03/22  1400    138   K 3.6 3.7    105   CO2 28 28    97   BUN 9 7*   CREATININE 0.9 0.8   CALCIUM 8.7 8.1*   PROT 7.2 6.9   ALBUMIN 2.6* 2.4*   BILITOT 0.1 0.1   ALKPHOS 80 82   AST 39 43*   ALT 20 23   ANIONGAP 7* 5*   EGFRNONAA >60 >60     Lipase:   Recent Labs   Lab 01/03/22  0853   LIPASE 792*       Significant Imaging: I have reviewed all pertinent imaging results/findings within the past 24 hours.

## 2022-01-04 NOTE — EICU
[facility] Physician Virtual/Remote Brief Evaluation Note      Chloraseptic spray ordered for sore throat      ROBERTO Fay MD  [facility] Attending  [phone]    This report has been created through the use of [employer] dictation software. Typographical and content errors may occur with this process. While efforts are made to detect and correct such errors, in some cases errors will persist. For this reason, wording in this document should be considered in the proper context and not strictly verbatim

## 2022-01-04 NOTE — HOSPITAL COURSE
"Katt Mcneal is a 81 y.o. female wit history of pancreatitis,Afib on coumadin,CKD,malnutrition,,who presents to the Emergency Department for evaluation of cramping abdominal pain, She rates the severity of her pain at "15/10". Patient explains that she was discharged from the hospital on 12/30 for pancreatitis and e. Coli in her blood. She states she finished her course of IV antibiotics , She explains that her abdominal pain never resolved, but began worsening 3 days ago. Patient denies any fever, nausea, vomiting, diarrhea, cough, rhinorrhea, urinary problems, or other associated symptoms. She states she is unsure of when she last had a bowel movement.CT abdomen show,Relative to 12/20/2021 CT, there are known findings of peripancreatic inflammatory change compatible with persistent pancreatitis.  Relative that examination, there is slightly increased conspicuity of focal area of hypoattenuation within the body of the pancreas measuring up to 2 cm which could indicate developing necrosis.  No definite gas with this collection to suggest superinfection the present time.  No well-defined capsule to suggest mature pseudocyst formation at the present time.    Since the prior examination, there is increased degree of dilatation of the common bile duct, which may relate to ongoing inflammatory changes with the pancreas.  There is similar degree of dilatation of the pancreatic duct.   3. Since the prior examination, there is apparent organizing fluid collection which appears centered in the subcapsular region versus adjacent to the splenic hilum adjacent to the tail the pancreas, measuring up to 3.5 cm, which may relate to a nonspecific peripancreatic fluid collection, or possibly extra pancreatic fat necrosis.   Additional details, as provided in the body of report. GI and suregrty is consulted,on IVFa dn IV Abx  She is also in Afib with RVR started on amiodaron drip.converted to sinus,changed to po Amiodaron.  She " is covid positive,but stable on RA.  MRCP show,Severe pancreatic ductal dilation with a suspected pancreatic mass in the body segment measuring up to 2.7 cm.  Recommend further evaluation with EUS and tissue sampling. 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 Small organized fluid collection in the region of the splenic hilum, stable from prior. Intra and extrahepatic biliary ductal dilation, similar to prior and likely secondary to post cholecystectomy changes.  Patient say her pain is improved,.started on clear diet,stable to to floor.remains pain free,advanced diet to full liquid,PT,OT.  GI planing doing EUS for pancreatic mass as out opatient.  Her symtoms much improved,she toelrated solid food,patient matilde discharged hoem with HH and folow up with GI for EUS.

## 2022-01-04 NOTE — PT/OT/SLP PROGRESS
Occupational Therapy      Patient Name:  Katt Mcneal   MRN:  [id number]    Patient not seen today secondary to pt needing to be on IV amiodarone at least 24 hrs. Will follow-up as appropriate.    1/4/2022

## 2022-01-04 NOTE — PROGRESS NOTES
"[facility] Medicine  Progress Note    Patient Name: Katt Mcneal  MRN: [id number]  Patient Class: IP- Inpatient   Admission Date: 1/3/2022  Length of Stay: 1 days  Attending Physician: Kody Corona MD  Primary Care Provider: Panchito Perez MD        Subjective:     Principal Problem:Acute necrotizing pancreatitis        HPI:  Katt Mcneal is a 81 y.o. female wit history of pancreatitis,Afib on coumadin,CKD,malnutrition,,who presents to the Emergency Department for evaluation of cramping abdominal pain that began worsening 3 days ago. Patient is also complaining of decreased appetite, lower back pain that began yesterday, and sore throat that began this morning. She rates the severity of her pain at "15/10". Patient explains that she was discharged from the hospital on 12/30 for pancreatitis and e. Coli in her blood. She states she finished her course of IV antibiotics yesterday. She explains that her abdominal pain never resolved, but began worsening 3 days ago. Patient denies any fever, nausea, vomiting, diarrhea, cough, rhinorrhea, urinary problems, or other associated symptoms. She states she is unsure of when she last had a bowel movement.CT abdomen show,Relative to 12/20/2021 CT, there are known findings of peripancreatic inflammatory change compatible with persistent pancreatitis.  Relative that examination, there is slightly increased conspicuity of focal area of hypoattenuation within the body of the pancreas measuring up to 2 cm which could indicate developing necrosis.  No definite gas with this collection to suggest superinfection the present time.  No well-defined capsule to suggest mature pseudocyst formation at the present time.    Since the prior examination, there is increased degree of dilatation of the common bile duct, which may relate to ongoing inflammatory changes with the pancreas.  There is similar degree of dilatation of the pancreatic duct.   3. " "Since the prior examination, there is apparent organizing fluid collection which appears centered in the subcapsular region versus adjacent to the splenic hilum adjacent to the tail the pancreas, measuring up to 3.5 cm, which may relate to a nonspecific peripancreatic fluid collection, or possibly extra pancreatic fat necrosis.   Additional details, as provided in the body of report. GI and suregrty is consulted,  She is also in Afib with RVR started on amiodaron drip.  She is covid positive,but stable on RA.      Overview/Hospital Course:  Katt Mcneal is a 81 y.o. female wit history of pancreatitis,Afib on coumadin,CKD,malnutrition,,who presents to the Emergency Department for evaluation of cramping abdominal pain, She rates the severity of her pain at "15/10". Patient explains that she was discharged from the hospital on 12/30 for pancreatitis and e. Coli in her blood. She states she finished her course of IV antibiotics , She explains that her abdominal pain never resolved, but began worsening 3 days ago. Patient denies any fever, nausea, vomiting, diarrhea, cough, rhinorrhea, urinary problems, or other associated symptoms. She states she is unsure of when she last had a bowel movement.CT abdomen show,Relative to 12/20/2021 CT, there are known findings of peripancreatic inflammatory change compatible with persistent pancreatitis.  Relative that examination, there is slightly increased conspicuity of focal area of hypoattenuation within the body of the pancreas measuring up to 2 cm which could indicate developing necrosis.  No definite gas with this collection to suggest superinfection the present time.  No well-defined capsule to suggest mature pseudocyst formation at the present time.    Since the prior examination, there is increased degree of dilatation of the common bile duct, which may relate to ongoing inflammatory changes with the pancreas.  There is similar degree of dilatation of the pancreatic " duct.   3. Since the prior examination, there is apparent organizing fluid collection which appears centered in the subcapsular region versus adjacent to the splenic hilum adjacent to the tail the pancreas, measuring up to 3.5 cm, which may relate to a nonspecific peripancreatic fluid collection, or possibly extra pancreatic fat necrosis.   Additional details, as provided in the body of report. GI and suregrty is consulted,on IVFa dn IV Abx  She is also in Afib with RVR started on amiodaron drip.converted to sinus,changed to po Amiodaron.  She is covid positive,but stable on RA.  MRCP show,Severe pancreatic ductal dilation with a suspected pancreatic mass in the body segment measuring up to 2.7 cm.  Recommend further evaluation with EUS and tissue sampling. 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 Small organized fluid collection in the region of the splenic hilum, stable from prior. Intra and extrahepatic biliary ductal dilation, similar to prior and likely secondary to post cholecystectomy changes.  Patient say her pain is improved,.started on clear diet,stable to to floor.      Past Medical History:   Diagnosis Date    Arthritis     Atrial fibrillation     Bilateral carotid artery stenosis 7/13/2017    Coronary artery disease     Fibromyalgia     Hyperlipidemia     Hypertension     Myocardial infarction 03/21/2015    Pancreatitis     Thyroid disease        Past Surgical History:   Procedure Laterality Date    CHOLECYSTECTOMY      CORONARY STENT PLACEMENT  3/24/15    HYSTERECTOMY  10/28/2004       Review of patient's allergies indicates:   Allergen Reactions    Sulfa (sulfonamide antibiotics) Hives    Bactrim [sulfamethoxazole-trimethoprim] Other (See Comments)     Pt. Reports that it caused severe pain    Integrilin [eptifibatide]     Percocet [oxycodone-acetaminophen] Itching    Statins-hmg-coa reductase inhibitors Other  (See Comments)     Muscle pain. Patient states some, not all statins    Xarelto [rivaroxaban]     Epinephrine Palpitations       No current facility-administered medications on file prior to encounter.     Current Outpatient Medications on File Prior to Encounter   Medication Sig    [] cefTRIAXone (ROCEPHIN) 2 g/50 mL PgBk IVPB Inject 50 mLs (2 g total) into the vein once daily. for 3 days    cholecalciferol, vitamin D3, 2,000 unit Cap Take 1 capsule by mouth once daily.    cyclobenzaprine (FEXMID) 7.5 MG Tab Take 7.5 mg by mouth 3 (three) times daily as needed.    levothyroxine (SYNTHROID) 75 MCG tablet Take 50 mcg by mouth once daily.     lorazepam (ATIVAN) 0.5 MG tablet Take 0.5 mg by mouth every 6 (six) hours as needed for Anxiety.    metoprolol succinate (TOPROL-XL) 25 MG 24 hr tablet Take 25 mg by mouth.    nitroGLYCERIN (NITROSTAT) 0.4 MG SL tablet Place 0.4 mg under the tongue every 5 (five) minutes as needed for Chest pain.    ramipril (ALTACE) 5 MG capsule Take 5 mg by mouth every Tues, Thurs, Sat. Do not take if below <120    warfarin (COUMADIN) 2 MG tablet Take 2 mg by mouth every Tuesday, Thursday, Saturday, . 2 mg Mon, Wed, Fri     0.5 mg on Tues, Thurs, Sat, Sun    amiodarone (PACERONE) 200 MG Tab Take 1 tablet (200 mg total) by mouth 2 (two) times daily. (Patient taking differently: Take 200 mg by mouth once daily.)    amLODIPine (NORVASC) 5 MG tablet Take 1 tablet (5 mg total) by mouth once daily.    aspirin (ECOTRIN) 81 MG EC tablet Take 81 mg by mouth once daily.    dicyclomine (BENTYL) 20 mg tablet Take 20 mg by mouth 2 (two) times daily.    ondansetron (ZOFRAN-ODT) 4 MG TbDL Take 1 tablet (4 mg total) by mouth every 8 (eight) hours as needed (for nausea/vomiting). (Patient taking differently: Take 4 mg by mouth every 4 (four) hours as needed (for nausea/vomiting).)    potassium chloride (KLOR-CON) 10 MEQ TbSR Take 10 mEq by mouth once daily.    predniSONE  (DELTASONE) 5 MG tablet Take 2.5 mg by mouth once daily.      Family History    None       Tobacco Use    Smoking status: Former Smoker     Quit date: 1964     Years since quittin.5    Smokeless tobacco: Former User   Substance and Sexual Activity    Alcohol use: No     Alcohol/week: 0.0 standard drinks    Drug use: No    Sexual activity: Not Currently     Review of Systems   Constitutional: Positive for activity change and appetite change.   HENT: Negative for congestion and dental problem.    Eyes: Negative for pain.   Respiratory: Negative for apnea and choking.    Cardiovascular: Negative for chest pain and leg swelling.   Gastrointestinal: Negative for abdominal pain.   Endocrine: Negative for cold intolerance and heat intolerance.   Genitourinary: Negative for difficulty urinating and dyspareunia.   Musculoskeletal: Negative for arthralgias and back pain.   Skin: Negative for color change and pallor.   Allergic/Immunologic: Negative for environmental allergies and food allergies.   Neurological: Positive for weakness. Negative for dizziness.   Hematological: Negative for adenopathy. Does not bruise/bleed easily.   Psychiatric/Behavioral: Negative for agitation.     Objective:     Vital Signs (Most Recent):  Temp: 97.4 °F (36.3 °C) (22 0730)  Pulse: 88 (22 0900)  Resp: 20 (22 0900)  BP: (!) 169/77 (22 0900)  SpO2: (!) 94 % (22 0900) Vital Signs (24h Range):  Temp:  [97.4 °F (36.3 °C)-100.6 °F (38.1 °C)] 97.4 °F (36.3 °C)  Pulse:  [] 88  Resp:  [18-36] 20  SpO2:  [89 %-99 %] 94 %  BP: (117-200)/(61-92) 169/77     Weight: 41 kg (90 lb 6.2 oz)  Body mass index is 18.26 kg/m².    Physical Exam  Constitutional:       Appearance: Normal appearance.   HENT:      Head: Normocephalic.      Nose: Nose normal.      Mouth/Throat:      Mouth: Mucous membranes are dry.   Eyes:      Extraocular Movements: Extraocular movements intact.      Pupils: Pupils are equal, round, and  reactive to light.   Cardiovascular:      Rate and Rhythm: Tachycardia present. Rhythm irregular.      Heart sounds: No murmur heard.      Pulmonary:      Effort: Pulmonary effort is normal.   Abdominal:      General: Abdomen is flat. There is distension.      Palpations: Abdomen is soft.      Tenderness: There is abdominal tenderness.   Musculoskeletal:         General: No swelling or deformity. Normal range of motion.      Cervical back: Normal range of motion and neck supple.   Skin:     General: Skin is warm and dry.   Neurological:      Mental Status: She is alert and oriented to person, place, and time.      Cranial Nerves: No cranial nerve deficit.   Psychiatric:         Mood and Affect: Mood normal.         Behavior: Behavior normal.           CRANIAL NERVES     CN III, IV, VI   Pupils are equal, round, and reactive to light.       Significant Labs:   All pertinent labs within the past 24 hours have been reviewed.  BMP:   Recent Labs   Lab 01/03/22  0853 01/03/22  0853 01/03/22  1400      < > 97      < > 138   K 3.6   < > 3.7      < > 105   CO2 28   < > 28   BUN 9   < > 7*   CREATININE 0.9   < > 0.8   CALCIUM 8.7   < > 8.1*   MG 1.9  --   --     < > = values in this interval not displayed.     CBC:   Recent Labs   Lab 01/03/22  0853 01/04/22  0055   WBC 12.63 17.52*   HGB 7.6* 7.6*   HCT 24.5* 24.2*    317     CMP:   Recent Labs   Lab 01/03/22  0853 01/03/22  1400    138   K 3.6 3.7    105   CO2 28 28    97   BUN 9 7*   CREATININE 0.9 0.8   CALCIUM 8.7 8.1*   PROT 7.2 6.9   ALBUMIN 2.6* 2.4*   BILITOT 0.1 0.1   ALKPHOS 80 82   AST 39 43*   ALT 20 23   ANIONGAP 7* 5*   EGFRNONAA >60 >60     Lipase:   Recent Labs   Lab 01/03/22  0853   LIPASE 792*       Significant Imaging: I have reviewed all pertinent imaging results/findings within the past 24 hours.      Assessment/Plan:      * Acute necrotizing pancreatitis  CT abdomen show,Relative to 12/20/2021 CT, there are  known findings of peripancreatic inflammatory change compatible with persistent pancreatitis.  Relative that examination, there is slightly increased conspicuity of focal area of hypoattenuation within the body of the pancreas measuring up to 2 cm which could indicate developing necrosis.  No definite gas with this collection to suggest superinfection the present time.  No well-defined capsule to suggest mature pseudocyst formation at the present time.    Since the prior examination, there is increased degree of dilatation of the common bile duct, which may relate to ongoing inflammatory changes with the pancreas.  There is similar degree of dilatation of the pancreatic duct.   3. Since the prior examination, there is apparent organizing fluid collection which appears centered in the subcapsular region versus adjacent to the splenic hilum adjacent to the tail the pancreas, measuring up to 3.5 cm, which may relate to a nonspecific peripancreatic fluid collection, or possibly extra pancreatic fat necrosis.   Additional details, as provided in the body of report. GI and suregrty is consulted,keeped  NPO,stared on IVF and IV pain medications,    MRCP show,Severe pancreatic ductal dilation with a suspected pancreatic mass in the body segment measuring up to 2.7 cm.  Recommend further evaluation with EUS and tissue sampling. 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 Small organized fluid collection in the region of the splenic hilum, stable from prior. Intra and extrahepatic biliary ductal dilation, similar to prior and likely secondary to post cholecystectomy changes.  Patient say her pain is improved,.started on clear diet,stable to to floor.      Hypothyroid  On synthroid.      Anemia of chronic disease  some drop in HH without bledeing,will monitor.      Atrial fibrillation with RVR    She is also in Afib with RVR started on amiodaron drip.converted  to sinus,changed to po amiodarone.      Chronic anticoagulation  INR is elevated,jhold coumadin,check daily INR.      Dyslipidemia  On statin,has NPO status a this time.      Bilateral carotid artery stenosis  On medical treatment       Essential hypertension  Will use prn IV Hydralazine duo to NPO status.      Chronic heart failure with preserved ejection fraction  Will monitor.      Coronary artery disease involving native coronary artery of native heart without angina pectoris  Denies  chest pain,will monitor.        VTE Risk Mitigation (From admission, onward)    None          Discharge Planning   FRANCO:      Code Status: Prior   Is the patient medically ready for discharge?:     Reason for patient still in hospital (select all that apply): Patient trending condition               Critical care time spent on the evaluation and treatment of severe organ dysfunction, review of pertinent labs and imaging studies, discussions with consulting providers and discussions with patient/family: over 45  minutes.      Kody Corona MD  Department of Hospital Medicine   [facility] - Intensive Care

## 2022-01-04 NOTE — PLAN OF CARE
[facility] - Intensive Care  Initial Discharge Assessment       Primary Care Provider: Panchito Perez MD    Admission Diagnosis: Shortness of breath [R06.02]  Pancreatitis [K85.90]  Severe anemia [D64.9]  Idiopathic acute pancreatitis with infected necrosis [K85.02]  COVID-19 virus infection [U07.1]    Admission Date: 1/3/2022  Expected Discharge Date:      Discharge Barriers Identified: None    Payor: [employer]ARE / Plan: THREAT STREAM 65 / Product Type: Medicare Advantage /     Extended Emergency Contact Information  Primary Emergency Contact: Chano Mcneal  Address: [address] [place] [address] [facility]  Home Phone: [phone]  Mobile Phone: [phone]  Relation: Daughter  Preferred language: English   needed? No  Secondary Emergency Contact: Toño Mcneal  Home Phone: [phone]  Relation: Son    Discharge Plan A: Home with family  Discharge Plan B: Home with family      Nany Barrerae [facility] [place] - [address] [facility]  [address] [facility] [address]  Phone: [phone] Fax: [phone]      Initial Assessment (most recent)       Adult Discharge Assessment - 01/04/22 1326          Discharge Assessment    Assessment Type Discharge Planning Assessment     Confirmed/corrected address, phone number and insurance Yes     Confirmed Demographics Correct on Facesheet     Source of Information family     If unable to respond/provide information was family/caregiver contacted? Yes     Contact Name/Number Chano Mcneal (Daughter)   [phone]     When was your last doctors appointment? --   Patient was schedule for PCP appointment on tomorrow but was canceled. Patient's daughter stated before that patient's appt was about 2 months ago.    Communicated FRANCO with patient/caregiver Date not available/Unable to determine     Reason For Admission Pancreatities     Lives With child(boris), adult     Facility  Arrived From: Home     Do you expect to return to your current living situation? Yes     Do you have help at home or someone to help you manage your care at home? Yes     Who are your caregiver(s) and their phone number(s)? Chano Mcneal (Daughter)   [phone]     Equipment Currently Used at Home commode;walker, rolling     Readmission within 30 days? Yes     Patient currently being followed by outpatient case management? No     Do you currently have service(s) that help you manage your care at home? No     Do you take prescription medications? Yes     Do you have prescription coverage? Yes     Coverage PHN     Do you have any problems affording any of your prescribed medications? No     Is the patient taking medications as prescribed? yes     Who is going to help you get home at discharge? Chano Mcneal (Daughter)   [phone]     How do you get to doctors appointments? family or friend will provide     Are you on dialysis? No     Do you take coumadin? Yes     Who monitors your labs? Dr. Travon Palomino [phone]     Discharge Plan A Home with family     Discharge Plan B Home with family     Discharge Plan discussed with: Adult children     Discharge Barriers Identified None        Relationship/Environment    Name(s) of Who Lives With Patient Chano Mcneal (Daughter)   [phone]                     Patient's daughter stated patient and her lives together.   Patient's pharmacy is [facility].  Patient's coumadin doctor: Dr. Travon Palomino.  Patient has a rolling walker and bsc.

## 2022-01-04 NOTE — EICU
Intervention Initiated From:  Bedside    Neftaly Communicated with Bedside Nurse regarding:  BP   Bedside RN requesting order for oxygen and order for 's to 180's even after MSO4 IV given for pain and apresoline 10 mg IVP given.    Doctor Notified:  Yes  Comments: Johnathan Bolanos

## 2022-01-04 NOTE — NURSING
[facility] [place]  Nurses Note -- 4 Eyes    Patient Name: Katt Mcneal  MRN: [id number]  Attending Provider:  Kody Corona MD  1/3/2022       Wounds on : Admit    [] No   []Prevention Measures Documented    [x] Yes    [x]LDA's Charted   []Wound Care Consulted (optional)   []Photo Taken (optional)   []MD Notified    Attending RN:  Shira Soliz RN     Second RN:  Mitchel BRYANT

## 2022-01-04 NOTE — PLAN OF CARE
Patient remains in ICU. SR-ST on cardiac monitor. Amiodarone gtt infusing @ 0.5 mg/ min. PICC RUE intact. Patient NPO. LR infusing at 100 ml/hr. SBP up to 200 overnight. MD Fya notified. Hydralazine and enalaprilat given PRN to keep SBP <160. Patient complained of throat and abdominal pain. Medicated with chloraseptic spray and morphine PRN. Voiding per bedpan. No BM this shift. Will continue to monitor.

## 2022-01-04 NOTE — ASSESSMENT & PLAN NOTE
CT abdomen show,Relative to 12/20/2021 CT, there are known findings of peripancreatic inflammatory change compatible with persistent pancreatitis.  Relative that examination, there is slightly increased conspicuity of focal area of hypoattenuation within the body of the pancreas measuring up to 2 cm which could indicate developing necrosis.  No definite gas with this collection to suggest superinfection the present time.  No well-defined capsule to suggest mature pseudocyst formation at the present time.    Since the prior examination, there is increased degree of dilatation of the common bile duct, which may relate to ongoing inflammatory changes with the pancreas.  There is similar degree of dilatation of the pancreatic duct.   3. Since the prior examination, there is apparent organizing fluid collection which appears centered in the subcapsular region versus adjacent to the splenic hilum adjacent to the tail the pancreas, measuring up to 3.5 cm, which may relate to a nonspecific peripancreatic fluid collection, or possibly extra pancreatic fat necrosis.   Additional details, as provided in the body of report. GI and suregrty is consulted,keeped  NPO,stared on IVF and IV pain medications,    MRCP show,Severe pancreatic ductal dilation with a suspected pancreatic mass in the body segment measuring up to 2.7 cm.  Recommend further evaluation with EUS and tissue sampling. 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 Small organized fluid collection in the region of the splenic hilum, stable from prior. Intra and extrahepatic biliary ductal dilation, similar to prior and likely secondary to post cholecystectomy changes.  Patient say her pain is improved,.started on clear diet,stable to to floor.

## 2022-01-04 NOTE — PLAN OF CARE
01/04/22 1543   Readmission   Why were you hospitalized in the last 30 days? Pancreatitis   Why were you readmitted? Related to previous admission;Alarmed about signs/symptoms   When you left the hospital how did you feel? Patient's daughter stated patient was still having stomach pain.   When you left the hospital where did you go? Home with Family   Did patient/caregiver refused recommended DC plan? No   When did you start not feeling well? Patient's daughter stated Saturday New Year's Day.   Did you try to manage your symptoms your self? No   Did you call anyone? No   Did you try to see or did see a doctor or nurse before you came? No   Did you have  a follow-up appointment on discharge? Yes   Did you go? No   Why?   (Patient's daughter stated patient follow up appointment is on tomorrow but was canceled due to patient being in hospital.)   Was this a planned readmission? No     Patient's daughter informed SW that doctor's have now found a small mass on patient's pancreatitis and will be running more test to find out if it is cancer.

## 2022-01-04 NOTE — EICU
Rounding (Video Assessment):  complete    Intervention Initiated From:  Bedside    Neftaly Communicated with Bedside Nurse regarding:  Bedside requesting Chloraseptic spray for patient c/o sore throat    Nurse Notified:  yes    Doctor Notified:  Message sent to Dr ROBERTO Fay    Comments:

## 2022-01-04 NOTE — NURSING
Pt on 2 L NC awake alert, oriented. NS on monitor. Transitioned to PO Amio. BP stable. Voiding in bed pan. LR infusing. Daughter at bedside. No falls, injuries, or skin breakdown. Pt updated on plan of care.

## 2022-01-04 NOTE — EICU
eICU Physician Virtual/Remote Brief Evaluation Note      Message from RN  Had some desaturation into high 80s  Also with SBP in 170s to 180s following morphine and hydralazine  Chart reviewed, patient observed, discussed with RN  /81, P 101, RR 24, O2 sat 94  Resting comfortably with nasal cannula on bridge of nose  At home on metoprolol 25 at mg daily, Coumadin, amiodarone, amlodipine, lorazepam, ramipril   Will start metoprolol 5 mg IV q.6h  Enalaprilat 0.625 mg IV q.6h p.r.n. SBP greater than 160-1st line  Hydralazine changed to second-line p.r.n. antihypertensive  2 L nasal cannula ordered      ROBERTO Fay MD  [facility] Attending  [phone]    This report has been created through the use of [employer] dictation software. Typographical and content errors may occur with this process. While efforts are made to detect and correct such errors, in some cases errors will persist. For this reason, wording in this document should be considered in the proper context and not strictly verbatim

## 2022-01-04 NOTE — PT/OT/SLP PROGRESS
Physical Therapy      Patient Name:  Katt Mcneal   MRN:  [id number]    Patient not seen today secondary to failed MOVE Screen. It has not been > 24 hours since initiation of IV Amiodarone.

## 2022-01-05 LAB
ALBUMIN SERPL BCP-MCNC: 2.1 G/DL (ref 3.5–5.2)
ALP SERPL-CCNC: 68 U/L (ref 55–135)
ALT SERPL W/O P-5'-P-CCNC: 35 U/L (ref 10–44)
ANION GAP SERPL CALC-SCNC: 8 MMOL/L (ref 8–16)
AST SERPL-CCNC: 56 U/L (ref 10–40)
BASOPHILS # BLD AUTO: 0.03 K/UL (ref 0–0.2)
BASOPHILS NFR BLD: 0.2 % (ref 0–1.9)
BILIRUB SERPL-MCNC: 0.5 MG/DL (ref 0.1–1)
BUN SERPL-MCNC: 12 MG/DL (ref 8–23)
CALCIUM SERPL-MCNC: 7.9 MG/DL (ref 8.7–10.5)
CHLORIDE SERPL-SCNC: 101 MMOL/L (ref 95–110)
CO2 SERPL-SCNC: 27 MMOL/L (ref 23–29)
CREAT SERPL-MCNC: 0.7 MG/DL (ref 0.5–1.4)
DIFFERENTIAL METHOD: ABNORMAL
EOSINOPHIL # BLD AUTO: 0 K/UL (ref 0–0.5)
EOSINOPHIL NFR BLD: 0.2 % (ref 0–8)
ERYTHROCYTE [DISTWIDTH] IN BLOOD BY AUTOMATED COUNT: 16.1 % (ref 11.5–14.5)
EST. GFR  (AFRICAN AMERICAN): >60 ML/MIN/1.73 M^2
EST. GFR  (NON AFRICAN AMERICAN): >60 ML/MIN/1.73 M^2
GLUCOSE SERPL-MCNC: 50 MG/DL (ref 70–110)
HCT VFR BLD AUTO: 24.6 % (ref 37–48.5)
HGB BLD-MCNC: 7.4 G/DL (ref 12–16)
IMM GRANULOCYTES # BLD AUTO: 0.11 K/UL (ref 0–0.04)
IMM GRANULOCYTES NFR BLD AUTO: 0.7 % (ref 0–0.5)
INR PPP: 2.3 (ref 0.8–1.2)
LYMPHOCYTES # BLD AUTO: 1.4 K/UL (ref 1–4.8)
LYMPHOCYTES NFR BLD: 8.3 % (ref 18–48)
MCH RBC QN AUTO: 26.2 PG (ref 27–31)
MCHC RBC AUTO-ENTMCNC: 30.1 G/DL (ref 32–36)
MCV RBC AUTO: 87 FL (ref 82–98)
MONOCYTES # BLD AUTO: 0.7 K/UL (ref 0.3–1)
MONOCYTES NFR BLD: 4.4 % (ref 4–15)
NEUTROPHILS # BLD AUTO: 14.3 K/UL (ref 1.8–7.7)
NEUTROPHILS NFR BLD: 86.2 % (ref 38–73)
NRBC BLD-RTO: 0 /100 WBC
PLATELET # BLD AUTO: 363 K/UL (ref 150–450)
PMV BLD AUTO: 11.4 FL (ref 9.2–12.9)
POCT GLUCOSE: 100 MG/DL (ref 70–110)
POCT GLUCOSE: 125 MG/DL (ref 70–110)
POCT GLUCOSE: 128 MG/DL (ref 70–110)
POCT GLUCOSE: 53 MG/DL (ref 70–110)
POCT GLUCOSE: 77 MG/DL (ref 70–110)
POCT GLUCOSE: 87 MG/DL (ref 70–110)
POTASSIUM SERPL-SCNC: 3.8 MMOL/L (ref 3.5–5.1)
PROT SERPL-MCNC: 6.3 G/DL (ref 6–8.4)
PROTHROMBIN TIME: 24.1 SEC (ref 9–12.5)
RBC # BLD AUTO: 2.82 M/UL (ref 4–5.4)
SODIUM SERPL-SCNC: 136 MMOL/L (ref 136–145)
WBC # BLD AUTO: 16.53 K/UL (ref 3.9–12.7)

## 2022-01-05 PROCEDURE — 25000003 PHARM REV CODE 250: Performed by: HOSPITALIST

## 2022-01-05 PROCEDURE — 63600175 PHARM REV CODE 636 W HCPCS: Performed by: HOSPITALIST

## 2022-01-05 PROCEDURE — 27000207 HC ISOLATION

## 2022-01-05 PROCEDURE — 97535 SELF CARE MNGMENT TRAINING: CPT

## 2022-01-05 PROCEDURE — 99232 SBSQ HOSP IP/OBS MODERATE 35: CPT | Mod: ,,, | Performed by: INTERNAL MEDICINE

## 2022-01-05 PROCEDURE — 85025 COMPLETE CBC W/AUTO DIFF WBC: CPT | Performed by: HOSPITALIST

## 2022-01-05 PROCEDURE — 85610 PROTHROMBIN TIME: CPT | Performed by: HOSPITALIST

## 2022-01-05 PROCEDURE — 99232 PR SUBSEQUENT HOSPITAL CARE,LEVL II: ICD-10-PCS | Mod: ,,, | Performed by: INTERNAL MEDICINE

## 2022-01-05 PROCEDURE — 97165 OT EVAL LOW COMPLEX 30 MIN: CPT

## 2022-01-05 PROCEDURE — 80053 COMPREHEN METABOLIC PANEL: CPT | Performed by: HOSPITALIST

## 2022-01-05 PROCEDURE — 21400001 HC TELEMETRY ROOM

## 2022-01-05 PROCEDURE — 25000003 PHARM REV CODE 250: Performed by: INTERNAL MEDICINE

## 2022-01-05 RX ORDER — IBUPROFEN 200 MG
24 TABLET ORAL
Status: DISCONTINUED | OUTPATIENT
Start: 2022-01-05 | End: 2022-01-06 | Stop reason: HOSPADM

## 2022-01-05 RX ORDER — IBUPROFEN 200 MG
16 TABLET ORAL
Status: DISCONTINUED | OUTPATIENT
Start: 2022-01-05 | End: 2022-01-06 | Stop reason: HOSPADM

## 2022-01-05 RX ORDER — POLYETHYLENE GLYCOL 3350 17 G/17G
17 POWDER, FOR SOLUTION ORAL DAILY
Status: DISCONTINUED | OUTPATIENT
Start: 2022-01-05 | End: 2022-01-06 | Stop reason: HOSPADM

## 2022-01-05 RX ORDER — GLUCAGON 1 MG
1 KIT INJECTION
Status: DISCONTINUED | OUTPATIENT
Start: 2022-01-05 | End: 2022-01-06 | Stop reason: HOSPADM

## 2022-01-05 RX ORDER — NYSTATIN 100000 [USP'U]/ML
500000 SUSPENSION ORAL 4 TIMES DAILY
Status: DISCONTINUED | OUTPATIENT
Start: 2022-01-05 | End: 2022-01-06 | Stop reason: HOSPADM

## 2022-01-05 RX ORDER — INSULIN ASPART 100 [IU]/ML
0-5 INJECTION, SOLUTION INTRAVENOUS; SUBCUTANEOUS
Status: DISCONTINUED | OUTPATIENT
Start: 2022-01-05 | End: 2022-01-06 | Stop reason: HOSPADM

## 2022-01-05 RX ORDER — DEXTROSE MONOHYDRATE AND SODIUM CHLORIDE 5; .9 G/100ML; G/100ML
INJECTION, SOLUTION INTRAVENOUS CONTINUOUS
Status: DISCONTINUED | OUTPATIENT
Start: 2022-01-05 | End: 2022-01-06 | Stop reason: HOSPADM

## 2022-01-05 RX ORDER — OXYCODONE AND ACETAMINOPHEN 5; 325 MG/1; MG/1
1 TABLET ORAL EVERY 6 HOURS PRN
Status: DISCONTINUED | OUTPATIENT
Start: 2022-01-05 | End: 2022-01-06 | Stop reason: HOSPADM

## 2022-01-05 RX ADMIN — MUPIROCIN: 20 OINTMENT TOPICAL at 08:01

## 2022-01-05 RX ADMIN — MEROPENEM AND SODIUM CHLORIDE 1 G: 1 INJECTION, SOLUTION INTRAVENOUS at 03:01

## 2022-01-05 RX ADMIN — NYSTATIN 500000 UNITS: 100000 SUSPENSION ORAL at 08:01

## 2022-01-05 RX ADMIN — NYSTATIN 500000 UNITS: 100000 SUSPENSION ORAL at 12:01

## 2022-01-05 RX ADMIN — DEXTROSE MONOHYDRATE 12.5 G: 25 INJECTION, SOLUTION INTRAVENOUS at 05:01

## 2022-01-05 RX ADMIN — MEROPENEM AND SODIUM CHLORIDE 1 G: 1 INJECTION, SOLUTION INTRAVENOUS at 02:01

## 2022-01-05 RX ADMIN — POLYETHYLENE GLYCOL 3350 17 G: 17 POWDER, FOR SOLUTION ORAL at 02:01

## 2022-01-05 RX ADMIN — METOPROLOL TARTRATE 50 MG: 50 TABLET, FILM COATED ORAL at 08:01

## 2022-01-05 RX ADMIN — LEVOTHYROXINE SODIUM 75 MCG: 75 TABLET ORAL at 05:01

## 2022-01-05 RX ADMIN — LORAZEPAM 0.5 MG: 0.5 TABLET ORAL at 08:01

## 2022-01-05 RX ADMIN — DEXTROSE AND SODIUM CHLORIDE: 5; .9 INJECTION, SOLUTION INTRAVENOUS at 09:01

## 2022-01-05 RX ADMIN — AMIODARONE HYDROCHLORIDE 200 MG: 200 TABLET ORAL at 08:01

## 2022-01-05 RX ADMIN — LOSARTAN POTASSIUM 50 MG: 25 TABLET, FILM COATED ORAL at 08:01

## 2022-01-05 RX ADMIN — OXYCODONE AND ACETAMINOPHEN 1 TABLET: 5; 325 TABLET ORAL at 12:01

## 2022-01-05 RX ADMIN — NYSTATIN 500000 UNITS: 100000 SUSPENSION ORAL at 05:01

## 2022-01-05 NOTE — PLAN OF CARE
Problem: Adult Inpatient Plan of Care  Goal: Plan of Care Review  1/5/2022 0614 by Kayla Boswell RN  Outcome: Ongoing, Progressing  1/5/2022 0614 by Kayla Boswell RN  Outcome: Ongoing, Progressing  Goal: Patient-Specific Goal (Individualized)  1/5/2022 0614 by Kayla Boswell RN  Outcome: Ongoing, Progressing  1/5/2022 0614 by Kayla Boswell RN  Outcome: Ongoing, Progressing  Goal: Absence of Hospital-Acquired Illness or Injury  1/5/2022 0614 by Kayla Boswell RN  Outcome: Ongoing, Progressing  1/5/2022 0614 by Kayla Boswell RN  Outcome: Ongoing, Progressing  Goal: Optimal Comfort and Wellbeing  1/5/2022 0614 by Kayla Boswell RN  Outcome: Ongoing, Progressing  1/5/2022 0614 by Kayla Boswell RN  Outcome: Ongoing, Progressing  Goal: Readiness for Transition of Care  1/5/2022 0614 by Kayla Boswell RN  Outcome: Ongoing, Progressing  1/5/2022 0614 by Kayla Boswell RN  Outcome: Ongoing, Progressing     Problem: Adjustment to Illness (Sepsis/Septic Shock)  Goal: Optimal Coping  1/5/2022 0614 by Kayla Boswell RN  Outcome: Ongoing, Progressing  1/5/2022 0614 by Kayla Boswell RN  Outcome: Ongoing, Progressing     Problem: Bleeding (Sepsis/Septic Shock)  Goal: Absence of Bleeding  1/5/2022 0614 by Kayla Boswell RN  Outcome: Ongoing, Progressing  1/5/2022 0614 by Kayla Boswell RN  Outcome: Ongoing, Progressing     Problem: Glycemic Control Impaired (Sepsis/Septic Shock)  Goal: Blood Glucose Level Within Desired Range  1/5/2022 0614 by Kayla Boswell RN  Outcome: Ongoing, Progressing  1/5/2022 0614 by Kayla Boswell RN  Outcome: Ongoing, Progressing     Problem: Infection Progression (Sepsis/Septic Shock)  Goal: Absence of Infection Signs and Symptoms  1/5/2022 0614 by Kayla Boswell RN  Outcome: Ongoing, Progressing  1/5/2022 0614 by Kayla Boswell RN  Outcome: Ongoing, Progressing     Problem: Nutrition Impaired (Sepsis/Septic Shock)  Goal: Optimal Nutrition Intake  1/5/2022 0614 by Kayla GUERRERO  ANNETTE Boswell  Outcome: Ongoing, Progressing  1/5/2022 0614 by Kayla Boswell RN  Outcome: Ongoing, Progressing     Problem: Infection  Goal: Absence of Infection Signs and Symptoms  1/5/2022 0614 by Kayla Boswell RN  Outcome: Ongoing, Progressing  1/5/2022 0614 by Kayla Boswell RN  Outcome: Ongoing, Progressing     Problem: Impaired Wound Healing  Goal: Optimal Wound Healing  1/5/2022 0614 by Kayla Boswell RN  Outcome: Ongoing, Progressing  1/5/2022 0614 by Kayla Boswell RN  Outcome: Ongoing, Progressing     Problem: Fall Injury Risk  Goal: Absence of Fall and Fall-Related Injury  1/5/2022 0614 by Kayla Boswell RN  Outcome: Ongoing, Progressing  1/5/2022 0614 by Kayla Boswell RN  Outcome: Ongoing, Progressing     Problem: Skin Injury Risk Increased  Goal: Skin Health and Integrity  1/5/2022 0614 by Kayla Boswell RN  Outcome: Ongoing, Progressing  1/5/2022 0614 by Kayla Boswell RN  Outcome: Ongoing, Progressing

## 2022-01-05 NOTE — PLAN OF CARE
Problem: Occupational Therapy Goal  Goal: Occupational Therapy Goal  Description: Goals to be met by: 1/19/21     Patient will increase functional independence with ADLs by performing:    LE Dressing with Modified [place].  Grooming while standing at sink with Modified [place].  Toileting from toilet with Modified [place] for hygiene and clothing management.   Supine to sit with Modified [place].  Toilet transfer to toilet with Modified [place].  Upper extremity exercise program x15 reps per handout, with independence.    Outcome: Ongoing, Progressing     Pt very pleasant and willing to participate in tx session this date w/ min encouragement; supportive dtr at bedside. Pt was able to perform bed mobility w/ min A to stand and ambulate to toilet w/ CGA-SBA and no AD. Pt w/ very mild instability but no LOB occurred; pt found on RA w/ sats 91-94% w/ activity; nurse notified. Pt will continue to benefit from skilled acute OT services to maximize functional capacity for safe performance w/ ADLs and functional mobility.

## 2022-01-05 NOTE — PROGRESS NOTES
"[facility]    CC: abdominal pain    HPI 81 y.o. female with past medical history of CAD, afib on warfarin and recurrent pancreatitis who presents with recurrent, worsening, generalized abdominal pain without nausea or vomiting.    The patient reports her abdominal pain has improved.  She denies nausea and vomiting.  She has not really eaten, she does not have an appetite.  She has not had a recent BM.    Collateral information obtained from the patient's daughter present at the bedside.      Past Medical History  Past Medical History:   Diagnosis Date    Arthritis     Atrial fibrillation     Bilateral carotid artery stenosis 7/13/2017    Coronary artery disease     Fibromyalgia     Hyperlipidemia     Hypertension     Myocardial infarction 03/21/2015    Pancreatitis     Thyroid disease          Review of Systems  General ROS: negative for chills, fever.  Positive for weight loss  Cardiovascular ROS: no chest pain or dyspnea on exertion  Gastrointestinal ROS: no nausea, vomiting or melena    Physical Examination  BP (!) 169/72 (BP Location: Left arm, Patient Position: Sitting)   Pulse 73   Temp 97.6 °F (36.4 °C) (Oral)   Resp 18   Ht 4' 11" (1.499 m)   Wt 41 kg (90 lb 6.2 oz)   SpO2 (!) 93%   Breastfeeding No   BMI 18.26 kg/m²   General appearance: alert, cooperative, no distress  HENT: Normocephalic, atraumatic, neck symmetrical, no nasal discharge   Lungs: clear to auscultation bilaterally, no dullness to percussion bilaterally  Heart: regular rate and rhythm without rub; no displacement of the PMI   Abdomen: soft, non-tender; bowel sounds normoactive; no organomegaly  Extremities: extremities symmetric; no clubbing, cyanosis, or edema  Neurologic: Alert and oriented, able to ambulate slowly around her room, intact sensation to light touch    Labs:  Lab Results   Component Value Date    WBC 16.53 (H) 01/05/2022    HGB 7.4 (L) 01/05/2022    HCT 24.6 (L) 01/05/2022    MCV 87 " 01/05/2022     01/05/2022         CMP  Sodium   Date Value Ref Range Status   01/05/2022 136 136 - 145 mmol/L Final     Potassium   Date Value Ref Range Status   01/05/2022 3.8 3.5 - 5.1 mmol/L Final     Chloride   Date Value Ref Range Status   01/05/2022 101 95 - 110 mmol/L Final     CO2   Date Value Ref Range Status   01/05/2022 27 23 - 29 mmol/L Final     Glucose   Date Value Ref Range Status   01/05/2022 50 (L) 70 - 110 mg/dL Final     BUN   Date Value Ref Range Status   01/05/2022 12 8 - 23 mg/dL Final     Creatinine   Date Value Ref Range Status   01/05/2022 0.7 0.5 - 1.4 mg/dL Final     Calcium   Date Value Ref Range Status   01/05/2022 7.9 (L) 8.7 - 10.5 mg/dL Final     Total Protein   Date Value Ref Range Status   01/05/2022 6.3 6.0 - 8.4 g/dL Final     Albumin   Date Value Ref Range Status   01/05/2022 2.1 (L) 3.5 - 5.2 g/dL Final     Total Bilirubin   Date Value Ref Range Status   01/05/2022 0.5 0.1 - 1.0 mg/dL Final     Comment:     For infants and newborns, interpretation of results should be based  on gestational age, weight and in agreement with clinical  observations.    Premature Infant recommended reference ranges:  Up to 24 hours.............<8.0 mg/dL  Up to 48 hours............<12.0 mg/dL  3-5 days..................<15.0 mg/dL  6-29 days.................<15.0 mg/dL       Alkaline Phosphatase   Date Value Ref Range Status   01/05/2022 68 55 - 135 U/L Final     AST   Date Value Ref Range Status   01/05/2022 56 (H) 10 - 40 U/L Final     ALT   Date Value Ref Range Status   01/05/2022 35 10 - 44 U/L Final     Anion Gap   Date Value Ref Range Status   01/05/2022 8 8 - 16 mmol/L Final     eGFR if    Date Value Ref Range Status   01/05/2022 >60 >60 mL/min/1.73 m^2 Final     eGFR if non    Date Value Ref Range Status   01/05/2022 >60 >60 mL/min/1.73 m^2 Final     Comment:     Calculation used to obtain the estimated glomerular filtration  rate (eGFR) is the CKD-EPI  equation.          Imaging:  No new imaging to review    Assessment:   The patient is an 82 yo female with afib on warfarin (currently held), recurrent pancreatitis with chronically dilated CBD and PD who presented with recurrent abdominal pain.  MRI abdomen is concerning for a pancreatic mass.  The patient also has severe anemia without evidence of overt GI bleeding.  She has not required transfusion.    Plan:  Agree with advancing her diet as tolerated.    Miralax started for constipation.    EUS planned as an outpatient to evaluate her pancreatic mass.    We can plan for a future EGD and colonoscopy to evaluate her anemia as well.  I will discuss with the patient and her daughter.    Nadia Branham MD

## 2022-01-05 NOTE — SUBJECTIVE & OBJECTIVE
Past Medical History:   Diagnosis Date    Arthritis     Atrial fibrillation     Bilateral carotid artery stenosis 7/13/2017    Coronary artery disease     Fibromyalgia     Hyperlipidemia     Hypertension     Myocardial infarction 03/21/2015    Pancreatitis     Thyroid disease        Past Surgical History:   Procedure Laterality Date    CHOLECYSTECTOMY      CORONARY STENT PLACEMENT  3/24/15    HYSTERECTOMY  10/28/2004       Review of patient's allergies indicates:   Allergen Reactions    Sulfa (sulfonamide antibiotics) Hives    Bactrim [sulfamethoxazole-trimethoprim] Other (See Comments)     Pt. Reports that it caused severe pain    Integrilin [eptifibatide]     Percocet [oxycodone-acetaminophen] Itching    Statins-hmg-coa reductase inhibitors Other (See Comments)     Muscle pain. Patient states some, not all statins    Xarelto [rivaroxaban]     Epinephrine Palpitations       No current facility-administered medications on file prior to encounter.     Current Outpatient Medications on File Prior to Encounter   Medication Sig    cholecalciferol, vitamin D3, 2,000 unit Cap Take 1 capsule by mouth once daily.    cyclobenzaprine (FEXMID) 7.5 MG Tab Take 7.5 mg by mouth 3 (three) times daily as needed.    levothyroxine (SYNTHROID) 75 MCG tablet Take 50 mcg by mouth once daily.     lorazepam (ATIVAN) 0.5 MG tablet Take 0.5 mg by mouth every 6 (six) hours as needed for Anxiety.    metoprolol succinate (TOPROL-XL) 25 MG 24 hr tablet Take 25 mg by mouth.    nitroGLYCERIN (NITROSTAT) 0.4 MG SL tablet Place 0.4 mg under the tongue every 5 (five) minutes as needed for Chest pain.    ramipril (ALTACE) 5 MG capsule Take 5 mg by mouth every Tues, Thurs, Sat. Do not take if below <120    warfarin (COUMADIN) 2 MG tablet Take 2 mg by mouth every Tuesday, Thursday, Saturday, Sunday. 2 mg Mon, Wed, Fri     0.5 mg on Tues, Thurs, Sat, Sun    amiodarone (PACERONE) 200 MG Tab Take 1 tablet (200 mg total) by  mouth 2 (two) times daily. (Patient taking differently: Take 200 mg by mouth once daily.)    amLODIPine (NORVASC) 5 MG tablet Take 1 tablet (5 mg total) by mouth once daily.    aspirin (ECOTRIN) 81 MG EC tablet Take 81 mg by mouth once daily.    dicyclomine (BENTYL) 20 mg tablet Take 20 mg by mouth 2 (two) times daily.    ondansetron (ZOFRAN-ODT) 4 MG TbDL Take 1 tablet (4 mg total) by mouth every 8 (eight) hours as needed (for nausea/vomiting). (Patient taking differently: Take 4 mg by mouth every 4 (four) hours as needed (for nausea/vomiting).)    potassium chloride (KLOR-CON) 10 MEQ TbSR Take 10 mEq by mouth once daily.    predniSONE (DELTASONE) 5 MG tablet Take 2.5 mg by mouth once daily.      Family History    None       Tobacco Use    Smoking status: Former Smoker     Quit date: 1964     Years since quittin.5    Smokeless tobacco: Former User   Substance and Sexual Activity    Alcohol use: No     Alcohol/week: 0.0 standard drinks    Drug use: No    Sexual activity: Not Currently     Review of Systems   Constitutional: Positive for activity change and appetite change.   HENT: Negative for congestion and dental problem.    Eyes: Negative for pain.   Respiratory: Negative for apnea and choking.    Cardiovascular: Negative for chest pain and leg swelling.   Gastrointestinal: Negative for abdominal pain.   Endocrine: Negative for cold intolerance and heat intolerance.   Genitourinary: Negative for difficulty urinating and dyspareunia.   Musculoskeletal: Negative for arthralgias and back pain.   Skin: Negative for color change and pallor.   Allergic/Immunologic: Negative for environmental allergies and food allergies.   Neurological: Positive for weakness. Negative for dizziness.   Hematological: Negative for adenopathy. Does not bruise/bleed easily.   Psychiatric/Behavioral: Negative for agitation.     Objective:     Vital Signs (Most Recent):  Temp: 99.7 °F (37.6 °C) (22 0753)  Pulse: 73  (01/05/22 0753)  Resp: 17 (01/05/22 0753)  BP: 123/62 (01/05/22 0753)  SpO2: (!) 93 % (01/05/22 0753) Vital Signs (24h Range):  Temp:  [97.9 °F (36.6 °C)-99.7 °F (37.6 °C)] 99.7 °F (37.6 °C)  Pulse:  [68-76] 73  Resp:  [17-34] 17  SpO2:  [93 %-100 %] 93 %  BP: (110-183)/(56-87) 123/62     Weight: 41 kg (90 lb 6.2 oz)  Body mass index is 18.26 kg/m².    Physical Exam  Constitutional:       Appearance: Normal appearance.   HENT:      Head: Normocephalic.      Nose: Nose normal.      Mouth/Throat:      Mouth: Mucous membranes are dry.   Eyes:      Extraocular Movements: Extraocular movements intact.      Pupils: Pupils are equal, round, and reactive to light.   Cardiovascular:      Rate and Rhythm: Tachycardia present. Rhythm irregular.      Heart sounds: No murmur heard.      Pulmonary:      Effort: Pulmonary effort is normal.   Abdominal:      General: Abdomen is flat. There is distension.      Palpations: Abdomen is soft.      Tenderness: There is abdominal tenderness.   Musculoskeletal:         General: No swelling or deformity. Normal range of motion.      Cervical back: Normal range of motion and neck supple.   Skin:     General: Skin is warm and dry.   Neurological:      Mental Status: She is alert and oriented to person, place, and time.      Cranial Nerves: No cranial nerve deficit.   Psychiatric:         Mood and Affect: Mood normal.         Behavior: Behavior normal.           CRANIAL NERVES     CN III, IV, VI   Pupils are equal, round, and reactive to light.       Significant Labs:   All pertinent labs within the past 24 hours have been reviewed.  BMP:   Recent Labs   Lab 01/05/22 0333   GLU 50*      K 3.8      CO2 27   BUN 12   CREATININE 0.7   CALCIUM 7.9*     CBC:   Recent Labs   Lab 01/04/22  0055 01/05/22 0333   WBC 17.52* 16.53*   HGB 7.6* 7.4*   HCT 24.2* 24.6*    363     CMP:   Recent Labs   Lab 01/03/22  1400 01/05/22 0333    136   K 3.7 3.8    101   CO2 28 27    GLU 97 50*   BUN 7* 12   CREATININE 0.8 0.7   CALCIUM 8.1* 7.9*   PROT 6.9 6.3   ALBUMIN 2.4* 2.1*   BILITOT 0.1 0.5   ALKPHOS 82 68   AST 43* 56*   ALT 23 35   ANIONGAP 5* 8   EGFRNONAA >60 >60     Lipase:   No results for input(s): LIPASE in the last 48 hours.    Significant Imaging: I have reviewed all pertinent imaging results/findings within the past 24 hours.

## 2022-01-05 NOTE — ASSESSMENT & PLAN NOTE
CT abdomen show,Relative to 12/20/2021 CT, there are known findings of peripancreatic inflammatory change compatible with persistent pancreatitis.  Relative that examination, there is slightly increased conspicuity of focal area of hypoattenuation within the body of the pancreas measuring up to 2 cm which could indicate developing necrosis.  No definite gas with this collection to suggest superinfection the present time.  No well-defined capsule to suggest mature pseudocyst formation at the present time.    Since the prior examination, there is increased degree of dilatation of the common bile duct, which may relate to ongoing inflammatory changes with the pancreas.  There is similar degree of dilatation of the pancreatic duct.   3. Since the prior examination, there is apparent organizing fluid collection which appears centered in the subcapsular region versus adjacent to the splenic hilum adjacent to the tail the pancreas, measuring up to 3.5 cm, which may relate to a nonspecific peripancreatic fluid collection, or possibly extra pancreatic fat necrosis.   Additional details, as provided in the body of report. GI and suregrty is consulted,keeped  NPO,stared on IVF and IV pain medications,    MRCP show,Severe pancreatic ductal dilation with a suspected pancreatic mass in the body segment measuring up to 2.7 cm.  Recommend further evaluation with EUS and tissue sampling. 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 Small organized fluid collection in the region of the splenic hilum, stable from prior. Intra and extrahepatic biliary ductal dilation, similar to prior and likely secondary to post cholecystectomy changes.  Patient say her pain is improved,.started on clear diet,stable to to floor.  remains pain free,advanced diet to full liquid,PT,OT.  GI planing doing EUS for pancreatic mass.

## 2022-01-05 NOTE — PT/OT/SLP PROGRESS
Physical Therapy      Patient Name:  Katt Mcneal   MRN:  [id number]    Patient not seen today secondary to c/o abd pain. Explained to pt and dtr purpose of PT exam (assist with maintaining/regaining strength during admit to ensure safe DC home with dtr). Pt stated she has been ambulating to bathroom with nursing staff and is not concerned. Offered to notify nurse that she needs pain meds but patient declined. D/w nurse Wagner and Dr. Ny.
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
## 2022-01-05 NOTE — PROGRESS NOTES
"[facility] Medicine  Progress Note    Patient Name: Katt Mcneal  MRN: [id number]  Patient Class: IP- Inpatient   Admission Date: 1/3/2022  Length of Stay: 2 days  Attending Physician: Kody Corona MD  Primary Care Provider: Panchito Perez MD        Subjective:     Principal Problem:Acute necrotizing pancreatitis        HPI:  Katt Mcneal is a 81 y.o. female wit history of pancreatitis,Afib on coumadin,CKD,malnutrition,,who presents to the Emergency Department for evaluation of cramping abdominal pain that began worsening 3 days ago. Patient is also complaining of decreased appetite, lower back pain that began yesterday, and sore throat that began this morning. She rates the severity of her pain at "15/10". Patient explains that she was discharged from the hospital on 12/30 for pancreatitis and e. Coli in her blood. She states she finished her course of IV antibiotics yesterday. She explains that her abdominal pain never resolved, but began worsening 3 days ago. Patient denies any fever, nausea, vomiting, diarrhea, cough, rhinorrhea, urinary problems, or other associated symptoms. She states she is unsure of when she last had a bowel movement.CT abdomen show,Relative to 12/20/2021 CT, there are known findings of peripancreatic inflammatory change compatible with persistent pancreatitis.  Relative that examination, there is slightly increased conspicuity of focal area of hypoattenuation within the body of the pancreas measuring up to 2 cm which could indicate developing necrosis.  No definite gas with this collection to suggest superinfection the present time.  No well-defined capsule to suggest mature pseudocyst formation at the present time.    Since the prior examination, there is increased degree of dilatation of the common bile duct, which may relate to ongoing inflammatory changes with the pancreas.  There is similar degree of dilatation of the pancreatic duct.   3. " "Since the prior examination, there is apparent organizing fluid collection which appears centered in the subcapsular region versus adjacent to the splenic hilum adjacent to the tail the pancreas, measuring up to 3.5 cm, which may relate to a nonspecific peripancreatic fluid collection, or possibly extra pancreatic fat necrosis.   Additional details, as provided in the body of report. GI and suregrty is consulted,  She is also in Afib with RVR started on amiodaron drip.  She is covid positive,but stable on RA.      Overview/Hospital Course:  Katt Mcneal is a 81 y.o. female wit history of pancreatitis,Afib on coumadin,CKD,malnutrition,,who presents to the Emergency Department for evaluation of cramping abdominal pain, She rates the severity of her pain at "15/10". Patient explains that she was discharged from the hospital on 12/30 for pancreatitis and e. Coli in her blood. She states she finished her course of IV antibiotics , She explains that her abdominal pain never resolved, but began worsening 3 days ago. Patient denies any fever, nausea, vomiting, diarrhea, cough, rhinorrhea, urinary problems, or other associated symptoms. She states she is unsure of when she last had a bowel movement.CT abdomen show,Relative to 12/20/2021 CT, there are known findings of peripancreatic inflammatory change compatible with persistent pancreatitis.  Relative that examination, there is slightly increased conspicuity of focal area of hypoattenuation within the body of the pancreas measuring up to 2 cm which could indicate developing necrosis.  No definite gas with this collection to suggest superinfection the present time.  No well-defined capsule to suggest mature pseudocyst formation at the present time.    Since the prior examination, there is increased degree of dilatation of the common bile duct, which may relate to ongoing inflammatory changes with the pancreas.  There is similar degree of dilatation of the pancreatic " duct.   3. Since the prior examination, there is apparent organizing fluid collection which appears centered in the subcapsular region versus adjacent to the splenic hilum adjacent to the tail the pancreas, measuring up to 3.5 cm, which may relate to a nonspecific peripancreatic fluid collection, or possibly extra pancreatic fat necrosis.   Additional details, as provided in the body of report. GI and suregrty is consulted,on IVFa dn IV Abx  She is also in Afib with RVR started on amiodaron drip.converted to sinus,changed to po Amiodaron.  She is covid positive,but stable on RA.  MRCP show,Severe pancreatic ductal dilation with a suspected pancreatic mass in the body segment measuring up to 2.7 cm.  Recommend further evaluation with EUS and tissue sampling. 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 Small organized fluid collection in the region of the splenic hilum, stable from prior. Intra and extrahepatic biliary ductal dilation, similar to prior and likely secondary to post cholecystectomy changes.  Patient say her pain is improved,.started on clear diet,stable to to floor.remains pain free,advanced diet to full liquid,PT,OT.  GI planing doing EUS for pancreatic mass.        Past Medical History:   Diagnosis Date    Arthritis     Atrial fibrillation     Bilateral carotid artery stenosis 7/13/2017    Coronary artery disease     Fibromyalgia     Hyperlipidemia     Hypertension     Myocardial infarction 03/21/2015    Pancreatitis     Thyroid disease        Past Surgical History:   Procedure Laterality Date    CHOLECYSTECTOMY      CORONARY STENT PLACEMENT  3/24/15    HYSTERECTOMY  10/28/2004       Review of patient's allergies indicates:   Allergen Reactions    Sulfa (sulfonamide antibiotics) Hives    Bactrim [sulfamethoxazole-trimethoprim] Other (See Comments)     Pt. Reports that it caused severe pain    Integrilin [eptifibatide]      Percocet [oxycodone-acetaminophen] Itching    Statins-hmg-coa reductase inhibitors Other (See Comments)     Muscle pain. Patient states some, not all statins    Xarelto [rivaroxaban]     Epinephrine Palpitations       No current facility-administered medications on file prior to encounter.     Current Outpatient Medications on File Prior to Encounter   Medication Sig    cholecalciferol, vitamin D3, 2,000 unit Cap Take 1 capsule by mouth once daily.    cyclobenzaprine (FEXMID) 7.5 MG Tab Take 7.5 mg by mouth 3 (three) times daily as needed.    levothyroxine (SYNTHROID) 75 MCG tablet Take 50 mcg by mouth once daily.     lorazepam (ATIVAN) 0.5 MG tablet Take 0.5 mg by mouth every 6 (six) hours as needed for Anxiety.    metoprolol succinate (TOPROL-XL) 25 MG 24 hr tablet Take 25 mg by mouth.    nitroGLYCERIN (NITROSTAT) 0.4 MG SL tablet Place 0.4 mg under the tongue every 5 (five) minutes as needed for Chest pain.    ramipril (ALTACE) 5 MG capsule Take 5 mg by mouth every Tues, Thurs, Sat. Do not take if below <120    warfarin (COUMADIN) 2 MG tablet Take 2 mg by mouth every Tuesday, Thursday, Saturday, Sunday. 2 mg Mon, Wed, Fri     0.5 mg on Tues, Thurs, Sat, Sun    amiodarone (PACERONE) 200 MG Tab Take 1 tablet (200 mg total) by mouth 2 (two) times daily. (Patient taking differently: Take 200 mg by mouth once daily.)    amLODIPine (NORVASC) 5 MG tablet Take 1 tablet (5 mg total) by mouth once daily.    aspirin (ECOTRIN) 81 MG EC tablet Take 81 mg by mouth once daily.    dicyclomine (BENTYL) 20 mg tablet Take 20 mg by mouth 2 (two) times daily.    ondansetron (ZOFRAN-ODT) 4 MG TbDL Take 1 tablet (4 mg total) by mouth every 8 (eight) hours as needed (for nausea/vomiting). (Patient taking differently: Take 4 mg by mouth every 4 (four) hours as needed (for nausea/vomiting).)    potassium chloride (KLOR-CON) 10 MEQ TbSR Take 10 mEq by mouth once daily.    predniSONE (DELTASONE) 5 MG tablet Take 2.5 mg  by mouth once daily.      Family History    None       Tobacco Use    Smoking status: Former Smoker     Quit date: 1964     Years since quittin.5    Smokeless tobacco: Former User   Substance and Sexual Activity    Alcohol use: No     Alcohol/week: 0.0 standard drinks    Drug use: No    Sexual activity: Not Currently     Review of Systems   Constitutional: Positive for activity change and appetite change.   HENT: Negative for congestion and dental problem.    Eyes: Negative for pain.   Respiratory: Negative for apnea and choking.    Cardiovascular: Negative for chest pain and leg swelling.   Gastrointestinal: Negative for abdominal pain.   Endocrine: Negative for cold intolerance and heat intolerance.   Genitourinary: Negative for difficulty urinating and dyspareunia.   Musculoskeletal: Negative for arthralgias and back pain.   Skin: Negative for color change and pallor.   Allergic/Immunologic: Negative for environmental allergies and food allergies.   Neurological: Positive for weakness. Negative for dizziness.   Hematological: Negative for adenopathy. Does not bruise/bleed easily.   Psychiatric/Behavioral: Negative for agitation.     Objective:     Vital Signs (Most Recent):  Temp: 99.7 °F (37.6 °C) (22 0753)  Pulse: 73 (22 0753)  Resp: 17 (22 0753)  BP: 123/62 (22 0753)  SpO2: (!) 93 % (22 0753) Vital Signs (24h Range):  Temp:  [97.9 °F (36.6 °C)-99.7 °F (37.6 °C)] 99.7 °F (37.6 °C)  Pulse:  [68-76] 73  Resp:  [17-34] 17  SpO2:  [93 %-100 %] 93 %  BP: (110-183)/(56-87) 123/62     Weight: 41 kg (90 lb 6.2 oz)  Body mass index is 18.26 kg/m².    Physical Exam  Constitutional:       Appearance: Normal appearance.   HENT:      Head: Normocephalic.      Nose: Nose normal.      Mouth/Throat:      Mouth: Mucous membranes are dry.   Eyes:      Extraocular Movements: Extraocular movements intact.      Pupils: Pupils are equal, round, and reactive to light.   Cardiovascular:       Rate and Rhythm: Tachycardia present. Rhythm irregular.      Heart sounds: No murmur heard.      Pulmonary:      Effort: Pulmonary effort is normal.   Abdominal:      General: Abdomen is flat. There is distension.      Palpations: Abdomen is soft.      Tenderness: There is abdominal tenderness.   Musculoskeletal:         General: No swelling or deformity. Normal range of motion.      Cervical back: Normal range of motion and neck supple.   Skin:     General: Skin is warm and dry.   Neurological:      Mental Status: She is alert and oriented to person, place, and time.      Cranial Nerves: No cranial nerve deficit.   Psychiatric:         Mood and Affect: Mood normal.         Behavior: Behavior normal.           CRANIAL NERVES     CN III, IV, VI   Pupils are equal, round, and reactive to light.       Significant Labs:   All pertinent labs within the past 24 hours have been reviewed.  BMP:   Recent Labs   Lab 01/05/22  0333   GLU 50*      K 3.8      CO2 27   BUN 12   CREATININE 0.7   CALCIUM 7.9*     CBC:   Recent Labs   Lab 01/04/22  0055 01/05/22  0333   WBC 17.52* 16.53*   HGB 7.6* 7.4*   HCT 24.2* 24.6*    363     CMP:   Recent Labs   Lab 01/03/22  1400 01/05/22  0333    136   K 3.7 3.8    101   CO2 28 27   GLU 97 50*   BUN 7* 12   CREATININE 0.8 0.7   CALCIUM 8.1* 7.9*   PROT 6.9 6.3   ALBUMIN 2.4* 2.1*   BILITOT 0.1 0.5   ALKPHOS 82 68   AST 43* 56*   ALT 23 35   ANIONGAP 5* 8   EGFRNONAA >60 >60     Lipase:   No results for input(s): LIPASE in the last 48 hours.    Significant Imaging: I have reviewed all pertinent imaging results/findings within the past 24 hours.      Assessment/Plan:      * Acute necrotizing pancreatitis  CT abdomen show,Relative to 12/20/2021 CT, there are known findings of peripancreatic inflammatory change compatible with persistent pancreatitis.  Relative that examination, there is slightly increased conspicuity of focal area of hypoattenuation within the  body of the pancreas measuring up to 2 cm which could indicate developing necrosis.  No definite gas with this collection to suggest superinfection the present time.  No well-defined capsule to suggest mature pseudocyst formation at the present time.    Since the prior examination, there is increased degree of dilatation of the common bile duct, which may relate to ongoing inflammatory changes with the pancreas.  There is similar degree of dilatation of the pancreatic duct.   3. Since the prior examination, there is apparent organizing fluid collection which appears centered in the subcapsular region versus adjacent to the splenic hilum adjacent to the tail the pancreas, measuring up to 3.5 cm, which may relate to a nonspecific peripancreatic fluid collection, or possibly extra pancreatic fat necrosis.   Additional details, as provided in the body of report. GI and suregrty is consulted,keeped  NPO,stared on IVF and IV pain medications,    MRCP show,Severe pancreatic ductal dilation with a suspected pancreatic mass in the body segment measuring up to 2.7 cm.  Recommend further evaluation with EUS and tissue sampling. 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 Small organized fluid collection in the region of the splenic hilum, stable from prior. Intra and extrahepatic biliary ductal dilation, similar to prior and likely secondary to post cholecystectomy changes.  Patient say her pain is improved,.started on clear diet,stable to to floor.  remains pain free,advanced diet to full liquid,PT,OT.  GI planing doing EUS for pancreatic mass.      Hypothyroid  On synthroid.      Anemia of chronic disease  some drop in HH without bledeing,will monitor.      Atrial fibrillation with RVR    She is also in Afib with RVR started on amiodaron drip.converted to sinus,changed to po amiodarone.      Chronic anticoagulation  INR is elevated,jhold coumadin,check daily  INR.      Dyslipidemia  On statin,has NPO status a this time.      Bilateral carotid artery stenosis  On medical treatment       Essential hypertension  Will use prn IV Hydralazine duo to NPO status.      Chronic heart failure with preserved ejection fraction  Will monitor.      Coronary artery disease involving native coronary artery of native heart without angina pectoris  Denies  chest pain,will monitor.        VTE Risk Mitigation (From admission, onward)    None          Discharge Planning   FRANCO:      Code Status: Prior   Is the patient medically ready for discharge?:     Reason for patient still in hospital (select all that apply): Patient trending condition  Discharge Plan A: Home with family                  Kody Corona MD  Department of Hospital Medicine   [facility]

## 2022-01-05 NOTE — TELEPHONE ENCOUNTER
----- Message from Nadia Branham MD sent at 1/4/2022  8:44 AM CST -----  Regarding: EUS in 2 weeks  Good morning Dr. Shwetha Obando and I discussed this patient this morning.  She will need an EUS for a pancreatic mass in about 2 weeks.  She is on coumadin at home for afib.  Could you get her scheduled?  Thanks!    Nadia

## 2022-01-06 VITALS
OXYGEN SATURATION: 94 % | WEIGHT: 90.38 LBS | SYSTOLIC BLOOD PRESSURE: 129 MMHG | HEART RATE: 67 BPM | TEMPERATURE: 99 F | BODY MASS INDEX: 18.22 KG/M2 | HEIGHT: 59 IN | RESPIRATION RATE: 19 BRPM | DIASTOLIC BLOOD PRESSURE: 62 MMHG

## 2022-01-06 LAB
INR PPP: 2.3 (ref 0.8–1.2)
POCT GLUCOSE: 121 MG/DL (ref 70–110)
POCT GLUCOSE: 128 MG/DL (ref 70–110)
POCT GLUCOSE: 97 MG/DL (ref 70–110)
PROTHROMBIN TIME: 24 SEC (ref 9–12.5)

## 2022-01-06 PROCEDURE — 25000003 PHARM REV CODE 250: Performed by: HOSPITALIST

## 2022-01-06 PROCEDURE — 63600175 PHARM REV CODE 636 W HCPCS: Performed by: HOSPITALIST

## 2022-01-06 PROCEDURE — 85610 PROTHROMBIN TIME: CPT | Performed by: HOSPITALIST

## 2022-01-06 RX ORDER — WARFARIN 1 MG/1
0.5 TABLET ORAL DAILY
Qty: 15 TABLET | Refills: 0 | Status: ON HOLD | OUTPATIENT
Start: 2022-01-07 | End: 2022-01-16 | Stop reason: HOSPADM

## 2022-01-06 RX ADMIN — NYSTATIN 500000 UNITS: 100000 SUSPENSION ORAL at 09:01

## 2022-01-06 RX ADMIN — METOPROLOL TARTRATE 50 MG: 50 TABLET, FILM COATED ORAL at 09:01

## 2022-01-06 RX ADMIN — AMIODARONE HYDROCHLORIDE 200 MG: 200 TABLET ORAL at 09:01

## 2022-01-06 RX ADMIN — LORAZEPAM 0.5 MG: 0.5 TABLET ORAL at 03:01

## 2022-01-06 RX ADMIN — LOSARTAN POTASSIUM 50 MG: 25 TABLET, FILM COATED ORAL at 09:01

## 2022-01-06 RX ADMIN — LEVOTHYROXINE SODIUM 75 MCG: 75 TABLET ORAL at 05:01

## 2022-01-06 RX ADMIN — MEROPENEM AND SODIUM CHLORIDE 1 G: 1 INJECTION, SOLUTION INTRAVENOUS at 01:01

## 2022-01-06 RX ADMIN — DEXTROSE AND SODIUM CHLORIDE: 5; .9 INJECTION, SOLUTION INTRAVENOUS at 06:01

## 2022-01-06 NOTE — NURSING
Patient found lying side ways in bed with her head hanging off the side of the bed. Patient on her picc line.Remains very confused and disoriented.

## 2022-01-06 NOTE — DISCHARGE SUMMARY
"[facility] Medicine  Discharge Summary      Patient Name: Katt Mcneal  MRN: [id number]  Patient Class: IP- Inpatient  Admission Date: 1/3/2022  Hospital Length of Stay: 3 days  Discharge Date and Time:  01/06/2022 2:35 PM  Attending Physician: Kody Corona MD   Discharging Provider: Kody Corona MD  Primary Care Provider: Panchito Perez MD      HPI:   Katt Mcneal is a 81 y.o. female wit history of pancreatitis,Afib on coumadin,CKD,malnutrition,,who presents to the Emergency Department for evaluation of cramping abdominal pain that began worsening 3 days ago. Patient is also complaining of decreased appetite, lower back pain that began yesterday, and sore throat that began this morning. She rates the severity of her pain at "15/10". Patient explains that she was discharged from the hospital on 12/30 for pancreatitis and e. Coli in her blood. She states she finished her course of IV antibiotics yesterday. She explains that her abdominal pain never resolved, but began worsening 3 days ago. Patient denies any fever, nausea, vomiting, diarrhea, cough, rhinorrhea, urinary problems, or other associated symptoms. She states she is unsure of when she last had a bowel movement.CT abdomen show,Relative to 12/20/2021 CT, there are known findings of peripancreatic inflammatory change compatible with persistent pancreatitis.  Relative that examination, there is slightly increased conspicuity of focal area of hypoattenuation within the body of the pancreas measuring up to 2 cm which could indicate developing necrosis.  No definite gas with this collection to suggest superinfection the present time.  No well-defined capsule to suggest mature pseudocyst formation at the present time.    Since the prior examination, there is increased degree of dilatation of the common bile duct, which may relate to ongoing inflammatory changes with the pancreas.  There is similar degree of " "dilatation of the pancreatic duct.   3. Since the prior examination, there is apparent organizing fluid collection which appears centered in the subcapsular region versus adjacent to the splenic hilum adjacent to the tail the pancreas, measuring up to 3.5 cm, which may relate to a nonspecific peripancreatic fluid collection, or possibly extra pancreatic fat necrosis.   Additional details, as provided in the body of report. GI and suregrty is consulted,  She is also in Afib with RVR started on amiodaron drip.  She is covid positive,but stable on RA.      * No surgery found *      Hospital Course:   Katt Mcneal is a 81 y.o. female wit history of pancreatitis,Afib on coumadin,CKD,malnutrition,,who presents to the Emergency Department for evaluation of cramping abdominal pain, She rates the severity of her pain at "15/10". Patient explains that she was discharged from the hospital on 12/30 for pancreatitis and e. Coli in her blood. She states she finished her course of IV antibiotics , She explains that her abdominal pain never resolved, but began worsening 3 days ago. Patient denies any fever, nausea, vomiting, diarrhea, cough, rhinorrhea, urinary problems, or other associated symptoms. She states she is unsure of when she last had a bowel movement.CT abdomen show,Relative to 12/20/2021 CT, there are known findings of peripancreatic inflammatory change compatible with persistent pancreatitis.  Relative that examination, there is slightly increased conspicuity of focal area of hypoattenuation within the body of the pancreas measuring up to 2 cm which could indicate developing necrosis.  No definite gas with this collection to suggest superinfection the present time.  No well-defined capsule to suggest mature pseudocyst formation at the present time.    Since the prior examination, there is increased degree of dilatation of the common bile duct, which may relate to ongoing inflammatory changes with the pancreas.  " There is similar degree of dilatation of the pancreatic duct.   3. Since the prior examination, there is apparent organizing fluid collection which appears centered in the subcapsular region versus adjacent to the splenic hilum adjacent to the tail the pancreas, measuring up to 3.5 cm, which may relate to a nonspecific peripancreatic fluid collection, or possibly extra pancreatic fat necrosis.   Additional details, as provided in the body of report. GI and suregrty is consulted,on IVFa dn IV Abx  She is also in Afib with RVR started on amiodaron drip.converted to sinus,changed to po Amiodaron.  She is covid positive,but stable on RA.  MRCP show,Severe pancreatic ductal dilation with a suspected pancreatic mass in the body segment measuring up to 2.7 cm.  Recommend further evaluation with EUS and tissue sampling. 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 Small organized fluid collection in the region of the splenic hilum, stable from prior. Intra and extrahepatic biliary ductal dilation, similar to prior and likely secondary to post cholecystectomy changes.  Patient say her pain is improved,.started on clear diet,stable to to floor.remains pain free,advanced diet to full liquid,PT,OT.  GI planing doing EUS for pancreatic mass as out opatient.  Her symtoms much improved,she toelrated solid food,patient matilde discharged hoem with  and folow up with GI for EUS.         Goals of Care Treatment Preferences:  Code Status: Full Code      Consults:   Consults (From admission, onward)        Status Ordering Provider     Inpatient consult to General Surgery  Once        Provider:  Shade Dos Santos MD    Completed VIRI ALSTON     Inpatient consult to Gastroenterology  Once        Provider:  (Not yet assigned)    Completed VIRI ALSTON          No new Assessment & Plan notes have been filed under this hospital service since the last note  was generated.  Service: Hospital Medicine    Final Active Diagnoses:    Diagnosis Date Noted POA    PRINCIPAL PROBLEM:  Acute necrotizing pancreatitis [K85.91] 01/03/2022 Yes    Hypothyroid [E03.9] 01/04/2022 Yes    Atrial fibrillation with RVR [I48.91] 01/03/2022 Yes    Anemia of chronic disease [D63.8] 01/03/2022 Yes    Chronic anticoagulation [Z79.01] 05/16/2018 Not Applicable    Bilateral carotid artery stenosis [I65.23] 07/13/2017 Yes    Dyslipidemia [E78.5] 07/13/2017 Yes    Essential hypertension [I10] 07/13/2017 Yes    Chronic heart failure with preserved ejection fraction [I50.32] 01/20/2016 Yes    Coronary artery disease involving native coronary artery of native heart without angina pectoris [I25.10] 01/19/2016 Yes      Problems Resolved During this Admission:       Discharged Condition: stable    Disposition: Home-Health Care [facility]    Follow Up:   Follow-up Information     Panchito Perez MD On 1/12/2022.    Specialty: Family Medicine  Why: @10:30am, for Hospital Follow up with Trent Lebron  Contact information:  [address] [facility]  [address]  [place] [id number] [phone]                       Patient Instructions:      COVID-19 Surveillance Program     Order Specific Question Answer Comments   Does patient have a smartphone? Yes    Does patient have the [facility] moose on their smartphone? Yes    While in surveillance program, will patient be using home oxygen? No      Activity as tolerated       Significant Diagnostic Studies: Labs:   BMP:   Recent Labs   Lab 01/05/22 0333   GLU 50*      K 3.8      CO2 27   BUN 12   CREATININE 0.7   CALCIUM 7.9*   , CMP   Recent Labs   Lab 01/05/22  0333      K 3.8      CO2 27   GLU 50*   BUN 12   CREATININE 0.7   CALCIUM 7.9*   PROT 6.3   ALBUMIN 2.1*   BILITOT 0.5   ALKPHOS 68   AST 56*   ALT 35   ANIONGAP 8   ESTGFRAFRICA >60   EGFRNONAA >60    and CBC   Recent Labs   Lab 01/05/22 0333   WBC 16.53*   HGB 7.4*   HCT 24.6*         Radiology: MRI: MRCP   CT scan: CT ABDOMEN PELVIS WITH CONTRAST:   Results for orders placed or performed during the hospital encounter of 01/03/22   CT Abdomen Pelvis With Contrast    Narrative    EXAMINATION:  CT ABDOMEN PELVIS WITH CONTRAST    CLINICAL HISTORY:  Pancreatitis, acute, severe;    TECHNIQUE:  Low dose axial images, sagittal and coronal reformations were obtained from the lung bases to the pubic symphysis following the IV administration of 75 mL of Omnipaque 350    COMPARISON:  CT of the abdomen pelvis performed 12/20/2021    FINDINGS:  Lower chest: Atelectasis and fibrosis noted at the lung bases, as before. Heart appears enlarged.    Liver: Normal contour.  Nonspecific hypoattenuating lesions are again noted, too small to definitely characterize.    Gallbladder and bile ducts: Gallbladder surgically absent.  Similar prominent intrahepatic bile ducts when compared to 12/20/2021 CT.  Interval, there appears to be in enlargement of the common duct, currently measuring approximately 16 mm compared to 12 mm when measured at a similar level previously.    Pancreas: There are no inflammatory changes in the peripancreatic region, as seen on the 12/21 CT.  There is similar degree of pancreatic ductal dilatation.  Slightly more conspicuous focal area of hypoattenuation within the body of the pancreas measuring approximately 19 x 20 mm (series 2, image 47).  No definite air is noted within this region.    Spleen: Extensive perisplenic and gastric varices are noted.  Organizing fluid collection at the splenic hilum measures approximately 3.5 x 0.9 cm in transaxial dimensions (series 2, image 42).  This appears more organized when compared to the prior CT of 12/20/2021.    Adrenals: Unremarkable.    Kidneys: No acute change.  Numerous cysts are again seen in the kidneys.    Lymph nodes: Mildly prominent number presumed reactive upper mesenteric and retroperitoneal lymph nodes are noted.    Bowel and  mesentery: Hiatal hernia.  No definite evidence of acute bowel obstruction.  Extensive colonic diverticulosis is noted without definite evidence of acute diverticulitis at the present time.  There is normal caliber the appendix.    Abdominal aorta: Normal caliber.  Atherosclerotic calcification.    Inferior vena cava: Unremarkable.    Free fluid or free air: None.    Pelvis: Unremarkable.    Body wall: Unremarkable.    Bones: No acute change.  Osseous demineralization again noted.  There are degenerative changes of the spine and hip joints, as before.  Chronic bilateral inferior pubic rami fractures and chronic right-sided superior pubic ramus fracture.  Grade 1 anterolisthesis of L5 on S1, as before.      Impression    1. Relative to 12/20/2021 CT, there are known findings of peripancreatic inflammatory change compatible with persistent pancreatitis.  Relative that examination, there is slightly increased conspicuity of focal area of hypoattenuation within the body of the pancreas measuring up to 2 cm which could indicate developing necrosis.  No definite gas with this collection to suggest superinfection the present time.  No well-defined capsule to suggest mature pseudocyst formation at the present time.  2. Since the prior examination, there is increased degree of dilatation of the common bile duct, which may relate to ongoing inflammatory changes with the pancreas.  There is similar degree of dilatation of the pancreatic duct.  3. Since the prior examination, there is apparent organizing fluid collection which appears centered in the subcapsular region versus adjacent to the splenic hilum adjacent to the tail the pancreas, measuring up to 3.5 cm, which may relate to a nonspecific peripancreatic fluid collection, or possibly extra pancreatic fat necrosis.  4. Additional details, as provided in the body of report.      Electronically signed by: Xavier Swartz  Date:    01/03/2022  Time:    11:40    and CT ABDOMEN  PELVIS WITHOUT CONTRAST: No results found for this visit on 01/03/22.    Pending Diagnostic Studies:     None         Medications:  Reconciled Home Medications:      Medication List      START taking these medications    pulse oximeter device  Commonly known as: pulse oximeter  by Apply Externally route 2 (two) times a day. Use twice daily at 8 AM and 3 PM and record the value in MyChart as directed.        CHANGE how you take these medications    amiodarone 200 MG Tab  Commonly known as: PACERONE  Take 1 tablet (200 mg total) by mouth 2 (two) times daily.  What changed: when to take this     ondansetron 4 MG Tbdl  Commonly known as: ZOFRAN-ODT  Take 1 tablet (4 mg total) by mouth every 8 (eight) hours as needed (for nausea/vomiting).  What changed: when to take this     warfarin 1 MG tablet  Commonly known as: COUMADIN  Take 0.5 tablets (0.5 mg total) by mouth Daily.  Start taking on: January 7, 2022  What changed:   · medication strength  · how much to take  · when to take this  · additional instructions        CONTINUE taking these medications    amLODIPine 5 MG tablet  Commonly known as: NORVASC  Take 1 tablet (5 mg total) by mouth once daily.     aspirin 81 MG EC tablet  Commonly known as: ECOTRIN  Take 81 mg by mouth once daily.     cholecalciferol (vitamin D3) 50 mcg (2,000 unit) Cap  Commonly known as: VITAMIN D3  Take 1 capsule by mouth once daily.     cyclobenzaprine 7.5 MG Tab  Commonly known as: FEXMID  Take 7.5 mg by mouth 3 (three) times daily as needed.     dicyclomine 20 mg tablet  Commonly known as: BENTYL  Take 20 mg by mouth 2 (two) times daily.     levothyroxine 75 MCG tablet  Commonly known as: SYNTHROID  Take 50 mcg by mouth once daily.     LORazepam 0.5 MG tablet  Commonly known as: ATIVAN  Take 0.5 mg by mouth every 6 (six) hours as needed for Anxiety.     metoprolol succinate 25 MG 24 hr tablet  Commonly known as: TOPROL-XL  Take 25 mg by mouth.     nitroGLYCERIN 0.4 MG SL tablet  Commonly  known as: NITROSTAT  Place 0.4 mg under the tongue every 5 (five) minutes as needed for Chest pain.     ramipriL 5 MG capsule  Commonly known as: ALTACE  Take 5 mg by mouth every Tues, Thurs, Sat. Do not take if below <120        STOP taking these medications    cefTRIAXone 2 g/50 mL Pgbk IVPB  Commonly known as: ROCEPHIN     potassium chloride 10 MEQ Tbsr  Commonly known as: KLOR-CON     predniSONE 5 MG tablet  Commonly known as: DELTASONE            Indwelling Lines/Drains at time of discharge:   Lines/Drains/Airways     Peripherally Inserted Central Catheter Line            PICC Double Lumen 12/20/21 2010 right basilic 16 days                Time spent on the discharge of patient: over 30  minutes         Kody Corona MD  Department of Hospital Medicine  [facility]

## 2022-01-06 NOTE — PLAN OF CARE
01/06/22 1424   Final Note   Assessment Type Final Discharge Note   Anticipated Discharge Disposition Home-Health   Hospital Resources/Appts/Education Provided Appointments scheduled and added to AVS   Post-Acute Status   Post-Acute Authorization Home Health   Home Health Status Referrals Sent   Discharge Delays None known at this time   Orders received for home health.  TN spoke video chat with daughter, Shey who stated ok for N to choose provider.  TN faxed referral to N at [phone].

## 2022-01-06 NOTE — NURSING
Patient out of bed stated she's at her sisters house attempted to reorient difficult to re direct..

## 2022-01-06 NOTE — PLAN OF CARE
01/06/22 1421   Medicare Message   Important Message from Medicare regarding Discharge Appeal Rights Explained to patient/caregiver;Other (comments)   Date IMM was signed 01/06/22   Time IMM was signed 1220   TN spoke with patient and Shey via secure chat.  Ms Kat verbalized understanding of appeal rights.  Patient stated that she felt ready to go home.  Copy will be sent certified mail:  [phone].

## 2022-01-06 NOTE — PLAN OF CARE
[facility] ORDERS  FACE TO FACE ENCOUNTER    Patient Name: Katt Mcneal  YOB: 1940    PCP: Panchito Perez MD   PCP Address: [facility] [address] [place] [id number]  PCP Phone Number: [phone]  PCP Fax: [phone]    Encounter Date: 1/3/22    Admit to Home Health    Diagnoses:  Active Hospital Problems    Diagnosis  POA    *Acute necrotizing pancreatitis [K85.91]  Yes     Priority: 1 - High    Hypothyroid [E03.9]  Yes    Atrial fibrillation with RVR [I48.91]  Yes    Anemia of chronic disease [D63.8]  Yes    Chronic anticoagulation [Z79.01]  Not Applicable    Bilateral carotid artery stenosis [I65.23]  Yes    Dyslipidemia [E78.5]  Yes    Essential hypertension [I10]  Yes    Chronic heart failure with preserved ejection fraction [I50.32]  Yes    Coronary artery disease involving native coronary artery of native heart without angina pectoris [I25.10]  Yes      Resolved Hospital Problems   No resolved problems to display.       Follow Up Appointments:  No future appointments.    Allergies:  Review of patient's allergies indicates:   Allergen Reactions    Sulfa (sulfonamide antibiotics) Hives    Bactrim [sulfamethoxazole-trimethoprim] Other (See Comments)     Pt. Reports that it caused severe pain    Integrilin [eptifibatide]     Percocet [oxycodone-acetaminophen] Itching    Statins-hmg-coa reductase inhibitors Other (See Comments)     Muscle pain. Patient states some, not all statins    Xarelto [rivaroxaban]     Epinephrine Palpitations       Medications: Review discharge medications with patient and family and provide education.      I have seen and examined this patient within the last 30 days. My clinical findings that support the need for the home health skilled services and home bound status are the following:no   Weakness/numbness causing balance and gait disturbance due to Weakness/Debility making it taxing to leave home.      Diet:   cardiac diet        Referrals/ Consults  Physical Therapy to evaluate and treat. Evaluate for home safety and equipment needs; Establish/upgrade home exercise program. Perform / instruct on therapeutic exercises, gait training, transfer training, and Range of Motion.  Occupational Therapy to evaluate and treat. Evaluate home environment for safety and equipment needs. Perform/Instruct on transfers, ADL training, ROM, and therapeutic exercises.    Activities:   activity as tolerated    Nursing:   Agency to admit patient within 24 hours of hospital discharge unless specified on physician order or at patient request    SN to complete comprehensive assessment including routine vital signs. Instruct on disease process and s/s of complications to report to MD. Review/verify medication list sent home with the patient at time of discharge  and instruct patient/caregiver as needed. Frequency may be adjusted depending on start of care date.     Skilled nurse to perform up to 3 visits PRN for symptoms related to diagnosis    Notify MD if SBP > 160 or < 90; DBP > 90 or < 50; HR > 120 or < 50; Temp > 101; O2 < 88%; Other:       Ok to schedule additional visits based on staff availability and patient request on consecutive days within the home health episode.    Miscellaneous   Routine Skin for Bedridden Patients: Instruct patient/caregiver to apply moisture barrier cream to all skin folds and wet areas in perineal area daily and after baths and all bowel movements.    Home Health Aide:  Nursing Twice weekly, Physical Therapy Twice weekly and Occupational Therapy Twice weekly    Wound Care Orders  n/a    I certify that this patient is confined to her home and needs intermittent skilled nursing care, physical therapy and occupational therapy.

## 2022-01-07 ENCOUNTER — PATIENT OUTREACH (OUTPATIENT)
Dept: ADMINISTRATIVE | Facility: CLINIC | Age: 82
End: 2022-01-07
Payer: MEDICARE

## 2022-01-07 ENCOUNTER — HOSPITAL ENCOUNTER (INPATIENT)
Facility: HOSPITAL | Age: 82
LOS: 9 days | Discharge: HOSPICE | DRG: 480 | End: 2022-01-16
Attending: EMERGENCY MEDICINE | Admitting: HOSPITALIST
Payer: MEDICARE

## 2022-01-07 ENCOUNTER — TELEPHONE (OUTPATIENT)
Dept: ADMINISTRATIVE | Facility: CLINIC | Age: 82
End: 2022-01-07
Payer: MEDICARE

## 2022-01-07 ENCOUNTER — ANESTHESIA EVENT (OUTPATIENT)
Dept: SURGERY | Facility: HOSPITAL | Age: 82
DRG: 480 | End: 2022-01-07
Payer: MEDICARE

## 2022-01-07 DIAGNOSIS — R00.0 SINUS TACHYCARDIA: ICD-10-CM

## 2022-01-07 DIAGNOSIS — F03.90 DEMENTIA WITHOUT BEHAVIORAL DISTURBANCE, UNSPECIFIED DEMENTIA TYPE: ICD-10-CM

## 2022-01-07 DIAGNOSIS — I50.9 CONGESTIVE HEART FAILURE: ICD-10-CM

## 2022-01-07 DIAGNOSIS — K86.1 IDIOPATHIC CHRONIC PANCREATITIS: ICD-10-CM

## 2022-01-07 DIAGNOSIS — M25.551 RIGHT HIP PAIN: ICD-10-CM

## 2022-01-07 DIAGNOSIS — I48.11 LONGSTANDING PERSISTENT ATRIAL FIBRILLATION: ICD-10-CM

## 2022-01-07 DIAGNOSIS — S72.141A INTERTROCHANTERIC FRACTURE OF RIGHT HIP: Primary | ICD-10-CM

## 2022-01-07 DIAGNOSIS — R00.0 TACHYCARDIA: ICD-10-CM

## 2022-01-07 PROBLEM — K83.1 COMMON BILE DUCT OBSTRUCTION: Status: ACTIVE | Noted: 2022-01-07

## 2022-01-07 PROBLEM — E03.8 OTHER SPECIFIED HYPOTHYROIDISM: Status: RESOLVED | Noted: 2018-05-16 | Resolved: 2022-01-07

## 2022-01-07 PROBLEM — E87.6 HYPOKALEMIA: Status: ACTIVE | Noted: 2022-01-07
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
## 2022-01-07 PROBLEM — I48.91 A-FIB: Status: RESOLVED | Noted: 2018-05-22 | Resolved: 2022-01-07

## 2022-01-07 PROBLEM — I25.118 CORONARY ARTERY DISEASE OF NATIVE ARTERY OF NATIVE HEART WITH STABLE ANGINA PECTORIS: Status: RESOLVED | Noted: 2017-07-28 | Resolved: 2022-01-07

## 2022-01-07 PROBLEM — K86.89 PANCREATIC MASS: Status: ACTIVE | Noted: 2022-01-07

## 2022-01-07 LAB
ABO + RH BLD: NORMAL
ALBUMIN SERPL BCP-MCNC: 2.2 G/DL (ref 3.5–5.2)
ALP SERPL-CCNC: 67 U/L (ref 55–135)
ALT SERPL W/O P-5'-P-CCNC: 33 U/L (ref 10–44)
ANION GAP SERPL CALC-SCNC: 11 MMOL/L (ref 8–16)
AST SERPL-CCNC: 54 U/L (ref 10–40)
BACTERIA BLD CULT: NORMAL
BACTERIA BLD CULT: NORMAL
BASOPHILS # BLD AUTO: 0.03 K/UL (ref 0–0.2)
BASOPHILS NFR BLD: 0.3 % (ref 0–1.9)
BILIRUB SERPL-MCNC: 0.3 MG/DL (ref 0.1–1)
BILIRUB UR QL STRIP: NEGATIVE
BLD GP AB SCN CELLS X3 SERPL QL: NORMAL
BUN SERPL-MCNC: 7 MG/DL (ref 8–23)
CALCIUM SERPL-MCNC: 7.9 MG/DL (ref 8.7–10.5)
CHLORIDE SERPL-SCNC: 106 MMOL/L (ref 95–110)
CLARITY UR: CLEAR
CO2 SERPL-SCNC: 23 MMOL/L (ref 23–29)
COLOR UR: COLORLESS
CREAT SERPL-MCNC: 0.7 MG/DL (ref 0.5–1.4)
DIFFERENTIAL METHOD: ABNORMAL
EOSINOPHIL # BLD AUTO: 0 K/UL (ref 0–0.5)
EOSINOPHIL NFR BLD: 0.1 % (ref 0–8)
ERYTHROCYTE [DISTWIDTH] IN BLOOD BY AUTOMATED COUNT: 16.1 % (ref 11.5–14.5)
EST. GFR  (AFRICAN AMERICAN): >60 ML/MIN/1.73 M^2
EST. GFR  (NON AFRICAN AMERICAN): >60 ML/MIN/1.73 M^2
GLUCOSE SERPL-MCNC: 76 MG/DL (ref 70–110)
GLUCOSE UR QL STRIP: NEGATIVE
HCT VFR BLD AUTO: 24.4 % (ref 37–48.5)
HGB BLD-MCNC: 7.7 G/DL (ref 12–16)
HGB UR QL STRIP: ABNORMAL
IMM GRANULOCYTES # BLD AUTO: 0.09 K/UL (ref 0–0.04)
IMM GRANULOCYTES NFR BLD AUTO: 0.8 % (ref 0–0.5)
INR PPP: 1.7 (ref 0.8–1.2)
KETONES UR QL STRIP: ABNORMAL
LEUKOCYTE ESTERASE UR QL STRIP: NEGATIVE
LIPASE SERPL-CCNC: 498 U/L (ref 4–60)
LYMPHOCYTES # BLD AUTO: 1.3 K/UL (ref 1–4.8)
LYMPHOCYTES NFR BLD: 11.2 % (ref 18–48)
MCH RBC QN AUTO: 26.4 PG (ref 27–31)
MCHC RBC AUTO-ENTMCNC: 31.6 G/DL (ref 32–36)
MCV RBC AUTO: 84 FL (ref 82–98)
MONOCYTES # BLD AUTO: 0.5 K/UL (ref 0.3–1)
MONOCYTES NFR BLD: 4.3 % (ref 4–15)
NEUTROPHILS # BLD AUTO: 9.4 K/UL (ref 1.8–7.7)
NEUTROPHILS NFR BLD: 83.3 % (ref 38–73)
NITRITE UR QL STRIP: NEGATIVE
NRBC BLD-RTO: 0 /100 WBC
PH UR STRIP: 7 [PH] (ref 5–8)
PLATELET # BLD AUTO: 381 K/UL (ref 150–450)
PMV BLD AUTO: 10.3 FL (ref 9.2–12.9)
POTASSIUM SERPL-SCNC: 3.3 MMOL/L (ref 3.5–5.1)
PROT SERPL-MCNC: 6.8 G/DL (ref 6–8.4)
PROT UR QL STRIP: ABNORMAL
PROTHROMBIN TIME: 17.7 SEC (ref 9–12.5)
RBC # BLD AUTO: 2.92 M/UL (ref 4–5.4)
SODIUM SERPL-SCNC: 140 MMOL/L (ref 136–145)
SP GR UR STRIP: 1.01 (ref 1–1.03)
URN SPEC COLLECT METH UR: ABNORMAL
UROBILINOGEN UR STRIP-ACNC: NEGATIVE EU/DL
WBC # BLD AUTO: 11.27 K/UL (ref 3.9–12.7)

## 2022-01-07 PROCEDURE — 96361 HYDRATE IV INFUSION ADD-ON: CPT

## 2022-01-07 PROCEDURE — 82607 VITAMIN B-12: CPT | Performed by: INTERNAL MEDICINE

## 2022-01-07 PROCEDURE — 85379 FIBRIN DEGRADATION QUANT: CPT | Performed by: INTERNAL MEDICINE

## 2022-01-07 PROCEDURE — 27000207 HC ISOLATION

## 2022-01-07 PROCEDURE — 96375 TX/PRO/DX INJ NEW DRUG ADDON: CPT

## 2022-01-07 PROCEDURE — 84484 ASSAY OF TROPONIN QUANT: CPT | Performed by: INTERNAL MEDICINE

## 2022-01-07 PROCEDURE — 86850 RBC ANTIBODY SCREEN: CPT | Performed by: ANESTHESIOLOGY

## 2022-01-07 PROCEDURE — 82652 VIT D 1 25-DIHYDROXY: CPT | Performed by: INTERNAL MEDICINE

## 2022-01-07 PROCEDURE — 93005 ELECTROCARDIOGRAM TRACING: CPT

## 2022-01-07 PROCEDURE — 84145 PROCALCITONIN (PCT): CPT | Performed by: INTERNAL MEDICINE

## 2022-01-07 PROCEDURE — 82550 ASSAY OF CK (CPK): CPT | Performed by: INTERNAL MEDICINE

## 2022-01-07 PROCEDURE — 83605 ASSAY OF LACTIC ACID: CPT | Performed by: INTERNAL MEDICINE

## 2022-01-07 PROCEDURE — 80053 COMPREHEN METABOLIC PANEL: CPT | Performed by: EMERGENCY MEDICINE

## 2022-01-07 PROCEDURE — 86920 COMPATIBILITY TEST SPIN: CPT | Performed by: ORTHOPAEDIC SURGERY

## 2022-01-07 PROCEDURE — 93010 ELECTROCARDIOGRAM REPORT: CPT | Mod: ,,, | Performed by: INTERNAL MEDICINE

## 2022-01-07 PROCEDURE — 83735 ASSAY OF MAGNESIUM: CPT | Performed by: EMERGENCY MEDICINE

## 2022-01-07 PROCEDURE — 85610 PROTHROMBIN TIME: CPT | Performed by: EMERGENCY MEDICINE

## 2022-01-07 PROCEDURE — 12000002 HC ACUTE/MED SURGE SEMI-PRIVATE ROOM

## 2022-01-07 PROCEDURE — 83615 LACTATE (LD) (LDH) ENZYME: CPT | Performed by: INTERNAL MEDICINE

## 2022-01-07 PROCEDURE — 83690 ASSAY OF LIPASE: CPT | Performed by: EMERGENCY MEDICINE

## 2022-01-07 PROCEDURE — 86920 COMPATIBILITY TEST SPIN: CPT | Performed by: HOSPITALIST

## 2022-01-07 PROCEDURE — 96374 THER/PROPH/DIAG INJ IV PUSH: CPT

## 2022-01-07 PROCEDURE — 25000003 PHARM REV CODE 250: Performed by: EMERGENCY MEDICINE

## 2022-01-07 PROCEDURE — 85025 COMPLETE CBC W/AUTO DIFF WBC: CPT | Performed by: EMERGENCY MEDICINE

## 2022-01-07 PROCEDURE — 81003 URINALYSIS AUTO W/O SCOPE: CPT | Performed by: EMERGENCY MEDICINE

## 2022-01-07 PROCEDURE — 85652 RBC SED RATE AUTOMATED: CPT | Performed by: INTERNAL MEDICINE

## 2022-01-07 PROCEDURE — 93010 EKG 12-LEAD: ICD-10-PCS | Mod: ,,, | Performed by: INTERNAL MEDICINE

## 2022-01-07 PROCEDURE — 63600175 PHARM REV CODE 636 W HCPCS: Performed by: EMERGENCY MEDICINE

## 2022-01-07 PROCEDURE — 99285 EMERGENCY DEPT VISIT HI MDM: CPT | Mod: 25

## 2022-01-07 PROCEDURE — 82728 ASSAY OF FERRITIN: CPT | Performed by: INTERNAL MEDICINE

## 2022-01-07 PROCEDURE — 82746 ASSAY OF FOLIC ACID SERUM: CPT | Performed by: INTERNAL MEDICINE

## 2022-01-07 PROCEDURE — 84466 ASSAY OF TRANSFERRIN: CPT | Performed by: INTERNAL MEDICINE

## 2022-01-07 PROCEDURE — 83880 ASSAY OF NATRIURETIC PEPTIDE: CPT | Performed by: INTERNAL MEDICINE

## 2022-01-07 PROCEDURE — 83970 ASSAY OF PARATHORMONE: CPT | Performed by: INTERNAL MEDICINE

## 2022-01-07 RX ORDER — ONDANSETRON 2 MG/ML
4 INJECTION INTRAMUSCULAR; INTRAVENOUS EVERY 8 HOURS PRN
Status: DISCONTINUED | OUTPATIENT
Start: 2022-01-08 | End: 2022-01-17 | Stop reason: HOSPADM

## 2022-01-07 RX ORDER — BENZONATATE 100 MG/1
100 CAPSULE ORAL 3 TIMES DAILY PRN
Status: DISCONTINUED | OUTPATIENT
Start: 2022-01-08 | End: 2022-01-17 | Stop reason: HOSPADM

## 2022-01-07 RX ORDER — SODIUM CHLORIDE 0.9 % (FLUSH) 0.9 %
10 SYRINGE (ML) INJECTION
Status: DISCONTINUED | OUTPATIENT
Start: 2022-01-08 | End: 2022-01-13

## 2022-01-07 RX ORDER — ALBUTEROL SULFATE 90 UG/1
2 AEROSOL, METERED RESPIRATORY (INHALATION) 2 TIMES DAILY
Status: DISCONTINUED | OUTPATIENT
Start: 2022-01-08 | End: 2022-01-13

## 2022-01-07 RX ORDER — LISINOPRIL 2.5 MG/1
2.5 TABLET ORAL DAILY
Status: DISCONTINUED | OUTPATIENT
Start: 2022-01-08 | End: 2022-01-09

## 2022-01-07 RX ORDER — ONDANSETRON 2 MG/ML
4 INJECTION INTRAMUSCULAR; INTRAVENOUS
Status: COMPLETED | OUTPATIENT
Start: 2022-01-07 | End: 2022-01-07

## 2022-01-07 RX ORDER — AMLODIPINE BESYLATE 5 MG/1
5 TABLET ORAL DAILY
Status: DISCONTINUED | OUTPATIENT
Start: 2022-01-08 | End: 2022-01-09

## 2022-01-07 RX ORDER — METOPROLOL SUCCINATE 25 MG/1
25 TABLET, EXTENDED RELEASE ORAL DAILY
Status: DISCONTINUED | OUTPATIENT
Start: 2022-01-08 | End: 2022-01-17 | Stop reason: HOSPADM

## 2022-01-07 RX ORDER — IBUPROFEN 200 MG
24 TABLET ORAL
Status: DISCONTINUED | OUTPATIENT
Start: 2022-01-08 | End: 2022-01-17 | Stop reason: HOSPADM

## 2022-01-07 RX ORDER — AMIODARONE HYDROCHLORIDE 200 MG/1
200 TABLET ORAL DAILY
Status: DISCONTINUED | OUTPATIENT
Start: 2022-01-08 | End: 2022-01-17 | Stop reason: HOSPADM

## 2022-01-07 RX ORDER — HYDROMORPHONE HYDROCHLORIDE 2 MG/ML
0.25 INJECTION, SOLUTION INTRAMUSCULAR; INTRAVENOUS; SUBCUTANEOUS EVERY 4 HOURS PRN
Status: DISCONTINUED | OUTPATIENT
Start: 2022-01-07 | End: 2022-01-12

## 2022-01-07 RX ORDER — IBUPROFEN 200 MG
16 TABLET ORAL
Status: DISCONTINUED | OUTPATIENT
Start: 2022-01-08 | End: 2022-01-17 | Stop reason: HOSPADM

## 2022-01-07 RX ORDER — DIPHENOXYLATE HYDROCHLORIDE AND ATROPINE SULFATE 2.5; .025 MG/1; MG/1
1 TABLET ORAL 4 TIMES DAILY PRN
Status: DISCONTINUED | OUTPATIENT
Start: 2022-01-08 | End: 2022-01-17 | Stop reason: HOSPADM

## 2022-01-07 RX ORDER — DEXTROSE MONOHYDRATE AND SODIUM CHLORIDE 5; .9 G/100ML; G/100ML
INJECTION, SOLUTION INTRAVENOUS
Status: COMPLETED | OUTPATIENT
Start: 2022-01-07 | End: 2022-01-07

## 2022-01-07 RX ORDER — TALC
6 POWDER (GRAM) TOPICAL NIGHTLY PRN
Status: DISCONTINUED | OUTPATIENT
Start: 2022-01-08 | End: 2022-01-17 | Stop reason: HOSPADM

## 2022-01-07 RX ORDER — ASCORBIC ACID 500 MG
500 TABLET ORAL 2 TIMES DAILY
Status: DISCONTINUED | OUTPATIENT
Start: 2022-01-08 | End: 2022-01-13

## 2022-01-07 RX ORDER — HYDROMORPHONE HYDROCHLORIDE 2 MG/ML
0.5 INJECTION, SOLUTION INTRAMUSCULAR; INTRAVENOUS; SUBCUTANEOUS EVERY 4 HOURS PRN
Status: DISCONTINUED | OUTPATIENT
Start: 2022-01-07 | End: 2022-01-12

## 2022-01-07 RX ORDER — MORPHINE SULFATE 4 MG/ML
2 INJECTION, SOLUTION INTRAMUSCULAR; INTRAVENOUS
Status: COMPLETED | OUTPATIENT
Start: 2022-01-07 | End: 2022-01-07

## 2022-01-07 RX ORDER — POTASSIUM CHLORIDE 7.45 MG/ML
10 INJECTION INTRAVENOUS ONCE
Status: COMPLETED | OUTPATIENT
Start: 2022-01-08 | End: 2022-01-08

## 2022-01-07 RX ORDER — LEVOTHYROXINE SODIUM 50 UG/1
50 TABLET ORAL
Status: DISCONTINUED | OUTPATIENT
Start: 2022-01-08 | End: 2022-01-13

## 2022-01-07 RX ORDER — NITROGLYCERIN 0.4 MG/1
0.4 TABLET SUBLINGUAL EVERY 5 MIN PRN
Status: DISCONTINUED | OUTPATIENT
Start: 2022-01-08 | End: 2022-01-17 | Stop reason: HOSPADM

## 2022-01-07 RX ORDER — ACETAMINOPHEN 325 MG/1
650 TABLET ORAL EVERY 4 HOURS PRN
Status: DISCONTINUED | OUTPATIENT
Start: 2022-01-08 | End: 2022-01-17 | Stop reason: HOSPADM

## 2022-01-07 RX ORDER — GLUCAGON 1 MG
1 KIT INJECTION
Status: DISCONTINUED | OUTPATIENT
Start: 2022-01-08 | End: 2022-01-17 | Stop reason: HOSPADM

## 2022-01-07 RX ORDER — CHOLECALCIFEROL (VITAMIN D3) 25 MCG
2000 TABLET ORAL DAILY
Status: DISCONTINUED | OUTPATIENT
Start: 2022-01-08 | End: 2022-01-13

## 2022-01-07 RX ORDER — ALBUTEROL SULFATE 90 UG/1
2 AEROSOL, METERED RESPIRATORY (INHALATION) EVERY 6 HOURS
Status: DISCONTINUED | OUTPATIENT
Start: 2022-01-08 | End: 2022-01-07

## 2022-01-07 RX ORDER — OXYCODONE AND ACETAMINOPHEN 5; 325 MG/1; MG/1
1 TABLET ORAL EVERY 4 HOURS PRN
Status: ON HOLD | COMMUNITY
End: 2022-01-16 | Stop reason: HOSPADM

## 2022-01-07 RX ORDER — METOPROLOL TARTRATE 1 MG/ML
5 INJECTION, SOLUTION INTRAVENOUS EVERY 5 MIN PRN
Status: DISCONTINUED | OUTPATIENT
Start: 2022-01-07 | End: 2022-01-17 | Stop reason: HOSPADM

## 2022-01-07 RX ORDER — AMOXICILLIN 250 MG
1 CAPSULE ORAL 2 TIMES DAILY PRN
Status: DISCONTINUED | OUTPATIENT
Start: 2022-01-08 | End: 2022-01-17 | Stop reason: HOSPADM

## 2022-01-07 RX ADMIN — SODIUM CHLORIDE 500 ML: 0.9 INJECTION, SOLUTION INTRAVENOUS at 04:01

## 2022-01-07 RX ADMIN — METOPROLOL SUCCINATE 25 MG: 25 TABLET, EXTENDED RELEASE ORAL at 06:01

## 2022-01-07 RX ADMIN — DEXTROSE AND SODIUM CHLORIDE: 5; .9 INJECTION, SOLUTION INTRAVENOUS at 06:01

## 2022-01-07 RX ADMIN — BACITRACIN ZINC, NEOMYCIN, POLYMYXIN B 1 EACH: 400; 3.5; 5 OINTMENT TOPICAL at 04:01

## 2022-01-07 RX ADMIN — ONDANSETRON 4 MG: 2 INJECTION INTRAMUSCULAR; INTRAVENOUS at 06:01

## 2022-01-07 RX ADMIN — MORPHINE SULFATE 2 MG: 4 INJECTION, SOLUTION INTRAMUSCULAR; INTRAVENOUS at 06:01

## 2022-01-07 NOTE — ANESTHESIA PREPROCEDURE EVALUATION
01/07/2022  Katt Mcneal is a 81 y.o., female     Anesthesia Evaluation    I have reviewed the Patient Summary Reports.    I have reviewed the Nursing Notes.    I have reviewed the Medications.     Review of Systems  Anesthesia Hx:  No problems with previous Anesthesia Denies Hx of Anesthetic complications  Neg history of prior surgery. Denies Family Hx of Anesthesia complications.   Denies Personal Hx of Anesthesia complications.   Social:  Non-Smoker, No Alcohol Use    Hematology/Oncology:  Hematology Normal   Oncology Normal     EENT/Dental:EENT/Dental Normal   Cardiovascular:   Exercise tolerance: good Hypertension Past MI CAD  CABG/stent (3 years ago stent proc) Dysrhythmias atrial fibrillation On coumadin INR - 2.3    TTE 12/2021:  · The left ventricle is normal in size with concentric remodeling and normal systolic function.  · Moderate left atrial enlargement.  · The estimated ejection fraction is 60%.  · Indeterminate left ventricular diastolic function.  · Normal right ventricular size with normal right ventricular systolic function.  · Mild mitral regurgitation.  · Mild tricuspid regurgitation.  · Normal central venous pressure (3 mmHg).  · The estimated PA systolic pressure is 28 mmHg.  · There is no pulmonary hypertension.      Pulmonary:  Pulmonary Normal    Renal/:  Renal/ Normal     Hepatic/GI:  Hepatic/GI Normal    Musculoskeletal:  Musculoskeletal Normal    Neurological:  Neurology Normal    Endocrine:   Hypothyroidism    Dermatological:  Skin Normal    Psych:  Psychiatric Normal             Lab Results   Component Value Date    WBC 16.53 (H) 01/05/2022    HGB 7.4 (L) 01/05/2022    HCT 24.6 (L) 01/05/2022    MCV 87 01/05/2022     01/05/2022         Chemistry        Component Value Date/Time     01/05/2022 0333    K 3.8 01/05/2022 0333     01/05/2022 0333    CO2 27  01/05/2022 0333    BUN 12 01/05/2022 0333    CREATININE 0.7 01/05/2022 0333    GLU 50 (L) 01/05/2022 0333        Component Value Date/Time    CALCIUM 7.9 (L) 01/05/2022 0333    ALKPHOS 68 01/05/2022 0333    AST 56 (H) 01/05/2022 0333    ALT 35 01/05/2022 0333    BILITOT 0.5 01/05/2022 0333    ESTGFRAFRICA >60 01/05/2022 0333    EGFRNONAA >60 01/05/2022 0333            Anesthesia Plan  Type of Anesthesia, risks & benefits discussed:  Anesthesia Type:  general    Patient's Preference:   Plan Factors:          Intra-op Monitoring Plan: standard ASA monitors  Intra-op Monitoring Plan Comments:   Post Op Pain Control Plan: multimodal analgesia and IV/PO Opioids PRN  Post Op Pain Control Plan Comments:     Induction:   IV  Beta Blocker:  Patient is not currently on a Beta-Blocker (No further documentation required).       Informed Consent: Patient understands risks and agrees with Anesthesia plan.  Questions answered. Anesthesia consent signed with patient.  ASA Score: 3     Day of Surgery Review of History & Physical:        Anesthesia Plan Notes: COVID + 1/3/2022 will need consent signed in the OR  Elevated inr discussed with dr bateman..wants to proceed with case and will write a noted confirming..  Recommended 1prbc and 1 ffp..he agrees and will order        Ready For Surgery From Anesthesia Perspective.

## 2022-01-07 NOTE — ED TRIAGE NOTES
Pt arrived via EMS due to fall at home. Pt tripped and fell in bathroom at home, daughter at bedside reports patient was found on the floor unable to move due to R hip pain 10/10. Pt not sure if she hit her head, but denies LOC, headache, lightheadedness, dizziness, vision changes, chest pain/abd pain. Pt has hx of Dementia. Alert and oriented x 3. Pt on warfarin, hx of Afib. No obvious signs of bleeding or injuries. Hx of pancreatitis, denies abd pain at this time. Pt denies taking any daily medications this morning, only took oxycodone at 1pm after fall. Recent COVID positive diagnosis as of 1/3/22. Vital signs stable, no acute distress noted.

## 2022-01-07 NOTE — PROGRESS NOTES
C3 nurse attempted to contact Katt Mcneal for a TCC post hospital discharge follow up call. The patient is unable to conduct the call @ this time. The patient requested a callback.

## 2022-01-07 NOTE — ED PROVIDER NOTES
Encounter Date: 1/7/2022     SCRIBE #1 NOTE: I, Del Rogers, am scribing for, and in the presence of,  Kehinde Pulliam MD. I have scribed the following portions of the note - Other sections scribed: HPI, ROS.       History     Chief Complaint   Patient presents with    Fall     EMS  called to 80yo female that tripped and fell and now c/o right hip pain. Ems reports lateral rotation and slight shortening to right leg. Patient stated she hit her head but no injuries were found. No LOC but patient is on warfarin.      Katt Mcneal is a 81 y.o. female with a PMHx of CAD, HTN and MI presenting to the ED for right upper leg pain secondary to a trip and fall that occurred today. Per triage nurse, the patient was unable to move after the fall and did not remember if she hit her head. Patient currently compliant with Coumadin. No modifying or exacerbating factors reported. Patient's Tetanus immunization is not up-to-date. Patient denies cough, shortness of breath, chest pain, fever, chills, abdominal pain, nausea, vomiting, diarrhea, dysuria, headaches, congestion, sore throat, arm trouble, eye pain, ear pain, rash, or other associated symptoms.      The history is provided by the patient and a caregiver.     Review of patient's allergies indicates:   Allergen Reactions    Sulfa (sulfonamide antibiotics) Hives    Bactrim [sulfamethoxazole-trimethoprim] Other (See Comments)     Pt. Reports that it caused severe pain    Integrilin [eptifibatide]     Percocet [oxycodone-acetaminophen] Itching    Statins-hmg-coa reductase inhibitors Other (See Comments)     Muscle pain. Patient states some, not all statins    Xarelto [rivaroxaban]     Epinephrine Palpitations     Past Medical History:   Diagnosis Date    Arthritis     Atrial fibrillation     Bilateral carotid artery stenosis 7/13/2017    Coronary artery disease     Fibromyalgia     Hyperlipidemia     Hypertension     Myocardial infarction 03/21/2015     Pancreatitis     Thyroid disease      Past Surgical History:   Procedure Laterality Date    CHOLECYSTECTOMY      CORONARY STENT PLACEMENT  3/24/15    HYSTERECTOMY  10/28/2004    INTRAMEDULLARY RODDING OF FEMUR Right 2022    Procedure: INSERTION, INTRAMEDULLARY TASHI, FEMUR;  Surgeon: Crow Ortega MD;  Location: [facility];  Service: Orthopedics;  Laterality: Right;     History reviewed. No pertinent family history.  Social History     Tobacco Use    Smoking status: Former Smoker     Quit date: 1964     Years since quittin.5    Smokeless tobacco: Former User   Substance Use Topics    Alcohol use: No     Alcohol/week: 0.0 standard drinks    Drug use: No     Review of Systems   Constitutional: Negative for chills, diaphoresis and fever.   HENT: Negative for ear pain and sore throat.    Eyes: Negative for pain.   Respiratory: Negative for cough and shortness of breath.    Cardiovascular: Negative for chest pain.   Gastrointestinal: Negative for abdominal pain, diarrhea, nausea and vomiting.   Genitourinary: Negative for dysuria.   Musculoskeletal: Positive for arthralgias. Negative for back pain.        (-) Arm or leg trouble.    Skin: Negative for rash.   Neurological: Negative for headaches.   Psychiatric/Behavioral: Negative for confusion.       Physical Exam     Initial Vitals [22 1545]   BP Pulse Resp Temp SpO2   (!) 141/79 (!) 115 16 98.6 °F (37 °C) 96 %      MAP       --         Physical Exam  The patient was examined specifically for the following:   General:No significant distress, Good color, Warm and dry. Head and neck:Scalp atraumatic, Neck supple. Neurological:Appropriate conversation, Gross motor deficits. Eyes:Conjugate gaze, Clear corneas. ENT: No epistaxis. Cardiac: Regular rate and rhythm, Grossly normal heart tones. Pulmonary: Wheezing, Rales. Gastrointestinal: Abdominal tenderness, Abdominal distention. Musculoskeletal: Extremity deformity, Apparent pain with range of  motion of the joints. Skin: Rash.   The findings on examination were normal except for the following:  Patient's heart rate is 115.  There is some pain in the right inguinal region with range of motion of the right hip.  The right inguinal region is tender.  There is no shortening or external rotation.  There is minimal midline lumbar tenderness.  The pelvis is stable.  Lower extremity neurologic examination is normal the ankle in the are nontender on the right.  Long bones appear to be stable.  Scalp is atraumatic.  The spine is nontender along its entire course.  The patient has a for this 3 cm skin tear on the right elbow.   ED Course   Procedures  Labs Reviewed   URINALYSIS, REFLEX TO URINE CULTURE - Abnormal; Notable for the following components:       Result Value    Color, UA Colorless (*)     Protein, UA Trace (*)     Ketones, UA 1+ (*)     Occult Blood UA Trace (*)     All other components within normal limits    Narrative:     Specimen Source->Urine   COMPREHENSIVE METABOLIC PANEL - Abnormal; Notable for the following components:    Potassium 3.3 (*)     BUN 7 (*)     Calcium 7.9 (*)     Albumin 2.2 (*)     AST 54 (*)     All other components within normal limits   CBC W/ AUTO DIFFERENTIAL - Abnormal; Notable for the following components:    RBC 2.92 (*)     Hemoglobin 7.7 (*)     Hematocrit 24.4 (*)     MCH 26.4 (*)     MCHC 31.6 (*)     RDW 16.1 (*)     Immature Granulocytes 0.8 (*)     Gran # (ANC) 9.4 (*)     Immature Grans (Abs) 0.09 (*)     Gran % 83.3 (*)     Lymph % 11.2 (*)     All other components within normal limits   LIPASE - Abnormal; Notable for the following components:    Lipase 498 (*)     All other components within normal limits   PROTIME-INR - Abnormal; Notable for the following components:    Prothrombin Time 17.7 (*)     INR 1.7 (*)     All other components within normal limits   LACTATE DEHYDROGENASE - Abnormal; Notable for the following components:     (*)     All other  components within normal limits   B-TYPE NATRIURETIC PEPTIDE - Abnormal; Notable for the following components:     (*)     All other components within normal limits   MAGNESIUM   CK   LACTIC ACID, PLASMA   TYPE & SCREEN     EKG Readings: (Independently Interpreted)   This patient is in atrial fibrillation with a heart rate of 117.  There are no significant ST segment or T-wave changes.  There is no definite evidence of acute myocardial infarction or malignant arrhythmia.     ECG Results          EKG 12-lead (Final result)  Result time 01/10/22 23:28:57    Final result by Interface, Lab [facility] (01/10/22 23:28:57)                 Narrative:    Test Reason : R00.0,    Vent. Rate : 113 BPM     Atrial Rate : 120 BPM     P-R Int : 000 ms          QRS Dur : 114 ms      QT Int : 384 ms       P-R-T Axes : 000 -42 090 degrees     QTc Int : 526 ms    Atrial fibrillation with rapid ventricular response  Left axis deviation  Moderate voltage criteria for LVH, may be normal variant  Nonspecific ST and T wave abnormality  Prolonged QT  Abnormal ECG  When compared with ECG of 03-JAN-2022 13:43,  No significant change was found  Confirmed by Tanesha GAONA, Walker JACKSON ([id number]) on 1/10/2022 11:28:49 PM    Referred By: [email or web address]   SELF           Confirmed By:Walker Almendarez MD                             EKG 12-LEAD (Final result)  Result time 01/16/22 11:49:46    Final result by Unknown User (01/16/22 11:49:46)                                 EKG 12-LEAD (Final result)  Result time 01/18/22 12:04:25    Final result by Unknown User (01/18/22 12:04:25)                                Imaging Results          X-Ray Chest AP Portable (Final result)  Result time 01/07/22 23:55:15    Final result by Castillo Jimenez MD (01/07/22 23:55:15)                 Impression:      Interval removal right-sided PICC line.  Otherwise, stable examination.      Electronically signed by: Castillo Jimenez MD  Date:    01/07/2022  Time:    23:55             Narrative:     EXAMINATION:  XR CHEST AP PORTABLE    CLINICAL HISTORY:  SHORTNESS OF BREATH;    TECHNIQUE:  Single frontal view of the chest was performed.    COMPARISON:  01/03/2022.    FINDINGS:  The right-sided central PICC line has been removed.  Monitoring EKG leads are present.  There is a loop recorder present.  There are coronary artery stents.    The trachea is unremarkable.  There is stable enlargement of the cardiomediastinal silhouette.  There is no evidence of free air beneath the hemidiaphragms.  There are no pleural effusions.  There is no evidence of a pneumothorax.  There is no evidence of pneumomediastinum.  The pulmonary vascularity is unchanged.  There are chronic interstitial findings.  There is no new airspace disease.  There are degenerative changes in the osseous structures.    There are postop changes in the right upper abdominal quadrant.  The remainder of the visualized upper abdominal structures are within normal limits.                               CT Hip Without Contrast Right (Final result)  Result time 01/07/22 20:24:53    Final result by Kimi Tolentino MD (01/07/22 20:24:53)                 Impression:      Nondisplaced fractures involving the right inter trochanteric region and right lesser trochanter.    No acute lumbar fracture.  Grade 1 anterolisthesis of L4 on L5.  Degenerative changes.      Electronically signed by: Kimi Tolentino  Date:    01/07/2022  Time:    20:24             Narrative:    EXAMINATION:  CT HIP RIGHT WITHOUT CONTRAST AND CT LUMBAR SPINE    CLINICAL HISTORY:  Back trauma, no prior imaging (Age >= 16y);; Fracture, hip;    TECHNIQUE:  1.25 mm axial images were obtained through the lumbar spine.  Coronal and sagittal reformatted images were provided.    COMPARISON:  CT abdomen pelvis 12/20/2021    FINDINGS:  CT right hip:    There is a curvilinear fracture through the right intertrochanteric region.  A nondisplaced fracture of the lesser trochanter is present.   Moderate degenerative changes are seen at the right hip joint with sclerosing, subchondral cysts, in narrowing of right hip joint.  There is a remote fracture involving the superior pubic ramus.  There is no hip dislocation.    CT lumbar spine:    Mild dextroscoliosis is present.  No acute fractures are detected.  There is grade 1 anterolisthesis of L5 on S1, which is not new.  There is significant narrowing of the posterior columns of L2 and L3 secondary to degenerative change.  There is small marginal osteophytes, endplate sclerosis, and severe intervertebral disc space narrowing from L3 through L5.  There is vacuum phenomena is seen at L2/L3, L5/S1, and the SI joints.  The bones are diffusely osteopenic.  Advanced vascular calcifications are present.  There is a 4.1 x 4.4 cm left renal cyst.  The gallbladder surgically absent.  Compressive atelectatic changes are seen at the visualized lung bases.                               CT Lumbar Spine Without Contrast (Final result)  Result time 01/07/22 20:24:53    Final result by Kimi Tolentino MD (01/07/22 20:24:53)                 Impression:      Nondisplaced fractures involving the right inter trochanteric region and right lesser trochanter.    No acute lumbar fracture.  Grade 1 anterolisthesis of L4 on L5.  Degenerative changes.      Electronically signed by: Kimi Tolentino  Date:    01/07/2022  Time:    20:24             Narrative:    EXAMINATION:  CT HIP RIGHT WITHOUT CONTRAST AND CT LUMBAR SPINE    CLINICAL HISTORY:  Back trauma, no prior imaging (Age >= 16y);; Fracture, hip;    TECHNIQUE:  1.25 mm axial images were obtained through the lumbar spine.  Coronal and sagittal reformatted images were provided.    COMPARISON:  CT abdomen pelvis 12/20/2021    FINDINGS:  CT right hip:    There is a curvilinear fracture through the right intertrochanteric region.  A nondisplaced fracture of the lesser trochanter is present.  Moderate degenerative changes are seen  at the right hip joint with sclerosing, subchondral cysts, in narrowing of right hip joint.  There is a remote fracture involving the superior pubic ramus.  There is no hip dislocation.    CT lumbar spine:    Mild dextroscoliosis is present.  No acute fractures are detected.  There is grade 1 anterolisthesis of L5 on S1, which is not new.  There is significant narrowing of the posterior columns of L2 and L3 secondary to degenerative change.  There is small marginal osteophytes, endplate sclerosis, and severe intervertebral disc space narrowing from L3 through L5.  There is vacuum phenomena is seen at L2/L3, L5/S1, and the SI joints.  The bones are diffusely osteopenic.  Advanced vascular calcifications are present.  There is a 4.1 x 4.4 cm left renal cyst.  The gallbladder surgically absent.  Compressive atelectatic changes are seen at the visualized lung bases.                               X-Ray Hip 2 or 3 views Right (with Pelvis when performed) (Final result)  Result time 01/07/22 17:36:41    Final result by Kimi Tolentino MD (01/07/22 17:36:41)                 Impression:      Findings are concerning for nondisplaced fracture of the right intertrochanteric region of the hip.  Mild degenerative changes of bilateral hip joints.      Electronically signed by: Kimi Tolentino  Date:    01/07/2022  Time:    17:36             Narrative:    EXAMINATION:  TWO VIEWS OF THE RIGHT FEMUR and XR hip with pelvis with two views of the right hip    CLINICAL HISTORY:  Pain in right hip    TECHNIQUE:  AP and lateral view of the right femur.  AP pelvis and two views of the right hip.    COMPARISON:  None.    FINDINGS:  Two views of the right femur demonstrate a curvilinear lucency in the right intertrochanteric region.  Advanced vascular calcifications are present.  Degenerative changes are seen at the right knee.    AP pelvis and two views of the right hip a nondisplaced fracture of the right intertrochanteric region.  Mild  degenerative changes are seen at the hip joints.  The bones are diffusely osteopenic.  There is multilevel degenerative change of the visualized lumbar spine.  If pain is out of proportion to radiological findings, consider additional imaging.                               X-Ray Femur Ap/Lat Right (Final result)  Result time 01/07/22 17:36:41    Final result by Kimi Tolentino MD (01/07/22 17:36:41)                 Impression:      Findings are concerning for nondisplaced fracture of the right intertrochanteric region of the hip.  Mild degenerative changes of bilateral hip joints.      Electronically signed by: Kimi Tolentino  Date:    01/07/2022  Time:    17:36             Narrative:    EXAMINATION:  TWO VIEWS OF THE RIGHT FEMUR and XR hip with pelvis with two views of the right hip    CLINICAL HISTORY:  Pain in right hip    TECHNIQUE:  AP and lateral view of the right femur.  AP pelvis and two views of the right hip.    COMPARISON:  None.    FINDINGS:  Two views of the right femur demonstrate a curvilinear lucency in the right intertrochanteric region.  Advanced vascular calcifications are present.  Degenerative changes are seen at the right knee.    AP pelvis and two views of the right hip a nondisplaced fracture of the right intertrochanteric region.  Mild degenerative changes are seen at the hip joints.  The bones are diffusely osteopenic.  There is multilevel degenerative change of the visualized lumbar spine.  If pain is out of proportion to radiological findings, consider additional imaging.                              Medical decision making:  Given the above this patient presents to the emergency with a fall.  She has pain in the right hip.  She has nondisplaced intertrochanteric fracture of the right hip.  The patient had some back pain last night.  She does not have back pain today.  Long bones are stable.  Orthopedics was consulted.   will see the patient.   agrees to admit.   Patient is a slightly elevated heart rate.  The patient did not take her medicine this morning.  I will admit the patient to a telemetry service.        Medications   remdesivir 100 mg in sodium chloride 0.9% 250 mL infusion (0 mg Intravenous Stopped 1/11/22 1008)   mupirocin 2 % ointment ( Nasal Given 1/13/22 1027)   sodium chloride 0.9% bolus 500 mL (0 mLs Intravenous Stopped 1/7/22 1755)   neomycin-bacitracnZn-polymyxnB packet (1 each Topical (Top) Given 1/7/22 1649)   morphine injection 2 mg (2 mg Intravenous Given 1/7/22 1829)   ondansetron injection 4 mg (4 mg Intravenous Given 1/7/22 1832)   dextrose 5 % and 0.9 % NaCl infusion ( Intravenous New Bag 1/7/22 1839)   potassium chloride 10 mEq in 100 mL IVPB (10 mEq Intravenous New Bag 1/8/22 0233)   remdesivir 200 mg in sodium chloride 0.9% 250 mL infusion (0 mg Intravenous Stopped 1/8/22 0228)   cefazolin (ANCEF) 2 gram in dextrose 5% 50 mL IVPB (premix) (0 g Intravenous Stopped 1/9/22 1028)   tuberculin injection 5 Units (5 Units Intradermal Given 1/10/22 1125)   LORazepam tablet 0.5 mg (0.5 mg Oral Given 1/13/22 0141)   furosemide injection 80 mg (80 mg Intravenous Given 1/13/22 1017)              Scribe Attestation:   Scribe #1: I performed the above scribed service and the documentation accurately describes the services I performed. I attest to the accuracy of the note.                 Clinical Impression:   Final diagnoses:  [R00.0] Sinus tachycardia  [M25.551] Right hip pain  [S72.141A] Intertrochanteric fracture of right hip (Primary)  [I48.11] Longstanding persistent atrial fibrillation  [K86.1] Idiopathic chronic pancreatitis  [F03.90] Dementia without behavioral disturbance, unspecified dementia type          ED Disposition Condition    Admit           I personally performed the services described in this documentation.  All medical record  entries made by the scribe are at my direction and in my presence.  Signed, Dr. Heraclio Pulliam,  MD  01/07/22 1915       Kehinde Pulliam MD  01/07/22 2017       Kehinde Pulliam MD  01/30/22 1947

## 2022-01-08 ENCOUNTER — ANESTHESIA (OUTPATIENT)
Dept: SURGERY | Facility: HOSPITAL | Age: 82
DRG: 480 | End: 2022-01-08
Payer: MEDICARE

## 2022-01-08 PROBLEM — A41.89 SEPSIS DUE TO COVID-19: Status: ACTIVE | Noted: 2022-01-08

## 2022-01-08 PROBLEM — U07.1 SEPSIS DUE TO COVID-19: Status: ACTIVE | Noted: 2022-01-08

## 2022-01-08 PROBLEM — Z01.810 PREOP CARDIOVASCULAR EXAM: Status: ACTIVE | Noted: 2022-01-08

## 2022-01-08 PROBLEM — J96.01 ACUTE RESPIRATORY FAILURE WITH HYPOXIA: Status: ACTIVE | Noted: 2022-01-08

## 2022-01-08 LAB
ANION GAP SERPL CALC-SCNC: 14 MMOL/L (ref 8–16)
BASOPHILS # BLD AUTO: 0.02 K/UL (ref 0–0.2)
BASOPHILS NFR BLD: 0.2 % (ref 0–1.9)
BLD PROD TYP BPU: NORMAL
BLD PROD TYP BPU: NORMAL
BLOOD UNIT EXPIRATION DATE: NORMAL
BLOOD UNIT EXPIRATION DATE: NORMAL
BLOOD UNIT TYPE CODE: 5100
BLOOD UNIT TYPE CODE: 5100
BLOOD UNIT TYPE: NORMAL
BLOOD UNIT TYPE: NORMAL
BNP SERPL-MCNC: 405 PG/ML (ref 0–99)
BUN SERPL-MCNC: 17 MG/DL (ref 8–23)
CALCIUM SERPL-MCNC: 8 MG/DL (ref 8.7–10.5)
CHLORIDE SERPL-SCNC: 105 MMOL/L (ref 95–110)
CK SERPL-CCNC: 57 U/L (ref 20–180)
CO2 SERPL-SCNC: 21 MMOL/L (ref 23–29)
CODING SYSTEM: NORMAL
CODING SYSTEM: NORMAL
CREAT SERPL-MCNC: 0.8 MG/DL (ref 0.5–1.4)
D DIMER PPP IA.FEU-MCNC: 10.12 MG/L FEU
DIFFERENTIAL METHOD: ABNORMAL
DISPENSE STATUS: NORMAL
DISPENSE STATUS: NORMAL
EOSINOPHIL # BLD AUTO: 0 K/UL (ref 0–0.5)
EOSINOPHIL NFR BLD: 0 % (ref 0–8)
ERYTHROCYTE [DISTWIDTH] IN BLOOD BY AUTOMATED COUNT: 15.9 % (ref 11.5–14.5)
ERYTHROCYTE [SEDIMENTATION RATE] IN BLOOD BY WESTERGREN METHOD: 90 MM/HR (ref 0–20)
EST. GFR  (AFRICAN AMERICAN): >60 ML/MIN/1.73 M^2
EST. GFR  (NON AFRICAN AMERICAN): >60 ML/MIN/1.73 M^2
FERRITIN SERPL-MCNC: 443 NG/ML (ref 20–300)
FOLATE SERPL-MCNC: 13.4 NG/ML (ref 4–24)
GLUCOSE SERPL-MCNC: 167 MG/DL (ref 70–110)
HCT VFR BLD AUTO: 23.5 % (ref 37–48.5)
HCT VFR BLD AUTO: 24.4 % (ref 37–48.5)
HGB BLD-MCNC: 7.4 G/DL (ref 12–16)
HGB BLD-MCNC: 7.6 G/DL (ref 12–16)
IMM GRANULOCYTES # BLD AUTO: 0.12 K/UL (ref 0–0.04)
IMM GRANULOCYTES NFR BLD AUTO: 0.9 % (ref 0–0.5)
INR PPP: 1.7 (ref 0.8–1.2)
IRON SERPL-MCNC: 12 UG/DL (ref 30–160)
LACTATE SERPL-SCNC: 0.6 MMOL/L (ref 0.5–2.2)
LDH SERPL L TO P-CCNC: 328 U/L (ref 110–260)
LYMPHOCYTES # BLD AUTO: 0.4 K/UL (ref 1–4.8)
LYMPHOCYTES NFR BLD: 3.1 % (ref 18–48)
MAGNESIUM SERPL-MCNC: 1.9 MG/DL (ref 1.6–2.6)
MCH RBC QN AUTO: 25.9 PG (ref 27–31)
MCHC RBC AUTO-ENTMCNC: 31.1 G/DL (ref 32–36)
MCV RBC AUTO: 83 FL (ref 82–98)
MONOCYTES # BLD AUTO: 0.2 K/UL (ref 0.3–1)
MONOCYTES NFR BLD: 1.2 % (ref 4–15)
NEUTROPHILS # BLD AUTO: 12.5 K/UL (ref 1.8–7.7)
NEUTROPHILS NFR BLD: 94.6 % (ref 38–73)
NRBC BLD-RTO: 0 /100 WBC
NUM UNITS TRANS FFP: NORMAL
NUM UNITS TRANS PACKED RBC: NORMAL
PLATELET # BLD AUTO: 386 K/UL (ref 150–450)
PMV BLD AUTO: 10.5 FL (ref 9.2–12.9)
POTASSIUM SERPL-SCNC: 3.7 MMOL/L (ref 3.5–5.1)
PROCALCITONIN SERPL IA-MCNC: 0.18 NG/ML
PROTHROMBIN TIME: 18.1 SEC (ref 9–12.5)
PTH-INTACT SERPL-MCNC: 85 PG/ML (ref 9–77)
RBC # BLD AUTO: 2.93 M/UL (ref 4–5.4)
SATURATED IRON: 5 % (ref 20–50)
SODIUM SERPL-SCNC: 140 MMOL/L (ref 136–145)
TOTAL IRON BINDING CAPACITY: 247 UG/DL (ref 250–450)
TRANSFERRIN SERPL-MCNC: 167 MG/DL (ref 200–375)
TROPONIN I SERPL DL<=0.01 NG/ML-MCNC: 0.05 NG/ML (ref 0–0.03)
VIT B12 SERPL-MCNC: 464 PG/ML (ref 210–950)
WBC # BLD AUTO: 13.19 K/UL (ref 3.9–12.7)

## 2022-01-08 PROCEDURE — 25000242 PHARM REV CODE 250 ALT 637 W/ HCPCS: Performed by: INTERNAL MEDICINE

## 2022-01-08 PROCEDURE — 36415 COLL VENOUS BLD VENIPUNCTURE: CPT | Performed by: ORTHOPAEDIC SURGERY

## 2022-01-08 PROCEDURE — 37000009 HC ANESTHESIA EA ADD 15 MINS: Performed by: ORTHOPAEDIC SURGERY

## 2022-01-08 PROCEDURE — 27201423 OPTIME MED/SURG SUP & DEVICES STERILE SUPPLY: Performed by: ORTHOPAEDIC SURGERY

## 2022-01-08 PROCEDURE — P9016 RBC LEUKOCYTES REDUCED: HCPCS | Performed by: ORTHOPAEDIC SURGERY

## 2022-01-08 PROCEDURE — 27000207 HC ISOLATION

## 2022-01-08 PROCEDURE — P9017 PLASMA 1 DONOR FRZ W/IN 8 HR: HCPCS | Performed by: ORTHOPAEDIC SURGERY

## 2022-01-08 PROCEDURE — 63600175 PHARM REV CODE 636 W HCPCS: Performed by: ANESTHESIOLOGY

## 2022-01-08 PROCEDURE — D9220A PRA ANESTHESIA: ICD-10-PCS | Mod: CRNA,,, | Performed by: REGISTERED NURSE

## 2022-01-08 PROCEDURE — 71000039 HC RECOVERY, EACH ADD'L HOUR: Performed by: ORTHOPAEDIC SURGERY

## 2022-01-08 PROCEDURE — 94640 AIRWAY INHALATION TREATMENT: CPT

## 2022-01-08 PROCEDURE — 63600175 PHARM REV CODE 636 W HCPCS: Performed by: HOSPITALIST

## 2022-01-08 PROCEDURE — 25000242 PHARM REV CODE 250 ALT 637 W/ HCPCS: Performed by: HOSPITALIST

## 2022-01-08 PROCEDURE — 25000003 PHARM REV CODE 250: Performed by: EMERGENCY MEDICINE

## 2022-01-08 PROCEDURE — 21400001 HC TELEMETRY ROOM

## 2022-01-08 PROCEDURE — 25000003 PHARM REV CODE 250: Performed by: INTERNAL MEDICINE

## 2022-01-08 PROCEDURE — 37000008 HC ANESTHESIA 1ST 15 MINUTES: Performed by: ORTHOPAEDIC SURGERY

## 2022-01-08 PROCEDURE — D9220A PRA ANESTHESIA: Mod: CRNA,,, | Performed by: REGISTERED NURSE

## 2022-01-08 PROCEDURE — C1776 JOINT DEVICE (IMPLANTABLE): HCPCS | Performed by: ORTHOPAEDIC SURGERY

## 2022-01-08 PROCEDURE — 94760 N-INVAS EAR/PLS OXIMETRY 1: CPT

## 2022-01-08 PROCEDURE — 85018 HEMOGLOBIN: CPT | Performed by: ORTHOPAEDIC SURGERY

## 2022-01-08 PROCEDURE — 63600175 PHARM REV CODE 636 W HCPCS: Performed by: INTERNAL MEDICINE

## 2022-01-08 PROCEDURE — 36000710: Performed by: ORTHOPAEDIC SURGERY

## 2022-01-08 PROCEDURE — D9220A PRA ANESTHESIA: ICD-10-PCS | Mod: ANES,,, | Performed by: ANESTHESIOLOGY

## 2022-01-08 PROCEDURE — 85014 HEMATOCRIT: CPT | Performed by: ORTHOPAEDIC SURGERY

## 2022-01-08 PROCEDURE — 80048 BASIC METABOLIC PNL TOTAL CA: CPT | Performed by: ORTHOPAEDIC SURGERY

## 2022-01-08 PROCEDURE — 99223 1ST HOSP IP/OBS HIGH 75: CPT | Mod: ,,, | Performed by: INTERNAL MEDICINE

## 2022-01-08 PROCEDURE — 25000003 PHARM REV CODE 250: Performed by: HOSPITALIST

## 2022-01-08 PROCEDURE — 25000003 PHARM REV CODE 250: Performed by: REGISTERED NURSE

## 2022-01-08 PROCEDURE — C1713 ANCHOR/SCREW BN/BN,TIS/BN: HCPCS | Performed by: ORTHOPAEDIC SURGERY

## 2022-01-08 PROCEDURE — 85610 PROTHROMBIN TIME: CPT | Performed by: INTERNAL MEDICINE

## 2022-01-08 PROCEDURE — 51702 INSERT TEMP BLADDER CATH: CPT

## 2022-01-08 PROCEDURE — 63600175 PHARM REV CODE 636 W HCPCS: Performed by: REGISTERED NURSE

## 2022-01-08 PROCEDURE — 36415 COLL VENOUS BLD VENIPUNCTURE: CPT | Performed by: INTERNAL MEDICINE

## 2022-01-08 PROCEDURE — 94761 N-INVAS EAR/PLS OXIMETRY MLT: CPT

## 2022-01-08 PROCEDURE — 99223 PR INITIAL HOSPITAL CARE,LEVL III: ICD-10-PCS | Mod: ,,, | Performed by: INTERNAL MEDICINE

## 2022-01-08 PROCEDURE — 71000033 HC RECOVERY, INTIAL HOUR: Performed by: ORTHOPAEDIC SURGERY

## 2022-01-08 PROCEDURE — 36000711: Performed by: ORTHOPAEDIC SURGERY

## 2022-01-08 PROCEDURE — 36430 TRANSFUSION BLD/BLD COMPNT: CPT

## 2022-01-08 PROCEDURE — 27000221 HC OXYGEN, UP TO 24 HOURS

## 2022-01-08 PROCEDURE — D9220A PRA ANESTHESIA: Mod: ANES,,, | Performed by: ANESTHESIOLOGY

## 2022-01-08 PROCEDURE — 85025 COMPLETE CBC W/AUTO DIFF WBC: CPT | Performed by: INTERNAL MEDICINE

## 2022-01-08 DEVICE — SCREW STRDRV REC T25 5X30 TTNM: Type: IMPLANTABLE DEVICE | Site: FEMUR | Status: FUNCTIONAL

## 2022-01-08 RX ORDER — GLUCAGON 1 MG
1 KIT INJECTION
Status: DISCONTINUED | OUTPATIENT
Start: 2022-01-08 | End: 2022-01-13

## 2022-01-08 RX ORDER — SODIUM CHLORIDE 0.9 % (FLUSH) 0.9 %
10 SYRINGE (ML) INJECTION
Status: DISCONTINUED | OUTPATIENT
Start: 2022-01-08 | End: 2022-01-09

## 2022-01-08 RX ORDER — INSULIN ASPART 100 [IU]/ML
0-5 INJECTION, SOLUTION INTRAVENOUS; SUBCUTANEOUS EVERY 6 HOURS PRN
Status: DISCONTINUED | OUTPATIENT
Start: 2022-01-08 | End: 2022-01-13

## 2022-01-08 RX ORDER — FENTANYL CITRATE 50 UG/ML
25 INJECTION, SOLUTION INTRAMUSCULAR; INTRAVENOUS EVERY 5 MIN PRN
Status: DISCONTINUED | OUTPATIENT
Start: 2022-01-08 | End: 2022-01-09

## 2022-01-08 RX ORDER — MUPIROCIN 20 MG/G
OINTMENT TOPICAL 2 TIMES DAILY
Status: DISCONTINUED | OUTPATIENT
Start: 2022-01-08 | End: 2022-01-09 | Stop reason: SDUPTHER

## 2022-01-08 RX ORDER — LIDOCAINE HYDROCHLORIDE 20 MG/ML
INJECTION INTRAVENOUS
Status: DISCONTINUED | OUTPATIENT
Start: 2022-01-08 | End: 2022-01-08

## 2022-01-08 RX ORDER — ROCURONIUM BROMIDE 10 MG/ML
INJECTION, SOLUTION INTRAVENOUS
Status: DISCONTINUED | OUTPATIENT
Start: 2022-01-08 | End: 2022-01-08

## 2022-01-08 RX ORDER — HYDROCODONE BITARTRATE AND ACETAMINOPHEN 500; 5 MG/1; MG/1
TABLET ORAL
Status: DISCONTINUED | OUTPATIENT
Start: 2022-01-08 | End: 2022-01-09

## 2022-01-08 RX ORDER — DEXAMETHASONE SODIUM PHOSPHATE 4 MG/ML
INJECTION, SOLUTION INTRA-ARTICULAR; INTRALESIONAL; INTRAMUSCULAR; INTRAVENOUS; SOFT TISSUE
Status: DISCONTINUED | OUTPATIENT
Start: 2022-01-08 | End: 2022-01-08

## 2022-01-08 RX ORDER — FENTANYL CITRATE 50 UG/ML
INJECTION, SOLUTION INTRAMUSCULAR; INTRAVENOUS
Status: DISCONTINUED | OUTPATIENT
Start: 2022-01-08 | End: 2022-01-08

## 2022-01-08 RX ORDER — DEXTROSE MONOHYDRATE AND SODIUM CHLORIDE 5; .9 G/100ML; G/100ML
INJECTION, SOLUTION INTRAVENOUS CONTINUOUS
Status: DISCONTINUED | OUTPATIENT
Start: 2022-01-08 | End: 2022-01-09

## 2022-01-08 RX ORDER — PHENYLEPHRINE HYDROCHLORIDE 10 MG/ML
INJECTION INTRAVENOUS
Status: DISCONTINUED | OUTPATIENT
Start: 2022-01-08 | End: 2022-01-08

## 2022-01-08 RX ORDER — ONDANSETRON 2 MG/ML
INJECTION INTRAMUSCULAR; INTRAVENOUS
Status: DISCONTINUED | OUTPATIENT
Start: 2022-01-08 | End: 2022-01-08

## 2022-01-08 RX ORDER — PROPOFOL 10 MG/ML
VIAL (ML) INTRAVENOUS
Status: DISCONTINUED | OUTPATIENT
Start: 2022-01-08 | End: 2022-01-08

## 2022-01-08 RX ORDER — SODIUM CHLORIDE 0.9 % (FLUSH) 0.9 %
10 SYRINGE (ML) INJECTION
Status: DISCONTINUED | OUTPATIENT
Start: 2022-01-08 | End: 2022-01-13

## 2022-01-08 RX ADMIN — THERA TABS 1 TABLET: TAB at 10:01

## 2022-01-08 RX ADMIN — REMDESIVIR 200 MG: 100 INJECTION, POWDER, LYOPHILIZED, FOR SOLUTION INTRAVENOUS at 01:01

## 2022-01-08 RX ADMIN — HYDROMORPHONE HYDROCHLORIDE 0.5 MG: 2 INJECTION INTRAMUSCULAR; INTRAVENOUS; SUBCUTANEOUS at 12:01

## 2022-01-08 RX ADMIN — FENTANYL CITRATE 25 MCG: 50 INJECTION, SOLUTION INTRAMUSCULAR; INTRAVENOUS at 05:01

## 2022-01-08 RX ADMIN — LIDOCAINE HYDROCHLORIDE 60 MG: 20 INJECTION, SOLUTION INTRAVENOUS at 03:01

## 2022-01-08 RX ADMIN — DEXAMETHASONE 6 MG: 2 TABLET ORAL at 10:01

## 2022-01-08 RX ADMIN — DEXAMETHASONE SODIUM PHOSPHATE 4 MG: 4 INJECTION, SOLUTION INTRAMUSCULAR; INTRAVENOUS at 03:01

## 2022-01-08 RX ADMIN — LISINOPRIL 2.5 MG: 2.5 TABLET ORAL at 10:01

## 2022-01-08 RX ADMIN — OXYCODONE HYDROCHLORIDE AND ACETAMINOPHEN 500 MG: 500 TABLET ORAL at 10:01

## 2022-01-08 RX ADMIN — HYDROMORPHONE HYDROCHLORIDE 0.5 MG: 2 INJECTION INTRAMUSCULAR; INTRAVENOUS; SUBCUTANEOUS at 11:01

## 2022-01-08 RX ADMIN — PHENYLEPHRINE HYDROCHLORIDE 200 MCG: 10 INJECTION INTRAVENOUS at 03:01

## 2022-01-08 RX ADMIN — OXYCODONE HYDROCHLORIDE AND ACETAMINOPHEN 500 MG: 500 TABLET ORAL at 08:01

## 2022-01-08 RX ADMIN — ROCURONIUM BROMIDE 30 MG: 10 INJECTION, SOLUTION INTRAVENOUS at 03:01

## 2022-01-08 RX ADMIN — FENTANYL CITRATE 25 MCG: 50 INJECTION INTRAMUSCULAR; INTRAVENOUS at 05:01

## 2022-01-08 RX ADMIN — FENTANYL CITRATE 50 MCG: 50 INJECTION, SOLUTION INTRAMUSCULAR; INTRAVENOUS at 03:01

## 2022-01-08 RX ADMIN — CHOLECALCIFEROL TAB 25 MCG (1000 UNIT) 2000 UNITS: 25 TAB at 10:01

## 2022-01-08 RX ADMIN — MUPIROCIN: 20 OINTMENT TOPICAL at 08:01

## 2022-01-08 RX ADMIN — PHENYLEPHRINE HYDROCHLORIDE 200 MCG: 10 INJECTION INTRAVENOUS at 04:01

## 2022-01-08 RX ADMIN — DEXTROSE AND SODIUM CHLORIDE: 5; .9 INJECTION, SOLUTION INTRAVENOUS at 10:01

## 2022-01-08 RX ADMIN — ALBUTEROL SULFATE 2 PUFF: 108 INHALANT RESPIRATORY (INHALATION) at 09:01

## 2022-01-08 RX ADMIN — DEXAMETHASONE 6 MG: 2 TABLET ORAL at 12:01

## 2022-01-08 RX ADMIN — ALBUTEROL SULFATE 2 PUFF: 108 INHALANT RESPIRATORY (INHALATION) at 07:01

## 2022-01-08 RX ADMIN — AMIODARONE HYDROCHLORIDE 200 MG: 200 TABLET ORAL at 10:01

## 2022-01-08 RX ADMIN — ONDANSETRON 4 MG: 2 INJECTION, SOLUTION INTRAMUSCULAR; INTRAVENOUS at 04:01

## 2022-01-08 RX ADMIN — SUGAMMADEX 100 MG: 100 INJECTION, SOLUTION INTRAVENOUS at 04:01

## 2022-01-08 RX ADMIN — AMLODIPINE BESYLATE 5 MG: 5 TABLET ORAL at 10:01

## 2022-01-08 RX ADMIN — CEFAZOLIN SODIUM 2 G: 1 POWDER, FOR SOLUTION INTRAMUSCULAR; INTRAVENOUS at 03:01

## 2022-01-08 RX ADMIN — METOPROLOL SUCCINATE 25 MG: 25 TABLET, EXTENDED RELEASE ORAL at 10:01

## 2022-01-08 RX ADMIN — FENTANYL CITRATE 25 MCG: 50 INJECTION INTRAMUSCULAR; INTRAVENOUS at 06:01

## 2022-01-08 RX ADMIN — POTASSIUM CHLORIDE 10 MEQ: 7.46 INJECTION, SOLUTION INTRAVENOUS at 02:01

## 2022-01-08 RX ADMIN — PROPOFOL 100 MG: 10 INJECTION, EMULSION INTRAVENOUS at 03:01

## 2022-01-08 RX ADMIN — SODIUM CHLORIDE, SODIUM LACTATE, POTASSIUM CHLORIDE, AND CALCIUM CHLORIDE: .6; .31; .03; .02 INJECTION, SOLUTION INTRAVENOUS at 03:01

## 2022-01-08 NOTE — CARE UPDATE
Patient has been seen and examianted,patient with history of necrotizing pancreatitis,CAD,Afib,ACOCD,covid,has been  admitted after fall with right intertrochanteric fracture,cardiolofgy is consulted for cardiac clearance,paln for intervention today.

## 2022-01-08 NOTE — ASSESSMENT & PLAN NOTE
NPO for ortho evaluation in am  Patient is currently without capacity to make medical decisions, so will need family  Pain control with low dose dilaudid  CT of the hip showed: nondisplaced fractures involving the right inter trochanteric region and right lesser trochanter.   Cardiology consult for preop given long Cardiac history  Check PTH and Calcitriol

## 2022-01-08 NOTE — ASSESSMENT & PLAN NOTE
Check B12, folate, Fe studies  Her Hg is mid-7g range, so suspect in am she will need a unit of blood  Will need family to sign consent, as she is too confused

## 2022-01-08 NOTE — ASSESSMENT & PLAN NOTE
BNP elevated mildly to 406  Continue ACE  Changed to Lisinopril 2.5mg po qday for formulary  Continue home beta blocker, Toprol XL 25mg po qday

## 2022-01-08 NOTE — ASSESSMENT & PLAN NOTE
AV hellen blocking agents as needed for appropriate heart rate control.  Resume anticoagulation when cleared by surgery after surgery.

## 2022-01-08 NOTE — OP NOTE
01/08/2022    PREOPERATIVE DIAGNOSIS:  Right intertrochanteric hip fracture.  Positive COVID-19    POSTOPERATIVE DIAGNOSIS:  Right intertrochanteric hip fracture.  Positive COVID-19    PROCEDURE:  Right hip IM nailing for intertrochanteric hip fracture. (CPT# 51626)    SURGEON:  Crow Ortega M.D.    ASSISTANT:   none.    ANESTHESIA:  general.    ESTIMATED BLOOD LOSS:  100 mL    INDICATIONS:  The patient is a 81 y.o. who fell previously.  Clinical evaluation was consistent with hip pathology and radiographs revealed an intertrochanteric hip fracture.  She was admitted to the hospital where preoperative medical clearance was obtained and it was recommended at this time to proceed with intramedullary nailing.  Risks and complications were discussed including, but not limited to risks of anesthetic complications, infections, wound healing complications, nonunion, malunion, hardware failure, pain, stiffness, DVT, pulmonary embolism and death among others and she elected to proceed.    DESCRIPTION OF PROCEDURE:  The patient was taken Operating Room where anesthesia was administered by the Anesthesia Department.  She was then placed in the fracture table and all superficial neurovascular structures were well padded.  The right lower extremity was then sterilely prepped and draped in the normal fashion after fluroscopy was used to confirm adequate reduction.   .    A 4 cm longitudinal incision was made just proximal to the greater trochanter.  The subcutaneous tissue and gluteal fascia were incised.  A threaded guide pin was then placed in the tip of the greater trochanter and extended and introduced into the proximal femur under AP and lateral fluoroscopy.  The Synthes one-step reamer was then used to ream proximally.  The patient had very tight cortices.  We will planning to use a short nail however we still placed  A ball-tipped guide tim in order to ream over this.  We reamed over this from an 8.5 up to a 11 mm  Reamer there was a large amount of chatter throughout at each ream.  Again the patient's cortices were very tight.  A Synthes short TFN size 9 mm was then passed over the guidewire, which was subsequently removed.  The proximal interlocking device was then placed and a 3-cm longitudinal incision was made over the lateral aspect of the proximal thigh and the fascia angel was incised.  A threaded guide pin was then placed through the lateral cortex of the femur through the femoral neck and into the femoral head and measured at 75 mm and over reamed.  A 75 mm screw was then placed under fluoroscopy and satisfactory position was noted on AP and lateral fluoroscopy and the setscrew was then set.  We then used the guide to place the distal distal interlocking screw.  This was done successfully from lateral to medial.   through a 1 cm incision under fluoroscopic guidance.  All wounds were then thoroughly irrigated.  Subcutaneous tissues were closed with interrupted inverted of #3-0 Vicryl.  Skin was approximated using skin staples.  Sterile dressing was applied.  Anesthesia was reversed and she was returned to the Postanesthesia Care Unit in stable condition.

## 2022-01-08 NOTE — NURSING
Patient was taken care.  Orientation could not assess as patient only responded with pain.  She was placed on Paulino catheter as per order.  JUNE hose was placed on bilateral lower legs.  IV site assessed.  Due medication provided.  Patient swallow test cannot be done so was unable to give Tylenol for the increased temperature.  Maintained ventilation, decreased the room temperature.  Checked after 45 minutes (100.5).    Could not take enough history and perform complete examination of the extremities.    Will continue to monitor.

## 2022-01-08 NOTE — ASSESSMENT & PLAN NOTE
This was recently worked up  Follow up with GI as per prior plan  Lipase today is mildly improved      Ref. Range 1/3/2022 08:53 1/3/2022 14:00 1/5/2022 03:33 1/7/2022 17:44   Lipase Latest Ref Range: 4 - 60 U/L 792 (H)   498 (H)

## 2022-01-08 NOTE — H&P
[facility] Medicine  History & Physical    Patient Name: Katt Mcneal  MRN: [id number]  Admission Date: 1/7/2022  Attending Physician: Kody Corona MD   Primary Care Provider: Panchito Perez MD         Patient information was obtained from past medical records and ER records.       Subjective:     Principal Problem:Intertrochanteric fracture of right hip    Chief Complaint:   Chief Complaint   Patient presents with    Fall     EMS  called to 80yo female that tripped and fell and now c/o right hip pain. Ems reports lateral rotation and slight shortening to right leg. Patient stated she hit her head but no injuries were found. No LOC but patient is on warfarin.         HPI: Ms. Mcneal is an 80yo lady with a past medical history of Afib on Coumadin, CAD, MI, HTN, pancreatic mass with CBD obstruction and pancreatitis, and hypothyroidism    She was recently admitted to  on 1/3 to 1/6 due to abdominal pain and pancreatitis (MRCP showed severe pancreatic ductal dilation with a suspected pancreatic mass in the body segment measuring up to 2.7 cm.  Recommend further evaluation with EUS and tissue sampling).  Please refer to Dr. Ny's complete discharge summary for details of her stay.  She was also found to be COVID positive on 1/3 during that admission, but her oxygenation was always >94%, so no acute therapy was initiated (CXR showed bilateral mild diffuse nonspecific interstitial coarsening improved from prior, and may reflect residual minimal pulmonary edema versus interstitial type pneumonia or chronic interstitial lung changes.)    She now comes to the ED after tripping and falling at home earlier today.  She cannot remember if she struck her head or not.  After falling she developed right hip pain and was unable to bear any weight on her right leg.  EMS had to be activated and bring her to the ED.  In the ED she was found to have COVID and nondisplaced fractures involving the  right inter trochanteric region and right lesser trochanter.  Her O2 sats today went as low as 88% on RA.  No repeat CXR was done in the ED.    On my exam in her room, she is awake, but confused and unwilling to speak or participate in her exam by the nurse.  There is no family present.      Past Medical History:   Diagnosis Date    Arthritis     Atrial fibrillation     Bilateral carotid artery stenosis 7/13/2017    Coronary artery disease     Fibromyalgia     Hyperlipidemia     Hypertension     Myocardial infarction 03/21/2015    Pancreatitis     Thyroid disease        Past Surgical History:   Procedure Laterality Date    CHOLECYSTECTOMY      CORONARY STENT PLACEMENT  3/24/15    HYSTERECTOMY  10/28/2004       Review of patient's allergies indicates:   Allergen Reactions    Sulfa (sulfonamide antibiotics) Hives    Bactrim [sulfamethoxazole-trimethoprim] Other (See Comments)     Pt. Reports that it caused severe pain    Integrilin [eptifibatide]     Percocet [oxycodone-acetaminophen] Itching    Statins-hmg-coa reductase inhibitors Other (See Comments)     Muscle pain. Patient states some, not all statins    Xarelto [rivaroxaban]     Epinephrine Palpitations       No current facility-administered medications on file prior to encounter.     Current Outpatient Medications on File Prior to Encounter   Medication Sig    amiodarone (PACERONE) 200 MG Tab Take 1 tablet (200 mg total) by mouth 2 (two) times daily. (Patient taking differently: Take 200 mg by mouth once daily.)    amLODIPine (NORVASC) 5 MG tablet Take 1 tablet (5 mg total) by mouth once daily.    aspirin (ECOTRIN) 81 MG EC tablet Take 81 mg by mouth once daily.    cyclobenzaprine (FEXMID) 7.5 MG Tab Take 7.5 mg by mouth 3 (three) times daily as needed.    dicyclomine (BENTYL) 20 mg tablet Take 20 mg by mouth 2 (two) times daily.    levothyroxine (SYNTHROID) 75 MCG tablet Take 50 mcg by mouth once daily.     lorazepam (ATIVAN) 0.5 MG  tablet Take 0.5 mg by mouth every 6 (six) hours as needed for Anxiety.    metoprolol succinate (TOPROL-XL) 25 MG 24 hr tablet Take 25 mg by mouth.    oxyCODONE-acetaminophen (PERCOCET) 5-325 mg per tablet Take 1 tablet by mouth every 4 (four) hours as needed for Pain.    ramipril (ALTACE) 5 MG capsule Take 5 mg by mouth every Tues, Thurs, Sat. Do not take if below <120    warfarin (COUMADIN) 1 MG tablet Take 0.5 tablets (0.5 mg total) by mouth Daily.    cholecalciferol, vitamin D3, 2,000 unit Cap Take 1 capsule by mouth once daily.    nitroGLYCERIN (NITROSTAT) 0.4 MG SL tablet Place 0.4 mg under the tongue every 5 (five) minutes as needed for Chest pain.    ondansetron (ZOFRAN-ODT) 4 MG TbDL Take 1 tablet (4 mg total) by mouth every 8 (eight) hours as needed (for nausea/vomiting). (Patient taking differently: Take 4 mg by mouth every 4 (four) hours as needed (for nausea/vomiting).)    pulse oximeter (PULSE OXIMETER) device by Apply Externally route 2 (two) times a day. Use twice daily at 8 AM and 3 PM and record the value in MyChart as directed.     Family History    None       Tobacco Use    Smoking status: Former Smoker     Quit date: 1964     Years since quittin.5    Smokeless tobacco: Former User   Substance and Sexual Activity    Alcohol use: No     Alcohol/week: 0.0 standard drinks    Drug use: No    Sexual activity: Not Currently     Review of Systems   Unable to perform ROS: Mental status change     Objective:     Vital Signs (Most Recent):  Temp: (!) 101.7 °F (38.7 °C) (22)  Pulse: 88 (22)  Resp: 20 (22)  BP: (!) 157/74 (22)  SpO2: 98 % (22) Vital Signs (24h Range):  Temp:  [98.6 °F (37 °C)-101.7 °F (38.7 °C)] 101.7 °F (38.7 °C)  Pulse:  [] 88  Resp:  [16-20] 20  SpO2:  [88 %-100 %] 98 %  BP: (141-195)/(74-91) 157/74     Weight: 45.4 kg (100 lb)  Body mass index is 20.2 kg/m².    Physical Exam  Constitutional:       General:  She is not in acute distress.     Appearance: She is underweight. She is ill-appearing. She is not toxic-appearing or diaphoretic.   HENT:      Head:      Comments: Opens eyes and tracks the nurse during exam  Cardiovascular:      Comments: Per nurse performing auscultation exam: regular rate and normal rhythm, no heart murmurs.  Pulmonary:      Comments: Per nurse at bedside performing auscultation: Clear with no crackles or wheezing.  Chest:      Chest wall: No deformity.   Abdominal:      Comments: Per nurse, abdomen soft and not tender with normal bowel sounds   Genitourinary:     Comments: No kat in place (ordred)  Musculoskeletal:      Comments: Right leg shortened and externally rotated mildly.  Severe pain with any movement of the leg by the nurse or in the bed noted.   Lymphadenopathy:      Comments: Trace edema to legs   Skin:     Comments: Left lower leg severe chronic venous hypertension with inflammation and redness   Neurological:      Mental Status: She is lethargic, disoriented and confused.      GCS: GCS eye subscore is 4. GCS verbal subscore is 2. GCS motor subscore is 5.      Comments: Confused and lethargic.  She is unwilling to answer any questions or follow any commands with the nurse at bedside.  Some coarse tremors.   Psychiatric:         Mood and Affect: Mood is depressed.         Speech: She is noncommunicative.         Behavior: Behavior is uncooperative and withdrawn.         Cognition and Memory: Cognition is impaired. Memory is impaired. She exhibits impaired recent memory and impaired remote memory.             Significant Labs:   Recent Results (from the past 24 hour(s))   Comprehensive Metabolic Panel    Collection Time: 01/07/22  5:44 PM   Result Value Ref Range    Sodium 140 136 - 145 mmol/L    Potassium 3.3 (L) 3.5 - 5.1 mmol/L    Chloride 106 95 - 110 mmol/L    CO2 23 23 - 29 mmol/L    Glucose 76 70 - 110 mg/dL    BUN 7 (L) 8 - 23 mg/dL    Creatinine 0.7 0.5 - 1.4 mg/dL     Calcium 7.9 (L) 8.7 - 10.5 mg/dL    Total Protein 6.8 6.0 - 8.4 g/dL    Albumin 2.2 (L) 3.5 - 5.2 g/dL    Total Bilirubin 0.3 0.1 - 1.0 mg/dL    Alkaline Phosphatase 67 55 - 135 U/L    AST 54 (H) 10 - 40 U/L    ALT 33 10 - 44 U/L    Anion Gap 11 8 - 16 mmol/L    eGFR if African American >60 >60 mL/min/1.73 m^2    eGFR if non African American >60 >60 mL/min/1.73 m^2   CBC Auto Differential    Collection Time: 01/07/22  5:44 PM   Result Value Ref Range    WBC 11.27 3.90 - 12.70 K/uL    RBC 2.92 (L) 4.00 - 5.40 M/uL    Hemoglobin 7.7 (L) 12.0 - 16.0 g/dL    Hematocrit 24.4 (L) 37.0 - 48.5 %    MCV 84 82 - 98 fL    MCH 26.4 (L) 27.0 - 31.0 pg    MCHC 31.6 (L) 32.0 - 36.0 g/dL    RDW 16.1 (H) 11.5 - 14.5 %    Platelets 381 150 - 450 K/uL    MPV 10.3 9.2 - 12.9 fL    Immature Granulocytes 0.8 (H) 0.0 - 0.5 %    Gran # (ANC) 9.4 (H) 1.8 - 7.7 K/uL    Immature Grans (Abs) 0.09 (H) 0.00 - 0.04 K/uL    Lymph # 1.3 1.0 - 4.8 K/uL    Mono # 0.5 0.3 - 1.0 K/uL    Eos # 0.0 0.0 - 0.5 K/uL    Baso # 0.03 0.00 - 0.20 K/uL    nRBC 0 0 /100 WBC    Gran % 83.3 (H) 38.0 - 73.0 %    Lymph % 11.2 (L) 18.0 - 48.0 %    Mono % 4.3 4.0 - 15.0 %    Eosinophil % 0.1 0.0 - 8.0 %    Basophil % 0.3 0.0 - 1.9 %    Differential Method Automated    Lipase    Collection Time: 01/07/22  5:44 PM   Result Value Ref Range    Lipase 498 (H) 4 - 60 U/L   Protime-INR    Collection Time: 01/07/22  5:44 PM   Result Value Ref Range    Prothrombin Time 17.7 (H) 9.0 - 12.5 sec    INR 1.7 (H) 0.8 - 1.2   Magnesium    Collection Time: 01/07/22  5:44 PM   Result Value Ref Range    Magnesium 1.9 1.6 - 2.6 mg/dL   Type & Screen    Collection Time: 01/07/22  6:49 PM   Result Value Ref Range    Group & Rh O POS     Indirect Alfonso NEG    Urinalysis, Reflex to Urine Culture Urine, Clean Catch    Collection Time: 01/07/22  8:15 PM    Specimen: Urine   Result Value Ref Range    Specimen UA Urine, Clean Catch     Color, UA Colorless (A) Yellow, Straw, Jaci    Appearance,  UA Clear Clear    pH, UA 7.0 5.0 - 8.0    Specific Gravity, UA 1.010 1.005 - 1.030    Protein, UA Trace (A) Negative    Glucose, UA Negative Negative    Ketones, UA 1+ (A) Negative    Bilirubin (UA) Negative Negative    Occult Blood UA Trace (A) Negative    Nitrite, UA Negative Negative    Urobilinogen, UA Negative <2.0 EU/dL    Leukocytes, UA Negative Negative   Iron and TIBC    Collection Time: 01/07/22 11:59 PM   Result Value Ref Range    Iron 12 (L) 30 - 160 ug/dL    Transferrin 167 (L) 200 - 375 mg/dL    TIBC 247 (L) 250 - 450 ug/dL    Saturated Iron 5 (L) 20 - 50 %   CK    Collection Time: 01/07/22 11:59 PM   Result Value Ref Range    CPK 57 20 - 180 U/L   Lactate Dehydrogenase    Collection Time: 01/07/22 11:59 PM   Result Value Ref Range     (H) 110 - 260 U/L   Brain natriuretic peptide    Collection Time: 01/07/22 11:59 PM   Result Value Ref Range     (H) 0 - 99 pg/mL   Lactic Acid, Plasma    Collection Time: 01/07/22 11:59 PM   Result Value Ref Range    Lactate (Lactic Acid) 0.6 0.5 - 2.2 mmol/L   Troponin I    Collection Time: 01/07/22 11:59 PM   Result Value Ref Range    Troponin I 0.046 (H) 0.000 - 0.026 ng/mL     Significant Imaging:   XR CHEST AP PORTABLE  FINDINGS:  The right-sided central PICC line has been removed.  Monitoring EKG leads are present.  There is a loop recorder present.  There are coronary artery stents.   The trachea is unremarkable.  There is stable enlargement of the cardiomediastinal silhouette.  There is no evidence of free air beneath the hemidiaphragms.  There are no pleural effusions.  There is no evidence of a pneumothorax.  There is no evidence of pneumomediastinum.  The pulmonary vascularity is unchanged.  There are chronic interstitial findings.  There is no new airspace disease.  There are degenerative changes in the osseous structures.   There are postop changes in the right upper abdominal quadrant.  The remainder of the visualized upper abdominal  structures are within normal limits.     Impression:   Interval removal right-sided PICC line.  Otherwise, stable examination.      Electronically signed by: Castillo Jimenez MD  Date:                                            01/07/2022  Time:                                           23:55  CT HIP RIGHT WITHOUT CONTRAST AND CT LUMBAR SPINE   FINDINGS:  CT right hip:   There is a curvilinear fracture through the right intertrochanteric region.  A nondisplaced fracture of the lesser trochanter is present.  Moderate degenerative changes are seen at the right hip joint with sclerosing, subchondral cysts, in narrowing of right hip joint.  There is a remote fracture involving the superior pubic ramus.  There is no hip dislocation.     CT lumbar spine:    Mild dextroscoliosis is present.  No acute fractures are detected.  There is grade 1 anterolisthesis of L5 on S1, which is not new.  There is significant narrowing of the posterior columns of L2 and L3 secondary to degenerative change.  There is small marginal osteophytes, endplate sclerosis, and severe intervertebral disc space narrowing from L3 through L5.  There is vacuum phenomena is seen at L2/L3, L5/S1, and the SI joints.  The bones are diffusely osteopenic.  Advanced vascular calcifications are present.  There is a 4.1 x 4.4 cm left renal cyst.  The gallbladder surgically absent.  Compressive atelectatic changes are seen at the visualized lung bases.     Impression:   Nondisplaced fractures involving the right inter trochanteric region and right lesser trochanter.   No acute lumbar fracture.  Grade 1 anterolisthesis of L4 on L5.  Degenerative changes.      Electronically signed by: Kimi Tolentino  Date:                                            01/07/2022  Time:                                           20:24    TWO VIEWS OF THE RIGHT FEMUR and XR hip with pelvis with two views of the right hip  FINDINGS:  Two views of the right femur demonstrate a curvilinear  lucency in the right intertrochanteric region.  Advanced vascular calcifications are present.  Degenerative changes are seen at the right knee.     AP pelvis and two views of the right hip a nondisplaced fracture of the right intertrochanteric region.  Mild degenerative changes are seen at the hip joints.  The bones are diffusely osteopenic.  There is multilevel degenerative change of the visualized lumbar spine.  If pain is out of proportion to radiological findings, consider additional imaging.     Impression:   Findings are concerning for nondisplaced fracture of the right intertrochanteric region of the hip.  Mild degenerative changes of bilateral hip joints.      Electronically signed by: Kimi Tolentino  Date:                                            01/07/2022  Time:                                           17:36    MRCP 1/3/22  1. Examination is severely limited by motion artifact.  2. Severe pancreatic ductal dilation with a suspected pancreatic mass in the body segment measuring up to 2.7 cm.  Recommend further evaluation with EUS and tissue sampling.  3. 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  4. Small organized fluid collection in the region of the splenic hilum, stable from prior.  5. Intra and extrahepatic biliary ductal dilation, similar to prior and likely secondary to post cholecystectomy changes.      Electronically signed by: Felipe Prescott  Date:                                            01/03/2022  Time:                                           19:06    07-JAN-2022 16:37:59 EKG  Atrial fibrillation with rapid ventricular response  Vent. rate 113 BPM  QRS duration 114 ms  QT/QTc 384/526 ms  Left axis deviation  Moderate voltage criteria for LVH, may be normal variant  Nonspecific ST and T wave abnormality  Prolonged QT  Abnormal ECG  When compared with ECG of 03-JAN-2022 13:43,  No significant change was found    EKG  1/3/22:  Vent. Rate : 117 BPM     Atrial Rate : 122 BPM      P-R Int : 000 ms          QRS Dur : 110 ms       QT Int : 356 ms       P-R-T Axes : 000 -45 102 degrees      QTc Int : 496 ms   Atrial fibrillation with rapid ventricular response   Left anterior fascicular block   Voltage criteria for left ventricular hypertrophy   ST and T wave abnormality, consider lateral ischemia   Abnormal ECG   When compared with ECG of 03-JAN-2022 12:48,   No significant change was found   Confirmed by Manas Arango MD ([id number]) on 1/4/2022 2:44:33 PM     12/23/21 ECHO:  · The left ventricle is normal in size with concentric remodeling and normal systolic function.  · Moderate left atrial enlargement.  · The estimated ejection fraction is 60%.  · Indeterminate left ventricular diastolic function.  · Normal right ventricular size with normal right ventricular systolic function.  · Mild mitral regurgitation.  · Mild tricuspid regurgitation.  · Normal central venous pressure (3 mmHg).  · The estimated PA systolic pressure is 28 mmHg.  · There is no pulmonary hypertension.        Assessment/Plan:     * Intertrochanteric fracture of right hip  NPO for ortho evaluation in am  Patient is currently without capacity to make medical decisions, so will need family  Pain control with low dose dilaudid  CT of the hip showed: nondisplaced fractures involving the right inter trochanteric region and right lesser trochanter.   Cardiology consult for preop given long Cardiac history  Check PTH and Calcitriol        Fall on same level from tripping as cause of accidental injury  She is on long term coumadin, confused and unsure if she struck her head  CT head stat is now ordered for completeness      Sepsis due to COVID-19  Patient is identified as Severe COVID-19 based on hypoxemia with O2 saturations <94% on room air or on ambulation   Initiate standard COVID protocols; COVID-19 testing ,Infection Control notification  and isolation- respiratory,  contact and droplet per protocol    Diagnostics: (leukopenia, hyponatremia, hyperferritinemia, elevated troponin, elevated d-dimer, age, and comorbidities are significant predictors of poor clinical outcome)  CBC, CMP, Procalcitonin, Ferritin, CRP, LDH, BNP, Troponin, ECG and Portable CXR    Management: Initiate targeted therapy with Remdesivir, 200mg IV x1, followed by 100mg IV daily x5 days total and Dexamethasone PO/IV 6mg daily x10 days, Maintain oxygen saturations 92-96% via Nasal Cannula 2 LPM and monitor with pulse oximetry. , Inhaled bronchodilators as needed for shortness of breath., Continuous cardiac monitoring. and Manage respiratory failure (O2 sats <91% on room air with respiratory symptoms or needing NIPPV/Mechanical ventilation) and/or Pneumonia (active chest infiltrates) separately as described below.    Advance Care Planning  Current advance care plan has not been discussed with patient/family/POA and patient currently wishes Full Code.        Acute respiratory failure with hypoxia    - Order RT consult via Respiratory Communication for COVID Protocols    - Incentive Spirometer Q4h, Flutter Valve Q4h    - Continuous Pulse Oximetry, goal SpO2 92-96%    - Supplemental O2 via LFNC, VentiMask, or HFNC (see Respiratory Support Oxygen Therapies)    - If wheezing, albuterol INH Q6h scheduled & PRN    - Proning Protocol if patient is a candidate (see  Proning Protocol)   - GCS >13, able to self-prone    - If deterioration, may warrant trial of NIPPV in neg pressure room or immediate ICU consult       Atrial fibrillation with RVR  This has now improved into the 80's  Cardiology consult    Current therapy:    amiodarone tablet 200 mg, 200 mg, Oral, Daily    metoprolol injection 5 mg, 5 mg, Intravenous, Q5 Min PRN    metoprolol succinate (TOPROL-XL) 24 hr tablet 25 mg, 25 mg, Oral, Daily            Chronic anticoagulation  Holding coumadin for hip surgery  INR today is 1.7      Coronary artery disease  involving native coronary artery of native heart without angina pectoris  Trop 0.046,   Cardiology consult for preop  Holding home ASA 81mg pending hip surgery    Current Facility-Administered Medications:     metoprolol succinate (TOPROL-XL) 24 hr tablet 25 mg, 25 mg, Oral, Daily    nitroGLYCERIN SL tablet 0.4 mg, 0.4 mg, Sublingual, Q5 Min PRN, CP        Chronic heart failure with preserved ejection fraction  BNP elevated mildly to 406  Continue ACE  Changed to Lisinopril 2.5mg po qday for formulary  Continue home beta blocker, Toprol XL 25mg po qday      Pancreatic mass  This was recently worked up  Follow up with GI as per prior plan      Common bile duct obstruction  This was recently worked up  Follow up with GI as per prior plan      Chronic recurrent pancreatitis  This was recently worked up  Follow up with GI as per prior plan  Lipase today is mildly improved      Ref. Range 1/3/2022 08:53 1/3/2022 14:00 1/5/2022 03:33 1/7/2022 17:44   Lipase Latest Ref Range: 4 - 60 U/L 792 (H)   498 (H)         Anemia of chronic disease  Check B12, folate, Fe studies  Her Hg is mid-7g range, so suspect in am she will need a unit of blood  Will need family to sign consent, as she is too confused      Hypothyroid    levothyroxine tablet 50 mcg, 50 mcg, Oral, Before breakfast          Hypokalemia  Replacing aggressively and rechecking  Ordered Mg too      VTE Risk Mitigation (From admission, onward)         Ordered     Place JUNE hose  Until discontinued         01/08/22 0224               The attending portion of this evaluation, treatment, and documentation was performed per LESIA Tilley MD via Telemedicine AudioVisual using the secure General Specific software platform with 2 way audio/video. The provider was located off-site and the patient is located in the hospital. The aforementioned video software was utilized to document the relevant history and physical exam      LESIA Tilley MD  Department of Hospital Medicine    [facility] [place]

## 2022-01-08 NOTE — PLAN OF CARE
Problem: Adult Inpatient Plan of Care  Goal: Plan of Care Review  Outcome: Ongoing, Progressing  Goal: Patient-Specific Goal (Individualized)  Outcome: Ongoing, Progressing  Goal: Absence of Hospital-Acquired Illness or Injury  Outcome: Ongoing, Progressing  Goal: Optimal Comfort and Wellbeing  Outcome: Ongoing, Progressing  Goal: Readiness for Transition of Care  Outcome: Ongoing, Progressing     Problem: Infection  Goal: Absence of Infection Signs and Symptoms  Outcome: Ongoing, Progressing     Problem: Adjustment to Illness (Sepsis/Septic Shock)  Goal: Optimal Coping  Outcome: Ongoing, Progressing     Problem: Bleeding (Sepsis/Septic Shock)  Goal: Absence of Bleeding  Outcome: Ongoing, Progressing     Problem: Glycemic Control Impaired (Sepsis/Septic Shock)  Goal: Blood Glucose Level Within Desired Range  Outcome: Ongoing, Progressing     Problem: Infection Progression (Sepsis/Septic Shock)  Goal: Absence of Infection Signs and Symptoms  Outcome: Ongoing, Progressing     Problem: Nutrition Impaired (Sepsis/Septic Shock)  Goal: Optimal Nutrition Intake  Outcome: Ongoing, Progressing     Problem: Impaired Wound Healing  Goal: Optimal Wound Healing  Outcome: Ongoing, Progressing

## 2022-01-08 NOTE — ASSESSMENT & PLAN NOTE
She is on long term coumadin, confused and unsure if she struck her head  CT head stat is now ordered for completeness

## 2022-01-08 NOTE — HPI
Ms. Mcneal is an 80yo lady with a past medical history of Afib on Coumadin, CAD, MI, HTN, pancreatic mass with CBD obstruction and pancreatitis, and hypothyroidism    She was recently admitted to  on 1/3 to 1/6 due to abdominal pain and pancreatitis (MRCP showed severe pancreatic ductal dilation with a suspected pancreatic mass in the body segment measuring up to 2.7 cm.  Recommend further evaluation with EUS and tissue sampling).  Please refer to Dr. Ny's complete discharge summary for details of her stay.  She was also found to be COVID positive on 1/3 during that admission, but her oxygenation was always >94%, so no acute therapy was initiated (CXR showed bilateral mild diffuse nonspecific interstitial coarsening improved from prior, and may reflect residual minimal pulmonary edema versus interstitial type pneumonia or chronic interstitial lung changes.)    She now comes to the ED after tripping and falling at home earlier today.  She cannot remember if she struck her head or not.  After falling she developed right hip pain and was unable to bear any weight on her right leg.  EMS had to be activated and bring her to the ED.  In the ED she was found to have COVID and nondisplaced fractures involving the right inter trochanteric region and right lesser trochanter.  Her O2 sats today went as low as 88% on RA.  No repeat CXR was done in the ED.    On my exam in her room, she is awake, but confused and unwilling to speak or participate in her exam by the nurse.  There is no family present.

## 2022-01-08 NOTE — TRANSFER OF CARE
"Anesthesia Transfer of Care Note    Patient: Katt Mcneal    Procedure(s) Performed: Procedure(s) (LRB):  INSERTION, INTRAMEDULLARY TASHI, FEMUR (Right)    Patient location: PACU    Anesthesia Type: general    Transport from OR: Transported from OR on 6-10 L/min O2 by face mask with adequate spontaneous ventilation    Post pain: adequate analgesia    Post assessment: no apparent anesthetic complications and tolerated procedure well    Post vital signs: stable    Level of consciousness: awake, alert and oriented    Nausea/Vomiting: no nausea/vomiting    Complications: none    Transfer of care protocol was followed      Last vitals:   Visit Vitals  BP (!) 144/67 (BP Location: Right arm, Patient Position: Lying)   Pulse 66   Temp 36.5 °C (97.7 °F) (Oral)   Resp 14   Ht 4' 11" (1.499 m)   Wt 45.4 kg (100 lb)   SpO2 100%   BMI 20.20 kg/m²     "

## 2022-01-08 NOTE — CONSULTS
[facility] - Telemetry  Cardiology  Consult Note    Patient Name: Katt Mcneal  MRN: [id number]  Admission Date: 1/7/2022  Hospital Length of Stay: 1 days  Code Status: Full Code   Attending Provider: Kody Corona MD   Consulting Provider: Walker Almendarez MD  Primary Care Physician: Panchito Perez MD  Principal Problem:Intertrochanteric fracture of right hip    Patient information was obtained from patient and ER records.     Inpatient consult to Cardiology  Consult performed by: Walker Almendarez MD  Consult ordered by: ANDREW Tilley MD        Subjective:     Chief Complaint:  sob     HPI:    EMS  called to 82yo female that tripped and fell and now c/o right hip pain. Ems reports lateral rotation and slight shortening to right leg. Patient stated she hit her head but no injuries were found. No LOC but patient is on warfarin.          HPI: Ms. Mcneal is an 82yo lady with a past medical history of Afib on Coumadin, CAD, MI, HTN, pancreatic mass with CBD obstruction and pancreatitis, and hypothyroidism     She was recently admitted to  on 1/3 to 1/6 due to abdominal pain and pancreatitis (MRCP showed severe pancreatic ductal dilation with a suspected pancreatic mass in the body segment measuring up to 2.7 cm.  Recommend further evaluation with EUS and tissue sampling).  Please refer to Dr. Ny's complete discharge summary for details of her stay.  She was also found to be COVID positive on 1/3 during that admission, but her oxygenation was always >94%, so no acute therapy was initiated (CXR showed bilateral mild diffuse nonspecific interstitial coarsening improved from prior, and may reflect residual minimal pulmonary edema versus interstitial type pneumonia or chronic interstitial lung changes.)     She now comes to the ED after tripping and falling at home earlier today.  She cannot remember if she struck her head or not.  After falling she developed right hip pain and was unable to bear any  weight on her right leg.  EMS had to be activated and bring her to the ED.  In the ED she was found to have COVID and nondisplaced fractures involving the right inter trochanteric region and right lesser trochanter.  Her O2 sats today went as low as 88% on RA.  No repeat CXR was done in the ED.      History obtained from patient as well as the patient's family.  Patient's family stated that the patient sometimes gets confused.  Also talked to orthopedic surgeon.  Cardiology has been consulted for preoperative risk evaluation prior to urgent/emergent orthopedic surgery for hip fracture.  Patient denies any chest pains at rest on exertion, orthopnea, PND.  Patient's family states that prior to her hip fracture she used to walk and could potentially walk a block without any chest pains or tightness.  She states that usually they used to stay at home and walk around the home, but when she went for her doctor's appointments she walked across parking lots without any complaints of chest pains or tightness.  Since her PCI she has been angina free.  She had an echocardiogram done recently which showed normal left ventricular systolic function.  Her EKG done today personally reviewed and shows AFib.      Past Medical History:   Diagnosis Date    Arthritis     Atrial fibrillation     Bilateral carotid artery stenosis 7/13/2017    Coronary artery disease     Fibromyalgia     Hyperlipidemia     Hypertension     Myocardial infarction 03/21/2015    Pancreatitis     Thyroid disease        Past Surgical History:   Procedure Laterality Date    CHOLECYSTECTOMY      CORONARY STENT PLACEMENT  3/24/15    HYSTERECTOMY  10/28/2004       Review of patient's allergies indicates:   Allergen Reactions    Sulfa (sulfonamide antibiotics) Hives    Bactrim [sulfamethoxazole-trimethoprim] Other (See Comments)     Pt. Reports that it caused severe pain    Integrilin [eptifibatide]     Percocet [oxycodone-acetaminophen] Itching     Statins-hmg-coa reductase inhibitors Other (See Comments)     Muscle pain. Patient states some, not all statins    Xarelto [rivaroxaban]     Epinephrine Palpitations       No current facility-administered medications on file prior to encounter.     Current Outpatient Medications on File Prior to Encounter   Medication Sig    amiodarone (PACERONE) 200 MG Tab Take 1 tablet (200 mg total) by mouth 2 (two) times daily. (Patient taking differently: Take 200 mg by mouth once daily.)    amLODIPine (NORVASC) 5 MG tablet Take 1 tablet (5 mg total) by mouth once daily.    aspirin (ECOTRIN) 81 MG EC tablet Take 81 mg by mouth once daily.    cyclobenzaprine (FEXMID) 7.5 MG Tab Take 7.5 mg by mouth 3 (three) times daily as needed.    dicyclomine (BENTYL) 20 mg tablet Take 20 mg by mouth 2 (two) times daily.    levothyroxine (SYNTHROID) 75 MCG tablet Take 50 mcg by mouth once daily.     lorazepam (ATIVAN) 0.5 MG tablet Take 0.5 mg by mouth every 6 (six) hours as needed for Anxiety.    metoprolol succinate (TOPROL-XL) 25 MG 24 hr tablet Take 25 mg by mouth.    oxyCODONE-acetaminophen (PERCOCET) 5-325 mg per tablet Take 1 tablet by mouth every 4 (four) hours as needed for Pain.    ramipril (ALTACE) 5 MG capsule Take 5 mg by mouth every Tues, Thurs, Sat. Do not take if below <120    warfarin (COUMADIN) 1 MG tablet Take 0.5 tablets (0.5 mg total) by mouth Daily.    cholecalciferol, vitamin D3, 2,000 unit Cap Take 1 capsule by mouth once daily.    nitroGLYCERIN (NITROSTAT) 0.4 MG SL tablet Place 0.4 mg under the tongue every 5 (five) minutes as needed for Chest pain.    ondansetron (ZOFRAN-ODT) 4 MG TbDL Take 1 tablet (4 mg total) by mouth every 8 (eight) hours as needed (for nausea/vomiting). (Patient taking differently: Take 4 mg by mouth every 4 (four) hours as needed (for nausea/vomiting).)    pulse oximeter (PULSE OXIMETER) device by Apply Externally route 2 (two) times a day. Use twice daily at 8 AM and 3 PM and  [facility] as directed.     Family History    None       Tobacco Use    Smoking status: Former Smoker     Quit date: 1964     Years since quittin.5    Smokeless tobacco: Former User   Substance and Sexual Activity    Alcohol use: No     Alcohol/week: 0.0 standard drinks    Drug use: No    Sexual activity: Not Currently     Review of Systems   Constitutional: Negative.   HENT: Negative.    Eyes: Negative.    Cardiovascular: Negative.    Respiratory: Negative.    Endocrine: Negative.    Hematologic/Lymphatic: Negative.    Skin: Negative.    Musculoskeletal: Positive for joint pain.   Gastrointestinal: Negative.    Genitourinary: Negative.    Neurological: Negative.    Psychiatric/Behavioral: Negative.    Allergic/Immunologic: Negative.      Objective:     Vital Signs (Most Recent):  Temp: 98.2 °F (36.8 °C) (22 1144)  Pulse: 74 (22 1144)  Resp: 16 (22 1144)  BP: (!) 111/55 (22 1144)  SpO2: 97 % (22 1144) Vital Signs (24h Range):  Temp:  [97.3 °F (36.3 °C)-101.7 °F (38.7 °C)] 98.2 °F (36.8 °C)  Pulse:  [] 74  Resp:  [16-20] 16  SpO2:  [88 %-100 %] 97 %  BP: (103-195)/(55-91) 111/55     Weight: 45.4 kg (100 lb)  Body mass index is 20.2 kg/m².    SpO2: 97 %  O2 Device (Oxygen Therapy): nasal cannula    No intake or output data in the 24 hours ending 22 1329    Lines/Drains/Airways     Drain                 Urethral Catheter 22 0225 Non-latex 16 Fr. <1 day          Peripheral Intravenous Line                 Peripheral IV - Single Lumen 22 20 G Left Hand 1 day         Peripheral IV - Single Lumen 22 20 G Right Forearm 1 day                Physical Exam  Constitutional:       Appearance: Normal appearance. She is well-developed.   HENT:      Head: Normocephalic.   Eyes:      Pupils: Pupils are equal, round, and reactive to light.   Cardiovascular:      Rate and Rhythm: Rhythm irregular.   Pulmonary:      Effort: Pulmonary effort is  normal.      Breath sounds: Normal breath sounds.   Abdominal:      General: Bowel sounds are normal.      Palpations: Abdomen is soft.      Tenderness: There is no abdominal tenderness.   Musculoskeletal:         General: Signs of injury present.      Cervical back: Normal range of motion and neck supple.   Skin:     General: Skin is warm.   Neurological:      Mental Status: She is alert and oriented to person, place, and time.         Significant Labs:   BMP:   Recent Labs   Lab 01/07/22  1744   GLU 76      K 3.3*      CO2 23   BUN 7*   CREATININE 0.7   CALCIUM 7.9*   MG 1.9   , CMP   Recent Labs   Lab 01/07/22  1744      K 3.3*      CO2 23   GLU 76   BUN 7*   CREATININE 0.7   CALCIUM 7.9*   PROT 6.8   ALBUMIN 2.2*   BILITOT 0.3   ALKPHOS 67   AST 54*   ALT 33   ANIONGAP 11   ESTGFRAFRICA >60   EGFRNONAA >60   , CBC   Recent Labs   Lab 01/07/22 1744 01/07/22 1744 01/08/22  0429   WBC 11.27  --  13.19*   HGB 7.7*  --  7.6*   HCT 24.4*   < > 24.4*     --  386    < > = values in this interval not displayed.   , INR   Recent Labs   Lab 01/07/22 1744 01/08/22  0429   INR 1.7* 1.7*   , Lipid Panel No results for input(s): CHOL, HDL, LDLCALC, TRIG, CHOLHDL in the last 48 hours., Troponin   Recent Labs   Lab 01/07/22  2359   TROPONINI 0.046*    and All pertinent lab results from the last 24 hours have been reviewed.    Significant Imaging: Echocardiogram:   Transthoracic echo (TTE) complete ([facility] Only):   Results for orders placed or performed during the hospital encounter of 12/20/21   Echo   Result Value Ref Range    BSA 1.29 m2    TDI SEPTAL 0.08 m/s    LV LATERAL E/E' RATIO 11.22 m/s    LV SEPTAL E/E' RATIO 12.63 m/s    LA WIDTH 4.44 cm    TDI LATERAL 0.09 m/s    PV PEAK VELOCITY 0.78 cm/s    LVIDd 4.34 3.5 - 6.0 cm    IVS 0.79 0.6 - 1.1 cm    Posterior Wall 0.96 0.6 - 1.1 cm    LVIDs 3.04 2.1 - 4.0 cm    FS 30 28 - 44 %    LA volume 89.15 cm3    Sinus 2.89 cm    STJ 2.22 cm     Ascending aorta 0.87 cm    LV mass 120.51 g    LA size 4.40 cm    RVDD 3.33 cm    TAPSE 1.91 cm    Left Ventricle Relative Wall Thickness 0.44 cm    AV mean gradient 5 mmHg    AV valve area 1.75 cm2    AV Velocity Ratio 0.72     AV index (prosthetic) 0.74     MV valve area p 1/2 method 4.35 cm2    E/A ratio 1.16     Mean e' 0.09 m/s    E wave deceleration time 174.22 msec    IVRT 121.11 msec    LVOT diameter 1.74 cm    LVOT area 2.4 cm2    LVOT peak henrique 1.00 m/s    LVOT peak VTI 22.78 cm    Ao peak henrique 1.38 m/s    Ao VTI 30.97 cm    LVOT stroke volume 54.14 cm3    AV peak gradient 8 mmHg    E/E' ratio 11.88 m/s    MV Peak E Henrique 1.01 m/s    TR Max Henrique 2.49 m/s    MV stenosis pressure 1/2 time 50.52 ms    MV Peak A Henrique 0.87 m/s    LV Systolic Volume 36.17 mL    LV Systolic Volume Index 27.8 mL/m2    LV Diastolic Volume 85.01 mL    LV Diastolic Volume Index 65.39 mL/m2    LA Volume Index 68.6 mL/m2    LV Mass Index 93 g/m2    RA Major Axis 4.79 cm    Left Atrium Minor Axis 5.09 cm    Left Atrium Major Axis 5.68 cm    Triscuspid Valve Regurgitation Peak Gradient 25 mmHg    RA Width 3.52 cm    Right Atrial Pressure (from IVC) 3 mmHg    EF 60 %    TV rest pulmonary artery pressure 28 mmHg    Narrative    · The left ventricle is normal in size with concentric remodeling and   normal systolic function.  · Moderate left atrial enlargement.  · The estimated ejection fraction is 60%.  · Indeterminate left ventricular diastolic function.  · Normal right ventricular size with normal right ventricular systolic   function.  · Mild mitral regurgitation.  · Mild tricuspid regurgitation.  · Normal central venous pressure (3 mmHg).  · The estimated PA systolic pressure is 28 mmHg.  · There is no pulmonary hypertension.        Assessment and Plan:     Preop cardiovascular exam  Preoperative risk assessment a patient with coronary artery disease status post PCI.  Prior to fracture could walk about a block without any angina.  Needs to  go for urgent/emergent surgery.  Risk of delaying surgery far exceeds any benefit from further cardiovascular evaluation.  Rest and echo had shown left ventricle systolic function.  Patient may proceed for surgery at moderate risk for coronary ischemia.  Discussed discussed in detail with the patient as well as the family.    Sepsis due to COVID-19          Atrial fibrillation with RVR  AV hellen blocking agents as needed for appropriate heart rate control.  Resume anticoagulation when cleared by surgery after surgery.    Chronic heart failure with preserved ejection fraction  Currently euvolemic.    Coronary artery disease involving native coronary artery of native heart without angina pectoris  Currently angina free.    Chronic recurrent pancreatitis            VTE Risk Mitigation (From admission, onward)         Ordered     Place JUNE hose  Until discontinued         01/08/22 0224                Thank you for your consult. I will follow-up with patient. Please contact us if you have any additional questions.    Walker Almendarez MD  Cardiology   [facility] - Telemetry

## 2022-01-08 NOTE — CONSULTS
GIL right hip pain    HPI: Katt Mncealis81 y.o. complaining of patient is a 81-year-old female fell yesterday suffering a fracture to the right hip at the intratrochanteric femur.  The patient is having extensive workup for pancreatic mass as well.  The patient has a intertrochanteric femur fracture which she is admitted for this point.  There was also small cortical irregularity on the x-rays on the femur and therefore CT scan of the entire femur was performed.  It does not appear there is any bony abnormality that would cause weakness to this area and no signs of packed any pathologic lesions.  Will plan for an intramedullary nail to the right hip.  The patient also has a cardiac history and Cardiology will be consulted for clearance.  She is also COVID positive.    ROS:   Pertinent positives:  Right hip pain   Negatives: F/C, N/V, CP, SOB,    All other 14 point ROS negative    PMH:   Past Medical History:   Diagnosis Date    Arthritis     Atrial fibrillation     Bilateral carotid artery stenosis 2017    Coronary artery disease     Fibromyalgia     Hyperlipidemia     Hypertension     Myocardial infarction 2015    Pancreatitis     Thyroid disease        PSH:   Past Surgical History:   Procedure Laterality Date    CHOLECYSTECTOMY      CORONARY STENT PLACEMENT  3/24/15    HYSTERECTOMY  10/28/2004       Social Hx:   Social History     Occupational History    Not on file   Tobacco Use    Smoking status: Former Smoker     Quit date: 1964     Years since quittin.5    Smokeless tobacco: Former User   Substance and Sexual Activity    Alcohol use: No     Alcohol/week: 0.0 standard drinks    Drug use: No    Sexual activity: Not Currently       Medications:    No current facility-administered medications on file prior to encounter.     Current Outpatient Medications on File Prior to Encounter   Medication Sig Dispense Refill    amiodarone (PACERONE) 200 MG Tab Take 1 tablet (200  "mg total) by mouth 2 (two) times daily. (Patient taking differently: Take 200 mg by mouth once daily.) 60 tablet 11    amLODIPine (NORVASC) 5 MG tablet Take 1 tablet (5 mg total) by mouth once daily. 30 tablet 11    aspirin (ECOTRIN) 81 MG EC tablet Take 81 mg by mouth once daily.      cyclobenzaprine (FEXMID) 7.5 MG Tab Take 7.5 mg by mouth 3 (three) times daily as needed.      dicyclomine (BENTYL) 20 mg tablet Take 20 mg by mouth 2 (two) times daily.      levothyroxine (SYNTHROID) 75 MCG tablet Take 50 mcg by mouth once daily.       lorazepam (ATIVAN) 0.5 MG tablet Take 0.5 mg by mouth every 6 (six) hours as needed for Anxiety.      metoprolol succinate (TOPROL-XL) 25 MG 24 hr tablet Take 25 mg by mouth.      oxyCODONE-acetaminophen (PERCOCET) 5-325 mg per tablet Take 1 tablet by mouth every 4 (four) hours as needed for Pain.      ramipril (ALTACE) 5 MG capsule Take 5 mg by mouth every Tues, Thurs, Sat. Do not take if below <120      warfarin (COUMADIN) 1 MG tablet Take 0.5 tablets (0.5 mg total) by mouth Daily. 15 tablet 0    cholecalciferol, vitamin D3, 2,000 unit Cap Take 1 capsule by mouth once daily.      nitroGLYCERIN (NITROSTAT) 0.4 MG SL tablet Place 0.4 mg under the tongue every 5 (five) minutes as needed for Chest pain.      ondansetron (ZOFRAN-ODT) 4 MG TbDL Take 1 tablet (4 mg total) by mouth every 8 (eight) hours as needed (for nausea/vomiting). (Patient taking differently: Take 4 mg by mouth every 4 (four) hours as needed (for nausea/vomiting).) 30 tablet 3    pulse oximeter (PULSE OXIMETER) device by Apply Externally route 2 (two) times a day. Use twice daily at 8 AM and 3 PM and record the value in [facility] as directed. 1 each 0         PE:         Vitals:    01/08/22 0919   BP:    Pulse: 68   Resp: 16   Temp:        Estimated body mass index is 20.2 kg/m² as calculated from the following:    Height as of this encounter: 4' 11" (1.499 m).    Weight as of this encounter: 45.4 kg (100 " lb).     General frail, elderly     Extremity:  Examination of the right hip patient has pain with any range of motion of the right hip.  She has pain on palpation around the right hip.  She appears to be neurovascular intact distally to the right lower extremity.  She has some confusion but overall she is appear to be alert and oriented.    Labs:    Lab Results   Component Value Date    WBC 13.19 (H) 01/08/2022    HGB 7.6 (L) 01/08/2022    HCT 24.4 (L) 01/08/2022    MCV 83 01/08/2022     01/08/2022           BMP  Lab Results   Component Value Date     01/07/2022    K 3.3 (L) 01/07/2022     01/07/2022    CO2 23 01/07/2022    BUN 7 (L) 01/07/2022    CREATININE 0.7 01/07/2022    CALCIUM 7.9 (L) 01/07/2022    ANIONGAP 11 01/07/2022    ESTGFRAFRICA >60 01/07/2022    EGFRNONAA >60 01/07/2022       Lab Results   Component Value Date    INR 1.7 (H) 01/08/2022    INR 1.7 (H) 01/07/2022    INR 2.3 (H) 01/06/2022       Lab Results   Component Value Date    SEDRATE 90 (H) 01/07/2022       Lab Results   Component Value Date    CRP 10.5 (H) 12/14/2016       Radiography:  Film    Interpretation    X-rays as well as CT scan of the right hip shows a fracture to the intertrochanteric femur.  There is displacement.  The fracture line runs down to the lesser trochanter.  There is not appear to be any pathologic lesions in the right hip or the right femur.    A/P  81 y.o.female with multiple medical problems including cardiac pathology as well as pancreatic mass which is in the process of being worked up.  She fell yesterday suffering a right hip intertrochanteric femur fracture.  She is in severe pain unable to bear weight.  I had discussion with the daughter as well as the niece as well as the patient will proceeding with a right hip intramedullary nailing the patient like to proceed with this.  Who signed consents.       Risks and complications were discussed including but not limited to the risks of anesthetic  complications, infection, wound healing complications, bleeding, injury to nerve, vessels, anr or soft tissues. Need to repeat or perform future operations, instability, limb length inequality, rotational discrepancy, non-union, mal-union,  neurologic dysfunction including numbness, DVT, pulmonary embolism, myocardial infarction, stroke and death among others were discussed, and the patient elects to proceed.      Crow Ortega MD

## 2022-01-08 NOTE — NURSING
Patient received from ED at 12:50 on stretcher. pateint kept on comfortable position. She is in oxygen 3L. She is not ion distress. She only responded to pain.   FACE scale was used to assess her pain. She grimaced when we mobilize her.   Bed in low and locked position.   Call bell in reach.  She was not ready to take information regarding visiting policy and room orientation so couldnot give. Took a set of vitals. Temp is high so will give med as per order.  General assessment done.  She is in NPO.  Will continue to monitor.

## 2022-01-08 NOTE — SUBJECTIVE & OBJECTIVE
Past Medical History:   Diagnosis Date    Arthritis     Atrial fibrillation     Bilateral carotid artery stenosis 7/13/2017    Coronary artery disease     Fibromyalgia     Hyperlipidemia     Hypertension     Myocardial infarction 03/21/2015    Pancreatitis     Thyroid disease        Past Surgical History:   Procedure Laterality Date    CHOLECYSTECTOMY      CORONARY STENT PLACEMENT  3/24/15    HYSTERECTOMY  10/28/2004       Review of patient's allergies indicates:   Allergen Reactions    Sulfa (sulfonamide antibiotics) Hives    Bactrim [sulfamethoxazole-trimethoprim] Other (See Comments)     Pt. Reports that it caused severe pain    Integrilin [eptifibatide]     Percocet [oxycodone-acetaminophen] Itching    Statins-hmg-coa reductase inhibitors Other (See Comments)     Muscle pain. Patient states some, not all statins    Xarelto [rivaroxaban]     Epinephrine Palpitations       No current facility-administered medications on file prior to encounter.     Current Outpatient Medications on File Prior to Encounter   Medication Sig    amiodarone (PACERONE) 200 MG Tab Take 1 tablet (200 mg total) by mouth 2 (two) times daily. (Patient taking differently: Take 200 mg by mouth once daily.)    amLODIPine (NORVASC) 5 MG tablet Take 1 tablet (5 mg total) by mouth once daily.    aspirin (ECOTRIN) 81 MG EC tablet Take 81 mg by mouth once daily.    cyclobenzaprine (FEXMID) 7.5 MG Tab Take 7.5 mg by mouth 3 (three) times daily as needed.    dicyclomine (BENTYL) 20 mg tablet Take 20 mg by mouth 2 (two) times daily.    levothyroxine (SYNTHROID) 75 MCG tablet Take 50 mcg by mouth once daily.     lorazepam (ATIVAN) 0.5 MG tablet Take 0.5 mg by mouth every 6 (six) hours as needed for Anxiety.    metoprolol succinate (TOPROL-XL) 25 MG 24 hr tablet Take 25 mg by mouth.    oxyCODONE-acetaminophen (PERCOCET) 5-325 mg per tablet Take 1 tablet by mouth every 4 (four) hours as needed for Pain.    ramipril (ALTACE) 5  MG capsule Take 5 mg by mouth every Tues, Thurs, Sat. Do not take if below <120    warfarin (COUMADIN) 1 MG tablet Take 0.5 tablets (0.5 mg total) by mouth Daily.    cholecalciferol, vitamin D3, 2,000 unit Cap Take 1 capsule by mouth once daily.    nitroGLYCERIN (NITROSTAT) 0.4 MG SL tablet Place 0.4 mg under the tongue every 5 (five) minutes as needed for Chest pain.    ondansetron (ZOFRAN-ODT) 4 MG TbDL Take 1 tablet (4 mg total) by mouth every 8 (eight) hours as needed (for nausea/vomiting). (Patient taking differently: Take 4 mg by mouth every 4 (four) hours as needed (for nausea/vomiting).)    pulse oximeter (PULSE OXIMETER) device by Apply Externally route 2 (two) times a day. Use twice daily at 8 AM and 3 PM and record the value in MyChart as directed.     Family History    None       Tobacco Use    Smoking status: Former Smoker     Quit date: 1964     Years since quittin.5    Smokeless tobacco: Former User   Substance and Sexual Activity    Alcohol use: No     Alcohol/week: 0.0 standard drinks    Drug use: No    Sexual activity: Not Currently     Review of Systems   Constitutional: Negative.   HENT: Negative.    Eyes: Negative.    Cardiovascular: Negative.    Respiratory: Negative.    Endocrine: Negative.    Hematologic/Lymphatic: Negative.    Skin: Negative.    Musculoskeletal: Positive for joint pain.   Gastrointestinal: Negative.    Genitourinary: Negative.    Neurological: Negative.    Psychiatric/Behavioral: Negative.    Allergic/Immunologic: Negative.      Objective:     Vital Signs (Most Recent):  Temp: 98.2 °F (36.8 °C) (22 1144)  Pulse: 74 (22 1144)  Resp: 16 (22 1144)  BP: (!) 111/55 (22 1144)  SpO2: 97 % (22 1144) Vital Signs (24h Range):  Temp:  [97.3 °F (36.3 °C)-101.7 °F (38.7 °C)] 98.2 °F (36.8 °C)  Pulse:  [] 74  Resp:  [16-20] 16  SpO2:  [88 %-100 %] 97 %  BP: (103-195)/(55-91) 111/55     Weight: 45.4 kg (100 lb)  Body mass index is  20.2 kg/m².    SpO2: 97 %  O2 Device (Oxygen Therapy): nasal cannula    No intake or output data in the 24 hours ending 01/08/22 1329    Lines/Drains/Airways     Drain                 Urethral Catheter 01/08/22 0225 Non-latex 16 Fr. <1 day          Peripheral Intravenous Line                 Peripheral IV - Single Lumen 01/07/22 20 G Left Hand 1 day         Peripheral IV - Single Lumen 01/07/22 20 G Right Forearm 1 day                Physical Exam  Constitutional:       Appearance: Normal appearance. She is well-developed.   HENT:      Head: Normocephalic.   Eyes:      Pupils: Pupils are equal, round, and reactive to light.   Cardiovascular:      Rate and Rhythm: Rhythm irregular.   Pulmonary:      Effort: Pulmonary effort is normal.      Breath sounds: Normal breath sounds.   Abdominal:      General: Bowel sounds are normal.      Palpations: Abdomen is soft.      Tenderness: There is no abdominal tenderness.   Musculoskeletal:         General: Signs of injury present.      Cervical back: Normal range of motion and neck supple.   Skin:     General: Skin is warm.   Neurological:      Mental Status: She is alert and oriented to person, place, and time.         Significant Labs:   BMP:   Recent Labs   Lab 01/07/22 1744   GLU 76      K 3.3*      CO2 23   BUN 7*   CREATININE 0.7   CALCIUM 7.9*   MG 1.9   , CMP   Recent Labs   Lab 01/07/22 1744      K 3.3*      CO2 23   GLU 76   BUN 7*   CREATININE 0.7   CALCIUM 7.9*   PROT 6.8   ALBUMIN 2.2*   BILITOT 0.3   ALKPHOS 67   AST 54*   ALT 33   ANIONGAP 11   ESTGFRAFRICA >60   EGFRNONAA >60   , CBC   Recent Labs   Lab 01/07/22 1744 01/07/22  1744 01/08/22 0429   WBC 11.27  --  13.19*   HGB 7.7*  --  7.6*   HCT 24.4*   < > 24.4*     --  386    < > = values in this interval not displayed.   , INR   Recent Labs   Lab 01/07/22 1744 01/08/22 0429   INR 1.7* 1.7*   , Lipid Panel No results for input(s): CHOL, HDL, LDLCALC, TRIG, CHOLHDL in the  last 48 hours., Troponin   Recent Labs   Lab 01/07/22  2359   TROPONINI 0.046*    and All pertinent lab results from the last 24 hours have been reviewed.    Significant Imaging: Echocardiogram:   Transthoracic echo (TTE) complete (Cupid Only):   Results for orders placed or performed during the hospital encounter of 12/20/21   Echo   Result Value Ref Range    BSA 1.29 m2    TDI SEPTAL 0.08 m/s    LV LATERAL E/E' RATIO 11.22 m/s    LV SEPTAL E/E' RATIO 12.63 m/s    LA WIDTH 4.44 cm    TDI LATERAL 0.09 m/s    PV PEAK VELOCITY 0.78 cm/s    LVIDd 4.34 3.5 - 6.0 cm    IVS 0.79 0.6 - 1.1 cm    Posterior Wall 0.96 0.6 - 1.1 cm    LVIDs 3.04 2.1 - 4.0 cm    FS 30 28 - 44 %    LA volume 89.15 cm3    Sinus 2.89 cm    STJ 2.22 cm    Ascending aorta 0.87 cm    LV mass 120.51 g    LA size 4.40 cm    RVDD 3.33 cm    TAPSE 1.91 cm    Left Ventricle Relative Wall Thickness 0.44 cm    AV mean gradient 5 mmHg    AV valve area 1.75 cm2    AV Velocity Ratio 0.72     AV index (prosthetic) 0.74     MV valve area p 1/2 method 4.35 cm2    E/A ratio 1.16     Mean e' 0.09 m/s    E wave deceleration time 174.22 msec    IVRT 121.11 msec    LVOT diameter 1.74 cm    LVOT area 2.4 cm2    LVOT peak henrique 1.00 m/s    LVOT peak VTI 22.78 cm    Ao peak henrique 1.38 m/s    Ao VTI 30.97 cm    LVOT stroke volume 54.14 cm3    AV peak gradient 8 mmHg    E/E' ratio 11.88 m/s    MV Peak E Henrique 1.01 m/s    TR Max Henrique 2.49 m/s    MV stenosis pressure 1/2 time 50.52 ms    MV Peak A Henrique 0.87 m/s    LV Systolic Volume 36.17 mL    LV Systolic Volume Index 27.8 mL/m2    LV Diastolic Volume 85.01 mL    LV Diastolic Volume Index 65.39 mL/m2    LA Volume Index 68.6 mL/m2    LV Mass Index 93 g/m2    RA Major Axis 4.79 cm    Left Atrium Minor Axis 5.09 cm    Left Atrium Major Axis 5.68 cm    Triscuspid Valve Regurgitation Peak Gradient 25 mmHg    RA Width 3.52 cm    Right Atrial Pressure (from IVC) 3 mmHg    EF 60 %    TV rest pulmonary artery pressure 28 mmHg    Narrative     · The left ventricle is normal in size with concentric remodeling and   normal systolic function.  · Moderate left atrial enlargement.  · The estimated ejection fraction is 60%.  · Indeterminate left ventricular diastolic function.  · Normal right ventricular size with normal right ventricular systolic   function.  · Mild mitral regurgitation.  · Mild tricuspid regurgitation.  · Normal central venous pressure (3 mmHg).  · The estimated PA systolic pressure is 28 mmHg.  · There is no pulmonary hypertension.

## 2022-01-08 NOTE — ASSESSMENT & PLAN NOTE
- Order RT consult via Respiratory Communication for COVID Protocols    - Incentive Spirometer Q4h, Flutter Valve Q4h    - Continuous Pulse Oximetry, goal SpO2 92-96%    - Supplemental O2 via LFNC, VentiMask, or HFNC (see Respiratory Support Oxygen Therapies)    - If wheezing, albuterol INH Q6h scheduled & PRN    - Proning Protocol if patient is a candidate (see HM Proning Protocol)   - GCS >13, able to self-prone    - If deterioration, may warrant trial of NIPPV in neg pressure room or immediate ICU consult

## 2022-01-08 NOTE — BRIEF OP NOTE
[facility] - [place] - Telemetry  Brief Operative Note    SUMMARY     Surgery Date: 1/8/2022     Surgeon(s) and Role:     * Crow Ortega MD - Primary    Assisting Surgeon: None    Pre-op Diagnosis:  Hip fracture, right, closed, initial encounter [S72.001A]    Post-op Diagnosis:  Post-Op Diagnosis Codes:     * Hip fracture, right, closed, initial encounter [S72.001A]    Procedure(s) (LRB):  INSERTION, INTRAMEDULLARY TASHI, FEMUR (Right)    Anesthesia: Choice    Operative Findings: right hip fracture    Estimated Blood Loss: * No values recorded between 1/8/2022  4:10 PM and 1/8/2022  5:05 PM *    Estimated Blood Loss has been documented.100 cc         Specimens:   Specimen (24h ago, onward)            None          [id number]

## 2022-01-08 NOTE — ASSESSMENT & PLAN NOTE
Trop 0.046,   Cardiology consult for preop  Holding home ASA 81mg pending hip surgery    Current Facility-Administered Medications:     metoprolol succinate (TOPROL-XL) 24 hr tablet 25 mg, 25 mg, Oral, Daily    nitroGLYCERIN SL tablet 0.4 mg, 0.4 mg, Sublingual, Q5 Min PRN, CP

## 2022-01-08 NOTE — ANESTHESIA PROCEDURE NOTES
Intubation    Date/Time: 1/8/2022 3:46 PM  Performed by: Flavia Sutton CRNA  Authorized by: Xavier Fernandes MD     Intubation:     Induction:  Intravenous    Intubated:  Postinduction    Mask Ventilation:  N/a    Attempts:  1    Attempted By:  CRNA    Method of Intubation:  Video laryngoscopy    Blade:  Mercado 3    Laryngeal View Grade: Grade I - full view of cords      Difficult Airway Encountered?: No      Complications:  None    Airway Device:  Oral endotracheal tube    Airway Device Size:  7.0    Style/Cuff Inflation:  Cuffed (inflated to minimal occlusive pressure)    Tube secured:  22    Secured at:  The lips    Placement Verified By:  Capnometry    Complicating Factors:  None    Findings Post-Intubation:  BS equal bilateral and atraumatic/condition of teeth unchanged

## 2022-01-08 NOTE — ASSESSMENT & PLAN NOTE
Preoperative risk assessment a patient with coronary artery disease status post PCI.  Prior to fracture could walk about a block without any angina.  Needs to go for urgent/emergent surgery.  Risk of delaying surgery far exceeds any benefit from further cardiovascular evaluation.  Rest and echo had shown left ventricle systolic function.  Patient may proceed for surgery at moderate risk for coronary ischemia.  Discussed discussed in detail with the patient as well as the family.

## 2022-01-08 NOTE — SUBJECTIVE & OBJECTIVE
Past Medical History:   Diagnosis Date    Arthritis     Atrial fibrillation     Bilateral carotid artery stenosis 7/13/2017    Coronary artery disease     Fibromyalgia     Hyperlipidemia     Hypertension     Myocardial infarction 03/21/2015    Pancreatitis     Thyroid disease        Past Surgical History:   Procedure Laterality Date    CHOLECYSTECTOMY      CORONARY STENT PLACEMENT  3/24/15    HYSTERECTOMY  10/28/2004       Review of patient's allergies indicates:   Allergen Reactions    Sulfa (sulfonamide antibiotics) Hives    Bactrim [sulfamethoxazole-trimethoprim] Other (See Comments)     Pt. Reports that it caused severe pain    Integrilin [eptifibatide]     Percocet [oxycodone-acetaminophen] Itching    Statins-hmg-coa reductase inhibitors Other (See Comments)     Muscle pain. Patient states some, not all statins    Xarelto [rivaroxaban]     Epinephrine Palpitations       No current facility-administered medications on file prior to encounter.     Current Outpatient Medications on File Prior to Encounter   Medication Sig    amiodarone (PACERONE) 200 MG Tab Take 1 tablet (200 mg total) by mouth 2 (two) times daily. (Patient taking differently: Take 200 mg by mouth once daily.)    amLODIPine (NORVASC) 5 MG tablet Take 1 tablet (5 mg total) by mouth once daily.    aspirin (ECOTRIN) 81 MG EC tablet Take 81 mg by mouth once daily.    cyclobenzaprine (FEXMID) 7.5 MG Tab Take 7.5 mg by mouth 3 (three) times daily as needed.    dicyclomine (BENTYL) 20 mg tablet Take 20 mg by mouth 2 (two) times daily.    levothyroxine (SYNTHROID) 75 MCG tablet Take 50 mcg by mouth once daily.     lorazepam (ATIVAN) 0.5 MG tablet Take 0.5 mg by mouth every 6 (six) hours as needed for Anxiety.    metoprolol succinate (TOPROL-XL) 25 MG 24 hr tablet Take 25 mg by mouth.    oxyCODONE-acetaminophen (PERCOCET) 5-325 mg per tablet Take 1 tablet by mouth every 4 (four) hours as needed for Pain.    ramipril (ALTACE) 5  MG capsule Take 5 mg by mouth every Tues, Thurs, Sat. Do not take if below <120    warfarin (COUMADIN) 1 MG tablet Take 0.5 tablets (0.5 mg total) by mouth Daily.    cholecalciferol, vitamin D3, 2,000 unit Cap Take 1 capsule by mouth once daily.    nitroGLYCERIN (NITROSTAT) 0.4 MG SL tablet Place 0.4 mg under the tongue every 5 (five) minutes as needed for Chest pain.    ondansetron (ZOFRAN-ODT) 4 MG TbDL Take 1 tablet (4 mg total) by mouth every 8 (eight) hours as needed (for nausea/vomiting). (Patient taking differently: Take 4 mg by mouth every 4 (four) hours as needed (for nausea/vomiting).)    pulse oximeter (PULSE OXIMETER) device by Apply Externally route 2 (two) times a day. Use twice daily at 8 AM and 3 PM and record the value in MyChart as directed.     Family History    None       Tobacco Use    Smoking status: Former Smoker     Quit date: 1964     Years since quittin.5    Smokeless tobacco: Former User   Substance and Sexual Activity    Alcohol use: No     Alcohol/week: 0.0 standard drinks    Drug use: No    Sexual activity: Not Currently     Review of Systems   Unable to perform ROS: Mental status change     Objective:     Vital Signs (Most Recent):  Temp: (!) 101.7 °F (38.7 °C) (22)  Pulse: 88 (22)  Resp: 20 (22)  BP: (!) 157/74 (22 0032)  SpO2: 98 % (22) Vital Signs (24h Range):  Temp:  [98.6 °F (37 °C)-101.7 °F (38.7 °C)] 101.7 °F (38.7 °C)  Pulse:  [] 88  Resp:  [16-20] 20  SpO2:  [88 %-100 %] 98 %  BP: (141-195)/(74-91) 157/74     Weight: 45.4 kg (100 lb)  Body mass index is 20.2 kg/m².    Physical Exam  Constitutional:       General: She is not in acute distress.     Appearance: She is underweight. She is ill-appearing. She is not toxic-appearing or diaphoretic.   HENT:      Head:      Comments: Opens eyes and tracks the nurse during exam  Cardiovascular:      Comments: Per nurse performing auscultation exam: regular rate  and normal rhythm, no heart murmurs.  Pulmonary:      Comments: Per nurse at bedside performing auscultation: Clear with no crackles or wheezing.  Chest:      Chest wall: No deformity.   Abdominal:      Comments: Per nurse, abdomen soft and not tender with normal bowel sounds   Genitourinary:     Comments: No kat in place (ordred)  Musculoskeletal:      Comments: Right leg shortened and externally rotated mildly.  Severe pain with any movement of the leg by the nurse or in the bed noted.   Lymphadenopathy:      Comments: Trace edema to legs   Skin:     Comments: Left lower leg severe chronic venous hypertension with inflammation and redness   Neurological:      Mental Status: She is lethargic, disoriented and confused.      GCS: GCS eye subscore is 4. GCS verbal subscore is 2. GCS motor subscore is 5.      Comments: Confused and lethargic.  She is unwilling to answer any questions or follow any commands with the nurse at bedside.  Some coarse tremors.   Psychiatric:         Mood and Affect: Mood is depressed.         Speech: She is noncommunicative.         Behavior: Behavior is uncooperative and withdrawn.         Cognition and Memory: Cognition is impaired. Memory is impaired. She exhibits impaired recent memory and impaired remote memory.             Significant Labs:   Recent Results (from the past 24 hour(s))   Comprehensive Metabolic Panel    Collection Time: 01/07/22  5:44 PM   Result Value Ref Range    Sodium 140 136 - 145 mmol/L    Potassium 3.3 (L) 3.5 - 5.1 mmol/L    Chloride 106 95 - 110 mmol/L    CO2 23 23 - 29 mmol/L    Glucose 76 70 - 110 mg/dL    BUN 7 (L) 8 - 23 mg/dL    Creatinine 0.7 0.5 - 1.4 mg/dL    Calcium 7.9 (L) 8.7 - 10.5 mg/dL    Total Protein 6.8 6.0 - 8.4 g/dL    Albumin 2.2 (L) 3.5 - 5.2 g/dL    Total Bilirubin 0.3 0.1 - 1.0 mg/dL    Alkaline Phosphatase 67 55 - 135 U/L    AST 54 (H) 10 - 40 U/L    ALT 33 10 - 44 U/L    Anion Gap 11 8 - 16 mmol/L    eGFR if African American >60 >60  mL/min/1.73 m^2    eGFR if non African American >60 >60 mL/min/1.73 m^2   CBC Auto Differential    Collection Time: 01/07/22  5:44 PM   Result Value Ref Range    WBC 11.27 3.90 - 12.70 K/uL    RBC 2.92 (L) 4.00 - 5.40 M/uL    Hemoglobin 7.7 (L) 12.0 - 16.0 g/dL    Hematocrit 24.4 (L) 37.0 - 48.5 %    MCV 84 82 - 98 fL    MCH 26.4 (L) 27.0 - 31.0 pg    MCHC 31.6 (L) 32.0 - 36.0 g/dL    RDW 16.1 (H) 11.5 - 14.5 %    Platelets 381 150 - 450 K/uL    MPV 10.3 9.2 - 12.9 fL    Immature Granulocytes 0.8 (H) 0.0 - 0.5 %    Gran # (ANC) 9.4 (H) 1.8 - 7.7 K/uL    Immature Grans (Abs) 0.09 (H) 0.00 - 0.04 K/uL    Lymph # 1.3 1.0 - 4.8 K/uL    Mono # 0.5 0.3 - 1.0 K/uL    Eos # 0.0 0.0 - 0.5 K/uL    Baso # 0.03 0.00 - 0.20 K/uL    nRBC 0 0 /100 WBC    Gran % 83.3 (H) 38.0 - 73.0 %    Lymph % 11.2 (L) 18.0 - 48.0 %    Mono % 4.3 4.0 - 15.0 %    Eosinophil % 0.1 0.0 - 8.0 %    Basophil % 0.3 0.0 - 1.9 %    Differential Method Automated    Lipase    Collection Time: 01/07/22  5:44 PM   Result Value Ref Range    Lipase 498 (H) 4 - 60 U/L   Protime-INR    Collection Time: 01/07/22  5:44 PM   Result Value Ref Range    Prothrombin Time 17.7 (H) 9.0 - 12.5 sec    INR 1.7 (H) 0.8 - 1.2   Magnesium    Collection Time: 01/07/22  5:44 PM   Result Value Ref Range    Magnesium 1.9 1.6 - 2.6 mg/dL   Type & Screen    Collection Time: 01/07/22  6:49 PM   Result Value Ref Range    Group & Rh O POS     Indirect Alfonso NEG    Urinalysis, Reflex to Urine Culture Urine, Clean Catch    Collection Time: 01/07/22  8:15 PM    Specimen: Urine   Result Value Ref Range    Specimen UA Urine, Clean Catch     Color, UA Colorless (A) Yellow, Straw, Jaci    Appearance, UA Clear Clear    pH, UA 7.0 5.0 - 8.0    Specific Gravity, UA 1.010 1.005 - 1.030    Protein, UA Trace (A) Negative    Glucose, UA Negative Negative    Ketones, UA 1+ (A) Negative    Bilirubin (UA) Negative Negative    Occult Blood UA Trace (A) Negative    Nitrite, UA Negative Negative     Urobilinogen, UA Negative <2.0 EU/dL    Leukocytes, UA Negative Negative   Iron and TIBC    Collection Time: 01/07/22 11:59 PM   Result Value Ref Range    Iron 12 (L) 30 - 160 ug/dL    Transferrin 167 (L) 200 - 375 mg/dL    TIBC 247 (L) 250 - 450 ug/dL    Saturated Iron 5 (L) 20 - 50 %   CK    Collection Time: 01/07/22 11:59 PM   Result Value Ref Range    CPK 57 20 - 180 U/L   Lactate Dehydrogenase    Collection Time: 01/07/22 11:59 PM   Result Value Ref Range     (H) 110 - 260 U/L   Brain natriuretic peptide    Collection Time: 01/07/22 11:59 PM   Result Value Ref Range     (H) 0 - 99 pg/mL   Lactic Acid, Plasma    Collection Time: 01/07/22 11:59 PM   Result Value Ref Range    Lactate (Lactic Acid) 0.6 0.5 - 2.2 mmol/L   Troponin I    Collection Time: 01/07/22 11:59 PM   Result Value Ref Range    Troponin I 0.046 (H) 0.000 - 0.026 ng/mL     Significant Imaging:   XR CHEST AP PORTABLE  FINDINGS:  The right-sided central PICC line has been removed.  Monitoring EKG leads are present.  There is a loop recorder present.  There are coronary artery stents.   The trachea is unremarkable.  There is stable enlargement of the cardiomediastinal silhouette.  There is no evidence of free air beneath the hemidiaphragms.  There are no pleural effusions.  There is no evidence of a pneumothorax.  There is no evidence of pneumomediastinum.  The pulmonary vascularity is unchanged.  There are chronic interstitial findings.  There is no new airspace disease.  There are degenerative changes in the osseous structures.   There are postop changes in the right upper abdominal quadrant.  The remainder of the visualized upper abdominal structures are within normal limits.     Impression:   Interval removal right-sided PICC line.  Otherwise, stable examination.      Electronically signed by: Castillo Jimenez MD  Date:                                            01/07/2022  Time:                                           23:55  CT HIP  RIGHT WITHOUT CONTRAST AND CT LUMBAR SPINE   FINDINGS:  CT right hip:   There is a curvilinear fracture through the right intertrochanteric region.  A nondisplaced fracture of the lesser trochanter is present.  Moderate degenerative changes are seen at the right hip joint with sclerosing, subchondral cysts, in narrowing of right hip joint.  There is a remote fracture involving the superior pubic ramus.  There is no hip dislocation.     CT lumbar spine:    Mild dextroscoliosis is present.  No acute fractures are detected.  There is grade 1 anterolisthesis of L5 on S1, which is not new.  There is significant narrowing of the posterior columns of L2 and L3 secondary to degenerative change.  There is small marginal osteophytes, endplate sclerosis, and severe intervertebral disc space narrowing from L3 through L5.  There is vacuum phenomena is seen at L2/L3, L5/S1, and the SI joints.  The bones are diffusely osteopenic.  Advanced vascular calcifications are present.  There is a 4.1 x 4.4 cm left renal cyst.  The gallbladder surgically absent.  Compressive atelectatic changes are seen at the visualized lung bases.     Impression:   Nondisplaced fractures involving the right inter trochanteric region and right lesser trochanter.   No acute lumbar fracture.  Grade 1 anterolisthesis of L4 on L5.  Degenerative changes.      Electronically signed by: Kimi Tolentino  Date:                                            01/07/2022  Time:                                           20:24    TWO VIEWS OF THE RIGHT FEMUR and XR hip with pelvis with two views of the right hip  FINDINGS:  Two views of the right femur demonstrate a curvilinear lucency in the right intertrochanteric region.  Advanced vascular calcifications are present.  Degenerative changes are seen at the right knee.     AP pelvis and two views of the right hip a nondisplaced fracture of the right intertrochanteric region.  Mild degenerative changes are seen at the hip  joints.  The bones are diffusely osteopenic.  There is multilevel degenerative change of the visualized lumbar spine.  If pain is out of proportion to radiological findings, consider additional imaging.     Impression:   Findings are concerning for nondisplaced fracture of the right intertrochanteric region of the hip.  Mild degenerative changes of bilateral hip joints.      Electronically signed by: Kimi Tolentino  Date:                                            01/07/2022  Time:                                           17:36    MRCP 1/3/22  1. Examination is severely limited by motion artifact.  2. Severe pancreatic ductal dilation with a suspected pancreatic mass in the body segment measuring up to 2.7 cm.  Recommend further evaluation with EUS and tissue sampling.  3. 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  4. Small organized fluid collection in the region of the splenic hilum, stable from prior.  5. Intra and extrahepatic biliary ductal dilation, similar to prior and likely secondary to post cholecystectomy changes.      Electronically signed by: Felipe Prescott  Date:                                            01/03/2022  Time:                                           19:06    07-JAN-2022 16:37:59 EKG  Atrial fibrillation with rapid ventricular response  Vent. rate 113 BPM  QRS duration 114 ms  QT/QTc 384/526 ms  Left axis deviation  Moderate voltage criteria for LVH, may be normal variant  Nonspecific ST and T wave abnormality  Prolonged QT  Abnormal ECG  When compared with ECG of 03-JAN-2022 13:43,  No significant change was found    EKG 1/3/22:  Vent. Rate : 117 BPM     Atrial Rate : 122 BPM      P-R Int : 000 ms          QRS Dur : 110 ms       QT Int : 356 ms       P-R-T Axes : 000 -45 102 degrees      QTc Int : 496 ms   Atrial fibrillation with rapid ventricular response   Left anterior fascicular block   Voltage criteria for left  ventricular hypertrophy   ST and T wave abnormality, consider lateral ischemia   Abnormal ECG   When compared with ECG of 03-JAN-2022 12:48,   No significant change was found   Confirmed by Manas Arango MD ([id number]) on 1/4/2022 2:44:33 PM     12/23/21 ECHO:  · The left ventricle is normal in size with concentric remodeling and normal systolic function.  · Moderate left atrial enlargement.  · The estimated ejection fraction is 60%.  · Indeterminate left ventricular diastolic function.  · Normal right ventricular size with normal right ventricular systolic function.  · Mild mitral regurgitation.  · Mild tricuspid regurgitation.  · Normal central venous pressure (3 mmHg).  · The estimated PA systolic pressure is 28 mmHg.  · There is no pulmonary hypertension.

## 2022-01-08 NOTE — ASSESSMENT & PLAN NOTE
This has now improved into the 80's  Cardiology consult    Current therapy:    amiodarone tablet 200 mg, 200 mg, Oral, Daily    metoprolol injection 5 mg, 5 mg, Intravenous, Q5 Min PRN    metoprolol succinate (TOPROL-XL) 24 hr tablet 25 mg, 25 mg, Oral, Daily

## 2022-01-08 NOTE — ASSESSMENT & PLAN NOTE
Patient is identified as Severe COVID-19 based on hypoxemia with O2 saturations <94% on room air or on ambulation   Initiate standard COVID protocols; COVID-19 testing ,Infection Control notification  and isolation- respiratory, contact and droplet per protocol    Diagnostics: (leukopenia, hyponatremia, hyperferritinemia, elevated troponin, elevated d-dimer, age, and comorbidities are significant predictors of poor clinical outcome)  CBC, CMP, Procalcitonin, Ferritin, CRP, LDH, BNP, Troponin, ECG and Portable CXR    Management: Initiate targeted therapy with Remdesivir, 200mg IV x1, followed by 100mg IV daily x5 days total and Dexamethasone PO/IV 6mg daily x10 days, Maintain oxygen saturations 92-96% via Nasal Cannula 2 LPM and monitor with pulse oximetry. , Inhaled bronchodilators as needed for shortness of breath., Continuous cardiac monitoring. and Manage respiratory failure (O2 sats <91% on room air with respiratory symptoms or needing NIPPV/Mechanical ventilation) and/or Pneumonia (active chest infiltrates) separately as described below.    Advance Care Planning  Current advance care plan has not been discussed with patient/family/POA and patient currently wishes Full Code.

## 2022-01-08 NOTE — HPI
EMS  called to 80yo female that tripped and fell and now c/o right hip pain. Ems reports lateral rotation and slight shortening to right leg. Patient stated she hit her head but no injuries were found. No LOC but patient is on warfarin.          HPI: Ms. 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  Recommend further evaluation with EUS and tissue sampling).  Please refer to Dr. Ny's complete discharge summary for details of her stay.  She was also found to be COVID positive on 1/3 during that admission, but her oxygenation was always >94%, so no acute therapy was initiated (CXR showed bilateral mild diffuse nonspecific interstitial coarsening improved from prior, and may reflect residual minimal pulmonary edema versus interstitial type pneumonia or chronic interstitial lung changes.)     She now comes to the ED after tripping and falling at home earlier today.  She cannot remember if she struck her head or not.  After falling she developed right hip pain and was unable to bear any weight on her right leg.  EMS had to be activated and bring her to the ED.  In the ED she was found to have COVID and nondisplaced fractures involving the right inter trochanteric region and right lesser trochanter.  Her O2 sats today went as low as 88% on RA.  No repeat CXR was done in the ED.      History obtained from patient as well as the patient's family.  Patient's family stated that the patient sometimes gets confused.  Also talked to orthopedic surgeon.  Cardiology has been consulted for preoperative risk evaluation prior to urgent/emergent orthopedic surgery for hip fracture.  Patient denies any chest  pains at rest on exertion, orthopnea, PND.  Patient's family states that prior to her hip fracture she used to walk and could potentially walk a block without any chest pains or tightness.  She states that usually they used to stay at home and walk around the home, but when she went for her doctor's appointments she walked across parking lots without any complaints of chest pains or tightness.  Since her PCI she has been angina free.  She had an echocardiogram done recently which showed normal left ventricular systolic function.  Her EKG done today personally reviewed and shows AFib.

## 2022-01-09 LAB
ANION GAP SERPL CALC-SCNC: 8 MMOL/L (ref 8–16)
ANISOCYTOSIS BLD QL SMEAR: SLIGHT
BASOPHILS # BLD AUTO: 0.01 K/UL (ref 0–0.2)
BASOPHILS NFR BLD: 0.1 % (ref 0–1.9)
BLD PROD TYP BPU: NORMAL
BLOOD UNIT EXPIRATION DATE: NORMAL
BLOOD UNIT TYPE CODE: 5100
BLOOD UNIT TYPE: NORMAL
BUN SERPL-MCNC: 19 MG/DL (ref 8–23)
CALCIUM SERPL-MCNC: 7.6 MG/DL (ref 8.7–10.5)
CHLORIDE SERPL-SCNC: 105 MMOL/L (ref 95–110)
CO2 SERPL-SCNC: 26 MMOL/L (ref 23–29)
CODING SYSTEM: NORMAL
CREAT SERPL-MCNC: 0.8 MG/DL (ref 0.5–1.4)
DIFFERENTIAL METHOD: ABNORMAL
DISPENSE STATUS: NORMAL
EOSINOPHIL # BLD AUTO: 0 K/UL (ref 0–0.5)
EOSINOPHIL NFR BLD: 0 % (ref 0–8)
ERYTHROCYTE [DISTWIDTH] IN BLOOD BY AUTOMATED COUNT: 15.6 % (ref 11.5–14.5)
EST. GFR  (AFRICAN AMERICAN): >60 ML/MIN/1.73 M^2
EST. GFR  (NON AFRICAN AMERICAN): >60 ML/MIN/1.73 M^2
GLUCOSE SERPL-MCNC: 179 MG/DL (ref 70–110)
HCT VFR BLD AUTO: 21 % (ref 37–48.5)
HCT VFR BLD AUTO: 21.3 % (ref 37–48.5)
HGB BLD-MCNC: 6.9 G/DL (ref 12–16)
HGB BLD-MCNC: 6.9 G/DL (ref 12–16)
IMM GRANULOCYTES # BLD AUTO: 0.15 K/UL (ref 0–0.04)
IMM GRANULOCYTES NFR BLD AUTO: 0.9 % (ref 0–0.5)
INR PPP: 1.7 (ref 0.8–1.2)
LYMPHOCYTES # BLD AUTO: 0.7 K/UL (ref 1–4.8)
LYMPHOCYTES NFR BLD: 4.4 % (ref 18–48)
MCH RBC QN AUTO: 26.7 PG (ref 27–31)
MCHC RBC AUTO-ENTMCNC: 32.4 G/DL (ref 32–36)
MCV RBC AUTO: 83 FL (ref 82–98)
MONOCYTES # BLD AUTO: 0.5 K/UL (ref 0.3–1)
MONOCYTES NFR BLD: 3.2 % (ref 4–15)
NEUTROPHILS # BLD AUTO: 14.9 K/UL (ref 1.8–7.7)
NEUTROPHILS NFR BLD: 91.4 % (ref 38–73)
NRBC BLD-RTO: 0 /100 WBC
NUM UNITS TRANS PACKED RBC: NORMAL
OVALOCYTES BLD QL SMEAR: ABNORMAL
PLATELET # BLD AUTO: 365 K/UL (ref 150–450)
PLATELET BLD QL SMEAR: ABNORMAL
PMV BLD AUTO: 10.8 FL (ref 9.2–12.9)
POCT GLUCOSE: 262 MG/DL (ref 70–110)
POIKILOCYTOSIS BLD QL SMEAR: SLIGHT
POTASSIUM SERPL-SCNC: 3.7 MMOL/L (ref 3.5–5.1)
PROTHROMBIN TIME: 17.9 SEC (ref 9–12.5)
RBC # BLD AUTO: 2.58 M/UL (ref 4–5.4)
SODIUM SERPL-SCNC: 139 MMOL/L (ref 136–145)
WBC # BLD AUTO: 16.26 K/UL (ref 3.9–12.7)

## 2022-01-09 PROCEDURE — 85610 PROTHROMBIN TIME: CPT | Performed by: ORTHOPAEDIC SURGERY

## 2022-01-09 PROCEDURE — 99233 SBSQ HOSP IP/OBS HIGH 50: CPT | Mod: ,,, | Performed by: INTERNAL MEDICINE

## 2022-01-09 PROCEDURE — 63600175 PHARM REV CODE 636 W HCPCS: Performed by: ORTHOPAEDIC SURGERY

## 2022-01-09 PROCEDURE — 85025 COMPLETE CBC W/AUTO DIFF WBC: CPT | Performed by: ORTHOPAEDIC SURGERY

## 2022-01-09 PROCEDURE — 85018 HEMOGLOBIN: CPT | Performed by: ORTHOPAEDIC SURGERY

## 2022-01-09 PROCEDURE — 97116 GAIT TRAINING THERAPY: CPT

## 2022-01-09 PROCEDURE — 99233 PR SUBSEQUENT HOSPITAL CARE,LEVL III: ICD-10-PCS | Mod: ,,, | Performed by: INTERNAL MEDICINE

## 2022-01-09 PROCEDURE — 25000003 PHARM REV CODE 250: Performed by: ORTHOPAEDIC SURGERY

## 2022-01-09 PROCEDURE — 27000221 HC OXYGEN, UP TO 24 HOURS

## 2022-01-09 PROCEDURE — 85014 HEMATOCRIT: CPT | Performed by: ORTHOPAEDIC SURGERY

## 2022-01-09 PROCEDURE — 25000242 PHARM REV CODE 250 ALT 637 W/ HCPCS: Performed by: ORTHOPAEDIC SURGERY

## 2022-01-09 PROCEDURE — 36430 TRANSFUSION BLD/BLD COMPNT: CPT

## 2022-01-09 PROCEDURE — 21400001 HC TELEMETRY ROOM

## 2022-01-09 PROCEDURE — P9016 RBC LEUKOCYTES REDUCED: HCPCS | Performed by: HOSPITALIST

## 2022-01-09 PROCEDURE — 97165 OT EVAL LOW COMPLEX 30 MIN: CPT

## 2022-01-09 PROCEDURE — 97161 PT EVAL LOW COMPLEX 20 MIN: CPT

## 2022-01-09 PROCEDURE — 80048 BASIC METABOLIC PNL TOTAL CA: CPT | Performed by: ORTHOPAEDIC SURGERY

## 2022-01-09 PROCEDURE — 94640 AIRWAY INHALATION TREATMENT: CPT

## 2022-01-09 PROCEDURE — 27000207 HC ISOLATION

## 2022-01-09 RX ORDER — HYDROCODONE BITARTRATE AND ACETAMINOPHEN 500; 5 MG/1; MG/1
TABLET ORAL
Status: DISCONTINUED | OUTPATIENT
Start: 2022-01-09 | End: 2022-01-13

## 2022-01-09 RX ORDER — HEPARIN SODIUM 5000 [USP'U]/ML
5000 INJECTION, SOLUTION INTRAVENOUS; SUBCUTANEOUS EVERY 8 HOURS
Status: DISCONTINUED | OUTPATIENT
Start: 2022-01-09 | End: 2022-01-10

## 2022-01-09 RX ORDER — SODIUM CHLORIDE 9 MG/ML
INJECTION, SOLUTION INTRAVENOUS CONTINUOUS
Status: DISCONTINUED | OUTPATIENT
Start: 2022-01-09 | End: 2022-01-09

## 2022-01-09 RX ORDER — DOCUSATE SODIUM 100 MG/1
100 CAPSULE, LIQUID FILLED ORAL 2 TIMES DAILY
Status: DISCONTINUED | OUTPATIENT
Start: 2022-01-09 | End: 2022-01-13

## 2022-01-09 RX ORDER — MUPIROCIN 20 MG/G
OINTMENT TOPICAL 2 TIMES DAILY
Status: DISPENSED | OUTPATIENT
Start: 2022-01-09 | End: 2022-01-13

## 2022-01-09 RX ORDER — SODIUM CHLORIDE 0.9 % (FLUSH) 0.9 %
10 SYRINGE (ML) INJECTION
Status: DISCONTINUED | OUTPATIENT
Start: 2022-01-09 | End: 2022-01-13

## 2022-01-09 RX ORDER — CEFAZOLIN SODIUM 2 G/50ML
2 SOLUTION INTRAVENOUS
Status: COMPLETED | OUTPATIENT
Start: 2022-01-09 | End: 2022-01-09

## 2022-01-09 RX ADMIN — OXYCODONE HYDROCHLORIDE AND ACETAMINOPHEN 500 MG: 500 TABLET ORAL at 09:01

## 2022-01-09 RX ADMIN — LEVOTHYROXINE SODIUM 50 MCG: 50 TABLET ORAL at 05:01

## 2022-01-09 RX ADMIN — DOCUSATE SODIUM 100 MG: 100 CAPSULE ORAL at 02:01

## 2022-01-09 RX ADMIN — DOCUSATE SODIUM 100 MG: 100 CAPSULE ORAL at 08:01

## 2022-01-09 RX ADMIN — SODIUM CHLORIDE: 0.9 INJECTION, SOLUTION INTRAVENOUS at 02:01

## 2022-01-09 RX ADMIN — ALBUTEROL SULFATE 2 PUFF: 108 INHALANT RESPIRATORY (INHALATION) at 07:01

## 2022-01-09 RX ADMIN — CEFAZOLIN SODIUM 2 G: 2 SOLUTION INTRAVENOUS at 09:01

## 2022-01-09 RX ADMIN — ACETAMINOPHEN 650 MG: 325 TABLET ORAL at 08:01

## 2022-01-09 RX ADMIN — CEFAZOLIN SODIUM 2 G: 2 SOLUTION INTRAVENOUS at 02:01

## 2022-01-09 RX ADMIN — DOCUSATE SODIUM 100 MG: 100 CAPSULE ORAL at 09:01

## 2022-01-09 RX ADMIN — THERA TABS 1 TABLET: TAB at 09:01

## 2022-01-09 RX ADMIN — MELATONIN TAB 3 MG 6 MG: 3 TAB at 08:01

## 2022-01-09 RX ADMIN — METOPROLOL SUCCINATE 25 MG: 25 TABLET, EXTENDED RELEASE ORAL at 09:01

## 2022-01-09 RX ADMIN — HEPARIN SODIUM 5000 UNITS: 5000 INJECTION INTRAVENOUS; SUBCUTANEOUS at 03:01

## 2022-01-09 RX ADMIN — MUPIROCIN: 20 OINTMENT TOPICAL at 09:01

## 2022-01-09 RX ADMIN — HEPARIN SODIUM 5000 UNITS: 5000 INJECTION INTRAVENOUS; SUBCUTANEOUS at 08:01

## 2022-01-09 RX ADMIN — DEXAMETHASONE 6 MG: 2 TABLET ORAL at 09:01

## 2022-01-09 RX ADMIN — CHOLECALCIFEROL TAB 25 MCG (1000 UNIT) 2000 UNITS: 25 TAB at 09:01

## 2022-01-09 RX ADMIN — HEPARIN SODIUM 5000 UNITS: 5000 INJECTION INTRAVENOUS; SUBCUTANEOUS at 05:01

## 2022-01-09 RX ADMIN — ALBUTEROL SULFATE 2 PUFF: 108 INHALANT RESPIRATORY (INHALATION) at 09:01

## 2022-01-09 RX ADMIN — HYDROMORPHONE HYDROCHLORIDE 0.25 MG: 2 INJECTION, SOLUTION INTRAMUSCULAR; INTRAVENOUS; SUBCUTANEOUS at 10:01

## 2022-01-09 RX ADMIN — REMDESIVIR 100 MG: 100 INJECTION, POWDER, LYOPHILIZED, FOR SOLUTION INTRAVENOUS at 11:01

## 2022-01-09 RX ADMIN — OXYCODONE HYDROCHLORIDE AND ACETAMINOPHEN 500 MG: 500 TABLET ORAL at 08:01

## 2022-01-09 RX ADMIN — AMIODARONE HYDROCHLORIDE 200 MG: 200 TABLET ORAL at 09:01

## 2022-01-09 NOTE — PLAN OF CARE
[facility] - Telemetry  Initial Discharge Assessment       Primary Care Provider: Panchito Perez MD    Admission Diagnosis: Sinus tachycardia [R00.0]  Right hip pain [M25.551]  Intertrochanteric fracture of right hip [S72.141A]  Idiopathic chronic pancreatitis [K86.1]  Longstanding persistent atrial fibrillation [I48.11]  Dementia without behavioral disturbance, unspecified dementia type [F03.90]    Admission Date: 1/7/2022  Expected Discharge Date:      Discharge Barriers Identified: (P) None    Payor: [employer] MEDICARE / Plan: [employer] / Product Type: Medicare Advantage /     Extended Emergency Contact Information  Primary Emergency Contact: Chano Mcneal  Address: [address] [place] [address] [facility]  Home Phone: [phone]  Mobile Phone: [phone]  Relation: Daughter  Preferred language: English   needed? No  Secondary Emergency Contact: Toño Mcneal  [place] Phone: [phone]  Relation: Son    Discharge Plan A: (P) Skilled Nursing Facility  Discharge Plan B: (P) [facility] [place] [facility] [address] [facility]  [address] [address]  Phone: [phone] Fax: [phone]      Initial Assessment (most recent)       Adult Discharge Assessment - 01/09/22 0855          Discharge Assessment    Assessment Type Discharge Planning Assessment (P)      Confirmed/corrected address, phone number and insurance Yes (P)      Confirmed Demographics Correct on Facesheet (P)      Source of Information family (P)      If unable to respond/provide information was family/caregiver contacted? Yes (P)      Contact Name/Number Chano Mcneal (Daughter)   [phone] (P)      When was your last doctors appointment? -- (P)    moose 2 mo ago    Communicated FRANCO with patient/caregiver Date not available/Unable to determine (P)      Reason For Admission Fracture following a fall at home (P)       Lives With child(boris), adult (P)      Do you expect to return to your current living situation? Yes (P)      Do you have help at home or someone to help you manage your care at home? Yes (P)      Who are your caregiver(s) and their phone number(s)? Chano Mcneal (Daughter)   [phone] (P)      Prior to hospitilization cognitive status: Alert/Oriented (P)      Current cognitive status: Alert/Oriented (P)      Walking or Climbing Stairs Difficulty ambulation difficulty, requires equipment (P)      Home Layout Able to live on 1st floor (P)      Equipment Currently Used at Home bedside commode;walker, rolling (P)      Readmission within 30 days? Yes (P)      Patient currently being followed by outpatient case management? No (P)      Do you currently have service(s) that help you manage your care at home? Yes (P)      Name and Contact number of agency HH arranged by PHN following recent DC (P)      Is the pt/caregiver preference to resume services with current agency -- (P)    tbd    Do you take prescription medications? Yes (P)      Do you have prescription coverage? Yes (P)      Coverage PHN (P)      Is the patient taking medications as prescribed? yes (P)      Who is going to help you get home at discharge? Chano Mcneal (Daughter)   [phone] (P)      How do you get to doctors appointments? family or friend will provide (P)      Are you on dialysis? No (P)      Do you take coumadin? Yes (P)      Who monitors your labs? Dr YOLANDA Palomino (P)      Discharge Plan A Skilled Nursing Facility (P)      Discharge Plan B Home Health (P)      DME Needed Upon Discharge  -- (P)    tbd    Discharge Plan discussed with: Adult children (P)      Discharge Barriers Identified None (P)         Relationship/Environment    Name(s) of Who Lives With Patient Chano Mcneal (Daughter)   [phone] (P)
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
## 2022-01-09 NOTE — PT/OT/SLP EVAL
Physical Therapy Evaluation    Patient Name:  Katt Mcneal   MRN:  [id number]    Recommendations:     Discharge Recommendations:  nursing facility, skilled   Discharge Equipment Recommendations: none   Barriers to discharge: Pt at increased risk for additional falls/ readmission if discharged home    Assessment:     Katt Mcneal is a 81 y.o. female admitted with a medical diagnosis of Intertrochanteric fracture of right hip.  She presents with the following impairments/functional limitations:  weakness,impaired endurance,impaired self care skills,impaired functional mobilty,impaired balance,gait instability,decreased coordination,decreased lower extremity function,decreased safety awareness,pain,decreased ROM,impaired joint extensibility,impaired cardiopulmonary response to activity,orthopedic precautions With encouragement, pt willing to participate in mobility assessment. Pt performs bed mobility with set up assist, cues for sequencing and min Ax1 for R LE management. Pt transfers supine> sit w/ modAx1. Pt transfers sit<>stand w/ CGAx1 and RW. Pt initially fearful to bear weight through R LE but following lateral weight shifts, pt took 10 small side steps at EOB w/ RW and CGAx1. Pt tolerates weight bearing well, pain improved with additional weight shifts/ side steps. Pt admits pain subsided back to baseline upon returning to bed. Pt would benefit from skilled therapy to improve strength, activity tolerance and decrease risk for additional falls. Recommending SNF at d/c..    Rehab Prognosis: Fair; patient would benefit from acute skilled PT services to address these deficits and reach maximum level of function.    Recent Surgery: Procedure(s) (LRB):  INSERTION, INTRAMEDULLARY TASHI, FEMUR (Right) 1 Day Post-Op    Plan:     During this hospitalization, patient to be seen daily to address the identified rehab impairments via gait training,therapeutic activities,therapeutic exercises,neuromuscular  re-education,wheelchair management/training and progress toward the following goals:    · Plan of Care Expires:  01/23/22    Subjective     Chief Complaint: R hip pain  Patient/Family Comments/goals: regain strength/ function of R LE to return home  Pain/Comfort:  Pain Rating 1: 5/10  Location - Side 1: Right  Location - Orientation 1: upper  Location 1: hip  Pain Addressed 1: Pre-medicate for activity,Reposition,Nurse notified,Distraction  Pain Rating Post-Intervention 1: 5/10    Patients cultural, spiritual, Adventist conflicts given the current situation: no    Living Environment:  Pt lives with dtr in a [facility] with ~14 RUBY and B HR, elevator currently not working.   Prior to admission, patients level of function was modified independent, daughter's assistance as needed. Daughter provides transportation.  Equipment used at home: bedside commode,walker, rolling.  DME owned (not currently used): none.  Upon discharge, patient will have assistance from her daughter.    Objective:     Communicated with Laura parada, prior to session.  Patient found HOB elevated with bed alarm,oxygen,PureWick,peripheral IV,telemetry  upon PT entry to room.    General Precautions: Standard, airborne,fall,contact,droplet,special contact   Orthopedic Precautions:RLE weight bearing as tolerated   Braces: N/A  Respiratory Status: Nasal cannula, flow 3 L/min    Exams:  · Cognitive Exam:  Patient is oriented to Person, Place, Time and Situation  · RLE ROM: WFL  · RLE Strength: WFL  · LLE ROM: Knee /ankle ROM WFL; L hip NT 2/2 POD#1  · LLE Strength: Deficits: Formal MMT held today 2/2 POD#1    Functional Mobility:  · Bed Mobility:     · Rolling Left:  minimum assistance  · Scooting: minimum assistance  · Bridging: minimum assistance  · Supine to Sit: minimum assistance  · Sit to Supine: moderate assistance  · Transfers:     · Sit to Stand:  contact guard assistance with rolling walker  · Gait: Pt took 10 small side steps toward L at EOB w/ RW  and CGAx1  · Balance: Fair + in sitting, poor in standing    Therapeutic Activities and Exercises:   Pt perform 10 lateral weight shifts x2. Pt benefits from cues for quad activation and weight acceptance on R LE. Following weight shifts, pt demonstrated increased confidence in amb. Pt was able to take progressively larger steps towards L, benefits from cues for walker management. Pt educated on fall prevention and pacing strategies with amb.    AM-PAC 6 CLICK MOBILITY  Total Score:14     Patient left HOB elevated with all lines intact, call button in reach, bed alarm on, nsg notified and sister present.    GOALS:   Multidisciplinary Problems     Physical Therapy Goals        Problem: Physical Therapy Goal    Goal Priority Disciplines Outcome Goal Variances Interventions   Physical Therapy Goal     PT, PT/OT Ongoing, Progressing     Description: Goals to be met by: 22     Patient will increase functional independence with mobility by performin. Supine to sit with Set-up [place]  2. Rolling to Left and Right with Modified [place].  3. Sit to stand transfer with Supervision  4. Gait  x 150 feet with Contact Guard Assistance using Rolling Walker.   5. Lower extremity exercise program x15 reps per handout, with independence                     History:     Past Medical History:   Diagnosis Date    Arthritis     Atrial fibrillation     Bilateral carotid artery stenosis 2017    Coronary artery disease     Fibromyalgia     Hyperlipidemia     Hypertension     Myocardial infarction 2015    Pancreatitis     Thyroid disease        Past Surgical History:   Procedure Laterality Date    CHOLECYSTECTOMY      CORONARY STENT PLACEMENT  3/24/15    HYSTERECTOMY  10/28/2004       Time Tracking:     PT Received On: 22  PT Start Time: 1040     PT Stop Time: 1107  PT Total Time (min): 27 min     Billable Minutes: Evaluation 12 and Gait Training 15      2022

## 2022-01-09 NOTE — PLAN OF CARE
01/09/22 0901   Readmission   Why were you hospitalized in the last 30 days? Pancreatitis   Why were you readmitted?   (Fall at home)   When you left the hospital how did you feel? OK   When you left the hospital where did you go? Home with Family   Did patient/caregiver refused recommended DC plan? No   Tell me about what happened between when you left the hospital and the day you returned. Fall   Did you try to manage your symptoms your self? No   Did you call anyone? No   Why? Dgt found patient down on the floor   Did you try to see or did see a doctor or nurse before you came? No   Why? Concern for fracture / break   Did you have  a follow-up appointment on discharge? Yes   Was this a planned readmission? No

## 2022-01-09 NOTE — PLAN OF CARE
Problem: Physical Therapy Goal  Goal: Physical Therapy Goal  Description: Goals to be met by: 22     Patient will increase functional independence with mobility by performin. Supine to sit with Set-up [place]  2. Rolling to Left and Right with Modified [place].  3. Sit to stand transfer with Supervision  4. Gait  x 150 feet with Contact Guard Assistance using Rolling Walker.   5. Lower extremity exercise program x15 reps per handout, with independence    Outcome: Ongoing, Progressing     PT evaluation completed today. With encouragement, pt willing to participate in mobility assessment. Pt performs bed mobility with set up assist, cues for sequencing and min Ax1 for R LE management. Pt transfers supine> sit w/ modAx1. Pt transfers sit<>stand w/ CGAx1 and RW. Pt initially fearful to bear weight through R LE but following lateral weight shifts, pt took 10 small side steps at EOB w/ RW and CGAx1. Pt tolerates weight bearing well, pain improved with additional weight shifts/ side steps. Pt admits pain subsided back to baseline upon returning to bed. Pt would benefit from skilled therapy to improve strength, activity tolerance and decrease risk for additional falls. Recommending SNF at d/c.

## 2022-01-09 NOTE — HOSPITAL COURSE
Admitted for R intertrochanteric fracture. Underwent R IMN 1/8 without complication.     However she developed recurrent abdominal pain requiring increased analgesics. She then decompensated overnight with abdominal pain, AMS, and new onset pulmonary edema. She was diuresed, given broad spectrum antibiotics, and transferred to the ICU.     After discussing goals of care with her children, the decision was made to transition to comfort-focused measures given her prolonged suffering from pancreatitis, increasing debility, and overall decline. She was treated for comfort while in the hospital and discharged to home hospice.

## 2022-01-09 NOTE — PROGRESS NOTES
[facility]  Cardiology  Progress Note    Patient Name: Katt Mcneal  MRN: [id number]  Admission Date: 1/7/2022  Hospital Length of Stay: 2 days  Code Status: Full Code   Attending Physician: Kody Corona MD   Primary Care Physician: Panchito Perez MD  Expected Discharge Date:   Principal Problem:Intertrochanteric fracture of right hip    Subjective:       Interval History:  Underwent surgery yesterday.  Uneventful.  No chest pains.      Past Medical History:   Diagnosis Date    Arthritis     Atrial fibrillation     Bilateral carotid artery stenosis 7/13/2017    Coronary artery disease     Fibromyalgia     Hyperlipidemia     Hypertension     Myocardial infarction 03/21/2015    Pancreatitis     Thyroid disease        Past Surgical History:   Procedure Laterality Date    CHOLECYSTECTOMY      CORONARY STENT PLACEMENT  3/24/15    HYSTERECTOMY  10/28/2004       Review of patient's allergies indicates:   Allergen Reactions    Sulfa (sulfonamide antibiotics) Hives    Bactrim [sulfamethoxazole-trimethoprim] Other (See Comments)     Pt. Reports that it caused severe pain    Integrilin [eptifibatide]     Percocet [oxycodone-acetaminophen] Itching    Statins-hmg-coa reductase inhibitors Other (See Comments)     Muscle pain. Patient states some, not all statins    Xarelto [rivaroxaban]     Epinephrine Palpitations       No current facility-administered medications on file prior to encounter.     Current Outpatient Medications on File Prior to Encounter   Medication Sig    amiodarone (PACERONE) 200 MG Tab Take 1 tablet (200 mg total) by mouth 2 (two) times daily. (Patient taking differently: Take 200 mg by mouth once daily.)    amLODIPine (NORVASC) 5 MG tablet Take 1 tablet (5 mg total) by mouth once daily.    aspirin (ECOTRIN) 81 MG EC tablet Take 81 mg by mouth once daily.    cyclobenzaprine (FEXMID) 7.5 MG Tab Take 7.5 mg by mouth 3 (three) times daily as needed.     dicyclomine (BENTYL) 20 mg tablet Take 20 mg by mouth 2 (two) times daily.    levothyroxine (SYNTHROID) 75 MCG tablet Take 50 mcg by mouth once daily.     lorazepam (ATIVAN) 0.5 MG tablet Take 0.5 mg by mouth every 6 (six) hours as needed for Anxiety.    metoprolol succinate (TOPROL-XL) 25 MG 24 hr tablet Take 25 mg by mouth.    oxyCODONE-acetaminophen (PERCOCET) 5-325 mg per tablet Take 1 tablet by mouth every 4 (four) hours as needed for Pain.    ramipril (ALTACE) 5 MG capsule Take 5 mg by mouth every Tues, Thurs, Sat. Do not take if below <120    warfarin (COUMADIN) 1 MG tablet Take 0.5 tablets (0.5 mg total) by mouth Daily.    cholecalciferol, vitamin D3, 2,000 unit Cap Take 1 capsule by mouth once daily.    nitroGLYCERIN (NITROSTAT) 0.4 MG SL tablet Place 0.4 mg under the tongue every 5 (five) minutes as needed for Chest pain.    ondansetron (ZOFRAN-ODT) 4 MG TbDL Take 1 tablet (4 mg total) by mouth every 8 (eight) hours as needed (for nausea/vomiting). (Patient taking differently: Take 4 mg by mouth every 4 (four) hours as needed (for nausea/vomiting).)    pulse oximeter (PULSE OXIMETER) device by Apply Externally route 2 (two) times a day. Use twice daily at 8 AM and 3 PM and record the value in MyChart as directed.     Family History    None       Tobacco Use    Smoking status: Former Smoker     Quit date: 1964     Years since quittin.5    Smokeless tobacco: Former User   Substance and Sexual Activity    Alcohol use: No     Alcohol/week: 0.0 standard drinks    Drug use: No    Sexual activity: Not Currently     Review of Systems   Constitutional: Negative.   HENT: Negative.    Eyes: Negative.    Cardiovascular: Negative.    Respiratory: Negative.    Endocrine: Negative.    Hematologic/Lymphatic: Negative.    Skin: Negative.    Musculoskeletal: Positive for joint pain.   Gastrointestinal: Negative.    Genitourinary: Negative.    Neurological: Negative.    Psychiatric/Behavioral:  Negative.    Allergic/Immunologic: Negative.      Objective:     Vital Signs (Most Recent):  Temp: 98.2 °F (36.8 °C) (01/09/22 1207)  Pulse: 77 (01/09/22 1207)  Resp: 16 (01/09/22 1207)  BP: 136/73 (01/09/22 1207)  SpO2: 99 % (01/09/22 1207) Vital Signs (24h Range):  Temp:  [97 °F (36.1 °C)-98.7 °F (37.1 °C)] 98.2 °F (36.8 °C)  Pulse:  [61-90] 77  Resp:  [14-22] 16  SpO2:  [94 %-100 %] 99 %  BP: (119-144)/(58-73) 136/73     Weight: 45.4 kg (100 lb)  Body mass index is 20.2 kg/m².    SpO2: 99 %  O2 Device (Oxygen Therapy): nasal cannula      Intake/Output Summary (Last 24 hours) at 1/9/2022 1343  Last data filed at 1/8/2022 1815  Gross per 24 hour   Intake 2482.91 ml   Output --   Net 2482.91 ml       Lines/Drains/Airways     Drain            Female External Urinary Catheter 01/09/22 0619 <1 day          Peripheral Intravenous Line                 Peripheral IV - Single Lumen 01/07/22 20 G Left Hand 2 days         Peripheral IV - Single Lumen 01/07/22 20 G Right Forearm 2 days                Physical Exam  Constitutional:       Appearance: Normal appearance. She is well-developed.   HENT:      Head: Normocephalic.   Eyes:      Pupils: Pupils are equal, round, and reactive to light.   Cardiovascular:      Rate and Rhythm: Rhythm irregular.   Pulmonary:      Effort: Pulmonary effort is normal.      Breath sounds: Normal breath sounds.   Abdominal:      General: Bowel sounds are normal.      Palpations: Abdomen is soft.      Tenderness: There is no abdominal tenderness.   Musculoskeletal:         General: Signs of injury present.      Cervical back: Normal range of motion and neck supple.   Skin:     General: Skin is warm.   Neurological:      Mental Status: She is alert and oriented to person, place, and time.         Significant Labs:   BMP:   Recent Labs   Lab 01/07/22  1744 01/08/22  2105 01/09/22  0532   GLU 76 167* 179*    140 139   K 3.3* 3.7 3.7    105 105   CO2 23 21* 26   BUN 7* 17 19   CREATININE  0.7 0.8 0.8   CALCIUM 7.9* 8.0* 7.6*   MG 1.9  --   --    , CMP   Recent Labs   Lab 01/07/22 1744 01/08/22 2105 01/09/22  0532    140 139   K 3.3* 3.7 3.7    105 105   CO2 23 21* 26   GLU 76 167* 179*   BUN 7* 17 19   CREATININE 0.7 0.8 0.8   CALCIUM 7.9* 8.0* 7.6*   PROT 6.8  --   --    ALBUMIN 2.2*  --   --    BILITOT 0.3  --   --    ALKPHOS 67  --   --    AST 54*  --   --    ALT 33  --   --    ANIONGAP 11 14 8   ESTGFRAFRICA >60 >60 >60   EGFRNONAA >60 >60 >60   , CBC   Recent Labs   Lab 01/07/22 1744 01/07/22 1744 01/08/22 0429 01/08/22 0429 01/08/22 2105 01/08/22 2105 01/09/22  0532   WBC 11.27  --  13.19*  --   --   --  16.26*   HGB 7.7*   < > 7.6*  --  7.4*  --  6.9*  6.9*   HCT 24.4*   < > 24.4*   < > 23.5*   < > 21.3*  21.0*     --  386  --   --   --  365    < > = values in this interval not displayed.   , INR   Recent Labs   Lab 01/07/22 1744 01/08/22 0429 01/09/22  0532   INR 1.7* 1.7* 1.7*   , Lipid Panel No results for input(s): CHOL, HDL, LDLCALC, TRIG, CHOLHDL in the last 48 hours., Troponin   Recent Labs   Lab 01/07/22  2359   TROPONINI 0.046*    and All pertinent lab results from the last 24 hours have been reviewed.    Significant Imaging: Echocardiogram:   Transthoracic echo (TTE) complete (Cupid Only):   Results for orders placed or performed during the hospital encounter of 12/20/21   Echo   Result Value Ref Range    BSA 1.29 m2    TDI SEPTAL 0.08 m/s    LV LATERAL E/E' RATIO 11.22 m/s    LV SEPTAL E/E' RATIO 12.63 m/s    LA WIDTH 4.44 cm    TDI LATERAL 0.09 m/s    PV PEAK VELOCITY 0.78 cm/s    LVIDd 4.34 3.5 - 6.0 cm    IVS 0.79 0.6 - 1.1 cm    Posterior Wall 0.96 0.6 - 1.1 cm    LVIDs 3.04 2.1 - 4.0 cm    FS 30 28 - 44 %    LA volume 89.15 cm3    Sinus 2.89 cm    STJ 2.22 cm    Ascending aorta 0.87 cm    LV mass 120.51 g    LA size 4.40 cm    RVDD 3.33 cm    TAPSE 1.91 cm    Left Ventricle Relative Wall Thickness 0.44 cm    AV mean gradient 5 mmHg    AV valve  area 1.75 cm2    AV Velocity Ratio 0.72     AV index (prosthetic) 0.74     MV valve area p 1/2 method 4.35 cm2    E/A ratio 1.16     Mean e' 0.09 m/s    E wave deceleration time 174.22 msec    IVRT 121.11 msec    LVOT diameter 1.74 cm    LVOT area 2.4 cm2    LVOT peak henrique 1.00 m/s    LVOT peak VTI 22.78 cm    Ao peak henrique 1.38 m/s    Ao VTI 30.97 cm    LVOT stroke volume 54.14 cm3    AV peak gradient 8 mmHg    E/E' ratio 11.88 m/s    MV Peak E Henrique 1.01 m/s    TR Max Henrique 2.49 m/s    MV stenosis pressure 1/2 time 50.52 ms    MV Peak A Henrique 0.87 m/s    LV Systolic Volume 36.17 mL    LV Systolic Volume Index 27.8 mL/m2    LV Diastolic Volume 85.01 mL    LV Diastolic Volume Index 65.39 mL/m2    LA Volume Index 68.6 mL/m2    LV Mass Index 93 g/m2    RA Major Axis 4.79 cm    Left Atrium Minor Axis 5.09 cm    Left Atrium Major Axis 5.68 cm    Triscuspid Valve Regurgitation Peak Gradient 25 mmHg    RA Width 3.52 cm    Right Atrial Pressure (from IVC) 3 mmHg    EF 60 %    TV rest pulmonary artery pressure 28 mmHg    Narrative    · The left ventricle is normal in size with concentric remodeling and   normal systolic function.  · Moderate left atrial enlargement.  · The estimated ejection fraction is 60%.  · Indeterminate left ventricular diastolic function.  · Normal right ventricular size with normal right ventricular systolic   function.  · Mild mitral regurgitation.  · Mild tricuspid regurgitation.  · Normal central venous pressure (3 mmHg).  · The estimated PA systolic pressure is 28 mmHg.  · There is no pulmonary hypertension.        Assessment and Plan:     Brief HPI:     Preop cardiovascular exam  Preoperative risk assessment a patient with coronary artery disease status post PCI.  Prior to fracture could walk about a block without any angina.  Needs to go for urgent/emergent surgery.  Risk of delaying surgery far exceeds any benefit from further cardiovascular evaluation.  Rest and echo had shown left ventricle systolic  function.  Patient may proceed for surgery at moderate risk for coronary ischemia.  Discussed discussed in detail with the patient as well as the family.    Sepsis due to COVID-19          Atrial fibrillation with RVR  AV hellen blocking agents as needed for appropriate heart rate control.  Resume anticoagulation when cleared by surgery after surgery.  Will sign off.  Follow-up in Cardiology Clinic.    Chronic heart failure with preserved ejection fraction  Currently euvolemic.    Coronary artery disease involving native coronary artery of native heart without angina pectoris  Currently angina free.    Chronic recurrent pancreatitis            VTE Risk Mitigation (From admission, onward)         Ordered     heparin (porcine) injection 5,000 Units  Every 8 hours         01/09/22 0047     IP VTE HIGH RISK PATIENT  Once         01/09/22 0047     Place JUNE hose  Until discontinued         01/08/22 0224                Walker Almendarez MD  Cardiology  [facility] - Telemetry

## 2022-01-09 NOTE — ASSESSMENT & PLAN NOTE
NPO for ortho evaluation in am  Patient is currently without capacity to make medical decisions, so will need family  Pain control with low dose dilaudid  CT of the hip showed: nondisplaced fractures involving the right inter trochanteric region and right lesser trochanter.   Cardiology consult for preop given long Cardiac history    she has cleared at moderate risk,S/P IM nail placement on right femur on 1.8.22,HH is lower  today,will transfuse pack of RBC today,PT,OT.

## 2022-01-09 NOTE — PROGRESS NOTES
Ortho Daily Progress Note    Katt Mcneal is a 81 y.o. female admitted on 1/7/2022      Chief Complaint/Reason for admission: Fall (EMS  called to 80yo female that tripped and fell and now c/o right hip pain. Ems reports lateral rotation and slight shortening to right leg. Patient stated she hit her head but no injuries were found. No LOC but patient is on warfarin. )       Hospital Day: 2  Post Op Day: 1 Day Post-Op     The patient was seen and examined this morning at the bedside. Patient reports no acute issues overnight.  Patient reports that pain is adequately controlled.    Sitting up eating in bed    _______________    Vitals:    01/08/22 2339 01/09/22 0030 01/09/22 0600 01/09/22 0746   BP:  125/62 122/62    Pulse:  65 61 90   Resp: 18 17 17 18   Temp:  97.8 °F (36.6 °C) 97.7 °F (36.5 °C)    TempSrc:  Oral Oral    SpO2:  99% 99% 99%   Weight:       Height:           Vital Signs (Most Recent)  Temp: 97.7 °F (36.5 °C) (01/09/22 0600)  Pulse: 90 (01/09/22 0746)  Resp: 18 (01/09/22 0746)  BP: 122/62 (01/09/22 0600)  SpO2: 99 % (01/09/22 0746)    Vital Signs Range (Last 24H):  Temp:  [97 °F (36.1 °C)-98.2 °F (36.8 °C)]   Pulse:  [61-90]   Resp:  [14-22]   BP: (103-144)/(55-70)   SpO2:  [94 %-100 %]       Physical:    Incision/ dressing clean/dry/intact  NVI Distally  Palpable distal pulses  CR<3sec      Recent Labs     01/07/22  1744 01/07/22  1744 01/08/22  0429 01/08/22  2105 01/09/22  0532   K 3.3*  --   --  3.7 3.7   MG 1.9  --   --   --   --    CALCIUM 7.9*  --   --  8.0* 7.6*   WBC 11.27  --  13.19*  --  16.26*   HGB 7.7*   < > 7.6* 7.4* 6.9*  6.9*   HCT 24.4*   < > 24.4* 23.5* 21.3*  21.0*     --  386  --  365   INR 1.7*  --  1.7*  --  1.7*    < > = values in this interval not displayed.       I/O last 3 completed shifts:  In: 2482.9 [I.V.:300; Blood:1482.9; IV Piggyback:700]  Out: -           Assessment:  A/P POD 1 s/p right           Hip IMN  Pos covid    Plan:    PT/OT: WBAT  Pain  Control  DVT Prophylaxis: per primary   Pt on home blood thinning medication  This should be appropriate    Expected postoperative anemia  Will transfuse today    Discharge Plan:  Patient will likely require either a memory unit with skilled nursing or home with home health with her family.  Will continue to make this decision based on physical therapy        Crow Orteag MD  [facility]

## 2022-01-09 NOTE — PROGRESS NOTES
[facility] Medicine  Progress Note    Patient Name: Katt Mcneal  MRN: [id number]  Patient Class: IP- Inpatient   Admission Date: 1/7/2022  Length of Stay: 2 days  Attending Physician: Kody Corona MD  Primary Care Provider: Panchito Perez MD        Subjective:     Principal Problem:Intertrochanteric fracture of right hip        HPI:  Ms. Mcneal is an 82yo lady with a past medical history of Afib on Coumadin, CAD, MI, HTN, pancreatic mass with CBD obstruction and pancreatitis, and hypothyroidism    She was recently admitted to  on 1/3 to 1/6 due to abdominal pain and pancreatitis (MRCP showed severe pancreatic ductal dilation with a suspected pancreatic mass in the body segment measuring up to 2.7 cm.  Recommend further evaluation with EUS and tissue sampling).  Please refer to Dr. Ny's complete discharge summary for details of her stay.  She was also found to be COVID positive on 1/3 during that admission, but her oxygenation was always >94%, so no acute therapy was initiated (CXR showed bilateral mild diffuse nonspecific interstitial coarsening improved from prior, and may reflect residual minimal pulmonary edema versus interstitial type pneumonia or chronic interstitial lung changes.)    She now comes to the ED after tripping and falling at home earlier today.  She cannot remember if she struck her head or not.  After falling she developed right hip pain and was unable to bear any weight on her right leg.  EMS had to be activated and bring her to the ED.  In the ED she was found to have COVID and nondisplaced fractures involving the right inter trochanteric region and right lesser trochanter.  Her O2 sats today went as low as 88% on RA.  No repeat CXR was done in the ED.    On my exam in her room, she is awake, but confused and unwilling to speak or participate in her exam by the nurse.  There is no family present.      Overview/Hospital Course:  patient with history of  necrotizing pancreatitis,CAD,Afib,AOCD,covid,has been  admitted after fall with right intertrochanteric fracture,cardiology is consulted for cardiac clearance,paln for intervention,she has cleared at moderate risk,S/P IM nail placement on right femur on 1.8.22,HH is lower  today,will transfuse pack of RBC today,PT,OT.      Past Medical History:   Diagnosis Date    Arthritis     Atrial fibrillation     Bilateral carotid artery stenosis 7/13/2017    Coronary artery disease     Fibromyalgia     Hyperlipidemia     Hypertension     Myocardial infarction 03/21/2015    Pancreatitis     Thyroid disease        Past Surgical History:   Procedure Laterality Date    CHOLECYSTECTOMY      CORONARY STENT PLACEMENT  3/24/15    HYSTERECTOMY  10/28/2004       Review of patient's allergies indicates:   Allergen Reactions    Sulfa (sulfonamide antibiotics) Hives    Bactrim [sulfamethoxazole-trimethoprim] Other (See Comments)     Pt. Reports that it caused severe pain    Integrilin [eptifibatide]     Percocet [oxycodone-acetaminophen] Itching    Statins-hmg-coa reductase inhibitors Other (See Comments)     Muscle pain. Patient states some, not all statins    Xarelto [rivaroxaban]     Epinephrine Palpitations       No current facility-administered medications on file prior to encounter.     Current Outpatient Medications on File Prior to Encounter   Medication Sig    amiodarone (PACERONE) 200 MG Tab Take 1 tablet (200 mg total) by mouth 2 (two) times daily. (Patient taking differently: Take 200 mg by mouth once daily.)    amLODIPine (NORVASC) 5 MG tablet Take 1 tablet (5 mg total) by mouth once daily.    aspirin (ECOTRIN) 81 MG EC tablet Take 81 mg by mouth once daily.    cyclobenzaprine (FEXMID) 7.5 MG Tab Take 7.5 mg by mouth 3 (three) times daily as needed.    dicyclomine (BENTYL) 20 mg tablet Take 20 mg by mouth 2 (two) times daily.    levothyroxine (SYNTHROID) 75 MCG tablet Take 50 mcg by mouth once daily.      lorazepam (ATIVAN) 0.5 MG tablet Take 0.5 mg by mouth every 6 (six) hours as needed for Anxiety.    metoprolol succinate (TOPROL-XL) 25 MG 24 hr tablet Take 25 mg by mouth.    oxyCODONE-acetaminophen (PERCOCET) 5-325 mg per tablet Take 1 tablet by mouth every 4 (four) hours as needed for Pain.    ramipril (ALTACE) 5 MG capsule Take 5 mg by mouth every Tues, Thurs, Sat. Do not take if below <120    warfarin (COUMADIN) 1 MG tablet Take 0.5 tablets (0.5 mg total) by mouth Daily.    cholecalciferol, vitamin D3, 2,000 unit Cap Take 1 capsule by mouth once daily.    nitroGLYCERIN (NITROSTAT) 0.4 MG SL tablet Place 0.4 mg under the tongue every 5 (five) minutes as needed for Chest pain.    ondansetron (ZOFRAN-ODT) 4 MG TbDL Take 1 tablet (4 mg total) by mouth every 8 (eight) hours as needed (for nausea/vomiting). (Patient taking differently: Take 4 mg by mouth every 4 (four) hours as needed (for nausea/vomiting).)    pulse oximeter (PULSE OXIMETER) device by Apply Externally route 2 (two) times a day. Use twice daily at 8 AM and 3 PM and record the value in MyChart as directed.     Family History    None       Tobacco Use    Smoking status: Former Smoker     Quit date: 1964     Years since quittin.5    Smokeless tobacco: Former User   Substance and Sexual Activity    Alcohol use: No     Alcohol/week: 0.0 standard drinks    Drug use: No    Sexual activity: Not Currently     Review of Systems   Constitutional: Positive for activity change and appetite change.   HENT: Negative for congestion and dental problem.    Eyes: Negative for pain.   Respiratory: Negative for apnea and choking.    Cardiovascular: Negative for chest pain and leg swelling.   Gastrointestinal: Negative for abdominal pain.   Endocrine: Negative for cold intolerance and heat intolerance.   Genitourinary: Negative for difficulty urinating and dyspareunia.   Musculoskeletal: Negative for arthralgias and back pain.   Skin: Negative  for color change and pallor.   Allergic/Immunologic: Negative for environmental allergies and food allergies.   Neurological: Positive for weakness. Negative for dizziness.   Hematological: Negative for adenopathy. Does not bruise/bleed easily.   Psychiatric/Behavioral: Negative for agitation.     Objective:     Vital Signs (Most Recent):  Temp: 98.7 °F (37.1 °C) (01/09/22 0918)  Pulse: 71 (01/09/22 0918)  Resp: 16 (01/09/22 1017)  BP: (!) 119/58 (01/09/22 0918)  SpO2: 97 % (01/09/22 0918) Vital Signs (24h Range):  Temp:  [97 °F (36.1 °C)-98.7 °F (37.1 °C)] 98.7 °F (37.1 °C)  Pulse:  [61-90] 71  Resp:  [14-22] 16  SpO2:  [94 %-100 %] 97 %  BP: (119-144)/(58-70) 119/58     Weight: 45.4 kg (100 lb)  Body mass index is 20.2 kg/m².    Physical Exam  Constitutional:       Appearance: Normal appearance.   HENT:      Head: Normocephalic.      Nose: Nose normal.      Mouth/Throat:      Mouth: Mucous membranes are dry.   Eyes:      Extraocular Movements: Extraocular movements intact.      Pupils: Pupils are equal, round, and reactive to light.   Cardiovascular:      Rate and Rhythm: Tachycardia present. Rhythm irregular.      Heart sounds: No murmur heard.      Pulmonary:      Effort: Pulmonary effort is normal.   Abdominal:      General: Abdomen is flat. There is no distension.      Palpations: Abdomen is soft.      Tenderness: There is no abdominal tenderness.   Musculoskeletal:         General: No swelling or deformity. Normal range of motion.      Cervical back: Normal range of motion and neck supple.   Skin:     General: Skin is warm and dry.   Neurological:      Mental Status: She is alert and oriented to person, place, and time.      Cranial Nerves: No cranial nerve deficit.   Psychiatric:         Mood and Affect: Mood normal.         Behavior: Behavior normal.           CRANIAL NERVES     CN III, IV, VI   Pupils are equal, round, and reactive to light.       Significant Labs:   All pertinent labs within the past 24  hours have been reviewed.  BMP:   Recent Labs   Lab 01/07/22 1744 01/08/22 2105 01/09/22  0532   GLU 76   < > 179*      < > 139   K 3.3*   < > 3.7      < > 105   CO2 23   < > 26   BUN 7*   < > 19   CREATININE 0.7   < > 0.8   CALCIUM 7.9*   < > 7.6*   MG 1.9  --   --     < > = values in this interval not displayed.     CBC:   Recent Labs   Lab 01/07/22 1744 01/07/22 1744 01/08/22  0429 01/08/22 2105 01/09/22  0532   WBC 11.27  --  13.19*  --  16.26*   HGB 7.7*   < > 7.6* 7.4* 6.9*  6.9*   HCT 24.4*   < > 24.4* 23.5* 21.3*  21.0*     --  386  --  365    < > = values in this interval not displayed.     CMP:   Recent Labs   Lab 01/07/22 1744 01/08/22 2105 01/09/22  0532    140 139   K 3.3* 3.7 3.7    105 105   CO2 23 21* 26   GLU 76 167* 179*   BUN 7* 17 19   CREATININE 0.7 0.8 0.8   CALCIUM 7.9* 8.0* 7.6*   PROT 6.8  --   --    ALBUMIN 2.2*  --   --    BILITOT 0.3  --   --    ALKPHOS 67  --   --    AST 54*  --   --    ALT 33  --   --    ANIONGAP 11 14 8   EGFRNONAA >60 >60 >60     Lipase:   Recent Labs   Lab 01/07/22 1744   LIPASE 498*       Significant Imaging: I have reviewed all pertinent imaging results/findings within the past 24 hours.      Assessment/Plan:      * Intertrochanteric fracture of right hip  NPO for ortho evaluation in am  Patient is currently without capacity to make medical decisions, so will need family  Pain control with low dose dilaudid  CT of the hip showed: nondisplaced fractures involving the right inter trochanteric region and right lesser trochanter.   Cardiology consult for preop given long Cardiac history    she has cleared at moderate risk,S/P IM nail placement on right femur on 1.8.22,HH is lower  today,will transfuse pack of RBC today,PT,OT.        Preop cardiovascular exam  Seen by cardiology.      Sepsis due to COVID-19  Patient is identified as Severe COVID-19 based on hypoxemia with O2 saturations <94% on room air or on ambulation    Initiate standard COVID protocols; COVID-19 testing ,Infection Control notification  and isolation- respiratory, contact and droplet per protocol    Diagnostics: (leukopenia, hyponatremia, hyperferritinemia, elevated troponin, elevated d-dimer, age, and comorbidities are significant predictors of poor clinical outcome)  CBC, CMP, Procalcitonin, Ferritin, CRP, LDH, BNP, Troponin, ECG and Portable CXR    Management: Initiate targeted therapy with Remdesivir, 200mg IV x1, followed by 100mg IV daily x5 days total and Dexamethasone PO/IV 6mg daily x10 days, Maintain oxygen saturations 92-96% via Nasal Cannula 2 LPM and monitor with pulse oximetry. , Inhaled bronchodilators as needed for shortness of breath., Continuous cardiac monitoring. and Manage respiratory failure (O2 sats <91% on room air with respiratory symptoms or needing NIPPV/Mechanical ventilation) and/or Pneumonia (active chest infiltrates) separately as described below.    Advance Care Planning  Current advance care plan has not been discussed with patient/family/POA and patient currently wishes Full Code.        Acute respiratory failure with hypoxia    - Order RT consult via Respiratory Communication for COVID Protocols    - Incentive Spirometer Q4h, Flutter Valve Q4h    - Continuous Pulse Oximetry, goal SpO2 92-96%    - Supplemental O2 via LFNC, VentiMask, or HFNC (see Respiratory Support Oxygen Therapies)    - If wheezing, albuterol INH Q6h scheduled & PRN    - Proning Protocol if patient is a candidate (see [facility] Proning Protocol)   - GCS >13, able to self-prone    - If deterioration, may warrant trial of NIPPV in neg pressure room or immediate ICU consult       Hypokalemia  Replacing aggressively and rechecking  Ordered Mg too      Common bile duct obstruction  This was recently worked up  Follow up with GI as per prior plan      Pancreatic mass  This was recently worked up  Follow up with GI as per prior plan      Fall on same level from tripping as  cause of accidental injury  She is on long term coumadin, confused and unsure if she struck her head  CT head stat is now ordered for completeness      Hypothyroid    levothyroxine tablet 50 mcg, 50 mcg, Oral, Before breakfast          Anemia of chronic disease  ,HH is lower  today,will transfuse pack of RBC today,        Atrial fibrillation with RVR  This has now improved into the 80's  Cardiology consult    Current therapy:    amiodarone tablet 200 mg, 200 mg, Oral, Daily    metoprolol injection 5 mg, 5 mg, Intravenous, Q5 Min PRN    metoprolol succinate (TOPROL-XL) 24 hr tablet 25 mg, 25 mg, Oral, Daily            Chronic anticoagulation  Holding coumadin for hip surgery  INR today is 1.7      Chronic heart failure with preserved ejection fraction  BNP elevated mildly to 406  Continue ACE  Changed to Lisinopril 2.5mg po qday for formulary  Continue home beta blocker, Toprol XL 25mg po qday      Coronary artery disease involving native coronary artery of native heart without angina pectoris  Trop 0.046,   Cardiology consult for preop  Holding home ASA 81mg pending hip surgery    Current Facility-Administered Medications:     metoprolol succinate (TOPROL-XL) 24 hr tablet 25 mg, 25 mg, Oral, Daily    nitroGLYCERIN SL tablet 0.4 mg, 0.4 mg, Sublingual, Q5 Min PRN, CP        Chronic recurrent pancreatitis  This was recently worked up  Follow up with GI as per prior plan  Lipase today is mildly improved      Ref. Range 1/3/2022 08:53 1/3/2022 14:00 1/5/2022 03:33 1/7/2022 17:44   Lipase Latest Ref Range: 4 - 60 U/L 792 (H)   498 (H)           VTE Risk Mitigation (From admission, onward)         Ordered     heparin (porcine) injection 5,000 Units  Every 8 hours         01/09/22 0047     IP VTE HIGH RISK PATIENT  Once         01/09/22 0047     Place JUNE hose  Until discontinued         01/08/22 0224                Discharge Planning   FRANCO:      Code Status: Full Code   Is the patient medically ready for  discharge?:     Reason for patient still in hospital (select all that apply): Patient trending condition  Discharge Plan A: Skilled Nursing Facility                  Kody Corona MD  Department of Hospital Medicine   [facility]

## 2022-01-09 NOTE — SUBJECTIVE & OBJECTIVE
Past Medical History:   Diagnosis Date    Arthritis     Atrial fibrillation     Bilateral carotid artery stenosis 7/13/2017    Coronary artery disease     Fibromyalgia     Hyperlipidemia     Hypertension     Myocardial infarction 03/21/2015    Pancreatitis     Thyroid disease        Past Surgical History:   Procedure Laterality Date    CHOLECYSTECTOMY      CORONARY STENT PLACEMENT  3/24/15    HYSTERECTOMY  10/28/2004       Review of patient's allergies indicates:   Allergen Reactions    Sulfa (sulfonamide antibiotics) Hives    Bactrim [sulfamethoxazole-trimethoprim] Other (See Comments)     Pt. Reports that it caused severe pain    Integrilin [eptifibatide]     Percocet [oxycodone-acetaminophen] Itching    Statins-hmg-coa reductase inhibitors Other (See Comments)     Muscle pain. Patient states some, not all statins    Xarelto [rivaroxaban]     Epinephrine Palpitations       No current facility-administered medications on file prior to encounter.     Current Outpatient Medications on File Prior to Encounter   Medication Sig    amiodarone (PACERONE) 200 MG Tab Take 1 tablet (200 mg total) by mouth 2 (two) times daily. (Patient taking differently: Take 200 mg by mouth once daily.)    amLODIPine (NORVASC) 5 MG tablet Take 1 tablet (5 mg total) by mouth once daily.    aspirin (ECOTRIN) 81 MG EC tablet Take 81 mg by mouth once daily.    cyclobenzaprine (FEXMID) 7.5 MG Tab Take 7.5 mg by mouth 3 (three) times daily as needed.    dicyclomine (BENTYL) 20 mg tablet Take 20 mg by mouth 2 (two) times daily.    levothyroxine (SYNTHROID) 75 MCG tablet Take 50 mcg by mouth once daily.     lorazepam (ATIVAN) 0.5 MG tablet Take 0.5 mg by mouth every 6 (six) hours as needed for Anxiety.    metoprolol succinate (TOPROL-XL) 25 MG 24 hr tablet Take 25 mg by mouth.    oxyCODONE-acetaminophen (PERCOCET) 5-325 mg per tablet Take 1 tablet by mouth every 4 (four) hours as needed for Pain.    ramipril (ALTACE) 5  MG capsule Take 5 mg by mouth every Tues, Thurs, Sat. Do not take if below <120    warfarin (COUMADIN) 1 MG tablet Take 0.5 tablets (0.5 mg total) by mouth Daily.    cholecalciferol, vitamin D3, 2,000 unit Cap Take 1 capsule by mouth once daily.    nitroGLYCERIN (NITROSTAT) 0.4 MG SL tablet Place 0.4 mg under the tongue every 5 (five) minutes as needed for Chest pain.    ondansetron (ZOFRAN-ODT) 4 MG TbDL Take 1 tablet (4 mg total) by mouth every 8 (eight) hours as needed (for nausea/vomiting). (Patient taking differently: Take 4 mg by mouth every 4 (four) hours as needed (for nausea/vomiting).)    pulse oximeter (PULSE OXIMETER) device by Apply Externally route 2 (two) times a day. Use twice daily at 8 AM and 3 PM and record the value in MyChart as directed.     Family History    None       Tobacco Use    Smoking status: Former Smoker     Quit date: 1964     Years since quittin.5    Smokeless tobacco: Former User   Substance and Sexual Activity    Alcohol use: No     Alcohol/week: 0.0 standard drinks    Drug use: No    Sexual activity: Not Currently     Review of Systems   Constitutional: Positive for activity change and appetite change.   HENT: Negative for congestion and dental problem.    Eyes: Negative for pain.   Respiratory: Negative for apnea and choking.    Cardiovascular: Negative for chest pain and leg swelling.   Gastrointestinal: Negative for abdominal pain.   Endocrine: Negative for cold intolerance and heat intolerance.   Genitourinary: Negative for difficulty urinating and dyspareunia.   Musculoskeletal: Negative for arthralgias and back pain.   Skin: Negative for color change and pallor.   Allergic/Immunologic: Negative for environmental allergies and food allergies.   Neurological: Positive for weakness. Negative for dizziness.   Hematological: Negative for adenopathy. Does not bruise/bleed easily.   Psychiatric/Behavioral: Negative for agitation.     Objective:     Vital Signs  (Most Recent):  Temp: 98.7 °F (37.1 °C) (01/09/22 0918)  Pulse: 71 (01/09/22 0918)  Resp: 16 (01/09/22 1017)  BP: (!) 119/58 (01/09/22 0918)  SpO2: 97 % (01/09/22 0918) Vital Signs (24h Range):  Temp:  [97 °F (36.1 °C)-98.7 °F (37.1 °C)] 98.7 °F (37.1 °C)  Pulse:  [61-90] 71  Resp:  [14-22] 16  SpO2:  [94 %-100 %] 97 %  BP: (119-144)/(58-70) 119/58     Weight: 45.4 kg (100 lb)  Body mass index is 20.2 kg/m².    Physical Exam  Constitutional:       Appearance: Normal appearance.   HENT:      Head: Normocephalic.      Nose: Nose normal.      Mouth/Throat:      Mouth: Mucous membranes are dry.   Eyes:      Extraocular Movements: Extraocular movements intact.      Pupils: Pupils are equal, round, and reactive to light.   Cardiovascular:      Rate and Rhythm: Tachycardia present. Rhythm irregular.      Heart sounds: No murmur heard.      Pulmonary:      Effort: Pulmonary effort is normal.   Abdominal:      General: Abdomen is flat. There is no distension.      Palpations: Abdomen is soft.      Tenderness: There is no abdominal tenderness.   Musculoskeletal:         General: No swelling or deformity. Normal range of motion.      Cervical back: Normal range of motion and neck supple.   Skin:     General: Skin is warm and dry.   Neurological:      Mental Status: She is alert and oriented to person, place, and time.      Cranial Nerves: No cranial nerve deficit.   Psychiatric:         Mood and Affect: Mood normal.         Behavior: Behavior normal.           CRANIAL NERVES     CN III, IV, VI   Pupils are equal, round, and reactive to light.       Significant Labs:   All pertinent labs within the past 24 hours have been reviewed.  BMP:   Recent Labs   Lab 01/07/22  1744 01/08/22  2105 01/09/22  0532   GLU 76   < > 179*      < > 139   K 3.3*   < > 3.7      < > 105   CO2 23   < > 26   BUN 7*   < > 19   CREATININE 0.7   < > 0.8   CALCIUM 7.9*   < > 7.6*   MG 1.9  --   --     < > = values in this interval not  displayed.     CBC:   Recent Labs   Lab 01/07/22  1744 01/07/22  1744 01/08/22  0429 01/08/22 2105 01/09/22  0532   WBC 11.27  --  13.19*  --  16.26*   HGB 7.7*   < > 7.6* 7.4* 6.9*  6.9*   HCT 24.4*   < > 24.4* 23.5* 21.3*  21.0*     --  386  --  365    < > = values in this interval not displayed.     CMP:   Recent Labs   Lab 01/07/22 1744 01/08/22 2105 01/09/22  0532    140 139   K 3.3* 3.7 3.7    105 105   CO2 23 21* 26   GLU 76 167* 179*   BUN 7* 17 19   CREATININE 0.7 0.8 0.8   CALCIUM 7.9* 8.0* 7.6*   PROT 6.8  --   --    ALBUMIN 2.2*  --   --    BILITOT 0.3  --   --    ALKPHOS 67  --   --    AST 54*  --   --    ALT 33  --   --    ANIONGAP 11 14 8   EGFRNONAA >60 >60 >60     Lipase:   Recent Labs   Lab 01/07/22 1744   LIPASE 498*       Significant Imaging: I have reviewed all pertinent imaging results/findings within the past 24 hours.

## 2022-01-09 NOTE — ASSESSMENT & PLAN NOTE
AV hellen blocking agents as needed for appropriate heart rate control.  Resume anticoagulation when cleared by surgery after surgery.  Will sign off.  Follow-up in Cardiology Clinic.

## 2022-01-09 NOTE — PLAN OF CARE
Problem: Occupational Therapy Goal  Goal: Occupational Therapy Goal  Description: Goals to be met by: 1/23/22     Patient will increase functional independence with ADLs by performing:    LE Dressing with Stand-by Assistance.  Grooming while standing at sink with Stand-by Assistance.  Toileting from bedside commode with Stand-by Assistance for hygiene and clothing management.   Supine to sit with Contact Guard Assistance.  Step transfer with Stand-by Assistance  Toilet transfer to bedside commode with Stand-by Assistance.  Upper extremity exercise program x15 reps per handout, with independence.    Outcome: Ongoing, Progressing    Pt very pleasant and willing to participate in tx session this date. Pt w/ pain in RLE, resulting in hesitancy w/ bearing weight as tolerated but with time, was able to stand and laterally step to HOB w/ min A-CGA and use of RW. Pt will continue to benefit from skilled acute OT services to maximize functional capacity for safe performance w/ ADLs and functional mobility.

## 2022-01-09 NOTE — SUBJECTIVE & OBJECTIVE
Interval History:  Underwent surgery yesterday.  Uneventful.  No chest pains.      Past Medical History:   Diagnosis Date    Arthritis     Atrial fibrillation     Bilateral carotid artery stenosis 7/13/2017    Coronary artery disease     Fibromyalgia     Hyperlipidemia     Hypertension     Myocardial infarction 03/21/2015    Pancreatitis     Thyroid disease        Past Surgical History:   Procedure Laterality Date    CHOLECYSTECTOMY      CORONARY STENT PLACEMENT  3/24/15    HYSTERECTOMY  10/28/2004       Review of patient's allergies indicates:   Allergen Reactions    Sulfa (sulfonamide antibiotics) Hives    Bactrim [sulfamethoxazole-trimethoprim] Other (See Comments)     Pt. Reports that it caused severe pain    Integrilin [eptifibatide]     Percocet [oxycodone-acetaminophen] Itching    Statins-hmg-coa reductase inhibitors Other (See Comments)     Muscle pain. Patient states some, not all statins    Xarelto [rivaroxaban]     Epinephrine Palpitations       No current facility-administered medications on file prior to encounter.     Current Outpatient Medications on File Prior to Encounter   Medication Sig    amiodarone (PACERONE) 200 MG Tab Take 1 tablet (200 mg total) by mouth 2 (two) times daily. (Patient taking differently: Take 200 mg by mouth once daily.)    amLODIPine (NORVASC) 5 MG tablet Take 1 tablet (5 mg total) by mouth once daily.    aspirin (ECOTRIN) 81 MG EC tablet Take 81 mg by mouth once daily.    cyclobenzaprine (FEXMID) 7.5 MG Tab Take 7.5 mg by mouth 3 (three) times daily as needed.    dicyclomine (BENTYL) 20 mg tablet Take 20 mg by mouth 2 (two) times daily.    levothyroxine (SYNTHROID) 75 MCG tablet Take 50 mcg by mouth once daily.     lorazepam (ATIVAN) 0.5 MG tablet Take 0.5 mg by mouth every 6 (six) hours as needed for Anxiety.    metoprolol succinate (TOPROL-XL) 25 MG 24 hr tablet Take 25 mg by mouth.    oxyCODONE-acetaminophen (PERCOCET) 5-325 mg per tablet Take  1 tablet by mouth every 4 (four) hours as needed for Pain.    ramipril (ALTACE) 5 MG capsule Take 5 mg by mouth every Tues, Thurs, Sat. Do not take if below <120    warfarin (COUMADIN) 1 MG tablet Take 0.5 tablets (0.5 mg total) by mouth Daily.    cholecalciferol, vitamin D3, 2,000 unit Cap Take 1 capsule by mouth once daily.    nitroGLYCERIN (NITROSTAT) 0.4 MG SL tablet Place 0.4 mg under the tongue every 5 (five) minutes as needed for Chest pain.    ondansetron (ZOFRAN-ODT) 4 MG TbDL Take 1 tablet (4 mg total) by mouth every 8 (eight) hours as needed (for nausea/vomiting). (Patient taking differently: Take 4 mg by mouth every 4 (four) hours as needed (for nausea/vomiting).)    pulse oximeter (PULSE OXIMETER) device by Apply Externally route 2 (two) times a day. Use twice daily at 8 AM and 3 PM and record the value in MyChart as directed.     Family History    None       Tobacco Use    Smoking status: Former Smoker     Quit date: 1964     Years since quittin.5    Smokeless tobacco: Former User   Substance and Sexual Activity    Alcohol use: No     Alcohol/week: 0.0 standard drinks    Drug use: No    Sexual activity: Not Currently     Review of Systems   Constitutional: Negative.   HENT: Negative.    Eyes: Negative.    Cardiovascular: Negative.    Respiratory: Negative.    Endocrine: Negative.    Hematologic/Lymphatic: Negative.    Skin: Negative.    Musculoskeletal: Positive for joint pain.   Gastrointestinal: Negative.    Genitourinary: Negative.    Neurological: Negative.    Psychiatric/Behavioral: Negative.    Allergic/Immunologic: Negative.      Objective:     Vital Signs (Most Recent):  Temp: 98.2 °F (36.8 °C) (22)  Pulse: 77 (22 120)  Resp: 16 (22)  BP: 136/73 (22)  SpO2: 99 % (22) Vital Signs (24h Range):  Temp:  [97 °F (36.1 °C)-98.7 °F (37.1 °C)] 98.2 °F (36.8 °C)  Pulse:  [61-90] 77  Resp:  [14-22] 16  SpO2:  [94 %-100 %] 99 %  BP:  (119-144)/(58-73) 136/73     Weight: 45.4 kg (100 lb)  Body mass index is 20.2 kg/m².    SpO2: 99 %  O2 Device (Oxygen Therapy): nasal cannula      Intake/Output Summary (Last 24 hours) at 1/9/2022 1343  Last data filed at 1/8/2022 1815  Gross per 24 hour   Intake 2482.91 ml   Output --   Net 2482.91 ml       Lines/Drains/Airways     Drain            Female External Urinary Catheter 01/09/22 0619 <1 day          Peripheral Intravenous Line                 Peripheral IV - Single Lumen 01/07/22 20 G Left Hand 2 days         Peripheral IV - Single Lumen 01/07/22 20 G Right Forearm 2 days                Physical Exam  Constitutional:       Appearance: Normal appearance. She is well-developed.   HENT:      Head: Normocephalic.   Eyes:      Pupils: Pupils are equal, round, and reactive to light.   Cardiovascular:      Rate and Rhythm: Rhythm irregular.   Pulmonary:      Effort: Pulmonary effort is normal.      Breath sounds: Normal breath sounds.   Abdominal:      General: Bowel sounds are normal.      Palpations: Abdomen is soft.      Tenderness: There is no abdominal tenderness.   Musculoskeletal:         General: Signs of injury present.      Cervical back: Normal range of motion and neck supple.   Skin:     General: Skin is warm.   Neurological:      Mental Status: She is alert and oriented to person, place, and time.         Significant Labs:   BMP:   Recent Labs   Lab 01/07/22 1744 01/08/22 2105 01/09/22  0532   GLU 76 167* 179*    140 139   K 3.3* 3.7 3.7    105 105   CO2 23 21* 26   BUN 7* 17 19   CREATININE 0.7 0.8 0.8   CALCIUM 7.9* 8.0* 7.6*   MG 1.9  --   --    , CMP   Recent Labs   Lab 01/07/22 1744 01/08/22 2105 01/09/22  0532    140 139   K 3.3* 3.7 3.7    105 105   CO2 23 21* 26   GLU 76 167* 179*   BUN 7* 17 19   CREATININE 0.7 0.8 0.8   CALCIUM 7.9* 8.0* 7.6*   PROT 6.8  --   --    ALBUMIN 2.2*  --   --    BILITOT 0.3  --   --    ALKPHOS 67  --   --    AST 54*  --   --    ALT  33  --   --    ANIONGAP 11 14 8   ESTGFRAFRICA >60 >60 >60   EGFRNONAA >60 >60 >60   , CBC   Recent Labs   Lab 01/07/22 1744 01/07/22  1744 01/08/22 0429 01/08/22  0429 01/08/22  2105 01/08/22  2105 01/09/22  0532   WBC 11.27  --  13.19*  --   --   --  16.26*   HGB 7.7*   < > 7.6*  --  7.4*  --  6.9*  6.9*   HCT 24.4*   < > 24.4*   < > 23.5*   < > 21.3*  21.0*     --  386  --   --   --  365    < > = values in this interval not displayed.   , INR   Recent Labs   Lab 01/07/22 1744 01/08/22 0429 01/09/22  0532   INR 1.7* 1.7* 1.7*   , Lipid Panel No results for input(s): CHOL, HDL, LDLCALC, TRIG, CHOLHDL in the last 48 hours., Troponin   Recent Labs   Lab 01/07/22  2359   TROPONINI 0.046*    and All pertinent lab results from the last 24 hours have been reviewed.    Significant Imaging: Echocardiogram:   Transthoracic echo (TTE) complete (Cupid Only):   Results for orders placed or performed during the hospital encounter of 12/20/21   Echo   Result Value Ref Range    BSA 1.29 m2    TDI SEPTAL 0.08 m/s    LV LATERAL E/E' RATIO 11.22 m/s    LV SEPTAL E/E' RATIO 12.63 m/s    LA WIDTH 4.44 cm    TDI LATERAL 0.09 m/s    PV PEAK VELOCITY 0.78 cm/s    LVIDd 4.34 3.5 - 6.0 cm    IVS 0.79 0.6 - 1.1 cm    Posterior Wall 0.96 0.6 - 1.1 cm    LVIDs 3.04 2.1 - 4.0 cm    FS 30 28 - 44 %    LA volume 89.15 cm3    Sinus 2.89 cm    STJ 2.22 cm    Ascending aorta 0.87 cm    LV mass 120.51 g    LA size 4.40 cm    RVDD 3.33 cm    TAPSE 1.91 cm    Left Ventricle Relative Wall Thickness 0.44 cm    AV mean gradient 5 mmHg    AV valve area 1.75 cm2    AV Velocity Ratio 0.72     AV index (prosthetic) 0.74     MV valve area p 1/2 method 4.35 cm2    E/A ratio 1.16     Mean e' 0.09 m/s    E wave deceleration time 174.22 msec    IVRT 121.11 msec    LVOT diameter 1.74 cm    LVOT area 2.4 cm2    LVOT peak calryn 1.00 m/s    LVOT peak VTI 22.78 cm    Ao peak carlyn 1.38 m/s    Ao VTI 30.97 cm    LVOT stroke volume 54.14 cm3    AV peak gradient  8 mmHg    E/E' ratio 11.88 m/s    MV Peak E Henrique 1.01 m/s    TR Max Henrique 2.49 m/s    MV stenosis pressure 1/2 time 50.52 ms    MV Peak A Henrique 0.87 m/s    LV Systolic Volume 36.17 mL    LV Systolic Volume Index 27.8 mL/m2    LV Diastolic Volume 85.01 mL    LV Diastolic Volume Index 65.39 mL/m2    LA Volume Index 68.6 mL/m2    LV Mass Index 93 g/m2    RA Major Axis 4.79 cm    Left Atrium Minor Axis 5.09 cm    Left Atrium Major Axis 5.68 cm    Triscuspid Valve Regurgitation Peak Gradient 25 mmHg    RA Width 3.52 cm    Right Atrial Pressure (from IVC) 3 mmHg    EF 60 %    TV rest pulmonary artery pressure 28 mmHg    Narrative    · The left ventricle is normal in size with concentric remodeling and   normal systolic function.  · Moderate left atrial enlargement.  · The estimated ejection fraction is 60%.  · Indeterminate left ventricular diastolic function.  · Normal right ventricular size with normal right ventricular systolic   function.  · Mild mitral regurgitation.  · Mild tricuspid regurgitation.  · Normal central venous pressure (3 mmHg).  · The estimated PA systolic pressure is 28 mmHg.  · There is no pulmonary hypertension.

## 2022-01-09 NOTE — PLAN OF CARE
Problem: Adult Inpatient Plan of Care  Goal: Plan of Care Review  Outcome: Ongoing, Progressing  Goal: Patient-Specific Goal (Individualized)  Outcome: Ongoing, Progressing  Goal: Absence of Hospital-Acquired Illness or Injury  Outcome: Ongoing, Progressing  Goal: Optimal Comfort and Wellbeing  Outcome: Ongoing, Progressing  Goal: Readiness for Transition of Care  Outcome: Ongoing, Progressing         Patient not in severe pain.   She denies pain during rest ( leg rest) period.  She has no sleep during night.  No any distress.  In oxygen all the time.  Safety maintained.  Isolation maintained.

## 2022-01-09 NOTE — PLAN OF CARE
Pt awake, alert, denies nausea, pain well managed with fentanyl, scant amt dark red drainage on 1 of 3 dressings to rt thigh, circulation optimal to rt leg and foot with brisk cap refill, no discoloration, no numbness  or tingling, calm and cooperative, VSS, meets criteria for release from pacu

## 2022-01-09 NOTE — ANESTHESIA POSTPROCEDURE EVALUATION
Anesthesia Post Evaluation    Patient: Katt Mcneal    Procedure(s) Performed: Procedure(s) (LRB):  INSERTION, INTRAMEDULLARY TASHI, FEMUR (Right)    Final Anesthesia Type: general      Patient location during evaluation: PACU  Patient participation: Yes- Able to Participate  Level of consciousness: awake and alert, oriented and awake  Post-procedure vital signs: reviewed and stable  Pain management: adequate  Airway patency: patent    PONV status at discharge: No PONV  Anesthetic complications: no      Cardiovascular status: blood pressure returned to baseline, hemodynamically stable and stable  Respiratory status: unassisted and spontaneous ventilation  Hydration status: euvolemic  Follow-up not needed.          Vitals Value Taken Time   /62 01/09/22 0030   Temp 36.6 °C (97.8 °F) 01/09/22 0030   Pulse 65 01/09/22 0030   Resp 17 01/09/22 0030   SpO2 99 % 01/09/22 0030         Event Time   Out of Recovery 01/08/2022 18:48:00         Pain/Carolina Score: Pain Rating Prior to Med Admin: 8 (1/8/2022 11:39 PM)  [place] Score: 9 (1/8/2022  6:10 PM)

## 2022-01-09 NOTE — NURSING
Patient report taken from day nurse. Patient on the bed.  Is on oxygen 3L.  She just came from OT recovery room.  She is alert now.  Bed in low and locked position.  Call bell in reach.  Has pain on and off. So will give her pain medicine accordingly.  IV site assessed.  Tele monitor checked.    Patient has no fear and concerns.  Will continue to monitor.

## 2022-01-09 NOTE — NURSING
Messaged doctor about not b eing able to do 2 units of blood patient need repeat after first unit. Then other unit may be given if necessary per blood bank

## 2022-01-10 LAB
ANION GAP SERPL CALC-SCNC: 5 MMOL/L (ref 8–16)
BASOPHILS # BLD AUTO: ABNORMAL K/UL (ref 0–0.2)
BASOPHILS NFR BLD: 0 % (ref 0–1.9)
BUN SERPL-MCNC: 18 MG/DL (ref 8–23)
CALCIUM SERPL-MCNC: 7.8 MG/DL (ref 8.7–10.5)
CHLORIDE SERPL-SCNC: 107 MMOL/L (ref 95–110)
CO2 SERPL-SCNC: 29 MMOL/L (ref 23–29)
CREAT SERPL-MCNC: 0.7 MG/DL (ref 0.5–1.4)
D DIMER PPP IA.FEU-MCNC: 2.78 MG/L FEU
DIFFERENTIAL METHOD: ABNORMAL
EOSINOPHIL # BLD AUTO: ABNORMAL K/UL (ref 0–0.5)
EOSINOPHIL NFR BLD: 0 % (ref 0–8)
ERYTHROCYTE [DISTWIDTH] IN BLOOD BY AUTOMATED COUNT: 16.3 % (ref 11.5–14.5)
EST. GFR  (AFRICAN AMERICAN): >60 ML/MIN/1.73 M^2
EST. GFR  (NON AFRICAN AMERICAN): >60 ML/MIN/1.73 M^2
FERRITIN SERPL-MCNC: 390 NG/ML (ref 20–300)
GLUCOSE SERPL-MCNC: 131 MG/DL (ref 70–110)
HCT VFR BLD AUTO: 27 % (ref 37–48.5)
HCT VFR BLD AUTO: 27.5 % (ref 37–48.5)
HGB BLD-MCNC: 8.9 G/DL (ref 12–16)
HGB BLD-MCNC: 9 G/DL (ref 12–16)
IMM GRANULOCYTES # BLD AUTO: ABNORMAL K/UL (ref 0–0.04)
IMM GRANULOCYTES NFR BLD AUTO: ABNORMAL % (ref 0–0.5)
INR PPP: 2 (ref 0.8–1.2)
LDH SERPL L TO P-CCNC: 247 U/L (ref 110–260)
LYMPHOCYTES # BLD AUTO: ABNORMAL K/UL (ref 1–4.8)
LYMPHOCYTES NFR BLD: 3 % (ref 18–48)
MCH RBC QN AUTO: 26.8 PG (ref 27–31)
MCHC RBC AUTO-ENTMCNC: 33.3 G/DL (ref 32–36)
MCV RBC AUTO: 80 FL (ref 82–98)
MONOCYTES # BLD AUTO: ABNORMAL K/UL (ref 0.3–1)
MONOCYTES NFR BLD: 1 % (ref 4–15)
NEUTROPHILS NFR BLD: 96 % (ref 38–73)
NRBC BLD-RTO: 0 /100 WBC
PLATELET # BLD AUTO: 366 K/UL (ref 150–450)
PMV BLD AUTO: 10.3 FL (ref 9.2–12.9)
POCT GLUCOSE: 133 MG/DL (ref 70–110)
POCT GLUCOSE: 145 MG/DL (ref 70–110)
POCT GLUCOSE: 193 MG/DL (ref 70–110)
POTASSIUM SERPL-SCNC: 3.6 MMOL/L (ref 3.5–5.1)
PROTHROMBIN TIME: 20.7 SEC (ref 9–12.5)
RBC # BLD AUTO: 3.36 M/UL (ref 4–5.4)
SODIUM SERPL-SCNC: 141 MMOL/L (ref 136–145)
WBC # BLD AUTO: 17.83 K/UL (ref 3.9–12.7)

## 2022-01-10 PROCEDURE — 25000242 PHARM REV CODE 250 ALT 637 W/ HCPCS: Performed by: ORTHOPAEDIC SURGERY

## 2022-01-10 PROCEDURE — 94761 N-INVAS EAR/PLS OXIMETRY MLT: CPT

## 2022-01-10 PROCEDURE — 63600175 PHARM REV CODE 636 W HCPCS: Performed by: ORTHOPAEDIC SURGERY

## 2022-01-10 PROCEDURE — 85379 FIBRIN DEGRADATION QUANT: CPT | Performed by: ORTHOPAEDIC SURGERY

## 2022-01-10 PROCEDURE — 82728 ASSAY OF FERRITIN: CPT | Performed by: ORTHOPAEDIC SURGERY

## 2022-01-10 PROCEDURE — 25000003 PHARM REV CODE 250: Performed by: ORTHOPAEDIC SURGERY

## 2022-01-10 PROCEDURE — 85018 HEMOGLOBIN: CPT | Performed by: ORTHOPAEDIC SURGERY

## 2022-01-10 PROCEDURE — 21400001 HC TELEMETRY ROOM

## 2022-01-10 PROCEDURE — 83615 LACTATE (LD) (LDH) ENZYME: CPT | Performed by: ORTHOPAEDIC SURGERY

## 2022-01-10 PROCEDURE — 97530 THERAPEUTIC ACTIVITIES: CPT | Mod: CQ

## 2022-01-10 PROCEDURE — 86580 TB INTRADERMAL TEST: CPT | Performed by: HOSPITALIST

## 2022-01-10 PROCEDURE — 97116 GAIT TRAINING THERAPY: CPT | Mod: CQ

## 2022-01-10 PROCEDURE — 30200315 PPD INTRADERMAL TEST REV CODE 302: Performed by: HOSPITALIST

## 2022-01-10 PROCEDURE — 85007 BL SMEAR W/DIFF WBC COUNT: CPT | Performed by: ORTHOPAEDIC SURGERY

## 2022-01-10 PROCEDURE — 94640 AIRWAY INHALATION TREATMENT: CPT

## 2022-01-10 PROCEDURE — 27000221 HC OXYGEN, UP TO 24 HOURS

## 2022-01-10 PROCEDURE — 80048 BASIC METABOLIC PNL TOTAL CA: CPT | Performed by: ORTHOPAEDIC SURGERY

## 2022-01-10 PROCEDURE — 36415 COLL VENOUS BLD VENIPUNCTURE: CPT | Performed by: ORTHOPAEDIC SURGERY

## 2022-01-10 PROCEDURE — 27000207 HC ISOLATION

## 2022-01-10 PROCEDURE — 97110 THERAPEUTIC EXERCISES: CPT

## 2022-01-10 PROCEDURE — 85027 COMPLETE CBC AUTOMATED: CPT | Performed by: ORTHOPAEDIC SURGERY

## 2022-01-10 PROCEDURE — 85610 PROTHROMBIN TIME: CPT | Performed by: ORTHOPAEDIC SURGERY

## 2022-01-10 PROCEDURE — 85014 HEMATOCRIT: CPT | Performed by: ORTHOPAEDIC SURGERY

## 2022-01-10 PROCEDURE — 25000003 PHARM REV CODE 250: Performed by: HOSPITALIST

## 2022-01-10 RX ORDER — AMLODIPINE BESYLATE 5 MG/1
5 TABLET ORAL DAILY
Status: DISCONTINUED | OUTPATIENT
Start: 2022-01-10 | End: 2022-01-14

## 2022-01-10 RX ORDER — LOSARTAN POTASSIUM 25 MG/1
25 TABLET ORAL DAILY
Status: DISCONTINUED | OUTPATIENT
Start: 2022-01-10 | End: 2022-01-13

## 2022-01-10 RX ADMIN — AMIODARONE HYDROCHLORIDE 200 MG: 200 TABLET ORAL at 08:01

## 2022-01-10 RX ADMIN — OXYCODONE HYDROCHLORIDE AND ACETAMINOPHEN 500 MG: 500 TABLET ORAL at 08:01

## 2022-01-10 RX ADMIN — THERA TABS 1 TABLET: TAB at 08:01

## 2022-01-10 RX ADMIN — DOCUSATE SODIUM 100 MG: 100 CAPSULE ORAL at 08:01

## 2022-01-10 RX ADMIN — HEPARIN SODIUM 5000 UNITS: 5000 INJECTION INTRAVENOUS; SUBCUTANEOUS at 05:01

## 2022-01-10 RX ADMIN — LOSARTAN POTASSIUM 25 MG: 25 TABLET, FILM COATED ORAL at 08:01

## 2022-01-10 RX ADMIN — ALBUTEROL SULFATE 2 PUFF: 108 INHALANT RESPIRATORY (INHALATION) at 08:01

## 2022-01-10 RX ADMIN — MUPIROCIN: 20 OINTMENT TOPICAL at 08:01

## 2022-01-10 RX ADMIN — AMLODIPINE BESYLATE 5 MG: 5 TABLET ORAL at 08:01

## 2022-01-10 RX ADMIN — TUBERCULIN PURIFIED PROTEIN DERIVATIVE 5 UNITS: 5 INJECTION, SOLUTION INTRADERMAL at 11:01

## 2022-01-10 RX ADMIN — ACETAMINOPHEN 650 MG: 325 TABLET ORAL at 07:01

## 2022-01-10 RX ADMIN — HYDROMORPHONE HYDROCHLORIDE 0.5 MG: 2 INJECTION INTRAMUSCULAR; INTRAVENOUS; SUBCUTANEOUS at 02:01

## 2022-01-10 RX ADMIN — LEVOTHYROXINE SODIUM 50 MCG: 50 TABLET ORAL at 05:01

## 2022-01-10 RX ADMIN — REMDESIVIR 100 MG: 100 INJECTION, POWDER, LYOPHILIZED, FOR SOLUTION INTRAVENOUS at 08:01

## 2022-01-10 RX ADMIN — CHOLECALCIFEROL TAB 25 MCG (1000 UNIT) 2000 UNITS: 25 TAB at 08:01

## 2022-01-10 RX ADMIN — METOPROLOL SUCCINATE 25 MG: 25 TABLET, EXTENDED RELEASE ORAL at 08:01

## 2022-01-10 RX ADMIN — DEXAMETHASONE 6 MG: 2 TABLET ORAL at 08:01

## 2022-01-10 NOTE — ASSESSMENT & PLAN NOTE
Pain control with low dose dilaudid  CT of the hip showed: nondisplaced fractures involving the right inter trochanteric region and right lesser trochanter.   Cardiology consult for preop given long Cardiac history    she has cleared at moderate risk,S/P IM nail placement on right femur on 1.8.22,        PT,OT recommending SNF,consulted SW.

## 2022-01-10 NOTE — ASSESSMENT & PLAN NOTE
This has now improved into the 80's  Cardiology consult    Current therapy:    amiodarone tablet 200 mg, 200 mg, Oral, Daily    metoprolol injection 5 mg, 5 mg, Intravenous, Q5 Min PRN    metoprolol succinate (TOPROL-XL) 24 hr tablet 25 mg, 25 mg, Oral, Daily.  Improved.

## 2022-01-10 NOTE — PLAN OF CARE
Problem: Occupational Therapy Goal  Goal: Occupational Therapy Goal  Description: Goals to be met by: 1/23/22     Patient will increase functional independence with ADLs by performing:    LE Dressing with Stand-by Assistance.  Grooming while standing at sink with Stand-by Assistance.  Toileting from bedside commode with Stand-by Assistance for hygiene and clothing management.   Supine to sit with Contact Guard Assistance.  Step transfer with Stand-by Assistance  Toilet transfer to bedside commode with Stand-by Assistance.  Upper extremity exercise program x15 reps per handout, with independence.    Outcome: Ongoing, Progressing    Pt very pleasant and willing to participate in tx session this date w/ min encouragement. Pt was able to stand and laterally ambulate along bed w/ min A and use of RW; pt requiring cueing for bearing weight as tolerated through RLE but demo'd progress with this date as compared to day of eval. Pt will continue to benefit from skilled acute OT services to maximize functional capacity for safe performance w/ ADLs and functional mobility.

## 2022-01-10 NOTE — PLAN OF CARE
01/10/22 1620   Medicare Message   Important Message from Medicare regarding Discharge Appeal Rights Given to patient/caregiver;Explained to patient/caregiver;Signed/date by patient/caregiver;Other (comments)   Date IMM was signed 01/10/22   Time IMM was signed 1620   [facility] MAIL [phone]

## 2022-01-10 NOTE — PROGRESS NOTES
[facility] Medicine  Progress Note    Patient Name: Katt Mcneal  MRN: [id number]  Patient Class: IP- Inpatient   Admission Date: 1/7/2022  Length of Stay: 3 days  Attending Physician: Kody Corona MD  Primary Care Provider: Panchito Perez MD        Subjective:     Principal Problem:Intertrochanteric fracture of right hip        HPI:  Ms. Mcneal is an 80yo lady with a past medical history of Afib on Coumadin, CAD, MI, HTN, pancreatic mass with CBD obstruction and pancreatitis, and hypothyroidism    She was recently admitted to  on 1/3 to 1/6 due to abdominal pain and pancreatitis (MRCP showed severe pancreatic ductal dilation with a suspected pancreatic mass in the body segment measuring up to 2.7 cm.  Recommend further evaluation with EUS and tissue sampling).  Please refer to Dr. Ny's complete discharge summary for details of her stay.  She was also found to be COVID positive on 1/3 during that admission, but her oxygenation was always >94%, so no acute therapy was initiated (CXR showed bilateral mild diffuse nonspecific interstitial coarsening improved from prior, and may reflect residual minimal pulmonary edema versus interstitial type pneumonia or chronic interstitial lung changes.)    She now comes to the ED after tripping and falling at home earlier today.  She cannot remember if she struck her head or not.  After falling she developed right hip pain and was unable to bear any weight on her right leg.  EMS had to be activated and bring her to the ED.  In the ED she was found to have COVID and nondisplaced fractures involving the right inter trochanteric region and right lesser trochanter.  Her O2 sats today went as low as 88% on RA.  No repeat CXR was done in the ED.    On my exam in her room, she is awake, but confused and unwilling to speak or participate in her exam by the nurse.  There is no family present.      Overview/Hospital Course:  patient with history of  necrotizing pancreatitis,CAD,Afib on coumadin at home,AOCD,covid,has been  admitted after fall with right intertrochanteric fracture,cardiology is consulted for cardiac clearance,,she has cleared at moderate risk,S/P IM nail placement on right femur on 1.8.22,HH was lower day after surgery,, transfused pack of RBC ,HH is improved,her INR is today 2.0,will continue hold coumadin today and reassess in AM.  PT,OT recommending SNF,consulted SW.      Past Medical History:   Diagnosis Date    Arthritis     Atrial fibrillation     Bilateral carotid artery stenosis 7/13/2017    Coronary artery disease     Fibromyalgia     Hyperlipidemia     Hypertension     Myocardial infarction 03/21/2015    Pancreatitis     Thyroid disease        Past Surgical History:   Procedure Laterality Date    CHOLECYSTECTOMY      CORONARY STENT PLACEMENT  3/24/15    HYSTERECTOMY  10/28/2004       Review of patient's allergies indicates:   Allergen Reactions    Sulfa (sulfonamide antibiotics) Hives    Bactrim [sulfamethoxazole-trimethoprim] Other (See Comments)     Pt. Reports that it caused severe pain    Integrilin [eptifibatide]     Percocet [oxycodone-acetaminophen] Itching    Statins-hmg-coa reductase inhibitors Other (See Comments)     Muscle pain. Patient states some, not all statins    Xarelto [rivaroxaban]     Epinephrine Palpitations       No current facility-administered medications on file prior to encounter.     Current Outpatient Medications on File Prior to Encounter   Medication Sig    amiodarone (PACERONE) 200 MG Tab Take 1 tablet (200 mg total) by mouth 2 (two) times daily. (Patient taking differently: Take 200 mg by mouth once daily.)    amLODIPine (NORVASC) 5 MG tablet Take 1 tablet (5 mg total) by mouth once daily.    aspirin (ECOTRIN) 81 MG EC tablet Take 81 mg by mouth once daily.    cyclobenzaprine (FEXMID) 7.5 MG Tab Take 7.5 mg by mouth 3 (three) times daily as needed.    dicyclomine (BENTYL) 20 mg  tablet Take 20 mg by mouth 2 (two) times daily.    levothyroxine (SYNTHROID) 75 MCG tablet Take 50 mcg by mouth once daily.     lorazepam (ATIVAN) 0.5 MG tablet Take 0.5 mg by mouth every 6 (six) hours as needed for Anxiety.    metoprolol succinate (TOPROL-XL) 25 MG 24 hr tablet Take 25 mg by mouth.    oxyCODONE-acetaminophen (PERCOCET) 5-325 mg per tablet Take 1 tablet by mouth every 4 (four) hours as needed for Pain.    ramipril (ALTACE) 5 MG capsule Take 5 mg by mouth every Tues, Thurs, Sat. Do not take if below <120    warfarin (COUMADIN) 1 MG tablet Take 0.5 tablets (0.5 mg total) by mouth Daily.    cholecalciferol, vitamin D3, 2,000 unit Cap Take 1 capsule by mouth once daily.    nitroGLYCERIN (NITROSTAT) 0.4 MG SL tablet Place 0.4 mg under the tongue every 5 (five) minutes as needed for Chest pain.    ondansetron (ZOFRAN-ODT) 4 MG TbDL Take 1 tablet (4 mg total) by mouth every 8 (eight) hours as needed (for nausea/vomiting). (Patient taking differently: Take 4 mg by mouth every 4 (four) hours as needed (for nausea/vomiting).)    pulse oximeter (PULSE OXIMETER) device by Apply Externally route 2 (two) times a day. Use twice daily at 8 AM and 3 PM and record the value in MyChart as directed.     Family History    None       Tobacco Use    Smoking status: Former Smoker     Quit date: 1964     Years since quittin.5    Smokeless tobacco: Former User   Substance and Sexual Activity    Alcohol use: No     Alcohol/week: 0.0 standard drinks    Drug use: No    Sexual activity: Not Currently     Review of Systems   Constitutional: Positive for activity change and appetite change.   HENT: Negative for congestion and dental problem.    Eyes: Negative for pain.   Respiratory: Negative for apnea and choking.    Cardiovascular: Negative for chest pain and leg swelling.   Gastrointestinal: Negative for abdominal pain.   Endocrine: Negative for cold intolerance and heat intolerance.   Genitourinary:  Negative for difficulty urinating and dyspareunia.   Musculoskeletal: Negative for arthralgias and back pain.   Skin: Negative for color change and pallor.   Allergic/Immunologic: Negative for environmental allergies and food allergies.   Neurological: Positive for weakness. Negative for dizziness.   Hematological: Negative for adenopathy. Does not bruise/bleed easily.   Psychiatric/Behavioral: Negative for agitation.     Objective:     Vital Signs (Most Recent):  Temp: 98 °F (36.7 °C) (01/10/22 0838)  Pulse: 64 (01/10/22 0847)  Resp: 18 (01/10/22 0847)  BP: (!) 157/76 (01/10/22 0838)  SpO2: 99 % (01/10/22 0847) Vital Signs (24h Range):  Temp:  [97.3 °F (36.3 °C)-98.3 °F (36.8 °C)] 98 °F (36.7 °C)  Pulse:  [60-88] 64  Resp:  [16-20] 18  SpO2:  [98 %-100 %] 99 %  BP: (131-161)/(61-81) 157/76     Weight: 45.4 kg (100 lb)  Body mass index is 20.2 kg/m².    Physical Exam  Constitutional:       Appearance: Normal appearance.   HENT:      Head: Normocephalic.      Nose: Nose normal.      Mouth/Throat:      Mouth: Mucous membranes are dry.   Eyes:      Extraocular Movements: Extraocular movements intact.      Pupils: Pupils are equal, round, and reactive to light.   Cardiovascular:      Rate and Rhythm: Tachycardia present. Rhythm irregular.      Heart sounds: No murmur heard.      Pulmonary:      Effort: Pulmonary effort is normal.   Abdominal:      General: Abdomen is flat. There is no distension.      Palpations: Abdomen is soft.      Tenderness: There is no abdominal tenderness.   Musculoskeletal:         General: No swelling or deformity. Normal range of motion.      Cervical back: Normal range of motion and neck supple.   Skin:     General: Skin is warm and dry.   Neurological:      Mental Status: She is alert and oriented to person, place, and time.      Cranial Nerves: No cranial nerve deficit.   Psychiatric:         Mood and Affect: Mood normal.         Behavior: Behavior normal.           CRANIAL NERVES     CN  III, IV, VI   Pupils are equal, round, and reactive to light.       Significant Labs:   All pertinent labs within the past 24 hours have been reviewed.  BMP:   Recent Labs   Lab 01/10/22  0532   *      K 3.6      CO2 29   BUN 18   CREATININE 0.7   CALCIUM 7.8*     CBC:   Recent Labs   Lab 01/08/22 2105 01/09/22  0532 01/10/22  0532   WBC  --  16.26* 17.83*   HGB 7.4* 6.9*  6.9* 9.0*  8.9*   HCT 23.5* 21.3*  21.0* 27.0*  27.5*   PLT  --  365 366     CMP:   Recent Labs   Lab 01/08/22 2105 01/09/22  0532 01/10/22  0532    139 141   K 3.7 3.7 3.6    105 107   CO2 21* 26 29   * 179* 131*   BUN 17 19 18   CREATININE 0.8 0.8 0.7   CALCIUM 8.0* 7.6* 7.8*   ANIONGAP 14 8 5*   EGFRNONAA >60 >60 >60     Lipase:   No results for input(s): LIPASE in the last 48 hours.    Significant Imaging: I have reviewed all pertinent imaging results/findings within the past 24 hours.      Assessment/Plan:      * Intertrochanteric fracture of right hip      Pain control with low dose dilaudid  CT of the hip showed: nondisplaced fractures involving the right inter trochanteric region and right lesser trochanter.   Cardiology consult for preop given long Cardiac history    she has cleared at moderate risk,S/P IM nail placement on right femur on 1.8.22,        PT,OT recommending SNF,consulted SW.    Preop cardiovascular exam  Seen by cardiology.      Sepsis due to COVID-19  Patient is identified as Severe COVID-19 based on hypoxemia with O2 saturations <94% on room air or on ambulation   Initiate standard COVID protocols; COVID-19 testing ,Infection Control notification  and isolation- respiratory, contact and droplet per protocol    Diagnostics: (leukopenia, hyponatremia, hyperferritinemia, elevated troponin, elevated d-dimer, age, and comorbidities are significant predictors of poor clinical outcome)  CBC, CMP, Procalcitonin, Ferritin, CRP, LDH, BNP, Troponin, ECG and Portable CXR    Management:  Initiate targeted therapy with Remdesivir, 200mg IV x1, followed by 100mg IV daily x5 days total and Dexamethasone PO/IV 6mg daily x10 days, Maintain oxygen saturations 92-96% via Nasal Cannula 3 LPM and monitor with pulse oximetry. , Inhaled bronchodilators as needed for shortness of breath., Continuous cardiac monitoring. and Manage respiratory failure (O2 sats <91% on room air with respiratory symptoms or needing NIPPV/Mechanical ventilation) and/or Pneumonia (active chest infiltrates) separately as described below.    Advance Care Planning  Current advance care plan has not been discussed with patient/family/POA and patient currently wishes Full Code.    on decadron,stable.      Acute respiratory failure with hypoxia    - Order RT consult via Respiratory Communication for COVID Protocols    - Incentive Spirometer Q4h, Flutter Valve Q4h    - Continuous Pulse Oximetry, goal SpO2 92-96%    - Supplemental O2 via LFNC, VentiMask, or HFNC (see Respiratory Support Oxygen Therapies)    - If wheezing, albuterol INH Q6h scheduled & PRN    - Proning Protocol if patient is a candidate (see  Proning Protocol)   - GCS >13, able to self-prone  Stable on NC O 2,      Hypokalemia  Replaced.      Common bile duct obstruction  This was recently worked up  Follow up with GI as per prior plan,toleerating diet.      Pancreatic mass  This was recently worked up  Follow up with GI as per prior plan for EUS.      Fall on same level from tripping as cause of accidental injury  As above.      Hypothyroid    levothyroxine tablet 50 mcg, 50 mcg, Oral, Before breakfast          Anemia of chronic disease  ,HH was lower day after surgery,, transfused pack of RBC ,HH is improved,        Atrial fibrillation with RVR  This has now improved into the 80's  Cardiology consult    Current therapy:    amiodarone tablet 200 mg, 200 mg, Oral, Daily    metoprolol injection 5 mg, 5 mg, Intravenous, Q5 Min PRN    metoprolol succinate (TOPROL-XL) 24 hr  tablet 25 mg, 25 mg, Oral, Daily.  Improved.            Chronic anticoagulation  Holding coumadin for hip surgery  INR is 2.0 today,may resumed in AM.      Chronic heart failure with preserved ejection fraction  BNP elevated mildly to 406  Continue ACE  Changed to Lisinopril 2.5mg po qday for formulary  Continue home beta blocker, Toprol XL 25mg po qday      Coronary artery disease involving native coronary artery of native heart without angina pectoris  Trop 0.046,   Cardiology consult for preop  Holding home ASA 81mg pending hip surgery    Current Facility-Administered Medications:     metoprolol succinate (TOPROL-XL) 24 hr tablet 25 mg, 25 mg, Oral, Daily    nitroGLYCERIN SL tablet 0.4 mg, 0.4 mg, Sublingual, Q5 Min PRN, CP        Chronic recurrent pancreatitis  This was recently worked up  Follow up with GI as per prior plan  Lipase today is mildly improved      Ref. Range 1/3/2022 08:53 1/3/2022 14:00 1/5/2022 03:33 1/7/2022 17:44   Lipase Latest Ref Range: 4 - 60 U/L 792 (H)   498 (H)     Stable,tolerating diet.      VTE Risk Mitigation (From admission, onward)         Ordered     IP VTE HIGH RISK PATIENT  Once         01/09/22 0047     Place JUNE hose  Until discontinued         01/08/22 0224                Discharge Planning   FRANCO:      Code Status: Full Code   Is the patient medically ready for discharge?:     Reason for patient still in hospital (select all that apply): Patient trending condition  Discharge Plan A: Skilled Nursing Facility                  Kody Corona MD  Department of Hospital Medicine   [facility]

## 2022-01-10 NOTE — PLAN OF CARE
Problem: Adult Inpatient Plan of Care  Goal: Plan of Care Review  Outcome: Ongoing, Progressing     Problem: Infection  Goal: Absence of Infection Signs and Symptoms  Outcome: Ongoing, Progressing     Problem: Impaired Wound Healing  Goal: Optimal Wound Healing  Outcome: Ongoing, Progressing

## 2022-01-10 NOTE — NURSING
Sinus Rhythm/Sinus Tachycardia  No ectopy  HR 82 - 112  0.12/0.08/0.24    Per monitor strip   TB skin test administered to  (L) forearm. Pt tolerated well

## 2022-01-10 NOTE — PLAN OF CARE
Problem: Physical Therapy Goal  Goal: Physical Therapy Goal  Description: Goals to be met by: 22     Patient will increase functional independence with mobility by performin. Supine to sit with Set-up McGrath  2. Rolling to Left and Right with Modified McGrath.  3. Sit to stand transfer with Supervision  4. Gait  x 150 feet with Contact Guard Assistance using Rolling Walker.   5. Lower extremity exercise program x15 reps per handout, with independence    Outcome: Ongoing, Progressing   Pt will benefit from further skilled therapy in order to return to PLOF.

## 2022-01-10 NOTE — DISCHARGE SUMMARY
[facility] Medicine  Discharge Summary      Patient Name: Katt Mcneal  MRN: [id number]  Patient Class: IP- Inpatient  Admission Date: 12/20/2021  Hospital Length of Stay: 10 days  Discharge Date and Time: 12/30/2021  6:29 PM  Attending Physician: No att. providers found   Discharging Provider: Arsenio Robison MD  Primary Care Provider: Panchito Perez MD      HPI:    is an 81 YOCF with a PMHx of Atrial fibrillation on Coumadine (due allergy to a DOAC) CAD S/P PCI, pancreatitis and diverticulosis presenting to the ED for severe abdominal pain x 24 hours, worsening this yesterday morning around 9 AM associated with an episode of vomiting as well as shivering and a temp of 102.9 .Pt denied any new or unsual food intake. She also denied diarrhea, or recent travel.   In the ER she was managed with IV fluid pain control and antipyretics. She underwent CT Abd which revealed findings concerning for possible pancreatitis involving the head of the pancreas and duodenum.    SHe is being admitted to the ICU for further cre of the suspected pancreatitis.       * No surgery found *      Hospital Course:   Ms. Mcneal is an 81y F w/ recurrent pancreatitis s/p cholecystectomy who was admitted for acute pancreatitis. Initial imaging showed dilated CBD c/w hx of cholecystectomy, GI deferred endoscopy to the outpatient setting. Her blood cultures grew e coli sensitive to ceftriaxone. Her pancreatitis was slow to resolve, but by discharge she was tolerating a diet without issue and pain was well controlled on po medications. She was discharged to complete a 2 week course of CTX for E coli bacteremia. She will follow up with GI in the outpatient setting. PT/OT followed inpatient and was ordered for further therapy in the outpatient setting. All questions answered prior to discharge, patient in agreement with plan.         Goals of Care Treatment Preferences:  Code Status: Full Code      Consults:  "  Consults (From admission, onward)        Status Ordering Provider     Inpatient consult to Gastroenterology  Once        Provider:  Osmany Jennings MD    Completed OSVALDO PADRON     Inpatient consult to PICC team ([facility])  Once        Provider:  (Not yet assigned)    GEORGINA Snow          No new Assessment & Plan notes have been filed under this hospital service since the last note was generated.  Service: Hospital Medicine    Final Active Diagnoses:    Diagnosis Date Noted POA    Severe sepsis [A41.9, R65.20] 12/24/2021 Yes    E coli bacteremia [R78.81, B96.20] 12/22/2021 Yes    Paroxysmal atrial fibrillation [I48.0] 07/13/2017 Yes    Essential hypertension [I10] 07/13/2017 Yes    Dyslipidemia [E78.5] 07/13/2017 Yes    Chronic heart failure with preserved ejection fraction [I50.32] 01/20/2016 Yes    Coronary artery disease involving native coronary artery of native heart without angina pectoris [I25.10] 01/19/2016 Yes      Problems Resolved During this Admission:    Diagnosis Date Noted Date Resolved POA    PRINCIPAL PROBLEM:  Acute recurrent pancreatitis [K85.90] 01/05/2016 01/07/2022 Yes    Other specified hypothyroidism [E03.8] 05/16/2018 01/07/2022 Yes       Discharged Condition: good    [place] [facility] [place]:   Follow-up Information     Trent Lebron NP On 1/5/2022.    Why: out patient services:Trent Lebron NP at 2:45PM, hospital follow up  Contact information:  [address]  [place] [id number] [phone]             Things to do.    Why: Things You are responsible For To Manage Your Care At Home:1.    Getting your prescriptions filled                      Patient Instructions:      WALKER FOR HOME USE     Order Specific Question Answer Comments   Type of Walker: Adult (5'4"-6'6")    With wheels? Yes    Height: 4' 11" (1.499 m)    Weight: 42 kg (92 lb 9.5 oz)    Length of need (1-99 months): 99    Does patient have medical " "equipment at home? none    Please check all that apply: Patient's condition impairs ambulation.    Please check all that apply: Patient is unable to safely ambulate without equipment.      WHEELCHAIR FOR HOME USE     Order Specific Question Answer Comments   Hours in W/C per day: 12    Type of Wheelchair: Standard    Size(Width): 18"(STD adult)    Leg Support: STD footrests    Lap Belt: Velcro    Accessories: None    Cushion: Basic    Height: 4' 11" (1.499 m)    Weight: 42 kg (92 lb 9.5 oz)    Does patient have medical equipment at home? none    Length of need (1-99 months): 99    Please check all that apply: Caregiver is capable and willing to operate wheelchair safely.    Please check all that apply: The patient requires the use of a w/c for activities of daily living within the Home.    Please check all that apply: Patient mobility limitations cannot be sufficiently resolved by the use of other ambulatory therapies.      Ambulatory referral/consult to Physical/Occupational Therapy   Standing Status: Future   Referral Priority: Routine Referral Type: Physical Medicine   Referral Reason: Specialty Services Required   Number of Visits Requested: 1     Notify your health care provider if you experience any of the following:  persistent nausea and vomiting or diarrhea     Notify your health care provider if you experience any of the following:  severe uncontrolled pain     Notify your health care provider if you experience any of the following:  temperature >100.4       Significant Diagnostic Studies: as above    Pending Diagnostic Studies:     None         Medications:  Reconciled Home Medications:      Medication List      CHANGE how you take these medications    amiodarone 200 MG Tab  Commonly known as: PACERONE  Take 1 tablet (200 mg total) by mouth 2 (two) times daily.  What changed: when to take this     ondansetron 4 MG Tbdl  Commonly known as: ZOFRAN-ODT  Take 1 tablet (4 mg total) by mouth every 8 (eight) " hours as needed (for nausea/vomiting).  What changed: when to take this     warfarin 2 MG tablet  Commonly known as: COUMADIN  Take 1 tablet (2 mg total) by mouth Daily.  What changed: Another medication with the same name was removed. Continue taking this medication, and follow the directions you see here.        CONTINUE taking these medications    amLODIPine 5 MG tablet  Commonly known as: NORVASC  Take 1 tablet (5 mg total) by mouth once daily.     aspirin 81 MG EC tablet  Commonly known as: ECOTRIN  Take 81 mg by mouth once daily.     cholecalciferol (vitamin D3) 50 mcg (2,000 unit) Cap  Commonly known as: VITAMIN D3  Take 1 capsule by mouth once daily.     cyclobenzaprine 7.5 MG Tab  Commonly known as: FEXMID  Take 7.5 mg by mouth 3 (three) times daily as needed.     levothyroxine 75 MCG tablet  Commonly known as: SYNTHROID  Take 50 mcg by mouth once daily.     LORazepam 0.5 MG tablet  Commonly known as: ATIVAN  Take 0.5 mg by mouth every 6 (six) hours as needed for Anxiety.     metoprolol succinate 25 MG 24 hr tablet  Commonly known as: TOPROL-XL  Take 25 mg by mouth.     nitroGLYCERIN 0.4 MG SL tablet  Commonly known as: NITROSTAT  Place 0.4 mg under the tongue every 5 (five) minutes as needed for Chest pain.     ramipriL 5 MG capsule  Commonly known as: ALTACE  Take 5 mg by mouth every Tues, Thurs, Sat. Do not take if below <120        STOP taking these medications    potassium chloride 10 MEQ Tbsr  Commonly known as: KLOR-CON     predniSONE 5 MG tablet  Commonly known as: DELTASONE            Indwelling Lines/Drains at time of discharge:   Lines/Drains/Airways     None                 Time spent on the discharge of patient: 55 minutes         Arsenio Robison MD  Department of Hospital Medicine  [facility]

## 2022-01-10 NOTE — ASSESSMENT & PLAN NOTE
- Order RT consult via Respiratory Communication for COVID Protocols    - Incentive Spirometer Q4h, Flutter Valve Q4h    - Continuous Pulse Oximetry, goal SpO2 92-96%    - Supplemental O2 via LFNC, VentiMask, or HFNC (see Respiratory Support Oxygen Therapies)    - If wheezing, albuterol INH Q6h scheduled & PRN    - Proning Protocol if patient is a candidate (see HM Proning Protocol)   - GCS >13, able to self-prone  Stable on NC O 2,

## 2022-01-10 NOTE — ASSESSMENT & PLAN NOTE
This was recently worked up  Follow up with GI as per prior plan  Lipase today is mildly improved      Ref. Range 1/3/2022 08:53 1/3/2022 14:00 1/5/2022 03:33 1/7/2022 17:44   Lipase Latest Ref Range: 4 - 60 U/L 792 (H)   498 (H)     Stable,tolerating diet.

## 2022-01-10 NOTE — PT/OT/SLP PROGRESS
Physical Therapy Treatment    Patient Name:  Katt Mcneal   MRN:  [id number]    Recommendations:     Discharge Recommendations:  nursing facility, skilled   Discharge Equipment Recommendations: none   Barriers to discharge: None    Assessment:     Katt Mcneal is a 81 y.o. female admitted with a medical diagnosis of Intertrochanteric fracture of right hip.  She presents with the following impairments/functional limitations:  weakness,impaired self care skills,impaired functional mobilty,impaired balance,gait instability,decreased upper extremity function,decreased lower extremity function,decreased ROM,pain,edema,decreased safety awareness,impaired cardiopulmonary response to activity,impaired endurance,orthopedic precautions,impaired cognition .    Rehab Prognosis: Good; patient would benefit from acute skilled PT services to address these deficits and reach maximum level of function.    Recent Surgery: Procedure(s) (LRB):  INSERTION, INTRAMEDULLARY TASHI, FEMUR (Right) 2 Days Post-Op    Plan:     During this hospitalization, patient to be seen daily to address the identified rehab impairments via gait training,therapeutic activities,therapeutic exercises,neuromuscular re-education,wheelchair management/training and progress toward the following goals:    · Plan of Care Expires:  01/23/22    Subjective     Chief Complaint: weakness and pain   Patient/Family Comments/goals: pt is agreeable to therapy   Pain/Comfort:  · Pain Rating 1: 7/10  · Location - Side 1: Right  · Location 1: hip  · Pain Addressed 1: Pre-medicate for activity,Reposition,Cessation of Activity,Nurse notified  · Pain Rating Post-Intervention 1: 7/10      Objective:     Communicated with nurse prior to session.  Patient found HOB elevated with bed alarm,telemetry,oxygen,peripheral IV upon PT entry to room.     General Precautions: Standard, fall,airborne,droplet,contact   Orthopedic Precautions:RLE weight bearing as tolerated   Braces:  N/A  Respiratory Status: Room air     Functional Mobility:  · Bed Mobility:     · Rolling Left:  minimum assistance  · Scooting: contact guard assistance  · Bridging: contact guard assistance  · Supine to Sit: minimum assistance,HOB elevated, bedside rail  · Sit to Supine: maximal assistance with BLE  · Transfers:     · Sit to Stand: x 3 trials from bed minimum assistance with rolling walker. V/T cues for safety ,proper technique and walker management.   · Gait: trained 10 steps , 8 steps and 6 steps on level tile with Rolling Walker with Minimal Assistance   .  Pt  with decreased sarah, increased time in double stance, decreased velocity of limb motion, decreased step length, decreased swing-to-stance ratio, decreased toe-to-floor clearance and decreased weight-shifting ability.Impairments contributing to gait deviations include impaired balance, decreased flexibility, pain, impaired postural control, decreased ROM and decreased strength. V/T cues for safety technique and walker management.    · Balance:  Fair+ in sitting, fair- in standing.        AM-PAC 6 CLICK MOBILITY  Turning over in bed (including adjusting bedclothes, sheets and blankets)?: 3  Sitting down on and standing up from a chair with arms (e.g., wheelchair, bedside commode, etc.): 3  Moving from lying on back to sitting on the side of the bed?: 3  Moving to and from a bed to a chair (including a wheelchair)?: 3  Need to walk in hospital room?: 3  Climbing 3-5 steps with a railing?: 1  Basic Mobility Total Score: 16       Therapeutic Activities and Exercises:   instructed pt to perform BLE x 10 reps x 2: AP, QS while in bed and BLE x 10 reps : LAQ . Pt required V/T cues for safety, proper technique and sequence.     Patient left HOB elevated with pillow to offload R hip  With B heels offloading on pillow  all lines intact, call button in reach, bed alarm on and nurse notified..    GOALS:   Multidisciplinary Problems     Physical Therapy Goals         Problem: Physical Therapy Goal    Goal Priority Disciplines Outcome Goal Variances Interventions   Physical Therapy Goal     PT, PT/OT Ongoing, Progressing     Description: Goals to be met by: 22     Patient will increase functional independence with mobility by performin. Supine to sit with Set-up [place]  2. Rolling to Left and Right with Modified [place].  3. Sit to stand transfer with Supervision  4. Gait  x 150 feet with Contact Guard Assistance using Rolling Walker.   5. Lower extremity exercise program x15 reps per handout, with independence                     Time Tracking:     PT Received On: 01/10/22  PT Start Time: 1530     PT Stop Time: 1556  PT Total Time (min): 26 min     Billable Minutes: Gait Training 13 and Therapeutic Activity 13    Treatment Type: Treatment  PT/PTA: PTA     PTA Visit Number: 1     01/10/2022

## 2022-01-10 NOTE — ASSESSMENT & PLAN NOTE
Patient is identified as Severe COVID-19 based on hypoxemia with O2 saturations <94% on room air or on ambulation   Initiate standard COVID protocols; COVID-19 testing ,Infection Control notification  and isolation- respiratory, contact and droplet per protocol    Diagnostics: (leukopenia, hyponatremia, hyperferritinemia, elevated troponin, elevated d-dimer, age, and comorbidities are significant predictors of poor clinical outcome)  CBC, CMP, Procalcitonin, Ferritin, CRP, LDH, BNP, Troponin, ECG and Portable CXR    Management: Initiate targeted therapy with Remdesivir, 200mg IV x1, followed by 100mg IV daily x5 days total and Dexamethasone PO/IV 6mg daily x10 days, Maintain oxygen saturations 92-96% via Nasal Cannula 3 LPM and monitor with pulse oximetry. , Inhaled bronchodilators as needed for shortness of breath., Continuous cardiac monitoring. and Manage respiratory failure (O2 sats <91% on room air with respiratory symptoms or needing NIPPV/Mechanical ventilation) and/or Pneumonia (active chest infiltrates) separately as described below.    Advance Care Planning  Current advance care plan has not been discussed with patient/family/POA and patient currently wishes Full Code.     on decadron,stable.

## 2022-01-10 NOTE — SUBJECTIVE & OBJECTIVE
Past Medical History:   Diagnosis Date    Arthritis     Atrial fibrillation     Bilateral carotid artery stenosis 7/13/2017    Coronary artery disease     Fibromyalgia     Hyperlipidemia     Hypertension     Myocardial infarction 03/21/2015    Pancreatitis     Thyroid disease        Past Surgical History:   Procedure Laterality Date    CHOLECYSTECTOMY      CORONARY STENT PLACEMENT  3/24/15    HYSTERECTOMY  10/28/2004       Review of patient's allergies indicates:   Allergen Reactions    Sulfa (sulfonamide antibiotics) Hives    Bactrim [sulfamethoxazole-trimethoprim] Other (See Comments)     Pt. Reports that it caused severe pain    Integrilin [eptifibatide]     Percocet [oxycodone-acetaminophen] Itching    Statins-hmg-coa reductase inhibitors Other (See Comments)     Muscle pain. Patient states some, not all statins    Xarelto [rivaroxaban]     Epinephrine Palpitations       No current facility-administered medications on file prior to encounter.     Current Outpatient Medications on File Prior to Encounter   Medication Sig    amiodarone (PACERONE) 200 MG Tab Take 1 tablet (200 mg total) by mouth 2 (two) times daily. (Patient taking differently: Take 200 mg by mouth once daily.)    amLODIPine (NORVASC) 5 MG tablet Take 1 tablet (5 mg total) by mouth once daily.    aspirin (ECOTRIN) 81 MG EC tablet Take 81 mg by mouth once daily.    cyclobenzaprine (FEXMID) 7.5 MG Tab Take 7.5 mg by mouth 3 (three) times daily as needed.    dicyclomine (BENTYL) 20 mg tablet Take 20 mg by mouth 2 (two) times daily.    levothyroxine (SYNTHROID) 75 MCG tablet Take 50 mcg by mouth once daily.     lorazepam (ATIVAN) 0.5 MG tablet Take 0.5 mg by mouth every 6 (six) hours as needed for Anxiety.    metoprolol succinate (TOPROL-XL) 25 MG 24 hr tablet Take 25 mg by mouth.    oxyCODONE-acetaminophen (PERCOCET) 5-325 mg per tablet Take 1 tablet by mouth every 4 (four) hours as needed for Pain.    ramipril (ALTACE) 5  MG capsule Take 5 mg by mouth every Tues, Thurs, Sat. Do not take if below <120    warfarin (COUMADIN) 1 MG tablet Take 0.5 tablets (0.5 mg total) by mouth Daily.    cholecalciferol, vitamin D3, 2,000 unit Cap Take 1 capsule by mouth once daily.    nitroGLYCERIN (NITROSTAT) 0.4 MG SL tablet Place 0.4 mg under the tongue every 5 (five) minutes as needed for Chest pain.    ondansetron (ZOFRAN-ODT) 4 MG TbDL Take 1 tablet (4 mg total) by mouth every 8 (eight) hours as needed (for nausea/vomiting). (Patient taking differently: Take 4 mg by mouth every 4 (four) hours as needed (for nausea/vomiting).)    pulse oximeter (PULSE OXIMETER) device by Apply Externally route 2 (two) times a day. Use twice daily at 8 AM and 3 PM and record the value in MyChart as directed.     Family History    None       Tobacco Use    Smoking status: Former Smoker     Quit date: 1964     Years since quittin.5    Smokeless tobacco: Former User   Substance and Sexual Activity    Alcohol use: No     Alcohol/week: 0.0 standard drinks    Drug use: No    Sexual activity: Not Currently     Review of Systems   Constitutional: Positive for activity change and appetite change.   HENT: Negative for congestion and dental problem.    Eyes: Negative for pain.   Respiratory: Negative for apnea and choking.    Cardiovascular: Negative for chest pain and leg swelling.   Gastrointestinal: Negative for abdominal pain.   Endocrine: Negative for cold intolerance and heat intolerance.   Genitourinary: Negative for difficulty urinating and dyspareunia.   Musculoskeletal: Negative for arthralgias and back pain.   Skin: Negative for color change and pallor.   Allergic/Immunologic: Negative for environmental allergies and food allergies.   Neurological: Positive for weakness. Negative for dizziness.   Hematological: Negative for adenopathy. Does not bruise/bleed easily.   Psychiatric/Behavioral: Negative for agitation.     Objective:     Vital Signs  (Most Recent):  Temp: 98 °F (36.7 °C) (01/10/22 0838)  Pulse: 64 (01/10/22 0847)  Resp: 18 (01/10/22 0847)  BP: (!) 157/76 (01/10/22 0838)  SpO2: 99 % (01/10/22 0847) Vital Signs (24h Range):  Temp:  [97.3 °F (36.3 °C)-98.3 °F (36.8 °C)] 98 °F (36.7 °C)  Pulse:  [60-88] 64  Resp:  [16-20] 18  SpO2:  [98 %-100 %] 99 %  BP: (131-161)/(61-81) 157/76     Weight: 45.4 kg (100 lb)  Body mass index is 20.2 kg/m².    Physical Exam  Constitutional:       Appearance: Normal appearance.   HENT:      Head: Normocephalic.      Nose: Nose normal.      Mouth/Throat:      Mouth: Mucous membranes are dry.   Eyes:      Extraocular Movements: Extraocular movements intact.      Pupils: Pupils are equal, round, and reactive to light.   Cardiovascular:      Rate and Rhythm: Tachycardia present. Rhythm irregular.      Heart sounds: No murmur heard.      Pulmonary:      Effort: Pulmonary effort is normal.   Abdominal:      General: Abdomen is flat. There is no distension.      Palpations: Abdomen is soft.      Tenderness: There is no abdominal tenderness.   Musculoskeletal:         General: No swelling or deformity. Normal range of motion.      Cervical back: Normal range of motion and neck supple.   Skin:     General: Skin is warm and dry.   Neurological:      Mental Status: She is alert and oriented to person, place, and time.      Cranial Nerves: No cranial nerve deficit.   Psychiatric:         Mood and Affect: Mood normal.         Behavior: Behavior normal.           CRANIAL NERVES     CN III, IV, VI   Pupils are equal, round, and reactive to light.       Significant Labs:   All pertinent labs within the past 24 hours have been reviewed.  BMP:   Recent Labs   Lab 01/10/22  0532   *      K 3.6      CO2 29   BUN 18   CREATININE 0.7   CALCIUM 7.8*     CBC:   Recent Labs   Lab 01/08/22  2105 01/09/22  0532 01/10/22  0532   WBC  --  16.26* 17.83*   HGB 7.4* 6.9*  6.9* 9.0*  8.9*   HCT 23.5* 21.3*  21.0* 27.0*  27.5*    PLT  --  365 366     CMP:   Recent Labs   Lab 01/08/22  2105 01/09/22  0532 01/10/22  0532    139 141   K 3.7 3.7 3.6    105 107   CO2 21* 26 29   * 179* 131*   BUN 17 19 18   CREATININE 0.8 0.8 0.7   CALCIUM 8.0* 7.6* 7.8*   ANIONGAP 14 8 5*   EGFRNONAA >60 >60 >60     Lipase:   No results for input(s): LIPASE in the last 48 hours.    Significant Imaging: I have reviewed all pertinent imaging results/findings within the past 24 hours.

## 2022-01-10 NOTE — NURSING
Pt's daughter(Georgia) have a concern about pt going into a rehab facility. She thinks the pt will benefit with home health care where the pt is surrounded with family due to her altered mental status.

## 2022-01-10 NOTE — NURSING
Report received from night nurse ANNETTE Rocha. Visualized patient and assessed patient's overall condition and appearance. No acute distress noted. Will continue to monitor

## 2022-01-11 PROBLEM — K86.89 PANCREATIC DUCT OBSTRUCTION: Status: ACTIVE | Noted: 2022-01-07

## 2022-01-11 LAB
1,25(OH)2D3 SERPL-MCNC: 70 PG/ML (ref 20–79)
ANION GAP SERPL CALC-SCNC: 10 MMOL/L (ref 8–16)
BUN SERPL-MCNC: 14 MG/DL (ref 8–23)
CALCIUM SERPL-MCNC: 8 MG/DL (ref 8.7–10.5)
CHLORIDE SERPL-SCNC: 109 MMOL/L (ref 95–110)
CO2 SERPL-SCNC: 23 MMOL/L (ref 23–29)
CREAT SERPL-MCNC: 0.7 MG/DL (ref 0.5–1.4)
EST. GFR  (AFRICAN AMERICAN): >60 ML/MIN/1.73 M^2
EST. GFR  (NON AFRICAN AMERICAN): >60 ML/MIN/1.73 M^2
GLUCOSE SERPL-MCNC: 83 MG/DL (ref 70–110)
HCT VFR BLD AUTO: 31 % (ref 37–48.5)
HGB BLD-MCNC: 9.8 G/DL (ref 12–16)
INR PPP: 2.3 (ref 0.8–1.2)
POCT GLUCOSE: 141 MG/DL (ref 70–110)
POTASSIUM SERPL-SCNC: 3.6 MMOL/L (ref 3.5–5.1)
PROTHROMBIN TIME: 24.1 SEC (ref 9–12.5)
SODIUM SERPL-SCNC: 142 MMOL/L (ref 136–145)

## 2022-01-11 PROCEDURE — 97110 THERAPEUTIC EXERCISES: CPT | Mod: CQ

## 2022-01-11 PROCEDURE — 85014 HEMATOCRIT: CPT | Performed by: ORTHOPAEDIC SURGERY

## 2022-01-11 PROCEDURE — 21400001 HC TELEMETRY ROOM

## 2022-01-11 PROCEDURE — 94761 N-INVAS EAR/PLS OXIMETRY MLT: CPT

## 2022-01-11 PROCEDURE — 83615 LACTATE (LD) (LDH) ENZYME: CPT | Performed by: ORTHOPAEDIC SURGERY

## 2022-01-11 PROCEDURE — 27000221 HC OXYGEN, UP TO 24 HOURS

## 2022-01-11 PROCEDURE — 80048 BASIC METABOLIC PNL TOTAL CA: CPT | Performed by: ORTHOPAEDIC SURGERY

## 2022-01-11 PROCEDURE — 97530 THERAPEUTIC ACTIVITIES: CPT | Mod: CQ

## 2022-01-11 PROCEDURE — 63600175 PHARM REV CODE 636 W HCPCS: Performed by: ORTHOPAEDIC SURGERY

## 2022-01-11 PROCEDURE — 97535 SELF CARE MNGMENT TRAINING: CPT

## 2022-01-11 PROCEDURE — 85610 PROTHROMBIN TIME: CPT | Performed by: ORTHOPAEDIC SURGERY

## 2022-01-11 PROCEDURE — 97530 THERAPEUTIC ACTIVITIES: CPT

## 2022-01-11 PROCEDURE — 97116 GAIT TRAINING THERAPY: CPT | Mod: CQ

## 2022-01-11 PROCEDURE — 82728 ASSAY OF FERRITIN: CPT | Performed by: ORTHOPAEDIC SURGERY

## 2022-01-11 PROCEDURE — 25000003 PHARM REV CODE 250: Performed by: ORTHOPAEDIC SURGERY

## 2022-01-11 PROCEDURE — 25000242 PHARM REV CODE 250 ALT 637 W/ HCPCS: Performed by: ORTHOPAEDIC SURGERY

## 2022-01-11 PROCEDURE — 85379 FIBRIN DEGRADATION QUANT: CPT | Performed by: ORTHOPAEDIC SURGERY

## 2022-01-11 PROCEDURE — 27000207 HC ISOLATION

## 2022-01-11 PROCEDURE — 94760 N-INVAS EAR/PLS OXIMETRY 1: CPT

## 2022-01-11 PROCEDURE — 36415 COLL VENOUS BLD VENIPUNCTURE: CPT | Performed by: ORTHOPAEDIC SURGERY

## 2022-01-11 PROCEDURE — 27100098 HC SPACER

## 2022-01-11 PROCEDURE — 25000003 PHARM REV CODE 250: Performed by: STUDENT IN AN ORGANIZED HEALTH CARE EDUCATION/TRAINING PROGRAM

## 2022-01-11 PROCEDURE — 94640 AIRWAY INHALATION TREATMENT: CPT

## 2022-01-11 PROCEDURE — 85018 HEMOGLOBIN: CPT | Performed by: ORTHOPAEDIC SURGERY

## 2022-01-11 RX ORDER — ASPIRIN 81 MG/1
81 TABLET ORAL DAILY
Status: DISCONTINUED | OUTPATIENT
Start: 2022-01-11 | End: 2022-01-13

## 2022-01-11 RX ADMIN — DEXAMETHASONE 6 MG: 2 TABLET ORAL at 09:01

## 2022-01-11 RX ADMIN — WARFARIN SODIUM 0.5 MG: 1 TABLET ORAL at 05:01

## 2022-01-11 RX ADMIN — HYDROMORPHONE HYDROCHLORIDE 0.5 MG: 2 INJECTION INTRAMUSCULAR; INTRAVENOUS; SUBCUTANEOUS at 02:01

## 2022-01-11 RX ADMIN — OXYCODONE HYDROCHLORIDE AND ACETAMINOPHEN 500 MG: 500 TABLET ORAL at 09:01

## 2022-01-11 RX ADMIN — REMDESIVIR 100 MG: 100 INJECTION, POWDER, LYOPHILIZED, FOR SOLUTION INTRAVENOUS at 09:01

## 2022-01-11 RX ADMIN — ALBUTEROL SULFATE 2 PUFF: 108 INHALANT RESPIRATORY (INHALATION) at 07:01

## 2022-01-11 RX ADMIN — DOCUSATE SODIUM 100 MG: 100 CAPSULE ORAL at 09:01

## 2022-01-11 RX ADMIN — MUPIROCIN: 20 OINTMENT TOPICAL at 09:01

## 2022-01-11 RX ADMIN — AMIODARONE HYDROCHLORIDE 200 MG: 200 TABLET ORAL at 09:01

## 2022-01-11 RX ADMIN — CHOLECALCIFEROL TAB 25 MCG (1000 UNIT) 2000 UNITS: 25 TAB at 09:01

## 2022-01-11 RX ADMIN — LEVOTHYROXINE SODIUM 50 MCG: 50 TABLET ORAL at 05:01

## 2022-01-11 RX ADMIN — ALBUTEROL SULFATE 2 PUFF: 108 INHALANT RESPIRATORY (INHALATION) at 08:01

## 2022-01-11 RX ADMIN — ASPIRIN 81 MG: 81 TABLET, COATED ORAL at 12:01

## 2022-01-11 RX ADMIN — THERA TABS 1 TABLET: TAB at 09:01

## 2022-01-11 RX ADMIN — HYDROMORPHONE HYDROCHLORIDE 0.5 MG: 2 INJECTION INTRAMUSCULAR; INTRAVENOUS; SUBCUTANEOUS at 11:01

## 2022-01-11 RX ADMIN — HYDROMORPHONE HYDROCHLORIDE 0.5 MG: 2 INJECTION INTRAMUSCULAR; INTRAVENOUS; SUBCUTANEOUS at 10:01

## 2022-01-11 NOTE — PT/OT/SLP PROGRESS
"Physical Therapy Treatment    Patient Name:  Katt Mcneal   MRN:  [id number]    Recommendations:     Discharge Recommendations:  nursing facility, skilled   Discharge Equipment Recommendations: none   Barriers to discharge: None    Assessment:     Katt Mcneal is a 81 y.o. female admitted with a medical diagnosis of Intertrochanteric fracture of right hip.  She presents with the following impairments/functional limitations:  weakness,impaired endurance,impaired self care skills,impaired functional mobilty,gait instability,impaired balance,decreased upper extremity function,decreased lower extremity function,decreased ROM,edema,orthopedic precautions,decreased coordination,impaired cognition,decreased safety awareness,pain,impaired cardiopulmonary response to activity,impaired muscle length .    Rehab Prognosis: Good; patient would benefit from acute skilled PT services to address these deficits and reach maximum level of function.    Recent Surgery: Procedure(s) (LRB):  INSERTION, INTRAMEDULLARY TASHI, FEMUR (Right) 3 Days Post-Op    Plan:     During this hospitalization, patient to be seen daily to address the identified rehab impairments via gait training,therapeutic activities,therapeutic exercises,neuromuscular re-education,wheelchair management/training and progress toward the following goals:    · Plan of Care Expires:  01/23/22    Subjective     Chief Complaint: RLE pain and feeling lonely in the hospital.   Patient/Family Comments/goals: :" I want to go home."   Pain/Comfort:  · Location - Side 1: Left  · Location 1: hip  · Pain Addressed 1: Reposition,Cessation of Activity,Pre-medicate for activity,Nurse notified  · Pain Rating Post-Intervention 1: 6/10      Objective:     Communicated with nurse prior to session.  Patient found HOB elevated with telemetry,bed alarm,oxygen,peripheral IV upon PT entry to room.     General Precautions: Standard, fall,airborne,contact,droplet,respiratory   Orthopedic " Precautions:RLE weight bearing as tolerated   Braces: N/A  Respiratory Status: Nasal cannula, flow 3 L/min   SpO2 stable on RA on exertions : 91%-95% .   Functional Mobility:  · Bed Mobility:     · Rolling Left:  minimum assistance  · Scooting: contact guard assistance  · Bridging: contact guard assistance  · Supine to Sit: minimum assistance,HOB elevated, bedside rail  · Transfers:     · Sit to Stand: x 2 trials from bed and 2 trials from chair minimum assistance with rolling walker. V/T cues for safety ,proper technique and walker management.  · Bed to chair: pt ambulated to chair with RW, CGA.    · Gait: trained 8 ft , 10 ft and 20 ft in room on level tile with Rolling Walker with CGA (chair followed)  . Pt required 2 seated rest breaks 2* to fatigue and pain.   Pt  with decreased sarah, increased time in double stance, decreased velocity of limb motion, decreased step length, decreased swing-to-stance ratio, decreased toe-to-floor clearance and decreased weight-shifting ability.Impairments contributing to gait deviations include impaired balance, decreased flexibility, pain, impaired postural control, decreased ROM and decreased strength. V/T cues for safety technique and walker management.    · Balance:  Fair+ in sitting, fair in standing.       AM-PAC 6 CLICK MOBILITY  Turning over in bed (including adjusting bedclothes, sheets and blankets)?: 3  Sitting down on and standing up from a chair with arms (e.g., wheelchair, bedside commode, etc.): 3  Moving from lying on back to sitting on the side of the bed?: 3  Moving to and from a bed to a chair (including a wheelchair)?: 3  Need to walk in hospital room?: 3  Climbing 3-5 steps with a railing?: 2  Basic Mobility Total Score: 17       Therapeutic Activities and Exercises:   Lower Extremity Exercises.   Patient educated on the purpose of therapeutic exercise.     Patient verbalized acceptance/understanding of instructions, expectations, and limitations(for  safety).   Patient performed: 2 sets of 10 reps (each) of B LE There Ex: AP, LAQ, Hip abd/add, Hip flexion( AAROM RLE) while sitting up on EOB.        Patient required  verbal cues/tactile cues to ensure correct sequence, to maintain proper form, and to allow for self-correction.      Patient left up in reclined chair on green air cushion , BLE elevated , heels offloading  with all lines intact, call button in reach, chair alarm on, nurse notified..    GOALS:   Multidisciplinary Problems     Physical Therapy Goals        Problem: Physical Therapy Goal    Goal Priority Disciplines Outcome Goal Variances Interventions   Physical Therapy Goal     PT, PT/OT Ongoing, Progressing     Description: Goals to be met by: 22     Patient will increase functional independence with mobility by performin. Supine to sit with Set-up [place]  2. Rolling to Left and Right with Modified [place].  3. Sit to stand transfer with Supervision  4. Gait  x 150 feet with Contact Guard Assistance using Rolling Walker.   5. Lower extremity exercise program x15 reps per handout, with independence                     Time Tracking:     PT Received On: 22  PT Start Time: 1140     PT Stop Time: 1218  PT Total Time (min): 38 min     Billable Minutes: Gait Training 14, Therapeutic Activity 12 and Therapeutic Exercise 12    Treatment Type: Treatment  PT/PTA: PTA     PTA Visit Number: 2     2022

## 2022-01-11 NOTE — SUBJECTIVE & OBJECTIVE
Interval History: Pt complains of L hip pain this AM.    Review of Systems   Constitutional: Negative for chills and fever.   Respiratory: Negative for cough and shortness of breath.    Cardiovascular: Negative for chest pain and leg swelling.   Gastrointestinal: Negative for abdominal distention and abdominal pain.   Musculoskeletal: Positive for arthralgias.     Objective:     Vital Signs (Most Recent):  Temp: 99.8 °F (37.7 °C) (01/11/22 0807)  Pulse: 98 (01/11/22 0807)  Resp: 16 (01/11/22 1116)  BP: (!) 109/54 (01/11/22 0807)  SpO2: 95 % (01/11/22 0807) Vital Signs (24h Range):  Temp:  [97.7 °F (36.5 °C)-99.8 °F (37.7 °C)] 99.8 °F (37.7 °C)  Pulse:  [] 98  Resp:  [16-20] 16  SpO2:  [91 %-99 %] 95 %  BP: (104-190)/(54-84) 109/54     Weight: 45.4 kg (100 lb)  Body mass index is 20.2 kg/m².  No intake or output data in the 24 hours ending 01/11/22 1123   Physical Exam  Constitutional:       General: She is not in acute distress.     Appearance: She is ill-appearing. She is not toxic-appearing or diaphoretic.   Cardiovascular:      Rate and Rhythm: Normal rate and regular rhythm.      Heart sounds: No murmur heard.  No gallop.    Pulmonary:      Effort: Pulmonary effort is normal. No respiratory distress.      Breath sounds: Normal breath sounds. No wheezing.   Abdominal:      General: Bowel sounds are normal. There is no distension.      Palpations: Abdomen is soft.      Tenderness: There is no abdominal tenderness.         Significant Labs: All pertinent labs within the past 24 hours have been reviewed.    Significant Imaging: I have reviewed all pertinent imaging results/findings within the past 24 hours.

## 2022-01-11 NOTE — PLAN OF CARE
Patient lying supine in bed and stated she is in pain of 6 of 10 rating. Pain medication given. Will continue to monitor patient's pain.

## 2022-01-11 NOTE — PROGRESS NOTES
[facility] Medicine  Progress Note    Patient Name: Katt Mcneal  MRN: [id number]  Patient Class: IP- Inpatient   Admission Date: 1/7/2022  Length of Stay: 4 days  Attending Physician: Arsenio Robison MD  Primary Care Provider: Panchito Perez MD        Subjective:     Principal Problem:Intertrochanteric fracture of right hip        HPI:  Ms. Mcneal is an 80yo lady with a past medical history of Afib on Coumadin, CAD, MI, HTN, pancreatic mass with CBD obstruction and pancreatitis, and hypothyroidism    She was recently admitted to  on 1/3 to 1/6 due to abdominal pain and pancreatitis (MRCP showed severe pancreatic ductal dilation with a suspected pancreatic mass in the body segment measuring up to 2.7 cm.  Recommend further evaluation with EUS and tissue sampling).  Please refer to Dr. Ny's complete discharge summary for details of her stay.  She was also found to be COVID positive on 1/3 during that admission, but her oxygenation was always >94%, so no acute therapy was initiated (CXR showed bilateral mild diffuse nonspecific interstitial coarsening improved from prior, and may reflect residual minimal pulmonary edema versus interstitial type pneumonia or chronic interstitial lung changes.)    She now comes to the ED after tripping and falling at home earlier today.  She cannot remember if she struck her head or not.  After falling she developed right hip pain and was unable to bear any weight on her right leg.  EMS had to be activated and bring her to the ED.  In the ED she was found to have COVID and nondisplaced fractures involving the right inter trochanteric region and right lesser trochanter.  Her O2 sats today went as low as 88% on RA.  No repeat CXR was done in the ED.    On my exam in her room, she is awake, but confused and unwilling to speak or participate in her exam by the nurse.  There is no family present.      Overview/Hospital Course:  Admitted for R  intertrochanteric fracture. Underwent R IMN 1/8. Required 1u pRBC POD 1, otherwise stable. PT/OT recommending SNF.          Interval History: Pt complains of L hip pain this AM.    Review of Systems   Constitutional: Negative for chills and fever.   Respiratory: Negative for cough and shortness of breath.    Cardiovascular: Negative for chest pain and leg swelling.   Gastrointestinal: Negative for abdominal distention and abdominal pain.   Musculoskeletal: Positive for arthralgias.     Objective:     Vital Signs (Most Recent):  Temp: 99.8 °F (37.7 °C) (01/11/22 0807)  Pulse: 98 (01/11/22 0807)  Resp: 16 (01/11/22 1116)  BP: (!) 109/54 (01/11/22 0807)  SpO2: 95 % (01/11/22 0807) Vital Signs (24h Range):  Temp:  [97.7 °F (36.5 °C)-99.8 °F (37.7 °C)] 99.8 °F (37.7 °C)  Pulse:  [] 98  Resp:  [16-20] 16  SpO2:  [91 %-99 %] 95 %  BP: (104-190)/(54-84) 109/54     Weight: 45.4 kg (100 lb)  Body mass index is 20.2 kg/m².  No intake or output data in the 24 hours ending 01/11/22 1123   Physical Exam  Constitutional:       General: She is not in acute distress.     Appearance: She is ill-appearing. She is not toxic-appearing or diaphoretic.   Cardiovascular:      Rate and Rhythm: Normal rate and regular rhythm.      Heart sounds: No murmur heard.  No gallop.    Pulmonary:      Effort: Pulmonary effort is normal. No respiratory distress.      Breath sounds: Normal breath sounds. No wheezing.   Abdominal:      General: Bowel sounds are normal. There is no distension.      Palpations: Abdomen is soft.      Tenderness: There is no abdominal tenderness.         Significant Labs: All pertinent labs within the past 24 hours have been reviewed.    Significant Imaging: I have reviewed all pertinent imaging results/findings within the past 24 hours.      Assessment/Plan:      * Intertrochanteric fracture of right hip  S/p IMN w/ ortho on 1/8.   - PT/OT consulted; recommending SNF  - warfarin for DVT ppx  - orthopedics  consulted        Preop cardiovascular exam  Seen by cardiology.      Sepsis due to COVID-19  Resolved      Acute respiratory failure with hypoxia  Stable on low-dose O2. Likely COVID w/ component of atelectasis/positioning.   - incentive spirometry  - prn furosemide  - cont dexamethasone while inpatient      Hypokalemia  Replaced.      Pancreatic duct obstruction  Due to pancreatic mass. Plan for EUS as an outpatient.        Pancreatic mass  Follow up with GI outpatient as per prior plan for EUS.      Fall on same level from tripping as cause of accidental injury  As above.      Hypothyroid  Continue home synthroid          Anemia of chronic disease  Improved after transfusion        Atrial fibrillation with RVR  Continue amiodarone, metoprolol, warfarin          Chronic anticoagulation  Restart home warfarin      Chronic heart failure with preserved ejection fraction  Euvolemic on exam  - prn diuretics      Coronary artery disease involving native coronary artery of native heart without angina pectoris  Continue home aspirin/statin      Chronic recurrent pancreatitis  Recurrent pancreatitis w/ pancreatic mass. Currently tolerating PO w/ minimal epigastric pain.   - outpatient GI follow up  - prn anti-emetics and analgesics      VTE Risk Mitigation (From admission, onward)         Ordered     warfarin (COUMADIN) split tablet 0.5 mg  Daily         01/11/22 1126     IP VTE HIGH RISK PATIENT  Once         01/09/22 0047     Place JUNE hose  Until discontinued         01/08/22 0224                Discharge Planning   FRANCO:      Code Status: Full Code   Is the patient medically ready for discharge?:     Reason for patient still in hospital (select all that apply): Patient trending condition, Laboratory test, Treatment, Consult recommendations and Pending disposition  Discharge Plan A: Skilled Nursing Facility   Discharge Delays: (!) Procedure Scheduling (IR, OR, Labs, Echo, Cath, Echo, EEG)              Arsenio Robison  MD  Department of Hospital Medicine   [facility] [place] - Telemetry

## 2022-01-11 NOTE — ASSESSMENT & PLAN NOTE
S/p IMN w/ ortho on 1/8.   - PT/OT consulted; recommending SNF  - warfarin for DVT ppx  - orthopedics consulted

## 2022-01-11 NOTE — PLAN OF CARE
Problem: Physical Therapy Goal  Goal: Physical Therapy Goal  Description: Goals to be met by: 22     Patient will increase functional independence with mobility by performin. Supine to sit with Set-up [place]  2. Rolling to Left and Right with Modified [place].  3. Sit to stand transfer with Supervision  4. Gait  x 150 feet with Contact Guard Assistance using Rolling Walker.   5. Lower extremity exercise program x15 reps per handout, with independence    Outcome: Ongoing, Progressing   Pt will benefit from further skilled therapy in order to return to PLOF.

## 2022-01-11 NOTE — PLAN OF CARE
[place] - Telemetry  Discharge Reassessment  Chano Mcneal (Daughter) [phone] ([place])  TN spoke with dtr patient was up and about previously at home with dtr, lives in [facility],  Daughter preference is [facility] snfs [place].  TN explained her mother has covid and covid accepting snfs may not be available on the [facility] alone. She agreed to sent to others snfs to widen search.     Primary Care Provider: Panchito Perez MD    Expected Discharge Date:     Reassessment (most recent)     Discharge Reassessment - 01/10/22 1627        Discharge Reassessment    Assessment Type Discharge Planning Reassessment     Discharge Plan discussed with: Adult children;Patient     Communicated FRANCO with patient/caregiver Date not available/Unable to determine     Discharge Plan A Skilled Nursing Facility     Discharge Plan B Skilled Nursing Facility;Home Health;Home with family     DME Needed Upon Discharge  other (see comments)     Discharge Barriers Identified Other (see comments)     Why the patient remains in the hospital Requires continued medical care        Post-Acute Status    Post-Acute Authorization Placement     Post-Acute Placement Status Pending payor review/awaiting authorization (if required)     Home Health Status Discharge Plan Changed     Coverage [facility] Resources/Appts/Education Provided Provided patient/caregiver with written discharge plan information;Post-Acute resouces added to AVS     Patient choice form signed by patient/caregiver List with quality metrics by geographic area provided     Discharge Delays Procedure Scheduling (IR, OR, Labs, Echo, Cath, Echo, EEG)

## 2022-01-11 NOTE — NURSING
Bedside Report given to night nurse EMANUEL Sánchez. Walking rounds completed. Visualized and assessed patient NAD noted. Safety precautions maintained and call light within reach.     Chart check completed.

## 2022-01-11 NOTE — ASSESSMENT & PLAN NOTE
Stable on low-dose O2. Likely COVID w/ component of atelectasis/positioning.   - incentive spirometry  - prn furosemide  - cont dexamethasone while inpatient

## 2022-01-11 NOTE — PLAN OF CARE
Problem: Occupational Therapy Goal  Goal: Occupational Therapy Goal  Description: Goals to be met by: 1/23/22     Patient will increase functional independence with ADLs by performing:    LE Dressing with Stand-by Assistance.  Grooming while standing at sink with Stand-by Assistance.  Toileting from bedside commode with Stand-by Assistance for hygiene and clothing management.   Supine to sit with Contact Guard Assistance.  Step transfer with Stand-by Assistance  Toilet transfer to bedside commode with Stand-by Assistance.  Upper extremity exercise program x15 reps per handout, with independence.    Outcome: Ongoing, Progressing

## 2022-01-11 NOTE — NURSING
Report received from night nurse EMANUEL Sánchez. Visualized patient and assessed patient's overall condition and appearance. No acute distress noted. Will continue to monitor

## 2022-01-11 NOTE — ASSESSMENT & PLAN NOTE
[address] [place] [address]  [phone]      Cardiology Progress Note      2/25/2019 9:30AM     Admit Date: 2/19/2019    Admit Diagnosis:   Atrial fibrillation ([facility] [address] [I48.91]  Periprosthetic fracture around internal prosthetic hip joint [M97. 8XXA, Z96.649]  A-fib ([facility] [address] [I48.91]    Subjective:     Aris Waite has no c/o SOB, CP. Remains in SR on amiodarone. Orthopedics oks starting Eliquis for anticoagulation for PAF.     Visit Vitals  /63 (BP 1 Location: Left arm, BP Patient Position: Sitting)   Pulse 68   Temp 98.4 °F (36.9 °C)   Resp 18   Ht 5' 6\" (1.676 m)   Wt 86.9 kg (191 lb 9.3 oz)   SpO2 98%   BMI 30.92 kg/m²       Current Facility-Administered Medications   Medication Dose Route Frequency    apixaban (ELIQUIS) tablet 5 mg  5 mg Oral Q12H    amiodarone (CORDARONE) tablet 200 mg  200 mg Oral DAILY    0.9% sodium chloride infusion 250 mL  250 mL IntraVENous PRN    sodium chloride (NS) flush 5-40 mL  5-40 mL IntraVENous Q8H    sodium chloride (NS) flush 5-40 mL  5-40 mL IntraVENous PRN    acetaminophen (TYLENOL) tablet 650 mg  650 mg Oral Q6H    naloxone (NARCAN) injection 0.4 mg  0.4 mg IntraVENous PRN    ondansetron (ZOFRAN ODT) tablet 4 mg  4 mg Oral Q4H PRN    senna-docusate (PERICOLACE) 8.6-50 mg per tablet 1 Tab  1 Tab Oral BID    polyethylene glycol (MIRALAX) packet 17 g  17 g Oral DAILY    bisacodyl (DULCOLAX) suppository 10 mg  10 mg Rectal DAILY PRN    magnesium hydroxide (MILK OF MAGNESIA) 400 mg/5 mL oral suspension 30 mL  30 mL Oral DAILY PRN    oxyCODONE IR (ROXICODONE) tablet 10 mg  10 mg Oral Q3H PRN    oxyCODONE IR (ROXICODONE) tablet 5 mg  5 mg Oral Q3H PRN    morphine injection 1 mg  1 mg IntraVENous Q2H PRN    glucose chewable tablet 16 g  4 Tab Oral PRN    dextrose (D50W) injection syrg 12.5-25 g  12.5-25 g IntraVENous PRN    glucagon (GLUCAGEN) injection 1 mg  1 mg IntraMUSCular PRN    aspirin chewable tablet 81 mg  81 mg As above.     Oral BID    atorvastatin (LIPITOR) tablet 20 mg  20 mg Oral DAILY    famotidine (PEPCID) tablet 20 mg  20 mg Oral ACB    gemfibrozil (LOPID) tablet 300 mg  300 mg Oral ACB&D    acetaminophen (TYLENOL) tablet 650 mg  650 mg Oral Q4H PRN    ondansetron (ZOFRAN) injection 4 mg  4 mg IntraVENous Q4H PRN       Objective:      Physical Exam:  General Appearance:  WNWD  male in no acute distress  Chest:   Clear  Cardiovascular:  Regular rate and rhythm, no murmur.   Abdomen:   Soft, non-tender, bowel sounds are active.   Extremities: no peripheral edema  Skin:  Warm and dry.     Data Review:   Recent Labs     02/25/19  0412 02/24/19  0346 02/23/19  0251   WBC 7.6 7.9 7.9   HGB 8.5* 8.1* 8.4*   HCT 25.6* 24.1* 24.9*    235 211     No results for input(s): NA, K, CL, CO2, GLU, BUN, CREA, CA, MG, PHOS, ALB, TBIL, TBILI, SGOT, ALT, INR in the last 72 hours. No lab exists for component: INREXT    No results for input(s): TROIQ, CPK, CKMB in the last 72 hours. Intake/Output Summary (Last 24 hours) at 2/25/2019 1150  Last data filed at 2/25/2019 8582  Gross per 24 hour   Intake 360 ml   Output 3100 ml   Net -2740 ml        Telemetry: SR       Assessment:     Active Problems:    Atrial fibrillation ([facility] [address] (2/19/2019)      Periprosthetic fracture around internal prosthetic hip joint (2/19/2019)      A-fib ([facility] [address] (2/19/2019)        Plan:     Afib with RVR:  Remains in SR, continue on amiodarone 200mg daily. Start low dose Dilt 120mg daily as BP stable. New onset, or first documented as patient cannot feel and tell that he is in afib. CHADS2 vasc score: 2, Start Eliquis today as insurance will cover.       Hypertension:  BP much improved post surgery, PTA was on ACEI/HCTZ. Starting diltiazem for BP and rhythm support.       Anemia:  2 units PRBC transfused on 2/20/19. HGB stable, continue to monitor   Transfused per Hospitalist    Noted plans for discharge. Follow up with Dr. Trina Fraser 2-3 weeks. Lokesh Carrero [facility]  Cardiology    Patient seen and examined by me with nurse practitioner Lokesh Carrero. I personally performed all components of the history, physical, and medical decision making and agree with the assessment and plan with minor modifications as noted. Remains in normal sinus rhythm-continue diltiazem and Eliquis as outpatient. Acute anemia was attributed to orthopedic blood loss per orthopedics. Okay to restart Eliquis at this time per them. Discussed risks and benefits of anticoagulation, signs and symptoms of bleeding, and to call if any concerns. Follow-up with me in approximately 2 weeks. Thanks for the consult. We appreciate the opportunity to participate in this patient's care.

## 2022-01-11 NOTE — ASSESSMENT & PLAN NOTE
Recurrent pancreatitis w/ pancreatic mass. Currently tolerating PO w/ minimal epigastric pain.   - outpatient GI follow up  - prn anti-emetics and analgesics

## 2022-01-11 NOTE — PT/OT/SLP PROGRESS
Occupational Therapy   Treatment    Name: Katt Mcneal  MRN: [id number]  Admitting Diagnosis:  Intertrochanteric fracture of right hip  3 Days Post-Op    Recommendations:     Discharge Recommendations: nursing facility, skilled  Discharge Equipment Recommendations:   (TBD per SNF.)  Barriers to discharge:   (Pt is not at PLOF and is at risk for falls, readmission and morbidity if d/c home at this time.)    Assessment:     Katt Mcneal is a 81 y.o. female with a medical diagnosis of Intertrochanteric fracture of right hip. Performance deficits affecting function are weakness,impaired endurance,impaired self care skills,impaired functional mobilty,gait instability,impaired balance,decreased upper extremity function,decreased lower extremity function,impaired cognition,decreased safety awareness,pain,decreased ROM,orthopedic precautions,impaired cardiopulmonary response to activity.     Pt w/ functional progress as she was able to ambulate household distances and perform standing ADLs w/ CGA and use of RW. Pt tolerated standing ~14 min for performing ADLs of choice; pt still w/ pain but participated in activities well this date. Pt will continue to benefit from skilled acute OT services to maximize functional capacity for safe performance w/ ADLs and functional mobility.     Rehab Prognosis:  Good; patient would benefit from acute skilled OT services to address these deficits and reach maximum level of function.       Plan:     Patient to be seen 5 x/week,6 x/week to address the above listed problems via self-care/home management,therapeutic activities,therapeutic exercises  · Plan of Care Expires: 01/23/22  · Plan of Care Reviewed with: patient    Subjective     Pain/Comfort:  · Pain Rating 1:  (Pt did not rate.)  · Location - Side 1: Left  · Location - Orientation 1: upper  · Location 1: hip  · Pain Addressed 1: Pre-medicate for activity,Reposition,Cessation of Activity    Objective:     Communicated with:  nurse (Homa) prior to session.  Patient found HOB elevated with telemetry,bed alarm,oxygen,peripheral IV upon OT entry to room.    General Precautions: Standard, fall,airborne,contact,droplet,respiratory   Orthopedic Precautions:RLE weight bearing as tolerated   Braces: N/A  Respiratory Status: Nasal cannula, flow 3 L/min; pt ambulated household distances and performed ADLs on RA w/ SPO2 92%     Occupational Performance:      Bed Mobility:    · Patient completed Sit to Supine with moderate assistance for managing BLEs    Functional Mobility/Transfers:  · Patient completed Sit <> Stand Transfer with minimum assistance x 1 trial from chair  with  rolling walker; pt required cueing for safe hand placement   · Functional Mobility: Pt was able to ambulate from chair>sink>bed w/ CGA and use of RW. Pt w/o LOB throughout and required min cueing for safe RW management.     Activities of Daily Living:  · Grooming: contact guard assistance for maintaining standing balance while performing oral care; pt was able to tolerated ~14 min of standing w/ cueing for maintaining uprigth posture  · Upper Body Dressing: minimum assistance for doffing back gown     [facility] 6 Click ADL: 20    Treatment & Education:  -Pt performed ADLs and functional mobility as noted above.   -Pt educated on safe functional mobility as noted.   -Pt encouraged to mobilize in room w/ assistance from staff to ensure safety.   -Pt encouraged to continuing performing BUE TE w/ use of yellow theraband per handout.   -Questions and concerns addressed within OT scope.       Patient left HOB elevated with all lines intact, call button in reach, bed alarm on and BLEs elevatedEducation:      GOALS:   Multidisciplinary Problems     Occupational Therapy Goals        Problem: Occupational Therapy Goal    Goal Priority Disciplines Outcome Interventions   Occupational Therapy Goal     OT, PT/OT Ongoing, Progressing    Description: Goals to be met by: 1/23/22     Patient  will increase functional independence with ADLs by performing:    LE Dressing with Stand-by Assistance.  Grooming while standing at sink with Stand-by Assistance.  Toileting from bedside commode with Stand-by Assistance for hygiene and clothing management.   Supine to sit with Contact Guard Assistance.  Step transfer with Stand-by Assistance  Toilet transfer to bedside commode with Stand-by Assistance.  Upper extremity exercise program x15 reps per handout, with independence.                     Time Tracking:     OT Date of Treatment: 01/11/22  OT Start Time: 1410  OT Stop Time: 1436  OT Total Time (min): 26 min    Billable Minutes:Self Care/Home Management 15  Therapeutic Activity 11  Total Time 26    OT/JEREMIAH: OT          1/11/2022

## 2022-01-12 LAB
ALBUMIN SERPL BCP-MCNC: 1.9 G/DL (ref 3.5–5.2)
ALP SERPL-CCNC: 351 U/L (ref 55–135)
ALT SERPL W/O P-5'-P-CCNC: 143 U/L (ref 10–44)
ANION GAP SERPL CALC-SCNC: 10 MMOL/L (ref 8–16)
ANISOCYTOSIS BLD QL SMEAR: SLIGHT
AST SERPL-CCNC: 319 U/L (ref 10–40)
BASOPHILS # BLD AUTO: 0.01 K/UL (ref 0–0.2)
BASOPHILS NFR BLD: 0.2 % (ref 0–1.9)
BILIRUB SERPL-MCNC: 0.9 MG/DL (ref 0.1–1)
BUN SERPL-MCNC: 21 MG/DL (ref 8–23)
BURR CELLS BLD QL SMEAR: ABNORMAL
CALCIUM SERPL-MCNC: 7.7 MG/DL (ref 8.7–10.5)
CHLORIDE SERPL-SCNC: 106 MMOL/L (ref 95–110)
CO2 SERPL-SCNC: 27 MMOL/L (ref 23–29)
CREAT SERPL-MCNC: 0.7 MG/DL (ref 0.5–1.4)
D DIMER PPP IA.FEU-MCNC: 4.01 MG/L FEU
DACRYOCYTES BLD QL SMEAR: ABNORMAL
DIFFERENTIAL METHOD: ABNORMAL
EOSINOPHIL # BLD AUTO: 0 K/UL (ref 0–0.5)
EOSINOPHIL NFR BLD: 0 % (ref 0–8)
ERYTHROCYTE [DISTWIDTH] IN BLOOD BY AUTOMATED COUNT: 17.1 % (ref 11.5–14.5)
EST. GFR  (AFRICAN AMERICAN): >60 ML/MIN/1.73 M^2
EST. GFR  (NON AFRICAN AMERICAN): >60 ML/MIN/1.73 M^2
FERRITIN SERPL-MCNC: 906 NG/ML (ref 20–300)
GLUCOSE SERPL-MCNC: 139 MG/DL (ref 70–110)
HCT VFR BLD AUTO: 26.5 % (ref 37–48.5)
HCT VFR BLD AUTO: 28.2 % (ref 37–48.5)
HGB BLD-MCNC: 8.3 G/DL (ref 12–16)
HGB BLD-MCNC: 8.9 G/DL (ref 12–16)
HYPOCHROMIA BLD QL SMEAR: ABNORMAL
IMM GRANULOCYTES # BLD AUTO: 0.03 K/UL (ref 0–0.04)
IMM GRANULOCYTES NFR BLD AUTO: 0.6 % (ref 0–0.5)
INR PPP: 2.4 (ref 0.8–1.2)
LDH SERPL L TO P-CCNC: 382 U/L (ref 110–260)
LIPASE SERPL-CCNC: 630 U/L (ref 4–60)
LYMPHOCYTES # BLD AUTO: 0.1 K/UL (ref 1–4.8)
LYMPHOCYTES NFR BLD: 2.5 % (ref 18–48)
MCH RBC QN AUTO: 26.2 PG (ref 27–31)
MCHC RBC AUTO-ENTMCNC: 31.6 G/DL (ref 32–36)
MCV RBC AUTO: 83 FL (ref 82–98)
MONOCYTES # BLD AUTO: 0 K/UL (ref 0.3–1)
MONOCYTES NFR BLD: 0.6 % (ref 4–15)
NEUTROPHILS # BLD AUTO: 4.5 K/UL (ref 1.8–7.7)
NEUTROPHILS NFR BLD: 96.1 % (ref 38–73)
NRBC BLD-RTO: 1 /100 WBC
OVALOCYTES BLD QL SMEAR: ABNORMAL
PLATELET # BLD AUTO: 220 K/UL (ref 150–450)
PLATELET BLD QL SMEAR: ABNORMAL
PMV BLD AUTO: 10.8 FL (ref 9.2–12.9)
POCT GLUCOSE: 164 MG/DL (ref 70–110)
POCT GLUCOSE: 165 MG/DL (ref 70–110)
POCT GLUCOSE: 170 MG/DL (ref 70–110)
POCT GLUCOSE: 208 MG/DL (ref 70–110)
POIKILOCYTOSIS BLD QL SMEAR: SLIGHT
POTASSIUM SERPL-SCNC: 3.2 MMOL/L (ref 3.5–5.1)
PROT SERPL-MCNC: 5.5 G/DL (ref 6–8.4)
PROTHROMBIN TIME: 24.2 SEC (ref 9–12.5)
RBC # BLD AUTO: 3.4 M/UL (ref 4–5.4)
SODIUM SERPL-SCNC: 143 MMOL/L (ref 136–145)
WBC # BLD AUTO: 4.73 K/UL (ref 3.9–12.7)

## 2022-01-12 PROCEDURE — 27000221 HC OXYGEN, UP TO 24 HOURS

## 2022-01-12 PROCEDURE — 63600175 PHARM REV CODE 636 W HCPCS: Performed by: ORTHOPAEDIC SURGERY

## 2022-01-12 PROCEDURE — 21400001 HC TELEMETRY ROOM

## 2022-01-12 PROCEDURE — 25000003 PHARM REV CODE 250: Performed by: STUDENT IN AN ORGANIZED HEALTH CARE EDUCATION/TRAINING PROGRAM

## 2022-01-12 PROCEDURE — 85025 COMPLETE CBC W/AUTO DIFF WBC: CPT | Performed by: STUDENT IN AN ORGANIZED HEALTH CARE EDUCATION/TRAINING PROGRAM

## 2022-01-12 PROCEDURE — 25000003 PHARM REV CODE 250: Performed by: ORTHOPAEDIC SURGERY

## 2022-01-12 PROCEDURE — 94640 AIRWAY INHALATION TREATMENT: CPT

## 2022-01-12 PROCEDURE — 36415 COLL VENOUS BLD VENIPUNCTURE: CPT | Performed by: ORTHOPAEDIC SURGERY

## 2022-01-12 PROCEDURE — 97530 THERAPEUTIC ACTIVITIES: CPT

## 2022-01-12 PROCEDURE — 27000207 HC ISOLATION

## 2022-01-12 PROCEDURE — 85018 HEMOGLOBIN: CPT | Performed by: ORTHOPAEDIC SURGERY

## 2022-01-12 PROCEDURE — 85610 PROTHROMBIN TIME: CPT | Performed by: ORTHOPAEDIC SURGERY

## 2022-01-12 PROCEDURE — 25000242 PHARM REV CODE 250 ALT 637 W/ HCPCS: Performed by: ORTHOPAEDIC SURGERY

## 2022-01-12 PROCEDURE — 94760 N-INVAS EAR/PLS OXIMETRY 1: CPT

## 2022-01-12 PROCEDURE — 63600175 PHARM REV CODE 636 W HCPCS: Performed by: STUDENT IN AN ORGANIZED HEALTH CARE EDUCATION/TRAINING PROGRAM

## 2022-01-12 PROCEDURE — 36415 COLL VENOUS BLD VENIPUNCTURE: CPT | Performed by: STUDENT IN AN ORGANIZED HEALTH CARE EDUCATION/TRAINING PROGRAM

## 2022-01-12 PROCEDURE — 25000003 PHARM REV CODE 250: Performed by: HOSPITALIST

## 2022-01-12 PROCEDURE — 83690 ASSAY OF LIPASE: CPT | Performed by: STUDENT IN AN ORGANIZED HEALTH CARE EDUCATION/TRAINING PROGRAM

## 2022-01-12 PROCEDURE — 80053 COMPREHEN METABOLIC PANEL: CPT | Performed by: STUDENT IN AN ORGANIZED HEALTH CARE EDUCATION/TRAINING PROGRAM

## 2022-01-12 PROCEDURE — 85014 HEMATOCRIT: CPT | Performed by: ORTHOPAEDIC SURGERY

## 2022-01-12 RX ORDER — HYDROMORPHONE HYDROCHLORIDE 2 MG/ML
0.5 INJECTION, SOLUTION INTRAMUSCULAR; INTRAVENOUS; SUBCUTANEOUS EVERY 4 HOURS PRN
Status: DISCONTINUED | OUTPATIENT
Start: 2022-01-12 | End: 2022-01-13

## 2022-01-12 RX ORDER — HYDROMORPHONE HYDROCHLORIDE 2 MG/ML
1 INJECTION, SOLUTION INTRAMUSCULAR; INTRAVENOUS; SUBCUTANEOUS EVERY 4 HOURS PRN
Status: DISCONTINUED | OUTPATIENT
Start: 2022-01-12 | End: 2022-01-13

## 2022-01-12 RX ADMIN — THERA TABS 1 TABLET: TAB at 08:01

## 2022-01-12 RX ADMIN — INSULIN ASPART 2 UNITS: 100 INJECTION, SOLUTION INTRAVENOUS; SUBCUTANEOUS at 06:01

## 2022-01-12 RX ADMIN — LEVOTHYROXINE SODIUM 50 MCG: 50 TABLET ORAL at 06:01

## 2022-01-12 RX ADMIN — OXYCODONE HYDROCHLORIDE AND ACETAMINOPHEN 500 MG: 500 TABLET ORAL at 08:01

## 2022-01-12 RX ADMIN — ALBUTEROL SULFATE 2 PUFF: 108 INHALANT RESPIRATORY (INHALATION) at 07:01

## 2022-01-12 RX ADMIN — AMLODIPINE BESYLATE 5 MG: 5 TABLET ORAL at 08:01

## 2022-01-12 RX ADMIN — METOPROLOL SUCCINATE 25 MG: 25 TABLET, EXTENDED RELEASE ORAL at 08:01

## 2022-01-12 RX ADMIN — CHOLECALCIFEROL TAB 25 MCG (1000 UNIT) 2000 UNITS: 25 TAB at 08:01

## 2022-01-12 RX ADMIN — HYDROMORPHONE HYDROCHLORIDE 1 MG: 2 INJECTION INTRAMUSCULAR; INTRAVENOUS; SUBCUTANEOUS at 08:01

## 2022-01-12 RX ADMIN — HYDROMORPHONE HYDROCHLORIDE 0.5 MG: 2 INJECTION INTRAMUSCULAR; INTRAVENOUS; SUBCUTANEOUS at 09:01

## 2022-01-12 RX ADMIN — MUPIROCIN: 20 OINTMENT TOPICAL at 08:01

## 2022-01-12 RX ADMIN — DEXAMETHASONE 6 MG: 2 TABLET ORAL at 08:01

## 2022-01-12 RX ADMIN — MUPIROCIN: 20 OINTMENT TOPICAL at 09:01

## 2022-01-12 RX ADMIN — ASPIRIN 81 MG: 81 TABLET, COATED ORAL at 08:01

## 2022-01-12 RX ADMIN — WARFARIN SODIUM 0.5 MG: 1 TABLET ORAL at 04:01

## 2022-01-12 RX ADMIN — HYDROMORPHONE HYDROCHLORIDE 0.5 MG: 2 INJECTION INTRAMUSCULAR; INTRAVENOUS; SUBCUTANEOUS at 02:01

## 2022-01-12 RX ADMIN — LOSARTAN POTASSIUM 25 MG: 25 TABLET, FILM COATED ORAL at 08:01

## 2022-01-12 RX ADMIN — DOCUSATE SODIUM 100 MG: 100 CAPSULE ORAL at 08:01

## 2022-01-12 RX ADMIN — AMIODARONE HYDROCHLORIDE 200 MG: 200 TABLET ORAL at 08:01

## 2022-01-12 NOTE — ASSESSMENT & PLAN NOTE
Recurrent pancreatitis w/ pancreatic mass. Tolerated PO w/ minimal epigastric pain early in admission, but had recurrent epigastric pain 1/12.  - labs pending; will consider repeat imaging  - outpatient GI follow up  - prn anti-emetics and analgesics

## 2022-01-12 NOTE — NURSING
Bedside Report given to night nurse ANNETTE Paula. Walking rounds completed. Visualized and assessed patient NAD noted. Safety precautions maintained and call light within reach.     Chart check completed.

## 2022-01-12 NOTE — PROGRESS NOTES
[facility] Medicine  Progress Note    Patient Name: Katt Mcneal  MRN: [id number]  Patient Class: IP- Inpatient   Admission Date: 1/7/2022  Length of Stay: 5 days  Attending Physician: Arsenio Robison MD  Primary Care Provider: Panchito Perez MD        Subjective:     Principal Problem:Intertrochanteric fracture of right hip        HPI:  Ms. Mcneal is an 82yo lady with a past medical history of Afib on Coumadin, CAD, MI, HTN, pancreatic mass with CBD obstruction and pancreatitis, and hypothyroidism    She was recently admitted to  on 1/3 to 1/6 due to abdominal pain and pancreatitis (MRCP showed severe pancreatic ductal dilation with a suspected pancreatic mass in the body segment measuring up to 2.7 cm.  Recommend further evaluation with EUS and tissue sampling).  Please refer to Dr. Ny's complete discharge summary for details of her stay.  She was also found to be COVID positive on 1/3 during that admission, but her oxygenation was always >94%, so no acute therapy was initiated (CXR showed bilateral mild diffuse nonspecific interstitial coarsening improved from prior, and may reflect residual minimal pulmonary edema versus interstitial type pneumonia or chronic interstitial lung changes.)    She now comes to the ED after tripping and falling at home earlier today.  She cannot remember if she struck her head or not.  After falling she developed right hip pain and was unable to bear any weight on her right leg.  EMS had to be activated and bring her to the ED.  In the ED she was found to have COVID and nondisplaced fractures involving the right inter trochanteric region and right lesser trochanter.  Her O2 sats today went as low as 88% on RA.  No repeat CXR was done in the ED.    On my exam in her room, she is awake, but confused and unwilling to speak or participate in her exam by the nurse.  There is no family present.      Overview/Hospital Course:  Admitted for R  intertrochanteric fracture. Underwent R IMN 1/8. Required 1u pRBC POD 1, otherwise stable. PT/OT recommending SNF.    Pt w/ recurrent abdominal pain and chills on 1/12.      Interval History: No events overnight. This AM w/ new acute abdominal pain.    Review of Systems   Constitutional: Negative for chills and fever.   Respiratory: Negative for cough and shortness of breath.    Cardiovascular: Negative for chest pain and leg swelling.   Gastrointestinal: Positive for abdominal pain, nausea and vomiting. Negative for abdominal distention.     Objective:     Vital Signs (Most Recent):  Temp: 97.1 °F (36.2 °C) (01/12/22 0817)  Pulse: 83 (01/12/22 0817)  Resp: 18 (01/12/22 0951)  BP: (!) 184/82 (01/12/22 0817)  SpO2: (!) 94 % (01/12/22 0817) Vital Signs (24h Range):  Temp:  [97.1 °F (36.2 °C)-98.7 °F (37.1 °C)] 97.1 °F (36.2 °C)  Pulse:  [74-84] 83  Resp:  [16-20] 18  SpO2:  [94 %-100 %] 94 %  BP: (127-184)/(60-82) 184/82     Weight: 45.4 kg (100 lb)  Body mass index is 20.2 kg/m².  No intake or output data in the 24 hours ending 01/12/22 0954   Physical Exam  Constitutional:       General: She is not in acute distress.     Appearance: She is ill-appearing. She is not toxic-appearing or diaphoretic.   Cardiovascular:      Rate and Rhythm: Normal rate and regular rhythm.      Heart sounds: No murmur heard.  No gallop.    Pulmonary:      Effort: Pulmonary effort is normal. No respiratory distress.      Breath sounds: Normal breath sounds. No wheezing.   Abdominal:      General: Bowel sounds are normal.      Palpations: Abdomen is soft.      Tenderness: There is abdominal tenderness. There is no guarding or rebound.         Significant Labs: All pertinent labs within the past 24 hours have been reviewed.    Significant Imaging: I have reviewed all pertinent imaging results/findings within the past 24 hours.      Assessment/Plan:      * Intertrochanteric fracture of right hip  S/p IMN w/ ortho on 1/8.   - PT/OT consulted;  recommending SNF  - warfarin for DVT ppx  - orthopedics consulted        Preop cardiovascular exam  Seen by cardiology.      Sepsis due to COVID-19  Resolved      Acute respiratory failure with hypoxia  Stable on low-dose O2. Likely COVID w/ component of atelectasis/positioning.   - incentive spirometry  - prn furosemide  - cont dexamethasone while inpatient      Hypokalemia  Replaced.      Pancreatic duct obstruction  Due to pancreatic mass. Plan for EUS as an outpatient.        Pancreatic mass  Follow up with GI outpatient as per prior plan for EUS.      Fall on same level from tripping as cause of accidental injury  As above.      Hypothyroid  Continue home synthroid          Anemia of chronic disease  Improved after transfusion        Atrial fibrillation with RVR  Continue amiodarone, metoprolol, warfarin          Chronic anticoagulation  Restart home warfarin      Chronic heart failure with preserved ejection fraction  Euvolemic on exam  - prn diuretics      Coronary artery disease involving native coronary artery of native heart without angina pectoris  Continue home aspirin/statin      Chronic recurrent pancreatitis  Recurrent pancreatitis w/ pancreatic mass. Tolerated PO w/ minimal epigastric pain early in admission, but had recurrent epigastric pain 1/12.  - labs pending; will consider repeat imaging  - outpatient GI follow up  - prn anti-emetics and analgesics      VTE Risk Mitigation (From admission, onward)         Ordered     warfarin (COUMADIN) split tablet 0.5 mg  Daily         01/11/22 1126     IP VTE HIGH RISK PATIENT  Once         01/09/22 0047     Place JUNE hose  Until discontinued         01/08/22 0224                Discharge Planning   FRANCO:      Code Status: Full Code   Is the patient medically ready for discharge?:     Reason for patient still in hospital (select all that apply): Patient trending condition, Laboratory test, Treatment, Consult recommendations and Pending disposition  Discharge  Plan A: Skilled Nursing Facility   Discharge Delays: (!) Procedure Scheduling (IR, OR, Labs, Echo, Cath, Echo, EEG)              Arsenio Robison MD  Department of Hospital Medicine   [facility]

## 2022-01-12 NOTE — PT/OT/SLP PROGRESS
"Occupational Therapy   Treatment    Name: Katt Mcneal  MRN: [id number]  Admitting Diagnosis:  Intertrochanteric fracture of right hip  4 Days Post-Op    Recommendations:     Discharge Recommendations: nursing facility, skilled  Discharge Equipment Recommendations:   (TBD per SNF.)  Barriers to discharge:   (Pt is not at PLOF and is at risk for falls, readmission and morbidity if d/c home at this time.)    Assessment:     Katt Mcneal is a 81 y.o. female with a medical diagnosis of Intertrochanteric fracture of right hip. Performance deficits affecting function are weakness,impaired endurance,impaired self care skills,impaired functional mobilty,gait instability,impaired balance,decreased upper extremity function,decreased lower extremity function,decreased coordination,impaired cognition,decreased safety awareness,pain,impaired cardiopulmonary response to activity,impaired skin     Pt w/ reports of abdominal pain and feeling unwell upon entry (pt found in uncomfortable position); pt w/ SPO2 78% on RA w/ NC not in nares. Dtr reports that pt has been having a "bad morning" and has been fidgeting w/ NC and telemetry monitor. Pt and dtr educated on importance of O2 compliance and was placed back on 2-3L O2 NC in which pt recovered to 88-91%. Pt was able to tolerate sitting EOB for standing and stepping to HOB w/ CGA and use of RW; pt repositioned in bed w/ nurse made aware of complaints and vitals.     Rehab Prognosis:  Fair; patient would benefit from acute skilled OT services to address these deficits and reach maximum level of function.       Plan:     Patient to be seen 5 x/week,6 x/week to address the above listed problems via self-care/home management,therapeutic activities,therapeutic exercises  · Plan of Care Expires: 01/23/22  · Plan of Care Reviewed with: patient    Subjective     Pain/Comfort:  · Pain Rating 1:  (Pt did not rate.)  · Location - Orientation 1: generalized  · Location 1: abdomen  · Pain " Addressed 1: Reposition    Objective:     Communicated with: nurse prior to session.  Patient found in L side-lying, far down in bed w/ NC out of nares with telemetry,bed alarm,oxygen,peripheral IV upon OT entry to room.    General Precautions: Standard, fall,airborne,contact,droplet,respiratory   Orthopedic Precautions:RLE weight bearing as tolerated   Braces: N/A  Respiratory Status: Room air; NC out of nares (dtr reports pt is consistently taking herself off); pt placed back on 2-3L for recovering to 88-91%; pt initially found in RA w/ SPO2 78%.      Occupational Performance:     Bed Mobility:    · Patient completed Scooting hips to EOB with minimum assistance  · Patient completed Supine to Sit with minimum assistance  · Patient completed Sit to Supine with minimum assistance     Functional Mobility/Transfers:  · Patient completed Sit <> Stand Transfer with contact guard assistance and minimum assistance  with  rolling walker   · Functional Mobility: Pt was able to laterally ambulate along bed w/ CGA-min A and use of RW.     Activities of Daily Living:  · Upper Body Dressing: minimum assistance for donning gown over back     [facility] 6 Click ADL: 21    Treatment & Education:  -Pt performed ADLs and functional mobility as noted above.   -Pt educated on safe functional mobility as noted.   -Pt encouraged to mobilize in room w/ assistance from staff to ensure safety.   -Dtr and pt educated on importance of O2 compliance for maintaining good SPO2.   -Pt encouraged to continuing performing BUE TE w/ use of yellow theraband per handout.   -Questions and concerns addressed within OT scope.     Patient left HOB elevated with all lines intact, call button in reach, bed alarm on and BLEs supportedEducation:   on pillows.     GOALS:   Multidisciplinary Problems     Occupational Therapy Goals        Problem: Occupational Therapy Goal    Goal Priority Disciplines Outcome Interventions   Occupational Therapy Goal     OT, PT/OT  Ongoing, Progressing    Description: Goals to be met by: 1/23/22     Patient will increase functional independence with ADLs by performing:    LE Dressing with Stand-by Assistance.  Grooming while standing at sink with Stand-by Assistance.  Toileting from bedside commode with Stand-by Assistance for hygiene and clothing management.   Supine to sit with Contact Guard Assistance.  Step transfer with Stand-by Assistance  Toilet transfer to bedside commode with Stand-by Assistance.  Upper extremity exercise program x15 reps per handout, with independence.                     Time Tracking:     OT Date of Treatment: 01/12/22  OT Start Time: 1200  OT Stop Time: 1219  OT Total Time (min): 19 min    Billable Minutes:Therapeutic Activity 19  Total Time 19    OT/JEREMIAH: OT          1/12/2022

## 2022-01-12 NOTE — SUBJECTIVE & OBJECTIVE
Interval History: No events overnight. This AM w/ new acute abdominal pain.    Review of Systems   Constitutional: Negative for chills and fever.   Respiratory: Negative for cough and shortness of breath.    Cardiovascular: Negative for chest pain and leg swelling.   Gastrointestinal: Positive for abdominal pain, nausea and vomiting. Negative for abdominal distention.     Objective:     Vital Signs (Most Recent):  Temp: 97.1 °F (36.2 °C) (01/12/22 0817)  Pulse: 83 (01/12/22 0817)  Resp: 18 (01/12/22 0951)  BP: (!) 184/82 (01/12/22 0817)  SpO2: (!) 94 % (01/12/22 0817) Vital Signs (24h Range):  Temp:  [97.1 °F (36.2 °C)-98.7 °F (37.1 °C)] 97.1 °F (36.2 °C)  Pulse:  [74-84] 83  Resp:  [16-20] 18  SpO2:  [94 %-100 %] 94 %  BP: (127-184)/(60-82) 184/82     Weight: 45.4 kg (100 lb)  Body mass index is 20.2 kg/m².  No intake or output data in the 24 hours ending 01/12/22 0954   Physical Exam  Constitutional:       General: She is not in acute distress.     Appearance: She is ill-appearing. She is not toxic-appearing or diaphoretic.   Cardiovascular:      Rate and Rhythm: Normal rate and regular rhythm.      Heart sounds: No murmur heard.  No gallop.    Pulmonary:      Effort: Pulmonary effort is normal. No respiratory distress.      Breath sounds: Normal breath sounds. No wheezing.   Abdominal:      General: Bowel sounds are normal.      Palpations: Abdomen is soft.      Tenderness: There is abdominal tenderness. There is no guarding or rebound.         Significant Labs: All pertinent labs within the past 24 hours have been reviewed.    Significant Imaging: I have reviewed all pertinent imaging results/findings within the past 24 hours.

## 2022-01-12 NOTE — PT/OT/SLP PROGRESS
Physical Therapy      Patient Name:  Katt Mcneal   MRN:  [id number]    Patient not seen today secondary to Patient ill. Pt with C/O stomach pain.  Pt more confused today. Inappropriate for therapy today.  Will follow-up as able.    Ciro Sifuentes, PTA

## 2022-01-12 NOTE — PLAN OF CARE
Problem: Infection  Goal: Absence of Infection Signs and Symptoms  Outcome: Ongoing, Progressing     Problem: Impaired Wound Healing  Goal: Optimal Wound Healing  Outcome: Ongoing, Progressing     Problem: Skin Injury Risk Increased  Goal: Skin Health and Integrity  Outcome: Ongoing, Progressing

## 2022-01-12 NOTE — NURSING
Report received from night nurse ANNETTE Krishnamurthy. Visualized patient and assessed patient's overall condition and appearance. No acute distress noted. Will continue to monitor

## 2022-01-13 PROBLEM — Z51.5 COMFORT MEASURES ONLY STATUS: Status: ACTIVE | Noted: 2022-01-13

## 2022-01-13 PROBLEM — Z51.5 END OF LIFE CARE: Status: ACTIVE | Noted: 2022-01-13

## 2022-01-13 PROBLEM — D72.829 LEUKOCYTOSIS: Status: ACTIVE | Noted: 2022-01-13

## 2022-01-13 PROBLEM — Z71.89 ADVANCE CARE PLANNING: Status: ACTIVE | Noted: 2022-01-13

## 2022-01-13 LAB
ALBUMIN SERPL BCP-MCNC: 2.1 G/DL (ref 3.5–5.2)
ALLENS TEST: ABNORMAL
ALP SERPL-CCNC: 275 U/L (ref 55–135)
ALT SERPL W/O P-5'-P-CCNC: 121 U/L (ref 10–44)
ANION GAP SERPL CALC-SCNC: 18 MMOL/L (ref 8–16)
ANISOCYTOSIS BLD QL SMEAR: SLIGHT
AORTIC ROOT ANNULUS: 2.52 CM
AORTIC VALVE CUSP SEPERATION: 1.88 CM
AST SERPL-CCNC: 155 U/L (ref 10–40)
AV INDEX (PROSTH): 0.82
AV MEAN GRADIENT: 5 MMHG
AV PEAK GRADIENT: 7 MMHG
AV VALVE AREA: 1.7 CM2
AV VELOCITY RATIO: 0.87
BASOPHILS # BLD AUTO: ABNORMAL K/UL (ref 0–0.2)
BASOPHILS NFR BLD: 0 % (ref 0–1.9)
BILIRUB SERPL-MCNC: 1.1 MG/DL (ref 0.1–1)
BNP SERPL-MCNC: 987 PG/ML (ref 0–99)
BSA FOR ECHO PROCEDURE: 1.37 M2
BUN SERPL-MCNC: 27 MG/DL (ref 8–23)
BURR CELLS BLD QL SMEAR: ABNORMAL
CALCIUM SERPL-MCNC: 8.7 MG/DL (ref 8.7–10.5)
CHLORIDE SERPL-SCNC: 105 MMOL/L (ref 95–110)
CO2 SERPL-SCNC: 18 MMOL/L (ref 23–29)
CREAT SERPL-MCNC: 0.9 MG/DL (ref 0.5–1.4)
CV ECHO LV RWT: 0.67 CM
DELSYS: ABNORMAL
DIFFERENTIAL METHOD: ABNORMAL
DOP CALC AO PEAK VEL: 1.34 M/S
DOP CALC AO VTI: 27.73 CM
DOP CALC LVOT AREA: 2.1 CM2
DOP CALC LVOT DIAMETER: 1.63 CM
DOP CALC LVOT PEAK VEL: 1.17 M/S
DOP CALC LVOT STROKE VOLUME: 47.22 CM3
DOP CALCLVOT PEAK VEL VTI: 22.64 CM
E WAVE DECELERATION TIME: 147.65 MSEC
E/A RATIO: 1.27
E/E' RATIO: 18.15 M/S
ECHO LV POSTERIOR WALL: 1.31 CM (ref 0.6–1.1)
EJECTION FRACTION: 55 %
EOSINOPHIL # BLD AUTO: ABNORMAL K/UL (ref 0–0.5)
EOSINOPHIL NFR BLD: 0 % (ref 0–8)
ERYTHROCYTE [DISTWIDTH] IN BLOOD BY AUTOMATED COUNT: 17.9 % (ref 11.5–14.5)
EST. GFR  (AFRICAN AMERICAN): >60 ML/MIN/1.73 M^2
EST. GFR  (NON AFRICAN AMERICAN): >60 ML/MIN/1.73 M^2
FRACTIONAL SHORTENING: 23 % (ref 28–44)
GLUCOSE SERPL-MCNC: 159 MG/DL (ref 70–110)
HBV CORE AB SERPL QL IA: NEGATIVE
HBV SURFACE AG SERPL QL IA: NEGATIVE
HCO3 UR-SCNC: 23.2 MMOL/L (ref 24–28)
HCT VFR BLD AUTO: 30.4 % (ref 37–48.5)
HGB BLD-MCNC: 9.5 G/DL (ref 12–16)
HYPOCHROMIA BLD QL SMEAR: ABNORMAL
IMM GRANULOCYTES # BLD AUTO: ABNORMAL K/UL (ref 0–0.04)
IMM GRANULOCYTES NFR BLD AUTO: ABNORMAL % (ref 0–0.5)
INR PPP: 2.8 (ref 0.8–1.2)
INTERVENTRICULAR SEPTUM: 1.37 CM (ref 0.6–1.1)
IVRT: 114.19 MSEC
LA MAJOR: 4.97 CM
LA MINOR: 4.75 CM
LA WIDTH: 4.39 CM
LACTATE SERPL-SCNC: 2.4 MMOL/L (ref 0.5–2.2)
LEFT ATRIUM SIZE: 3.84 CM
LEFT ATRIUM VOLUME INDEX: 50.8 ML/M2
LEFT ATRIUM VOLUME: 69.6 CM3
LEFT INTERNAL DIMENSION IN SYSTOLE: 3.03 CM (ref 2.1–4)
LEFT VENTRICLE DIASTOLIC VOLUME INDEX: 48.38 ML/M2
LEFT VENTRICLE DIASTOLIC VOLUME: 66.28 ML
LEFT VENTRICLE MASS INDEX: 138 G/M2
LEFT VENTRICLE SYSTOLIC VOLUME INDEX: 26.1 ML/M2
LEFT VENTRICLE SYSTOLIC VOLUME: 35.73 ML
LEFT VENTRICULAR INTERNAL DIMENSION IN DIASTOLE: 3.91 CM (ref 3.5–6)
LEFT VENTRICULAR MASS: 188.98 G
LV LATERAL E/E' RATIO: 16.86 M/S
LV SEPTAL E/E' RATIO: 19.67 M/S
LYMPHOCYTES # BLD AUTO: ABNORMAL K/UL (ref 1–4.8)
LYMPHOCYTES NFR BLD: 4 % (ref 18–48)
MCH RBC QN AUTO: 26 PG (ref 27–31)
MCHC RBC AUTO-ENTMCNC: 31.3 G/DL (ref 32–36)
MCV RBC AUTO: 83 FL (ref 82–98)
MONOCYTES # BLD AUTO: ABNORMAL K/UL (ref 0.3–1)
MONOCYTES NFR BLD: 0 % (ref 4–15)
MV PEAK A VEL: 0.93 M/S
MV PEAK E VEL: 1.18 M/S
NEUTROPHILS NFR BLD: 91 % (ref 38–73)
NEUTS BAND NFR BLD MANUAL: 5 %
NRBC BLD-RTO: 0 /100 WBC
OVALOCYTES BLD QL SMEAR: ABNORMAL
PATH REV BLD -IMP: NORMAL
PCO2 BLDA: 34.2 MMHG (ref 35–45)
PH SMN: 7.44 [PH] (ref 7.35–7.45)
PISA TR MAX VEL: 3.26 M/S
PLATELET # BLD AUTO: 278 K/UL (ref 150–450)
PLATELET BLD QL SMEAR: ABNORMAL
PMV BLD AUTO: 11.8 FL (ref 9.2–12.9)
PO2 BLDA: 54 MMHG (ref 80–100)
POC BE: -1 MMOL/L
POC SATURATED O2: 89 % (ref 95–100)
POC TCO2: 24 MMOL/L (ref 23–27)
POCT GLUCOSE: 242 MG/DL (ref 70–110)
POIKILOCYTOSIS BLD QL SMEAR: SLIGHT
POLYCHROMASIA BLD QL SMEAR: ABNORMAL
POTASSIUM SERPL-SCNC: 4.4 MMOL/L (ref 3.5–5.1)
PROCALCITONIN SERPL IA-MCNC: 23.6 NG/ML
PROT SERPL-MCNC: 6.9 G/DL (ref 6–8.4)
PROTHROMBIN TIME: 28.1 SEC (ref 9–12.5)
PULM VEIN S/D RATIO: 1.28
PV PEAK D VEL: 0.54 M/S
PV PEAK S VEL: 0.69 M/S
PV PEAK VELOCITY: 0.74 CM/S
RA MAJOR: 4.78 CM
RA PRESSURE: 8 MMHG
RA WIDTH: 3.25 CM
RBC # BLD AUTO: 3.66 M/UL (ref 4–5.4)
RIGHT VENTRICULAR END-DIASTOLIC DIMENSION: 3.31 CM
RV TISSUE DOPPLER FREE WALL SYSTOLIC VELOCITY 1 (APICAL 4 CHAMBER VIEW): 9.37 CM/S
SAMPLE: ABNORMAL
SINUS: 2.64 CM
SITE: ABNORMAL
SODIUM SERPL-SCNC: 141 MMOL/L (ref 136–145)
STJ: 2.36 CM
TB INDURATION 48 - 72 HR READ: 0 MM
TDI LATERAL: 0.07 M/S
TDI SEPTAL: 0.06 M/S
TDI: 0.07 M/S
TR MAX PG: 43 MMHG
TRICUSPID ANNULAR PLANE SYSTOLIC EXCURSION: 1.53 CM
TROPONIN I SERPL DL<=0.01 NG/ML-MCNC: 0.02 NG/ML (ref 0–0.03)
TV REST PULMONARY ARTERY PRESSURE: 51 MMHG
WBC # BLD AUTO: 51.59 K/UL (ref 3.9–12.7)

## 2022-01-13 PROCEDURE — 36600 WITHDRAWAL OF ARTERIAL BLOOD: CPT

## 2022-01-13 PROCEDURE — 94799 UNLISTED PULMONARY SVC/PX: CPT

## 2022-01-13 PROCEDURE — 63600175 PHARM REV CODE 636 W HCPCS: Performed by: STUDENT IN AN ORGANIZED HEALTH CARE EDUCATION/TRAINING PROGRAM

## 2022-01-13 PROCEDURE — 99497 PR ADVNCD CARE PLAN 30 MIN: ICD-10-PCS | Mod: 25,,, | Performed by: INTERNAL MEDICINE

## 2022-01-13 PROCEDURE — 85027 COMPLETE CBC AUTOMATED: CPT | Performed by: STUDENT IN AN ORGANIZED HEALTH CARE EDUCATION/TRAINING PROGRAM

## 2022-01-13 PROCEDURE — 80053 COMPREHEN METABOLIC PANEL: CPT | Performed by: STUDENT IN AN ORGANIZED HEALTH CARE EDUCATION/TRAINING PROGRAM

## 2022-01-13 PROCEDURE — 85610 PROTHROMBIN TIME: CPT | Performed by: ORTHOPAEDIC SURGERY

## 2022-01-13 PROCEDURE — 27000207 HC ISOLATION

## 2022-01-13 PROCEDURE — 87186 SC STD MICRODIL/AGAR DIL: CPT | Performed by: STUDENT IN AN ORGANIZED HEALTH CARE EDUCATION/TRAINING PROGRAM

## 2022-01-13 PROCEDURE — 99291 PR CRITICAL CARE, E/M 30-74 MINUTES: ICD-10-PCS | Mod: ,,, | Performed by: STUDENT IN AN ORGANIZED HEALTH CARE EDUCATION/TRAINING PROGRAM

## 2022-01-13 PROCEDURE — 85060 PATHOLOGIST REVIEW: ICD-10-PCS | Mod: ,,, | Performed by: PATHOLOGY

## 2022-01-13 PROCEDURE — 85007 BL SMEAR W/DIFF WBC COUNT: CPT | Performed by: STUDENT IN AN ORGANIZED HEALTH CARE EDUCATION/TRAINING PROGRAM

## 2022-01-13 PROCEDURE — 93010 ELECTROCARDIOGRAM REPORT: CPT | Mod: ,,, | Performed by: INTERNAL MEDICINE

## 2022-01-13 PROCEDURE — 86480 TB TEST CELL IMMUN MEASURE: CPT | Performed by: STUDENT IN AN ORGANIZED HEALTH CARE EDUCATION/TRAINING PROGRAM

## 2022-01-13 PROCEDURE — 36415 COLL VENOUS BLD VENIPUNCTURE: CPT | Performed by: STUDENT IN AN ORGANIZED HEALTH CARE EDUCATION/TRAINING PROGRAM

## 2022-01-13 PROCEDURE — 99223 PR INITIAL HOSPITAL CARE,LEVL III: ICD-10-PCS | Mod: ,,, | Performed by: INTERNAL MEDICINE

## 2022-01-13 PROCEDURE — 36415 COLL VENOUS BLD VENIPUNCTURE: CPT | Performed by: NURSE PRACTITIONER

## 2022-01-13 PROCEDURE — 99223 1ST HOSP IP/OBS HIGH 75: CPT | Mod: ,,, | Performed by: INTERNAL MEDICINE

## 2022-01-13 PROCEDURE — 93005 ELECTROCARDIOGRAM TRACING: CPT

## 2022-01-13 PROCEDURE — 99291 CRITICAL CARE FIRST HOUR: CPT | Mod: ,,, | Performed by: STUDENT IN AN ORGANIZED HEALTH CARE EDUCATION/TRAINING PROGRAM

## 2022-01-13 PROCEDURE — 25000003 PHARM REV CODE 250: Performed by: STUDENT IN AN ORGANIZED HEALTH CARE EDUCATION/TRAINING PROGRAM

## 2022-01-13 PROCEDURE — 83605 ASSAY OF LACTIC ACID: CPT | Performed by: STUDENT IN AN ORGANIZED HEALTH CARE EDUCATION/TRAINING PROGRAM

## 2022-01-13 PROCEDURE — 82803 BLOOD GASES ANY COMBINATION: CPT

## 2022-01-13 PROCEDURE — 87340 HEPATITIS B SURFACE AG IA: CPT | Performed by: STUDENT IN AN ORGANIZED HEALTH CARE EDUCATION/TRAINING PROGRAM

## 2022-01-13 PROCEDURE — 99900035 HC TECH TIME PER 15 MIN (STAT)

## 2022-01-13 PROCEDURE — 84484 ASSAY OF TROPONIN QUANT: CPT | Performed by: STUDENT IN AN ORGANIZED HEALTH CARE EDUCATION/TRAINING PROGRAM

## 2022-01-13 PROCEDURE — 20000000 HC ICU ROOM

## 2022-01-13 PROCEDURE — 87077 CULTURE AEROBIC IDENTIFY: CPT | Performed by: STUDENT IN AN ORGANIZED HEALTH CARE EDUCATION/TRAINING PROGRAM

## 2022-01-13 PROCEDURE — 87040 BLOOD CULTURE FOR BACTERIA: CPT | Performed by: STUDENT IN AN ORGANIZED HEALTH CARE EDUCATION/TRAINING PROGRAM

## 2022-01-13 PROCEDURE — 63600175 PHARM REV CODE 636 W HCPCS: Performed by: INTERNAL MEDICINE

## 2022-01-13 PROCEDURE — 85060 BLOOD SMEAR INTERPRETATION: CPT | Mod: ,,, | Performed by: PATHOLOGY

## 2022-01-13 PROCEDURE — 93010 EKG 12-LEAD: ICD-10-PCS | Mod: ,,, | Performed by: INTERNAL MEDICINE

## 2022-01-13 PROCEDURE — 83880 ASSAY OF NATRIURETIC PEPTIDE: CPT | Performed by: NURSE PRACTITIONER

## 2022-01-13 PROCEDURE — 94760 N-INVAS EAR/PLS OXIMETRY 1: CPT

## 2022-01-13 PROCEDURE — 36415 COLL VENOUS BLD VENIPUNCTURE: CPT | Performed by: ORTHOPAEDIC SURGERY

## 2022-01-13 PROCEDURE — 86704 HEP B CORE ANTIBODY TOTAL: CPT | Performed by: STUDENT IN AN ORGANIZED HEALTH CARE EDUCATION/TRAINING PROGRAM

## 2022-01-13 PROCEDURE — 94761 N-INVAS EAR/PLS OXIMETRY MLT: CPT

## 2022-01-13 PROCEDURE — 99497 ADVNCD CARE PLAN 30 MIN: CPT | Mod: 25,,, | Performed by: INTERNAL MEDICINE

## 2022-01-13 PROCEDURE — 27100171 HC OXYGEN HIGH FLOW UP TO 24 HOURS

## 2022-01-13 PROCEDURE — 84145 PROCALCITONIN (PCT): CPT | Performed by: STUDENT IN AN ORGANIZED HEALTH CARE EDUCATION/TRAINING PROGRAM

## 2022-01-13 RX ORDER — DEXMEDETOMIDINE HYDROCHLORIDE 4 UG/ML
0-1.4 INJECTION, SOLUTION INTRAVENOUS CONTINUOUS
Status: DISCONTINUED | OUTPATIENT
Start: 2022-01-13 | End: 2022-01-17 | Stop reason: HOSPADM

## 2022-01-13 RX ORDER — SODIUM CHLORIDE 9 MG/ML
INJECTION, SOLUTION INTRAVENOUS
Status: DISCONTINUED | OUTPATIENT
Start: 2022-01-13 | End: 2022-01-17 | Stop reason: HOSPADM

## 2022-01-13 RX ORDER — LORAZEPAM 0.5 MG/1
0.5 TABLET ORAL ONCE
Status: COMPLETED | OUTPATIENT
Start: 2022-01-13 | End: 2022-01-13

## 2022-01-13 RX ORDER — FUROSEMIDE 10 MG/ML
80 INJECTION INTRAMUSCULAR; INTRAVENOUS ONCE
Status: COMPLETED | OUTPATIENT
Start: 2022-01-13 | End: 2022-01-13

## 2022-01-13 RX ORDER — DEXAMETHASONE SODIUM PHOSPHATE 4 MG/ML
6 INJECTION, SOLUTION INTRA-ARTICULAR; INTRALESIONAL; INTRAMUSCULAR; INTRAVENOUS; SOFT TISSUE DAILY
Status: DISCONTINUED | OUTPATIENT
Start: 2022-01-13 | End: 2022-01-13

## 2022-01-13 RX ORDER — ALBUTEROL SULFATE 90 UG/1
2 AEROSOL, METERED RESPIRATORY (INHALATION) EVERY 4 HOURS PRN
Status: DISCONTINUED | OUTPATIENT
Start: 2022-01-13 | End: 2022-01-17 | Stop reason: HOSPADM

## 2022-01-13 RX ORDER — LORAZEPAM 2 MG/ML
1 INJECTION INTRAMUSCULAR EVERY 4 HOURS PRN
Status: DISCONTINUED | OUTPATIENT
Start: 2022-01-13 | End: 2022-01-17 | Stop reason: HOSPADM

## 2022-01-13 RX ORDER — HYDROMORPHONE HYDROCHLORIDE 2 MG/ML
1 INJECTION, SOLUTION INTRAMUSCULAR; INTRAVENOUS; SUBCUTANEOUS
Status: DISCONTINUED | OUTPATIENT
Start: 2022-01-13 | End: 2022-01-17 | Stop reason: HOSPADM

## 2022-01-13 RX ADMIN — VANCOMYCIN HYDROCHLORIDE 750 MG: 750 INJECTION, POWDER, LYOPHILIZED, FOR SOLUTION INTRAVENOUS at 10:01

## 2022-01-13 RX ADMIN — MEROPENEM 2 G: 1 INJECTION, POWDER, FOR SOLUTION INTRAVENOUS at 10:01

## 2022-01-13 RX ADMIN — HYDROMORPHONE HYDROCHLORIDE 1 MG: 2 INJECTION, SOLUTION INTRAMUSCULAR; INTRAVENOUS; SUBCUTANEOUS at 03:01

## 2022-01-13 RX ADMIN — MUPIROCIN: 20 OINTMENT TOPICAL at 10:01

## 2022-01-13 RX ADMIN — HYDROMORPHONE HYDROCHLORIDE 1 MG: 2 INJECTION, SOLUTION INTRAMUSCULAR; INTRAVENOUS; SUBCUTANEOUS at 02:01

## 2022-01-13 RX ADMIN — HYDROMORPHONE HYDROCHLORIDE 1 MG: 2 INJECTION INTRAMUSCULAR; INTRAVENOUS; SUBCUTANEOUS at 04:01

## 2022-01-13 RX ADMIN — SODIUM CHLORIDE 5 ML/HR: 0.9 INJECTION, SOLUTION INTRAVENOUS at 10:01

## 2022-01-13 RX ADMIN — HYDROMORPHONE HYDROCHLORIDE 1 MG: 2 INJECTION, SOLUTION INTRAMUSCULAR; INTRAVENOUS; SUBCUTANEOUS at 08:01

## 2022-01-13 RX ADMIN — DEXMEDETOMIDINE HYDROCHLORIDE IN 0.9% SODIUM CHLORIDE 0.2 MCG/KG/HR: 4 INJECTION INTRAVENOUS at 10:01

## 2022-01-13 RX ADMIN — FUROSEMIDE 80 MG: 10 INJECTION, SOLUTION INTRAMUSCULAR; INTRAVENOUS at 10:01

## 2022-01-13 RX ADMIN — DEXMEDETOMIDINE HYDROCHLORIDE IN 0.9% SODIUM CHLORIDE 0.7 MCG/KG/HR: 4 INJECTION INTRAVENOUS at 09:01

## 2022-01-13 RX ADMIN — LORAZEPAM 0.5 MG: 0.5 TABLET ORAL at 01:01

## 2022-01-13 RX ADMIN — DEXAMETHASONE SODIUM PHOSPHATE 6 MG: 4 INJECTION INTRA-ARTICULAR; INTRALESIONAL; INTRAMUSCULAR; INTRAVENOUS; SOFT TISSUE at 10:01

## 2022-01-13 NOTE — HPI
"(From H&P) "Ms. Mcneal is an 82yo lady with a past medical history of Afib on Coumadin, CAD, MI, HTN, pancreatic mass with CBD obstruction and pancreatitis, and hypothyroidism. She was recently admitted to  on 1/3 to 1/6 due to abdominal pain and pancreatitis (MRCP showed severe pancreatic ductal dilation with a suspected pancreatic mass in the body segment measuring up to 2.7 cm.  Recommend further evaluation with EUS and tissue sampling).  Please refer to Dr. Ny's complete discharge summary for details of her stay.  She was also found to be COVID positive on 1/3 during that admission, but her oxygenation was always >94%, so no acute therapy was initiated (CXR showed bilateral mild diffuse nonspecific interstitial coarsening improved from prior, and may reflect residual minimal pulmonary edema versus interstitial type pneumonia or chronic interstitial lung changes.) She now comes to the ED after tripping and falling at home earlier today.  She cannot remember if she struck her head or not.  After falling she developed right hip pain and was unable to bear any weight on her right leg.  EMS had to be activated and bring her to the ED.  In the ED she was found to have COVID and nondisplaced fractures involving the right inter trochanteric region and right lesser trochanter.  Her O2 sats today went as low as 88% on RA.  No repeat CXR was done in the ED."    Since arrival in hospital, patient has undergone right IMN with orthopedic surgery. However, patient with worsening respiratory and overall clinical status overnight; in process of being transferred to ICU with acute respiratory failure. Palliative medicine is consulted for advance care planning, given pt's elderly age, multiple chronic medical conditions (including pancreatic mass, possible malignancy), and critical illness. Met with pt at bedside; for details of discussion, see advance care planning section of plan.      "

## 2022-01-13 NOTE — ASSESSMENT & PLAN NOTE
- Concerning for possible malignancy; discussed with family that pt is too sick to undergo an evaluation for this, and would not be a candidate for treatment

## 2022-01-13 NOTE — HPI
81-year-old frail and elderly female with history of atrial fibrillation on Coumadin, CAD with history of MI, hypertension, recently diagnosed pancreatic mass with CBD obstruction returns after recent discharge earlier this month for acute on chronic pancreatitis and COVID now with a fall and hip fracture requiring IMN.  Patient's family reports a general consistent decline over the past year so in her functional status.  Since being hospitalized, her course has been complicated by episodes of acute hypoxemia and dyspnea.

## 2022-01-13 NOTE — SUBJECTIVE & OBJECTIVE
Past Medical History:   Diagnosis Date    Arthritis     Atrial fibrillation     Bilateral carotid artery stenosis 7/13/2017    Coronary artery disease     Fibromyalgia     Hyperlipidemia     Hypertension     Myocardial infarction 03/21/2015    Pancreatitis     Thyroid disease        Past Surgical History:   Procedure Laterality Date    CHOLECYSTECTOMY      CORONARY STENT PLACEMENT  3/24/15    HYSTERECTOMY  10/28/2004    INTRAMEDULLARY RODDING OF FEMUR Right 1/8/2022    Procedure: INSERTION, INTRAMEDULLARY TASHI, FEMUR;  Surgeon: Crow Ortega MD;  [facility];  Service: Orthopedics;  Laterality: Right;       Review of patient's allergies indicates:   Allergen Reactions    Sulfa (sulfonamide antibiotics) Hives    Bactrim [sulfamethoxazole-trimethoprim] Other (See Comments)     Pt. Reports that it caused severe pain    Integrilin [eptifibatide]     Percocet [oxycodone-acetaminophen] Itching    Statins-hmg-coa reductase inhibitors Other (See Comments)     Muscle pain. Patient states some, not all statins    Xarelto [rivaroxaban]     Epinephrine Palpitations       Medications:  Continuous Infusions:   dexmedetomidine (PRECEDEX) infusion 0.2 mcg/kg/hr (01/13/22 1025)     Scheduled Meds:   albuterol  2 puff Inhalation BID    amiodarone  200 mg Oral Daily    amLODIPine  5 mg Oral Daily    aspirin  81 mg Oral Daily    dexamethasone  6 mg Intravenous Daily    docusate sodium  100 mg Oral BID    levothyroxine  50 mcg Oral Before breakfast    metoprolol succinate  25 mg Oral Daily    mupirocin   Nasal BID    vitamin D  2,000 Units Oral Daily    warfarin  0.5 mg Oral Daily     PRN Meds:sodium chloride 0.9%, sodium chloride 0.9%, acetaminophen, benzonatate, dextrose 50%, dextrose 50%, dextrose 50%, diphenoxylate-atropine 2.5-0.025 mg, glucagon (human recombinant), glucagon (human recombinant), glucose, glucose, HYDROmorphone (PF), insulin aspart U-100, lorazepam, melatonin, metoprolol,  nitroGLYCERIN, ondansetron, promethazine (PHENERGAN) IVPB, senna-docusate 8.6-50 mg, DIPH,PERTUSS(ACELL),TET VACCINE (ADULT)(BOOSTRIX,ADACEL)    Family History    None       Tobacco Use    Smoking status: Former Smoker     Quit date: 1964     Years since quittin.5    Smokeless tobacco: Former User   Substance and Sexual Activity    Alcohol use: No     Alcohol/week: 0.0 standard drinks    Drug use: No    Sexual activity: Not Currently       Review of Systems   Unable to perform ROS: Mental status change     Objective:     Vital Signs (Most Recent):  Temp: 98.1 °F (36.7 °C) (22 0410)  Pulse: 94 (22)  Resp: (!) 27 (22)  BP: (!) 168/66 (22 0518)  SpO2: 100 % (22) Vital Signs (24h Range):  Temp:  [97.5 °F (36.4 °C)-98.3 °F (36.8 °C)] 98.1 °F (36.7 °C)  Pulse:  [] 94  Resp:  [16-38] 27  SpO2:  [74 %-100 %] 100 %  BP: (103-168)/(57-71) 168/66     Weight: 45.4 kg (100 lb)  Body mass index is 20.2 kg/m².    Physical Exam  Constitutional:       Appearance: She is toxic-appearing.      Comments: Elderly, both acutely and chronically ill-appearing   HENT:      Nose: Nose normal.      Mouth/Throat:      Mouth: Mucous membranes are dry.   Cardiovascular:      Rate and Rhythm: Normal rate.   Pulmonary:      Comments: Mildly increased work of breathing, on NC oxygen   Skin:     General: Skin is warm.   Neurological:      Comments: Encephalopathic    Psychiatric:      Comments: Agitated during initial visit        Significant Labs: All pertinent labs within the past 24 hours have been reviewed.  CBC:   Recent Labs   Lab 22   WBC 51.59*   HGB 9.5*   HCT 30.4*   MCV 83        BMP:  Recent Labs   Lab 22   *      K 4.4      CO2 18*   BUN 27*   CREATININE 0.9   CALCIUM 8.7     LFT:  Lab Results   Component Value Date     (H) 2022    ALKPHOS 275 (H) 2022    BILITOT 1.1 (H) 2022     Albumin:   Albumin    Date Value Ref Range Status   01/13/2022 2.1 (L) 3.5 - 5.2 g/dL Final     Protein:   Total Protein   Date Value Ref Range Status   01/13/2022 6.9 6.0 - 8.4 g/dL Final     Lactic acid:   Lab Results   Component Value Date    LACTATE 2.4 (H) 01/13/2022    LACTATE 0.6 01/07/2022       Significant Imaging: I have reviewed all pertinent imaging results/findings within the past 24 hours.

## 2022-01-13 NOTE — ASSESSMENT & PLAN NOTE
Marked leukocytosis concerning for new infection vs inflammatory process, likely intraabdominal.

## 2022-01-13 NOTE — SIGNIFICANT EVENT
0450  Called to come and see patient that was Tachypneic and Tachycardic. I was told that patient pulled off her 2 Lpm/Nc and started desating tp 74% sats. When I entered the room patient was lying in bed with 100% NRB sats 98%. Nurse reported she gave Dilaudid 1 mg. IV. 12 EKG done and it showed Sinus Tachycardia. Resp Therapist applied Vapotherm 15 liters/ 100% per DR Tilley request.  The nurse said she look better now that she is keeping oxygen on. Charge chat to  Dr Tilley status of patient no need for the ICU at this time. Will continue to monitor.

## 2022-01-13 NOTE — SUBJECTIVE & OBJECTIVE
Past Medical History:   Diagnosis Date    Arthritis     Atrial fibrillation     Bilateral carotid artery stenosis 2017    Coronary artery disease     Fibromyalgia     Hyperlipidemia     Hypertension     Myocardial infarction 2015    Pancreatitis     Thyroid disease        Past Surgical History:   Procedure Laterality Date    CHOLECYSTECTOMY      CORONARY STENT PLACEMENT  3/24/15    HYSTERECTOMY  10/28/2004    INTRAMEDULLARY RODDING OF FEMUR Right 2022    Procedure: INSERTION, INTRAMEDULLARY TASHI, FEMUR;  Surgeon: Crow Ortega MD;  Location: [facility];  Service: Orthopedics;  Laterality: Right;       Review of patient's allergies indicates:   Allergen Reactions    Sulfa (sulfonamide antibiotics) Hives    Bactrim [sulfamethoxazole-trimethoprim] Other (See Comments)     Pt. Reports that it caused severe pain    Integrilin [eptifibatide]     Percocet [oxycodone-acetaminophen] Itching    Statins-hmg-coa reductase inhibitors Other (See Comments)     Muscle pain. Patient states some, not all statins    Xarelto [rivaroxaban]     Epinephrine Palpitations       Family History    None       Tobacco Use    Smoking status: Former Smoker     Quit date: 1964     Years since quittin.5    Smokeless tobacco: Former User   Substance and Sexual Activity    Alcohol use: No     Alcohol/week: 0.0 standard drinks    Drug use: No    Sexual activity: Not Currently         Review of Systems   Unable to perform ROS: Acuity of condition     Objective:     Vital Signs (Most Recent):  Temp: 98.1 °F (36.7 °C) (22 0410)  Pulse: 94 (22 0847)  Resp: (!) 27 (22)  BP: (!) 168/66 (22 0518)  SpO2: 100 % (22 0847) Vital Signs (24h Range):  Temp:  [97.5 °F (36.4 °C)-98.3 °F (36.8 °C)] 98.1 °F (36.7 °C)  Pulse:  [] 94  Resp:  [16-38] 27  SpO2:  [74 %-100 %] 100 %  BP: (103-168)/(57-71) 168/66     Weight: 45.4 kg (100 lb)  Body mass index is 20.2 kg/m².    No  intake or output data in the 24 hours ending 01/13/22 1304    Physical Exam  Vitals and nursing note reviewed.   Constitutional:       General: She is not in acute distress.     Appearance: She is underweight. She is ill-appearing (Chronically). She is not toxic-appearing or diaphoretic.      Interventions: She is not intubated.     Comments: Frail and elderly   HENT:      Head: Normocephalic and atraumatic.      Nose: No rhinorrhea.      Mouth/Throat:      Mouth: Mucous membranes are dry.   Eyes:      General: No scleral icterus.     Conjunctiva/sclera: Conjunctivae normal.   Cardiovascular:      Rate and Rhythm: Normal rate and regular rhythm.      Heart sounds: No murmur heard.      Pulmonary:      Effort: Tachypnea present. No accessory muscle usage, respiratory distress or retractions. She is not intubated.      Breath sounds: Rhonchi and rales present. No decreased breath sounds or wheezing.   Abdominal:      General: There is no distension.      Palpations: Abdomen is soft.      Tenderness: There is no abdominal tenderness. There is no guarding.   Musculoskeletal:         General: No swelling.      Right lower leg: No edema.      Left lower leg: No edema.   Skin:     General: Skin is warm and dry.      Coloration: Skin is not jaundiced.      Findings: No rash.   Neurological:      Mental Status: She is disoriented and confused.         Vents:  Oxygen Concentration (%): 100 (01/13/22 0814)    Lines/Drains/Airways     Drain            Female External Urinary Catheter 01/09/22 0619 4 days          Peripheral Intravenous Line                 Peripheral IV - Single Lumen 01/13/22 0903 20 G Posterior;Right Forearm <1 day                Significant Labs:    CBC/Anemia Profile:  Recent Labs   Lab 01/11/22  2337 01/12/22  0412 01/12/22  1015 01/13/22  0442   WBC  --   --  4.73 51.59*   HGB  --  8.3* 8.9* 9.5*   HCT  --  26.5* 28.2* 30.4*   PLT  --   --  220 278   MCV  --   --  83 83   RDW  --   --  17.1* 17.9*    FERRITIN 906*  --   --   --         Chemistries:  Recent Labs   Lab 01/12/22  1015 01/13/22  0442    141   K 3.2* 4.4    105   CO2 27 18*   BUN 21 27*   CREATININE 0.7 0.9   CALCIUM 7.7* 8.7   ALBUMIN 1.9* 2.1*   PROT 5.5* 6.9   BILITOT 0.9 1.1*   ALKPHOS 351* 275*   * 121*   * 155*       All pertinent labs within the past 24 hours have been reviewed.    Significant Imaging:   I have reviewed all pertinent imaging results/findings within the past 24 hours.

## 2022-01-13 NOTE — ASSESSMENT & PLAN NOTE
Stable on low-dose O2 with acute worsening 1/13. Etiology likely cardiogenic vs non-cardiogenic pulmonary edema.  - incentive spirometry  - prn furosemide for comfort

## 2022-01-13 NOTE — ASSESSMENT & PLAN NOTE
- acute episode of hypoxemia.  BNP elevated.  CXR with dependent infiltrates.  Etiology likely multifactorial including decompensated heart failure vs noncardiogenic pulmonary edema from pancreatitis.  Lengthy discussion with family regarding prognosis and goals of care.  Decision was made not to proceed with aggressive care.  Patient made DNR and progressing forward with comfort only measures.

## 2022-01-13 NOTE — ASSESSMENT & PLAN NOTE
- Consult for advance care planning in older patient with multiple chronic conditions (including pancreatic mass); was initially admitted after hip fracture (s/p IMN repair current hospital stay). Had worsening respiratory status overnight and has since been transferred to ICU. Palliative medicine is consulted for advance care planning. Chart reviewed in depth; discussed pt during MDT rounds.   - Patient encephalopathic and unable to participate in discussion  - Along with Dr Robison (hospitalist) and Dr Baldwin (PCCM), met with pt's family in waiting room. Introduced role of palliative medicine, and learned more about pt outside of hospital. Pt is  since 2014, and developed pancreatic issues around that time. She has four children total (majority of which are MPOA, though family engages in shared decision-making), and two of them live with her in her house.   - Pt developed pancreatic issues around 7 years ago, and had started a functional decline secondary to this. However, family notes a more acute decline since December 20th (episode of acute pancreatitis, then fall, then got COVID); they state that this is the sickest she has ever appeared.   - Family with excellent insight into pt's current status, and recognizes that she is likely approaching the end of her life. As family wants to avoid any interventions or suffering, they are in agreement for DNR/DNI status, and would like to focus all efforts on comfort.   - Will start comfort measures in hospital, and family will discuss possibility of home hospice (no sooner than tomorrow)   - Our team will follow up with patient and family

## 2022-01-13 NOTE — ASSESSMENT & PLAN NOTE
Had prior plan to follow up with [facility] as an outpatient. Unlikely to have treatment options if malignant

## 2022-01-13 NOTE — NURSING
Received report from ANNETTE Paula. Patient lying in bed resting, NAD noted. Safety Precautions maintained, Will Monitor. Airborne/Contact/Droplet Precautions in progress.

## 2022-01-13 NOTE — PT/OT/SLP PROGRESS
Occupational Therapy      Patient Name:  Katt Mcneal   MRN:  [id number]    Patient not seen today secondary to pt transferred to ICU w/ increased O2 demands. Will follow-up 1/14/22 if medically stable/appropriate.     1/13/2022

## 2022-01-13 NOTE — NURSING
Patient family member called to say patient was removing oxygen. Patient found lying in bed with oxygen off. Patient is very agitated. Oxygen reapplied. V/s obtained. O2 remains low. Oxygen increased to 6L. Charge nurse notified. Rapid response team and MD notified.  PRN pain medication given related to patient moaning in bed.     Advised to call NP. Following orders received: EKG obtained. Labs obtained. Chest xray obtained. Patient placed on continual pulse ox monitoring.  Vital signs improved. Arterial blood gases ordered.     Patient to remain on floor on vapotherm with continual oxygen monitoring.

## 2022-01-13 NOTE — SUBJECTIVE & OBJECTIVE
Interval History: Patient decompensated overnight. See Care Update for more information.    Review of Systems   Unable to perform ROS: Mental status change     Objective:     Vital Signs (Most Recent):  Temp: 98.1 °F (36.7 °C) (01/13/22 0410)  Pulse: 94 (01/13/22 0847)  Resp: (!) 27 (01/13/22 0847)  BP: (!) 168/66 (01/13/22 0518)  SpO2: 100 % (01/13/22 0847) Vital Signs (24h Range):  Temp:  [97.5 °F (36.4 °C)-98.3 °F (36.8 °C)] 98.1 °F (36.7 °C)  Pulse:  [] 94  Resp:  [16-38] 27  SpO2:  [74 %-100 %] 100 %  BP: (103-168)/(57-71) 168/66     Weight: 45.4 kg (100 lb)  Body mass index is 20.2 kg/m².  No intake or output data in the 24 hours ending 01/13/22 1354   Physical Exam  Constitutional:       General: She is in acute distress.      Appearance: She is ill-appearing and toxic-appearing. She is not diaphoretic.   Cardiovascular:      Rate and Rhythm: Normal rate and regular rhythm.      Heart sounds: No murmur heard.  No gallop.    Pulmonary:      Effort: Respiratory distress present.      Breath sounds: No wheezing or rhonchi.      Comments: Diffusely coarse breath sounds  Abdominal:      General: Bowel sounds are normal. There is no distension.      Palpations: Abdomen is soft.      Tenderness: There is no abdominal tenderness.         Significant Labs: All pertinent labs within the past 24 hours have been reviewed.    Significant Imaging: I have reviewed all pertinent imaging results/findings within the past 24 hours.

## 2022-01-13 NOTE — ASSESSMENT & PLAN NOTE
- Comfort care measures only  - Start IV hydromorphone 1 mg Q1H PRN dyspnea, pain. If needing frequent doses, will start infusion.   - Can continue Precedex at this time  - Start IV lorazepam 1 mg Q4H PRN anxiety, agitation, terminal delirium  - If patient calmer, consider discontinuing restraints

## 2022-01-13 NOTE — PHYSICIAN QUERY
PT Name: Katt Mcneal  MR #: [id number]    DOCUMENTATION CLARIFICATION     CDS/: BONNIE Renae, RN               Contact information:[email or web address]     This form is a permanent document in the medical record.     Query Date: January 13, 2022    By submitting this query, we are merely seeking further clarification of documentation. Please utilize your independent clinical judgment when addressing the question(s) below.    The Medical Record contains the following:   Indicators Supporting Clinical Findings Location in Medical Record    x Atrial Fibrillation Longstanding persistent atrial fibrillation      Atrial fibrillation with RVR  This has now improved into the 80's  Cardiology consult    Atrial fibrillation with RVR  AV hellen blocking agents as needed for appropriate heart rate control.  Resume anticoagulation when cleared by surgery after surgery.    a past medical history of Afib on Coumadin   ED Provider Notes 01/07/2022    Hospital Medicine H&P 01/08/2022      Cardiology Consults Notes 01/08/2022          Hospital Medicine Progress Notes 01/09/2022      x EKG Results EKG Readings: (Independently Interpreted)   This patient is in atrial fibrillation with a heart rate of 117.  There are no significant ST segment or T-wave changes.  There is no definite evidence of acute myocardial infarction or malignant arrhythmia.     07-JAN-2022 16:37:59 EKG  Atrial fibrillation with rapid ventricular response  Vent. rate 113 BPM  QRS duration 114 ms  QT/QTc 384/526 ms  Left axis deviation  Moderate voltage criteria for LVH, may be normal variant  Nonspecific ST and T wave abnormality  Prolonged QT  Abnormal ECG     Her EKG done today personally reviewed and shows AFib.   ED Provider Notes 01/07/2022            Hospital Medicine H&P 01/08/2022                      Cardiology Consults Notes 01/08/2022    x Medication Current therapy:    amiodarone tablet 200 mg, 200 mg, Oral, Daily    metoprolol injection 5  mg, 5 mg, Intravenous, Q5 Min PRN    metoprolol succinate (TOPROL-XL) 24 hr tablet 25 mg, 25 mg, Oral, Daily    Hospital Medicine H&P 01/08/2022    x Treatment AV hellen blocking agents as needed for appropriate heart rate control.  Resume anticoagulation when cleared by surgery after surgery.     Holding coumadin for hip surgery Cardiology Consults Notes 01/08/2022        Hospital Medicine Progress Notes 01/09/2022    Other       The clinical guidelines noted are only a system guideline. It does not replace the provider's clinical judgment.    Provider, please specify the type of Atrial Fibrillation (AF):    [ x ] Long-standing persistent - AF that has lasted for > 12 months    [  ] Other Persistent - defined as AF that sustained for ?7 days; Episodes often require pharmacologic or electrical cardioversion to restore sinus rhythm   [  ] Chronic, not otherwise specified      [  ] Unspecified Atrial Fibrillation   [  ] Other cardiac diagnosis (please specify): ____________   [  ] Clinically Undetermined             Please document in your progress notes daily for the duration of treatment until resolved, and include in your discharge summary.    Reference:  JOSE Boyer MD. (2020, September 14). Overview of atrial fibrillation (BRIDGETT Lua MD & GIORGI Whittaker MD, Eds.). Retrieved October 22, 2020, from [email or web address]%20fibrillation&source=search_result&selectedTitle=1~150&usage_type=default&display_rank=1    Form No. 24859

## 2022-01-13 NOTE — PROGRESS NOTES
Pharmacokinetic Initial Assessment: IV Vancomycin    Assessment/Plan:    Initiate intravenous vancomycin with loading dose of 750 mg once with subsequent doses when random concentrations are less than 20 mcg/mL  Desired empiric serum trough concentration is 10 to 20 mcg/mL  Draw vancomycin random level on 1/14/2022 at 04:00.  Pharmacy will continue to follow and monitor vancomycin.      Please contact pharmacy at extension [phone]  with any questions regarding this assessment.     Thank you for the consult,   Annalise Meyers       Patient brief summary:  Katt Mcneal is a 81 y.o. female initiated on antimicrobial therapy with IV Vancomycin for treatment of suspected bacteremia    Drug Allergies:   Review of patient's allergies indicates:   Allergen Reactions    Sulfa (sulfonamide antibiotics) Hives    Bactrim [sulfamethoxazole-trimethoprim] Other (See Comments)     Pt. Reports that it caused severe pain    Integrilin [eptifibatide]     Percocet [oxycodone-acetaminophen] Itching    Statins-hmg-coa reductase inhibitors Other (See Comments)     Muscle pain. Patient states some, not all statins    Xarelto [rivaroxaban]     Epinephrine Palpitations       Actual Body Weight:   45.4 kg    Renal Function:   Estimated Creatinine Clearance: 35.7 mL/min (based on SCr of 0.9 mg/dL).,     Dialysis Method (if applicable):  N/A    CBC (last 72 hours):  Recent Labs   Lab Result Units 01/11/22 0337 01/12/22 0412 01/12/22 1015 01/13/22  0442   WBC K/uL  --   --  4.73 51.59*   Hemoglobin g/dL 9.8* 8.3* 8.9* 9.5*   Hematocrit % 31.0* 26.5* 28.2* 30.4*   Platelets K/uL  --   --  220 278   Gran % %  --   --  96.1* 91.0*   Lymph % %  --   --  2.5* 4.0*   Mono % %  --   --  0.6* 0.0*   Eosinophil % %  --   --  0.0 0.0   Basophil % %  --   --  0.2 0.0   Differential Method   --   --  Automated Manual       Metabolic Panel (last 72 hours):  Recent Labs   Lab Result Units 01/11/22 0337 01/12/22  1015 01/13/22  0442   Sodium mmol/L 142 143 141    Potassium mmol/L 3.6 3.2* 4.4   Chloride mmol/L 109 106 105   CO2 mmol/L 23 27 18*   Glucose mg/dL 83 139* 159*   BUN mg/dL 14 21 27*   Creatinine mg/dL 0.7 0.7 0.9   Albumin g/dL  --  1.9* 2.1*   Total Bilirubin mg/dL  --  0.9 1.1*   Alkaline Phosphatase U/L  --  351* 275*   AST U/L  --  319* 155*   ALT U/L  --  143* 121*       Drug levels (last 3 results):  No results for input(s): VANCOMYCINRA, VANCOMYCINPE, VANCOMYCINTR in the last 72 hours.    Microbiologic Results:  Microbiology Results (last 7 days)       Procedure Component Value Units Date/Time    Blood culture [[id number]] Collected: 01/13/22 0748    Order Status: Sent Specimen: Blood from Antecubital, Right Hand Updated: 01/13/22 0750    Blood culture [[id number]] Collected: 01/13/22 0740    Order Status: Sent Specimen: Blood Updated: 01/13/22 0749    Culture, Respiratory with Gram Stain [[id number]]     Order Status: No result Specimen: Respiratory     Influenza A & B by Molecular [[id number]] Collected: 01/08/22 0030    Order Status: Canceled Specimen: Nasopharyngeal Swab

## 2022-01-13 NOTE — PROGRESS NOTES
01/13/22 0448   Provider Notification   Reason for Communication Change in status   Provider Role Hospitalist   Method of Communication Call   Response Other (Comment)  (Notify NP)   Notification Time 0448 01/13/22 0449   Provider Notification   Reason for Communication Change in status   Provider Name Misusan SteinbergBeltran ANDREAS   Provider Role Hospitalist   Method of Communication Call   Response En route   Notification Time 0449 01/13/22 0450   Provider Notification   Reason for Communication Rapid response team   Provider Name Judit   Provider Role Rapid response team   Method of Communication Call   Response Waiting for response   Notification Time 0000 01/13/22 0451   Provider Notification   Reason for Communication Change in status   Provider Name Antelmo Ayesha RN   Provider Role Nurse   Method of Communication Call   Response En route   Notification Time 0451     Patient became agitated, found in bed without oxygen, moaning and tachypneic, replaced oxygen, medicated with 1mg PRN dilaudid, faces pain score 10/10, , oxygen sats 71%, Repositioned, increased respiratory support to 6L NC, then 100% NRB, notified RR team, MD. Advised to call NP. Eduardo Beltran NP came to bedside to assess, ordered EKG, CXR. Vitals improved,  on EKG, will remain on floor with order for vapotherm.

## 2022-01-13 NOTE — CONSULTS
[facility] - Intensive Care  Palliative Medicine  Consult Note    Patient Name: Katt Mcneal  MRN: [id number]  Admission Date: 1/7/2022  Hospital Length of Stay: 6 days  Code Status: DNR   Attending Provider: Arsenio Robison MD  Consulting Provider: Daksha Gupta MD  Primary Care Physician: Panchito Perez MD  Principal Problem:Intertrochanteric fracture of right hip    Patient information was obtained from relative(s), past medical records and primary team.      Inpatient consult to Palliative Care  Consult performed by: Daksha Gupta MD  Consult ordered by: Arsenio Robison MD  Reason for consult: Advance Care Planning         Assessment/Plan:     End of life care  - Comfort care measures only  - Start IV hydromorphone 1 mg Q1H PRN dyspnea, pain. If needing frequent doses, will start infusion.   - Can continue Precedex at this time  - Start IV lorazepam 1 mg Q4H PRN anxiety, agitation, terminal delirium  - If patient calmer, consider discontinuing restraints     Advance Care Planning       1/13/22  - Consult for advance care planning in older patient with multiple chronic conditions (including pancreatic mass); was initially admitted after hip fracture (s/p IMN repair current hospital stay). Had worsening respiratory status overnight and has since been transferred to ICU. Palliative medicine is consulted for advance care planning. Chart reviewed in depth; discussed pt during MDT rounds.   - Patient encephalopathic and unable to participate in discussion  - Along with Dr Robison (hospitalist) and Dr Baldwin (PCCM), met with pt's family in waiting room. Introduced role of palliative medicine, and learned more about pt outside of hospital. Pt is  since 2014, and developed pancreatic issues around that time. She has four children total (majority of which are MPOA, though family engages in shared decision-making), and two of them live with her in her house.   - Pt developed pancreatic issues around 7  years ago, and had started a functional decline secondary to this. However, family notes a more acute decline since December 20th (episode of acute pancreatitis, then fall, then got COVID); they state that this is the sickest she has ever appeared.   - Family with excellent insight into pt's current status, and recognizes that she is likely approaching the end of her life. As family wants to avoid any interventions or suffering, they are in agreement for DNR/DNI status, and would like to focus all efforts on comfort.   - Will start comfort measures in hospital, and family will discuss possibility of home hospice (no sooner than tomorrow)   - Our team will follow up with patient and family     Leukocytosis  - WBC up 51k this morning; evaluation and management per primary team. On broad spectrum ABX.     Acute respiratory failure with hypoxia  - Transferred to ICU 1/13/22 secondary to this  - Worsening respiratory status overnight; currently undergoing evaluation for this (unclear if aspiration vs volume overload vs new respiratory infection).   - Pulmonology consulted     Pancreatic mass  - Concerning for possible malignancy; discussed with family that pt is too sick to undergo an evaluation for this, and would not be a candidate for treatment      Intertrochanteric fracture of right hip  - S/p IMN with orthopedic surgery     Coronary artery disease involving native coronary artery of native heart without angina pectoris  - Noted underlying disease     Chronic recurrent pancreatitis  - Noted underlying condition; lipase 630 when last checked, though has been consistently high   - Evaluation/mgmt per primary team     Thank you for your consult. I will follow-up with patient. Please contact us if you have any additional questions.     OUMAR Vargas  Palliative Medicine Staff   [phone]    Total visit time: 90 minutes    > 50% of 70 min visit spent in chart review, face to face discussion of symptom  "assessment, coordination of care with other specialists, and discharge planning.    20 min ACP time spent: goals of care, emotional support, formulating and communicating prognosis, exploring burden/ benefit of various approaches of treatment.     Subjective:     HPI:   (From H&P) "Ms. Mcneal is an 80yo lady with a past medical history of Afib on Coumadin, CAD, MI, HTN, pancreatic mass with CBD obstruction and pancreatitis, and hypothyroidism. She was recently admitted to  on 1/3 to 1/6 due to abdominal pain and pancreatitis (MRCP showed severe pancreatic ductal dilation with a suspected pancreatic mass in the body segment measuring up to 2.7 cm.  Recommend further evaluation with EUS and tissue sampling).  Please refer to Dr. Ny's complete discharge summary for details of her stay.  She was also found to be COVID positive on 1/3 during that admission, but her oxygenation was always >94%, so no acute therapy was initiated (CXR showed bilateral mild diffuse nonspecific interstitial coarsening improved from prior, and may reflect residual minimal pulmonary edema versus interstitial type pneumonia or chronic interstitial lung changes.) She now comes to the ED after tripping and falling at home earlier today.  She cannot remember if she struck her head or not.  After falling she developed right hip pain and was unable to bear any weight on her right leg.  EMS had to be activated and bring her to the ED.  In the ED she was found to have COVID and nondisplaced fractures involving the right inter trochanteric region and right lesser trochanter.  Her O2 sats today went as low as 88% on RA.  No repeat CXR was done in the ED."    Since arrival in hospital, patient has undergone right IMN with orthopedic surgery. However, patient with worsening respiratory and overall clinical status overnight; in process of being transferred to ICU with acute respiratory failure. Palliative medicine is consulted for advance care " planning, given pt's elderly age, multiple chronic medical conditions (including pancreatic mass, possible malignancy), and critical illness. Met with pt at bedside; for details of discussion, see advance care planning section of plan.          Past Medical History:   Diagnosis Date    Arthritis     Atrial fibrillation     Bilateral carotid artery stenosis 7/13/2017    Coronary artery disease     Fibromyalgia     Hyperlipidemia     Hypertension     Myocardial infarction 03/21/2015    Pancreatitis     Thyroid disease        Past Surgical History:   Procedure Laterality Date    CHOLECYSTECTOMY      CORONARY STENT PLACEMENT  3/24/15    HYSTERECTOMY  10/28/2004    INTRAMEDULLARY RODDING OF FEMUR Right 1/8/2022    Procedure: INSERTION, INTRAMEDULLARY TASHI, FEMUR;  Surgeon: Crow Ortega MD;  Location: [facility];  Service: Orthopedics;  Laterality: Right;       Review of patient's allergies indicates:   Allergen Reactions    Sulfa (sulfonamide antibiotics) Hives    Bactrim [sulfamethoxazole-trimethoprim] Other (See Comments)     Pt. Reports that it caused severe pain    Integrilin [eptifibatide]     Percocet [oxycodone-acetaminophen] Itching    Statins-hmg-coa reductase inhibitors Other (See Comments)     Muscle pain. Patient states some, not all statins    Xarelto [rivaroxaban]     Epinephrine Palpitations       Medications:  Continuous Infusions:   dexmedetomidine (PRECEDEX) infusion 0.2 mcg/kg/hr (01/13/22 1025)     Scheduled Meds:   albuterol  2 puff Inhalation BID    amiodarone  200 mg Oral Daily    amLODIPine  5 mg Oral Daily    aspirin  81 mg Oral Daily    dexamethasone  6 mg Intravenous Daily    docusate sodium  100 mg Oral BID    levothyroxine  50 mcg Oral Before breakfast    metoprolol succinate  25 mg Oral Daily    mupirocin   Nasal BID    vitamin D  2,000 Units Oral Daily    warfarin  0.5 mg Oral Daily     PRN Meds:sodium chloride 0.9%, sodium chloride 0.9%, acetaminophen,  benzonatate, dextrose 50%, dextrose 50%, dextrose 50%, diphenoxylate-atropine 2.5-0.025 mg, glucagon (human recombinant), glucagon (human recombinant), glucose, glucose, HYDROmorphone (PF), insulin aspart U-100, lorazepam, melatonin, metoprolol, nitroGLYCERIN, ondansetron, promethazine (PHENERGAN) IVPB, senna-docusate 8.6-50 mg, DIPH,PERTUSS(ACELL),TET VACCINE (ADULT)(BOOSTRIX,ADACEL)    Family History    None       Tobacco Use    Smoking status: Former Smoker     Quit date: 1964     Years since quittin.5    Smokeless tobacco: Former User   Substance and Sexual Activity    Alcohol use: No     Alcohol/week: 0.0 standard drinks    Drug use: No    Sexual activity: Not Currently       Review of Systems   Unable to perform ROS: Mental status change     Objective:     Vital Signs (Most Recent):  Temp: 98.1 °F (36.7 °C) (22 0410)  Pulse: 94 (22 0847)  Resp: (!) 27 (22 0847)  BP: (!) 168/66 (22 0518)  SpO2: 100 % (22 0847) Vital Signs (24h Range):  Temp:  [97.5 °F (36.4 °C)-98.3 °F (36.8 °C)] 98.1 °F (36.7 °C)  Pulse:  [] 94  Resp:  [16-38] 27  SpO2:  [74 %-100 %] 100 %  BP: (103-168)/(57-71) 168/66     Weight: 45.4 kg (100 lb)  Body mass index is 20.2 kg/m².    Physical Exam  Constitutional:       Appearance: She is toxic-appearing.      Comments: Elderly, both acutely and chronically ill-appearing   HENT:      Nose: Nose normal.      Mouth/Throat:      Mouth: Mucous membranes are dry.   Cardiovascular:      Rate and Rhythm: Normal rate.   Pulmonary:      Comments: Mildly increased work of breathing, on NC oxygen   Skin:     General: Skin is warm.   Neurological:      Comments: Encephalopathic    Psychiatric:      Comments: Agitated during initial visit        Significant Labs: All pertinent labs within the past 24 hours have been reviewed.  CBC:   Recent Labs   Lab 22  0442   WBC 51.59*   HGB 9.5*   HCT 30.4*   MCV 83        BMP:  Recent Labs   Lab  01/13/22  0442   *      K 4.4      CO2 18*   BUN 27*   CREATININE 0.9   CALCIUM 8.7     LFT:  Lab Results   Component Value Date     (H) 01/13/2022    ALKPHOS 275 (H) 01/13/2022    BILITOT 1.1 (H) 01/13/2022     Albumin:   Albumin   Date Value Ref Range Status   01/13/2022 2.1 (L) 3.5 - 5.2 g/dL Final     Protein:   Total Protein   Date Value Ref Range Status   01/13/2022 6.9 6.0 - 8.4 g/dL Final     Lactic acid:   Lab Results   Component Value Date    LACTATE 2.4 (H) 01/13/2022    LACTATE 0.6 01/07/2022       Significant Imaging: I have reviewed all pertinent imaging results/findings within the past 24 hours.

## 2022-01-13 NOTE — ASSESSMENT & PLAN NOTE
Discussed goals of care with family for 20 minutes today. They recognize that she has been declining and is at the end of her life, and wish her to be comfortable rather than undergo aggressive procedures with limited hope of significant recovery of quality of life. Thus the family has agreed to transition to focus on comfort.

## 2022-01-13 NOTE — ASSESSMENT & PLAN NOTE
- Transferred to ICU 1/13/22 secondary to this  - Worsening respiratory status overnight; currently undergoing evaluation for this (unclear if aspiration vs volume overload vs new respiratory infection).   - Pulmonology consulted

## 2022-01-13 NOTE — CONSULTS
[facility] - Intensive Care  Critical Care Medicine  Consult Note    Patient Name: Katt Mcneal  MRN: [id number]  Admission Date: 1/7/2022  Hospital Length of Stay: 6 days  Code Status: DNR  Attending Physician: Arsenio Robison MD   Primary Care Provider: Panchito Perez MD   Principal Problem: Intertrochanteric fracture of right hip    Subjective:     HPI:  81-year-old frail and elderly female with history of atrial fibrillation on Coumadin, CAD with history of MI, hypertension, recently diagnosed pancreatic mass with CBD obstruction returns after recent discharge earlier this month for acute on chronic pancreatitis and COVID now with a fall and hip fracture requiring IMN.  Patient's family reports a general consistent decline over the past year so in her functional status.  Since being hospitalized, her course has been complicated by episodes of acute hypoxemia and dyspnea.        Hospital/ICU Course:  No notes on file    Past Medical History:   Diagnosis Date    Arthritis     Atrial fibrillation     Bilateral carotid artery stenosis 7/13/2017    Coronary artery disease     Fibromyalgia     Hyperlipidemia     Hypertension     Myocardial infarction 03/21/2015    Pancreatitis     Thyroid disease        Past Surgical History:   Procedure Laterality Date    CHOLECYSTECTOMY      CORONARY STENT PLACEMENT  3/24/15    HYSTERECTOMY  10/28/2004    INTRAMEDULLARY RODDING OF FEMUR Right 1/8/2022    Procedure: INSERTION, INTRAMEDULLARY TASHI, FEMUR;  Surgeon: Crow Ortega MD;  Location: [facility];  Service: Orthopedics;  Laterality: Right;       Review of patient's allergies indicates:   Allergen Reactions    Sulfa (sulfonamide antibiotics) Hives    Bactrim [sulfamethoxazole-trimethoprim] Other (See Comments)     Pt. Reports that it caused severe pain    Integrilin [eptifibatide]     Percocet [oxycodone-acetaminophen] Itching    Statins-hmg-coa reductase inhibitors Other (See Comments)     Muscle pain.  Patient states some, not all statins    Xarelto [rivaroxaban]     Epinephrine Palpitations       Family History    None       Tobacco Use    Smoking status: Former Smoker     Quit date: 1964     Years since quittin.5    Smokeless tobacco: Former User   Substance and Sexual Activity    Alcohol use: No     Alcohol/week: 0.0 standard drinks    Drug use: No    Sexual activity: Not Currently         Review of Systems   Unable to perform ROS: Acuity of condition     Objective:     Vital Signs (Most Recent):  Temp: 98.1 °F (36.7 °C) (22 0410)  Pulse: 94 (22 0847)  Resp: (!) 27 (22 0847)  BP: (!) 168/66 (22 0518)  SpO2: 100 % (22 08) Vital Signs (24h Range):  Temp:  [97.5 °F (36.4 °C)-98.3 °F (36.8 °C)] 98.1 °F (36.7 °C)  Pulse:  [] 94  Resp:  [16-38] 27  SpO2:  [74 %-100 %] 100 %  BP: (103-168)/(57-71) 168/66     Weight: 45.4 kg (100 lb)  Body mass index is 20.2 kg/m².    No intake or output data in the 24 hours ending 22 1304    Physical Exam  Vitals and nursing note reviewed.   Constitutional:       General: She is not in acute distress.     Appearance: She is underweight. She is ill-appearing (Chronically). She is not toxic-appearing or diaphoretic.      Interventions: She is not intubated.     Comments: Frail and elderly   HENT:      Head: Normocephalic and atraumatic.      Nose: No rhinorrhea.      Mouth/Throat:      Mouth: Mucous membranes are dry.   Eyes:      General: No scleral icterus.     Conjunctiva/sclera: Conjunctivae normal.   Cardiovascular:      Rate and Rhythm: Normal rate and regular rhythm.      Heart sounds: No murmur heard.      Pulmonary:      Effort: Tachypnea present. No accessory muscle usage, respiratory distress or retractions. She is not intubated.      Breath sounds: Rhonchi and rales present. No decreased breath sounds or wheezing.   Abdominal:      General: There is no distension.      Palpations: Abdomen is soft.      Tenderness:  There is no abdominal tenderness. There is no guarding.   Musculoskeletal:         General: No swelling.      Right lower leg: No edema.      Left lower leg: No edema.   Skin:     General: Skin is warm and dry.      Coloration: Skin is not jaundiced.      Findings: No rash.   Neurological:      Mental Status: She is disoriented and confused.         Vents:  Oxygen Concentration (%): 100 (01/13/22 0814)    Lines/Drains/Airways     Drain            Female External Urinary Catheter 01/09/22 0619 4 days          Peripheral Intravenous Line                 Peripheral IV - Single Lumen 01/13/22 0903 20 G Posterior;Right Forearm <1 day                Significant Labs:    CBC/Anemia Profile:  Recent Labs   Lab 01/11/22  2337 01/12/22  0412 01/12/22  1015 01/13/22  0442   WBC  --   --  4.73 51.59*   HGB  --  8.3* 8.9* 9.5*   HCT  --  26.5* 28.2* 30.4*   PLT  --   --  220 278   MCV  --   --  83 83   RDW  --   --  17.1* 17.9*   FERRITIN 906*  --   --   --         Chemistries:  Recent Labs   Lab 01/12/22  1015 01/13/22  0442    141   K 3.2* 4.4    105   CO2 27 18*   BUN 21 27*   CREATININE 0.7 0.9   CALCIUM 7.7* 8.7   ALBUMIN 1.9* 2.1*   PROT 5.5* 6.9   BILITOT 0.9 1.1*   ALKPHOS 351* 275*   * 121*   * 155*       All pertinent labs within the past 24 hours have been reviewed.    Significant Imaging:   I have reviewed all pertinent imaging results/findings within the past 24 hours.      ABG  Recent Labs   Lab 01/13/22  0619   PH 7.440   PO2 54*   PCO2 34.2*   HCO3 23.2*   BE -1     Assessment/Plan:     Pulmonary  Acute respiratory failure with hypoxia  - acute episode of hypoxemia.  BNP elevated.  CXR with dependent infiltrates.  Etiology likely multifactorial including decompensated heart failure vs noncardiogenic pulmonary edema from pancreatitis.  Lengthy discussion with family regarding prognosis and goals of care.  Decision was made not to proceed with aggressive care.  Patient made DNR and  progressing forward with comfort only measures.    Other  Chronic heart failure with preserved ejection fraction  - see the section on acute hypoxemic respiratory         Critical Care Time: 35 minutes  Critical secondary to Patient has a condition that poses threat to life and bodily function: Severe Respiratory Distress     Critical care was time spent personally by me on the following activities: development of treatment plan with patient or surrogate and bedside caregivers, discussions with consultants, evaluation of patient's response to treatment, examination of patient, ordering and performing treatments and interventions, ordering and review of laboratory studies, ordering and review of radiographic studies, pulse oximetry, re-evaluation of patient's condition. This critical care time did not overlap with that of any other provider or involve time for any procedures.      Thank you for the consult. No further recommendations. Signing off. Please feel free to contact us with any question or concerns regarding the care of this patient.       Emanuel Baldwin MD  Critical Care Medicine  [facility] - Intensive Care

## 2022-01-13 NOTE — PROGRESS NOTES
[facility] Medicine  Progress Note    Patient Name: Katt Mcneal  MRN: [id number]  Patient Class: IP- Inpatient   Admission Date: 1/7/2022  Length of Stay: 6 days  Attending Physician: Arsenio Robison MD  Primary Care Provider: Panchito Perez MD        Subjective:     Principal Problem:End of life care        HPI:  Ms. Mcneal is an 82yo lady with a past medical history of Afib on Coumadin, CAD, MI, HTN, pancreatic mass with CBD obstruction and pancreatitis, and hypothyroidism    She was recently admitted to  on 1/3 to 1/6 due to abdominal pain and pancreatitis (MRCP showed severe pancreatic ductal dilation with a suspected pancreatic mass in the body segment measuring up to 2.7 cm.  Recommend further evaluation with EUS and tissue sampling).  Please refer to Dr. Ny's complete discharge summary for details of her stay.  She was also found to be COVID positive on 1/3 during that admission, but her oxygenation was always >94%, so no acute therapy was initiated (CXR showed bilateral mild diffuse nonspecific interstitial coarsening improved from prior, and may reflect residual minimal pulmonary edema versus interstitial type pneumonia or chronic interstitial lung changes.)    She now comes to the ED after tripping and falling at home earlier today.  She cannot remember if she struck her head or not.  After falling she developed right hip pain and was unable to bear any weight on her right leg.  EMS had to be activated and bring her to the ED.  In the ED she was found to have COVID and nondisplaced fractures involving the right inter trochanteric region and right lesser trochanter.  Her O2 sats today went as low as 88% on RA.  No repeat CXR was done in the ED.    On my exam in her room, she is awake, but confused and unwilling to speak or participate in her exam by the nurse.  There is no family present.      Overview/Hospital Course:  Admitted for R intertrochanteric fracture.  Underwent R IMN 1/8. Required 1u pRBC POD 1, otherwise stable. PT/OT recommending SNF.    Pt w/ recurrent abdominal pain and chills on 1/12.      Interval History: Patient decompensated overnight. See Care Update for more information.    Review of Systems   Unable to perform ROS: Mental status change     Objective:     Vital Signs (Most Recent):  Temp: 98.1 °F (36.7 °C) (01/13/22 0410)  Pulse: 94 (01/13/22 0847)  Resp: (!) 27 (01/13/22 0847)  BP: (!) 168/66 (01/13/22 0518)  SpO2: 100 % (01/13/22 0847) Vital Signs (24h Range):  Temp:  [97.5 °F (36.4 °C)-98.3 °F (36.8 °C)] 98.1 °F (36.7 °C)  Pulse:  [] 94  Resp:  [16-38] 27  SpO2:  [74 %-100 %] 100 %  BP: (103-168)/(57-71) 168/66     Weight: 45.4 kg (100 lb)  Body mass index is 20.2 kg/m².  No intake or output data in the 24 hours ending 01/13/22 1354   Physical Exam  Constitutional:       General: She is in acute distress.      Appearance: She is ill-appearing and toxic-appearing. She is not diaphoretic.   Cardiovascular:      Rate and Rhythm: Normal rate and regular rhythm.      Heart sounds: No murmur heard.  No gallop.    Pulmonary:      Effort: Respiratory distress present.      Breath sounds: No wheezing or rhonchi.      Comments: Diffusely coarse breath sounds  Abdominal:      General: Bowel sounds are normal. There is no distension.      Palpations: Abdomen is soft.      Tenderness: There is no abdominal tenderness.         Significant Labs: All pertinent labs within the past 24 hours have been reviewed.    Significant Imaging: I have reviewed all pertinent imaging results/findings within the past 24 hours.      Assessment/Plan:      * End of life care  Transitioned to comfort care 1/13. See ACP note.      Comfort measures only status  As noted under ACP. Appreciate palliative input      Advance care planning  Discussed goals of care with family for 20 minutes today. They recognize that she has been declining and is at the end of her life, and wish her  to be comfortable rather than undergo aggressive procedures with limited hope of significant recovery of quality of life. Thus the family has agreed to transition to focus on comfort.      Leukocytosis  Marked leukocytosis concerning for new infection vs inflammatory process, likely intraabdominal.      Preop cardiovascular exam  Seen by cardiology.      Sepsis due to COVID-19  Resolved      Acute respiratory failure with hypoxia  Stable on low-dose O2 with acute worsening 1/13. Etiology likely cardiogenic vs non-cardiogenic pulmonary edema.  - incentive spirometry  - prn furosemide for comfort        Hypokalemia  Replaced.      Pancreatic duct obstruction  Had prior plan to follow up with EUS as an outpatient. Unlikely to have treatment options if malignant        Pancreatic mass  Had prior plan to follow up with EUS as an outpatient. Unlikely to have treatment options if malignant      Fall on same level from tripping as cause of accidental injury  As above.      Intertrochanteric fracture of right hip  S/p IMN w/ ortho on 1/8.   - PT/OT consulted; recommending SNF  - warfarin for DVT ppx  - orthopedics consulted        Hypothyroid  Continue home synthroid          Anemia of chronic disease  Improved after transfusion        Atrial fibrillation with RVR  Continue amiodarone, metoprolol, warfarin          Chronic anticoagulation  Restart home warfarin      Chronic heart failure with preserved ejection fraction  Diuresis prn for comfort      Coronary artery disease involving native coronary artery of native heart without angina pectoris  Continue home aspirin/statin      Chronic recurrent pancreatitis  Likely recurrence of pancreatitis w/ increased abdominal pain 1/12. Now on comfort measures.      VTE Risk Mitigation (From admission, onward)         Ordered     warfarin (COUMADIN) split tablet 0.5 mg  Daily         01/11/22 1126     IP VTE HIGH RISK PATIENT  Once         01/09/22 0047     Place JUNE hose  Until  discontinued         01/08/22 0224                Discharge Planning   FRANCO:      Code Status: DNR   Is the patient medically ready for discharge?:     Reason for patient still in hospital (select all that apply): Patient trending condition, Laboratory test, Treatment, Consult recommendations and Pending disposition  Discharge Plan A: Skilled Nursing Facility   Discharge Delays: (!) Procedure Scheduling (IR, OR, Labs, Echo, Cath, Echo, EEG)        Critical care time spent on the evaluation and treatment of severe organ dysfunction, review of pertinent labs and imaging studies, discussions with consulting providers and discussions with patient/family: 45 minutes.      Arsenio Robison MD  Department of Hospital Medicine   [facility] [place] [facility]

## 2022-01-13 NOTE — ASSESSMENT & PLAN NOTE
- Noted underlying condition; lipase 630 when last checked, though has been consistently high   - Evaluation/mgmt per primary team

## 2022-01-13 NOTE — CARE UPDATE
Patient w/ acute decompensation overnight, going from minimal low flow O2 support to vapotherm 100% FiO2. White count elevated to > 50. ABG w/ hypoxemia but no CO2 retention.    Pt obtunded on exam, w/ labored breathing on Vapotherm. CXR w/ diffuse interstitial thickening and alveolar opacities. DDx includes cardiogenic pulmonary edema, progression of COVID-19 pneumonia,  ARDS from pancreatitis, or possibly aspiration.     Pt to be transferred to the ICU. Giving trial of furosemide and empiric antibiotics. Will likely image abdomen if/when patient stabilizes, although unclear what interventions could be done.    Discussed goals of care with daughter Chano at bedside. The patient had said two weeks prior that she would like to be full code, however Chano realizes that her mother is declining over the past few months and was unlikely to recover a good quality of life. I agreed with that assessment, and expressed concern that intubation would do more harm than good. Chano agreed, but wanted to discuss w/ siblings prior to making a decision (no designated HCPOA, patient has Chano plus three sons).

## 2022-01-14 LAB — VANCOMYCIN SERPL-MCNC: 6.9 UG/ML

## 2022-01-14 PROCEDURE — 27100171 HC OXYGEN HIGH FLOW UP TO 24 HOURS

## 2022-01-14 PROCEDURE — 36415 COLL VENOUS BLD VENIPUNCTURE: CPT | Performed by: STUDENT IN AN ORGANIZED HEALTH CARE EDUCATION/TRAINING PROGRAM

## 2022-01-14 PROCEDURE — 63600175 PHARM REV CODE 636 W HCPCS: Performed by: INTERNAL MEDICINE

## 2022-01-14 PROCEDURE — 25000003 PHARM REV CODE 250: Performed by: STUDENT IN AN ORGANIZED HEALTH CARE EDUCATION/TRAINING PROGRAM

## 2022-01-14 PROCEDURE — 27000207 HC ISOLATION

## 2022-01-14 PROCEDURE — 94761 N-INVAS EAR/PLS OXIMETRY MLT: CPT

## 2022-01-14 PROCEDURE — 80202 ASSAY OF VANCOMYCIN: CPT | Performed by: STUDENT IN AN ORGANIZED HEALTH CARE EDUCATION/TRAINING PROGRAM

## 2022-01-14 PROCEDURE — 20000000 HC ICU ROOM

## 2022-01-14 RX ORDER — OLANZAPINE 5 MG/1
10 TABLET, ORALLY DISINTEGRATING ORAL NIGHTLY PRN
Status: DISCONTINUED | OUTPATIENT
Start: 2022-01-14 | End: 2022-01-17 | Stop reason: HOSPADM

## 2022-01-14 RX ADMIN — DEXMEDETOMIDINE HYDROCHLORIDE IN 0.9% SODIUM CHLORIDE 0.7 MCG/KG/HR: 4 INJECTION INTRAVENOUS at 09:01

## 2022-01-14 RX ADMIN — HYDROMORPHONE HYDROCHLORIDE 1 MG: 2 INJECTION, SOLUTION INTRAMUSCULAR; INTRAVENOUS; SUBCUTANEOUS at 08:01

## 2022-01-14 RX ADMIN — HYDROMORPHONE HYDROCHLORIDE 1 MG: 2 INJECTION, SOLUTION INTRAMUSCULAR; INTRAVENOUS; SUBCUTANEOUS at 09:01

## 2022-01-14 RX ADMIN — HYDROMORPHONE HYDROCHLORIDE 1 MG: 2 INJECTION, SOLUTION INTRAMUSCULAR; INTRAVENOUS; SUBCUTANEOUS at 12:01

## 2022-01-14 RX ADMIN — LORAZEPAM 1 MG: 2 INJECTION INTRAMUSCULAR; INTRAVENOUS at 12:01

## 2022-01-14 RX ADMIN — HYDROMORPHONE HYDROCHLORIDE 1 MG: 2 INJECTION, SOLUTION INTRAMUSCULAR; INTRAVENOUS; SUBCUTANEOUS at 04:01

## 2022-01-14 NOTE — PLAN OF CARE
Patient remains in ICU on comfort measures only.  Drowsy but responds to voice/stimulation and when awake attempts to remove all medical devices.  PRN pain medication given for dyspnea and signs of pain.  Paulino catheter present and draining urine.  Weaning precedex as tolerated.  Plan of care reviewed with daughter at bedside.

## 2022-01-14 NOTE — PLAN OF CARE
[facility] - Intensive Care  Discharge Reassessment    Primary Care Provider: Panchito Perez MD    Expected Discharge Date:  TBD    Chart Review    Reassessment (most recent)       Discharge Reassessment - 01/14/22 1540          Discharge Reassessment    Assessment Type Discharge Planning Reassessment     Did the patient's condition or plan change since previous assessment? Yes     Communicated FRANCO with patient/caregiver Date not available/Unable to determine   Patient on comfort measures.   01/13/2022    Discharge Plan A Comfort care/withdrawal   Patient on comfort measures at this time.    Discharge Plan B Hospice/home     DME Needed Upon Discharge  other (see comments)   TBD    Discharge Barriers Identified None     Why the patient remains in the hospital Requires continued medical care        Post-Acute Status    Post-Acute Authorization Hospice     Post-Acute Placement Status --   Awaiting family's decision.    Coverage [facility]     Discharge Delays Other   Family to make decision regarding hospice  1/14/2022

## 2022-01-14 NOTE — PLAN OF CARE
[facility] - Intensive Care  Discharge Reassessment    Primary Care Provider: Panchito Perez MD    Expected Discharge Date:  TBD    Reassessment (most recent)       Discharge Reassessment - 01/13/22 1850          Discharge Reassessment    Assessment Type Discharge Planning Reassessment     Did the patient's condition or plan change since previous assessment? Yes     Communicated FRANCO with patient/caregiver Date not available/Unable to determine     Discharge Plan A Skilled Nursing Facility     Discharge Plan B Home Health     Discharge Barriers Identified Other (see comments)   Consult received for SNF.  Two accepting facilities at this time.  Will be accepted if patient does not need chemo.    Why the patient remains in the hospital Requires continued medical care        Post-Acute Status    Post-Acute Placement Status Referrals Sent     Coverage [facility]     Discharge Delays Change in Medical Condition                   o This patient has been screened for Case Management needs.  Based on (documentation in medical record), patient transferred to ICU on 01/13/2022.    o   o Prior to transfer to ICU, consult for SNF placement was pending.  There were two facilities tentatively willing to accept for SNF placement ([facility]) if chemo was not needed during SNF placement.     o   o Case Management/Social Work remains available if a need arises, please enter consult for assistance.  For urgent needs contact Case Management Department/on-call at:  [phone]

## 2022-01-14 NOTE — PROGRESS NOTES
[facility] Medicine  Progress Note    Patient Name: Katt Mcneal  MRN: [id number]  Patient Class: IP- Inpatient   Admission Date: 1/7/2022  Length of Stay: 7 days  Attending Physician: Arsenio Robison MD  Primary Care Provider: Panchito Perez MD        Subjective:     Principal Problem:End of life care        HPI:  Ms. Mcneal is an 82yo lady with a past medical history of Afib on Coumadin, CAD, MI, HTN, pancreatic mass with CBD obstruction and pancreatitis, and hypothyroidism    She was recently admitted to  on 1/3 to 1/6 due to abdominal pain and pancreatitis (MRCP showed severe pancreatic ductal dilation with a suspected pancreatic mass in the body segment measuring up to 2.7 cm.  Recommend further evaluation with EUS and tissue sampling).  Please refer to Dr. Ny's complete discharge summary for details of her stay.  She was also found to be COVID positive on 1/3 during that admission, but her oxygenation was always >94%, so no acute therapy was initiated (CXR showed bilateral mild diffuse nonspecific interstitial coarsening improved from prior, and may reflect residual minimal pulmonary edema versus interstitial type pneumonia or chronic interstitial lung changes.)    She now comes to the ED after tripping and falling at home earlier today.  She cannot remember if she struck her head or not.  After falling she developed right hip pain and was unable to bear any weight on her right leg.  EMS had to be activated and bring her to the ED.  In the ED she was found to have COVID and nondisplaced fractures involving the right inter trochanteric region and right lesser trochanter.  Her O2 sats today went as low as 88% on RA.  No repeat CXR was done in the ED.    On my exam in her room, she is awake, but confused and unwilling to speak or participate in her exam by the nurse.  There is no family present.      Overview/Hospital Course:  Admitted for R intertrochanteric fracture.  Underwent R IMN 1/8. Required 1u pRBC POD 1, otherwise stable. PT/OT recommending SNF.    Pt w/ recurrent abdominal pain and chills on 1/12.      Interval History: No events overnight, resting comfortably this AM. Has bouts of agitation per nursing, have resolved by AM interview    Review of Systems   Unable to perform ROS: Mental status change     Objective:     Vital Signs (Most Recent):  Temp: 97.6 °F (36.4 °C) (01/14/22 0000)  Pulse: (!) 55 (01/14/22 0630)  Resp: (!) 22 (01/14/22 0630)  BP: (!) 148/81 (01/14/22 0600)  SpO2: 95 % (01/14/22 0630) Vital Signs (24h Range):  Temp:  [97.1 °F (36.2 °C)-97.6 °F (36.4 °C)] 97.6 °F (36.4 °C)  Pulse:  [55-97] 55  Resp:  [14-35] 22  SpO2:  [78 %-100 %] 95 %  BP: ()/(53-92) 148/81     Weight: 45.4 kg (100 lb)  Body mass index is 20.2 kg/m².    Intake/Output Summary (Last 24 hours) at 1/14/2022 0747  Last data filed at 1/14/2022 0153  Gross per 24 hour   Intake 398.28 ml   Output 370 ml   Net 28.28 ml      Physical Exam  Constitutional:       General: She is not in acute distress.     Appearance: She is ill-appearing. She is not toxic-appearing or diaphoretic.   Cardiovascular:      Rate and Rhythm: Normal rate and regular rhythm.      Heart sounds: No murmur heard.  No gallop.    Pulmonary:      Comments: Mildly labored breathing, coarse breaths sounds at bases, no wheezing, no accessory muscle use  Abdominal:      General: Bowel sounds are normal. There is no distension.      Palpations: Abdomen is soft.      Tenderness: There is no abdominal tenderness.         Significant Labs: All pertinent labs within the past 24 hours have been reviewed.    Significant Imaging: I have reviewed all pertinent imaging results/findings within the past 24 hours.      Assessment/Plan:      * End of life care  Transitioned to comfort care 1/13. See ACP note.    Will try to wean precedex     Comfort measures only status  As noted under ACP. Appreciate palliative input      Advance care  planning  Discussed goals of care with family for 20 minutes today. They recognize that she has been declining and is at the end of her life, and wish her to be comfortable rather than undergo aggressive procedures with limited hope of significant recovery of quality of life. Thus the family has agreed to transition to focus on comfort.      Leukocytosis  Marked leukocytosis concerning for new infection vs inflammatory process, likely intraabdominal.      Preop cardiovascular exam  Seen by cardiology.      Sepsis due to COVID-19  Resolved      Acute respiratory failure with hypoxia  Stable on low-dose O2 with acute worsening 1/13. Etiology likely cardiogenic vs non-cardiogenic pulmonary edema.  - incentive spirometry  - prn furosemide for comfort        Hypokalemia  Replaced.      Pancreatic duct obstruction  Had prior plan to follow up with EUS as an outpatient. Unlikely to have treatment options if malignant        Pancreatic mass  Had prior plan to follow up with EUS as an outpatient. Unlikely to have treatment options if malignant      Fall on same level from tripping as cause of accidental injury  As above.      Intertrochanteric fracture of right hip  S/p IMN w/ ortho on 1/8.   - PT/OT consulted; recommending SNF  - warfarin for DVT ppx  - orthopedics consulted        Hypothyroid  Continue home synthroid          Anemia of chronic disease  Improved after transfusion        Atrial fibrillation with RVR  Continue amiodarone, metoprolol, warfarin          Chronic anticoagulation  Restart home warfarin      Chronic heart failure with preserved ejection fraction  Diuresis prn for comfort      Coronary artery disease involving native coronary artery of native heart without angina pectoris  Continue home aspirin/statin      Chronic recurrent pancreatitis  Likely recurrence of pancreatitis w/ increased abdominal pain 1/12. Now on comfort measures.      VTE Risk Mitigation (From admission, onward)         Ordered      IP VTE HIGH RISK PATIENT  Once         01/09/22 0047     Place JUNE hose  Until discontinued         01/08/22 0224                Discharge Planning   FRANCO:      Code Status: DNR   Is the patient medically ready for discharge?:     Reason for patient still in hospital (select all that apply): Patient trending condition, Laboratory test, Treatment, Consult recommendations and Pending disposition  Discharge Plan A: Skilled Nursing Facility   Discharge Delays: (!) Change in Medical Condition        Critical care time spent on the evaluation and treatment of severe organ dysfunction, review of pertinent labs and imaging studies, discussions with consulting providers and discussions with patient/family: 55 minutes.      Arsenio Robison MD  Department of Hospital Medicine   [facility] - Intensive Care

## 2022-01-14 NOTE — PT/OT/SLP PROGRESS
"Occupational Therapy      Patient Name:  Katt Mcneal   MRN:  [id number]    Pt was transferred to ICU on 1/13 due to overnight decompensation and increase in O2 demands; pt is still in ICU and currently sedated at this time. Per ortho, pt "continues to worsen" and is on "comfort care" due to chronic pancreatitis. OT to sign off at this time; please re-consult if warranted. Thank you.    1/14/2022  "

## 2022-01-14 NOTE — SUBJECTIVE & OBJECTIVE
Interval History: No events overnight, resting comfortably this AM. Has bouts of agitation per nursing, have resolved by AM interview    Review of Systems   Unable to perform ROS: Mental status change     Objective:     Vital Signs (Most Recent):  Temp: 97.6 °F (36.4 °C) (01/14/22 0000)  Pulse: (!) 55 (01/14/22 0630)  Resp: (!) 22 (01/14/22 0630)  BP: (!) 148/81 (01/14/22 0600)  SpO2: 95 % (01/14/22 0630) Vital Signs (24h Range):  Temp:  [97.1 °F (36.2 °C)-97.6 °F (36.4 °C)] 97.6 °F (36.4 °C)  Pulse:  [55-97] 55  Resp:  [14-35] 22  SpO2:  [78 %-100 %] 95 %  BP: ()/(53-92) 148/81     Weight: 45.4 kg (100 lb)  Body mass index is 20.2 kg/m².    Intake/Output Summary (Last 24 hours) at 1/14/2022 0747  Last data filed at 1/14/2022 0153  Gross per 24 hour   Intake 398.28 ml   Output 370 ml   Net 28.28 ml      Physical Exam  Constitutional:       General: She is not in acute distress.     Appearance: She is ill-appearing. She is not toxic-appearing or diaphoretic.   Cardiovascular:      Rate and Rhythm: Normal rate and regular rhythm.      Heart sounds: No murmur heard.  No gallop.    Pulmonary:      Comments: Mildly labored breathing, coarse breaths sounds at bases, no wheezing, no accessory muscle use  Abdominal:      General: Bowel sounds are normal. There is no distension.      Palpations: Abdomen is soft.      Tenderness: There is no abdominal tenderness.         Significant Labs: All pertinent labs within the past 24 hours have been reviewed.    Significant Imaging: I have reviewed all pertinent imaging results/findings within the past 24 hours.

## 2022-01-14 NOTE — PROGRESS NOTES
Ortho Daily Progress Note    Katt Mcneal is a 81 y.o. female admitted on 1/7/2022      Chief Complaint/Reason for admission: Fall (EMS  called to 82yo female that tripped and fell and now c/o right hip pain. Ems reports lateral rotation and slight shortening to right leg. Patient stated she hit her head but no injuries were found. No LOC but patient is on warfarin. )       Hospital Day: 7  Post Op Day: 6 Days Post-Op     The patient is currently in the ICU with comfort care.  Her pancreas is worsening and the patient does not appear to be doing well.  _______________    Vitals:    01/14/22 0915 01/14/22 0930 01/14/22 0945 01/14/22 1000   BP:    (!) 153/81   Pulse: (!) 56 (!) 55 (!) 54 (!) 54   Resp: 19 (!) 21 19 (!) 24   Temp:       TempSrc:       SpO2: (!) 91% (!) 94% 98% 96%   Weight:       Height:           Vital Signs (Most Recent)  Temp: (!) 95.4 °F (35.2 °C) (01/14/22 0800)  Pulse: (!) 54 (01/14/22 1000)  Resp: (!) 24 (01/14/22 1000)  BP: (!) 153/81 (01/14/22 1000)  SpO2: 96 % (01/14/22 1000)    Vital Signs Range (Last 24H):  Temp:  [95.4 °F (35.2 °C)-97.6 °F (36.4 °C)]   Pulse:  [54-97]   Resp:  [14-41]   BP: ()/(53-92)   SpO2:  [78 %-100 %]       Physical:    Patient is sedated in the ICU.    Mild drainage from the right hip appears to be no cellulitis erythema.      Recent Labs     01/12/22  0412 01/12/22  1015 01/13/22  0442   K  --  3.2* 4.4   CALCIUM  --  7.7* 8.7   WBC  --  4.73 51.59*   HGB 8.3* 8.9* 9.5*   HCT 26.5* 28.2* 30.4*   PLT  --  220 278   INR 2.4*  --  2.8*       I/O last 3 completed shifts:  In: 464.5 [I.V.:227.8; IV Piggyback:236.7]  Out: 370 [Urine:370]          Assessment:  A/P status post right hip intramedullary nail.  Positive COVID-19  Positive pancreatitis chronic  With possible infection of the pancreas            Plan:    Patient continued to worsen.  Patient at this point is on comfort care.        Crow Ortega MD  [facility]

## 2022-01-14 NOTE — CARE UPDATE
[facility] Intensive Care  ICU Shift Summary  Date: 1/14/2022      Prehospitalization: Home  Admit Date / LOS : 1/7/2022/ 7 days    Diagnosis: End of life care    Consults:        Active: Palliative       Needed: N/A     Code Status: DNR   Advanced Directive: Not Received    LDA: Paulino and PIV       Central Lines/Site/Justification:Patient Does Not Have Central Line       Urinary Cath/Order/Justification:Critically Ill in the ICU and requiring intensive monitoring    Vasopressors/Infusions:    dexmedetomidine (PRECEDEX) infusion 0.4 mcg/kg/hr (01/14/22 9218)          GOALS: Volume/ Hemodynamic: N/A                     RASS: -1  awakes to voice (eye opening/contact) > 10 seconds    Pain Management: IV       Pain Controlled: yes     Rhythm: SB    Respiratory Device: HFNC                  Most Recent SBT/ SAT: N/A       MOVE Screen: FAIL  ICU Liberation: not applicable    VTE Prophylaxis: Pharm none ordered comfort care only  Mobility: Bedrest  Stress Ulcer Prophylaxis: No    Isolation: Airborne and Contact and Droplet  Dietary: NPO  Tolerance: not applicable  Advancement: no    I & O (24h):    Intake/Output Summary (Last 24 hours) at 1/14/2022 1720  Last data filed at 1/14/2022 1700  Gross per 24 hour   Intake 299.99 ml   Output 320 ml   Net -20.01 ml        Restraints: Yes    Significant Dates:  Post Op Date: N/A  Rescue Date: 1/13  Imaging/ Diagnostics: N/A    Noteworthy Labs:  none    COVID Test: (+)  CBC/Anemia Labs: Coags:    Recent Labs   Lab 01/07/22  2359 01/08/22  0429 01/10/22  0019 01/10/22  0532 01/11/22  2337 01/12/22  0412 01/12/22  1015 01/13/22  0442   WBC  --    < >  --    < >  --   --  4.73 51.59*   HGB  --    < >  --    < >  --    < > 8.9* 9.5*   HCT  --    < >  --    < >  --    < > 28.2* 30.4*   PLT  --    < >  --    < >  --   --  220 278   MCV  --    < >  --    < >  --   --  83 83   RDW  --    < >  --    < >  --   --  17.1* 17.9*   IRON 12*  --   --   --   --   --   --   --    FERRITIN 443*   <  > 390*  --  906*  --   --   --    FOLATE 13.4  --   --   --   --   --   --   --    [id number] 464  --   --   --   --   --   --   --     < > = values in this interval not displayed.    Recent Labs   Lab 01/12/22  0412 01/13/22  0442   INR 2.4* 2.8*        Chemistries:   Recent Labs   Lab 01/07/22  1744 01/08/22  2105 01/12/22  1015 01/13/22  0442      < > 143 141   K 3.3*   < > 3.2* 4.4      < > 106 105   CO2 23   < > 27 18*   BUN 7*   < > 21 27*   CREATININE 0.7   < > 0.7 0.9   CALCIUM 7.9*   < > 7.7* 8.7   PROT 6.8  --  5.5* 6.9   BILITOT 0.3  --  0.9 1.1*   ALKPHOS 67  --  351* 275*   ALT 33  --  143* 121*   AST 54*  --  319* 155*   MG 1.9  --   --   --     < > = values in this interval not displayed.        Cardiac Enzymes: Ejection Fractions:    Recent Labs     01/13/22 0442   TROPONINI 0.019    EF   Date Value Ref Range Status   01/13/2022 55 % Final        POCT Glucose: HbA1c:    Recent Labs   Lab 01/12/22  1049 01/12/22  1556 01/13/22  0022   POCTGLUCOSE 164* 208* 242*    No results found for: HGBA1C        ICU LOS 1d 8h  Level of Care: Critical Care    Chart Check: 12 HR Done  Shift Summary/Plan for the shift: See care plan note

## 2022-01-14 NOTE — PT/OT/SLP PROGRESS
Physical Therapy      Patient Name:  Katt Mcneal   MRN:  [id number]    Pt transferred to ICU yesterday on 1/13/22.  Per chart, pt is not doing well.  Family has decided on comfort care at this time.  PT will discontinue orders.

## 2022-01-14 NOTE — PLAN OF CARE
Patient transferred to ICU from [facility] this shift for hypoxemia and confusion/agitation. Skin tears noted to LLE and FELTON, LDAs charted. Goals of care discussion held with family shortly after transfer and patient made DNR/DNI and transitioned to comfort care. Pt oriented to self, disoriented to place, time, and situation. Reorientation provided. Precedex gtt infusing at 0.6 mcg/kg/min. PRN dilaudid given x2 this shift. NSR on cardiac montior, 6-10L HFNC, orders not to escalate oxygen support. BP on lower side after PRN pain med admin. Paulino placed for comfort due to extreme agitation with attempts to urinate. No BM. Soft wrist restraints in use. No falls, injury, or new skin breakdown this shift. Plan of care reviewed with patient daughter, son, granddaughter, and daughter-in-law.

## 2022-01-15 LAB
BACTERIA BLD CULT: ABNORMAL

## 2022-01-15 PROCEDURE — 27000207 HC ISOLATION

## 2022-01-15 PROCEDURE — 20000000 HC ICU ROOM

## 2022-01-15 PROCEDURE — 94761 N-INVAS EAR/PLS OXIMETRY MLT: CPT

## 2022-01-15 PROCEDURE — 25000003 PHARM REV CODE 250: Performed by: STUDENT IN AN ORGANIZED HEALTH CARE EDUCATION/TRAINING PROGRAM

## 2022-01-15 PROCEDURE — 63600175 PHARM REV CODE 636 W HCPCS: Performed by: INTERNAL MEDICINE

## 2022-01-15 PROCEDURE — 63600175 PHARM REV CODE 636 W HCPCS: Performed by: STUDENT IN AN ORGANIZED HEALTH CARE EDUCATION/TRAINING PROGRAM

## 2022-01-15 RX ORDER — HALOPERIDOL 5 MG/ML
1 INJECTION INTRAMUSCULAR
Status: DISCONTINUED | OUTPATIENT
Start: 2022-01-15 | End: 2022-01-15

## 2022-01-15 RX ORDER — HALOPERIDOL 5 MG/ML
2 INJECTION INTRAMUSCULAR
Status: DISCONTINUED | OUTPATIENT
Start: 2022-01-15 | End: 2022-01-17 | Stop reason: HOSPADM

## 2022-01-15 RX ADMIN — HALOPERIDOL LACTATE 1 MG: 5 INJECTION, SOLUTION INTRAMUSCULAR at 01:01

## 2022-01-15 RX ADMIN — HYDROMORPHONE HYDROCHLORIDE 1 MG: 2 INJECTION, SOLUTION INTRAMUSCULAR; INTRAVENOUS; SUBCUTANEOUS at 07:01

## 2022-01-15 RX ADMIN — HYDROMORPHONE HYDROCHLORIDE 1 MG: 2 INJECTION, SOLUTION INTRAMUSCULAR; INTRAVENOUS; SUBCUTANEOUS at 01:01

## 2022-01-15 RX ADMIN — HALOPERIDOL LACTATE 1 MG: 5 INJECTION, SOLUTION INTRAMUSCULAR at 03:01

## 2022-01-15 RX ADMIN — HYDROMORPHONE HYDROCHLORIDE 1 MG: 2 INJECTION, SOLUTION INTRAMUSCULAR; INTRAVENOUS; SUBCUTANEOUS at 06:01

## 2022-01-15 RX ADMIN — HYDROMORPHONE HYDROCHLORIDE 1 MG: 2 INJECTION, SOLUTION INTRAMUSCULAR; INTRAVENOUS; SUBCUTANEOUS at 08:01

## 2022-01-15 RX ADMIN — DEXMEDETOMIDINE HYDROCHLORIDE IN 0.9% SODIUM CHLORIDE 0.5 MCG/KG/HR: 4 INJECTION INTRAVENOUS at 10:01

## 2022-01-15 RX ADMIN — HYDROMORPHONE HYDROCHLORIDE 1 MG: 2 INJECTION, SOLUTION INTRAMUSCULAR; INTRAVENOUS; SUBCUTANEOUS at 04:01

## 2022-01-15 RX ADMIN — DEXMEDETOMIDINE HYDROCHLORIDE IN 0.9% SODIUM CHLORIDE 0.4 MCG/KG/HR: 4 INJECTION INTRAVENOUS at 05:01

## 2022-01-15 RX ADMIN — HYDROMORPHONE HYDROCHLORIDE 1 MG: 2 INJECTION, SOLUTION INTRAMUSCULAR; INTRAVENOUS; SUBCUTANEOUS at 03:01

## 2022-01-15 RX ADMIN — HALOPERIDOL LACTATE 2 MG: 5 INJECTION, SOLUTION INTRAMUSCULAR at 06:01

## 2022-01-15 RX ADMIN — HALOPERIDOL LACTATE 1 MG: 5 INJECTION, SOLUTION INTRAMUSCULAR at 10:01

## 2022-01-15 RX ADMIN — HYDROMORPHONE HYDROCHLORIDE 1 MG: 2 INJECTION, SOLUTION INTRAMUSCULAR; INTRAVENOUS; SUBCUTANEOUS at 10:01

## 2022-01-15 RX ADMIN — HALOPERIDOL LACTATE 2 MG: 5 INJECTION, SOLUTION INTRAMUSCULAR at 10:01

## 2022-01-15 NOTE — PROGRESS NOTES
CINTIA contact [facility] re: hospice services. CINTIA was informed by [facility]  that George ([facility]) will contact SW back.     CINTIA received call from George ([facility]). George explained to CINTIA that he received patient information and will make contact with patient daughter to complete consents. George stated he will contact CINTIA once all consent completed. George # [phone].    CINTIA faxed case management consult for ambulance to [facility].

## 2022-01-15 NOTE — SUBJECTIVE & OBJECTIVE
Interval History: Overnight pt w/ agitation, resolved by the time of HM evaluation.    Review of Systems   Unable to perform ROS: Acuity of condition     Objective:     Vital Signs (Most Recent):  Temp: 96.9 °F (36.1 °C) (01/15/22 0800)  Pulse: 67 (01/15/22 0900)  Resp: 10 (01/15/22 0900)  BP: (!) 170/89 (01/15/22 0810)  SpO2: (!) 93 % (01/15/22 0900) Vital Signs (24h Range):  Temp:  [96 °F (35.6 °C)-97.4 °F (36.3 °C)] 96.9 °F (36.1 °C)  Pulse:  [53-95] 67  Resp:  [10-44] 10  SpO2:  [89 %-98 %] 93 %  BP: ()/() 170/89     Weight: 45.4 kg (100 lb)  Body mass index is 20.2 kg/m².    Intake/Output Summary (Last 24 hours) at 1/15/2022 0918  Last data filed at 1/15/2022 0900  Gross per 24 hour   Intake 250.26 ml   Output --   Net 250.26 ml      Physical Exam  Constitutional:       General: She is not in acute distress.     Appearance: She is ill-appearing. She is not toxic-appearing or diaphoretic.   Cardiovascular:      Rate and Rhythm: Normal rate and regular rhythm.      Heart sounds: No murmur heard.  No gallop.    Pulmonary:      Effort: Pulmonary effort is normal. No respiratory distress.      Breath sounds: Normal breath sounds. No wheezing.   Abdominal:      General: Bowel sounds are normal.      Palpations: Abdomen is soft.         Significant Labs: All pertinent labs within the past 24 hours have been reviewed.    Significant Imaging: I have reviewed all pertinent imaging results/findings within the past 24 hours.

## 2022-01-15 NOTE — ASSESSMENT & PLAN NOTE
Discussed goals of care with family for 20 minutes today. They recognize that she has been declining and is at the end of her life, and wish her to be comfortable rather than undergo aggressive procedures with limited hope of significant recovery of quality of life. Thus the family has decided to transition to focus on comfort.

## 2022-01-15 NOTE — NURSING
Pt on comfort care in ICU, daughter at bedside. PRN medication given for agitation, pain and dyspnea. O2 remains at 8L HFNC.  Urine output is diminished.

## 2022-01-15 NOTE — CONSULTS
CINTIA received call from George ([facility]). George explained that Mr. Grullon will be coming to meet with patient's daughter to complete consents. CINTIA asked for George to provide [facility]IA contact # to Mr. Grullon ([facility]).

## 2022-01-15 NOTE — ASSESSMENT & PLAN NOTE
Transitioned to comfort care 1/13. See ACP note.    Will try to wean precedex, using adjunctive prn medications instead

## 2022-01-15 NOTE — PLAN OF CARE
Patient remains in ICU on comfort measures only.  Precedex infusing for agitation and titrated per orders.  Family met with hospice representative today and plans for transfer to home hospice tomorrow.  PRN dilaudid and haldol given for pain and agitation.  Paulino present and draining clear yellow urine.  Daughter remains at bedside and updated on plan of care.

## 2022-01-15 NOTE — PROGRESS NOTES
[facility] Medicine  Progress Note    Patient Name: Katt Mcneal  MRN: [id number]  Patient Class: IP- Inpatient   Admission Date: 1/7/2022  Length of Stay: 8 days  Attending Physician: Arsenio Robison MD  Primary Care Provider: Panchito Perez MD        Subjective:     Principal Problem:End of life care        HPI:  Ms. Mcneal is an 80yo lady with a past medical history of Afib on Coumadin, CAD, MI, HTN, pancreatic mass with CBD obstruction and pancreatitis, and hypothyroidism    She was recently admitted to  on 1/3 to 1/6 due to abdominal pain and pancreatitis (MRCP showed severe pancreatic ductal dilation with a suspected pancreatic mass in the body segment measuring up to 2.7 cm.  Recommend further evaluation with EUS and tissue sampling).  Please refer to Dr. Ny's complete discharge summary for details of her stay.  She was also found to be COVID positive on 1/3 during that admission, but her oxygenation was always >94%, so no acute therapy was initiated (CXR showed bilateral mild diffuse nonspecific interstitial coarsening improved from prior, and may reflect residual minimal pulmonary edema versus interstitial type pneumonia or chronic interstitial lung changes.)    She now comes to the ED after tripping and falling at home earlier today.  She cannot remember if she struck her head or not.  After falling she developed right hip pain and was unable to bear any weight on her right leg.  EMS had to be activated and bring her to the ED.  In the ED she was found to have COVID and nondisplaced fractures involving the right inter trochanteric region and right lesser trochanter.  Her O2 sats today went as low as 88% on RA.  No repeat CXR was done in the ED.    On my exam in her room, she is awake, but confused and unwilling to speak or participate in her exam by the nurse.  There is no family present.      Overview/Hospital Course:  Admitted for R intertrochanteric fracture.  Underwent R IMN 1/8. Required 1u pRBC POD 1, otherwise stable. PT/OT recommending SNF.    Pt w/ recurrent abdominal pain and chills on 1/12.      Interval History: Overnight pt w/ agitation, resolved by the time of HM evaluation.    Review of Systems   Unable to perform ROS: Acuity of condition     Objective:     Vital Signs (Most Recent):  Temp: 96.9 °F (36.1 °C) (01/15/22 0800)  Pulse: 67 (01/15/22 0900)  Resp: 10 (01/15/22 0900)  BP: (!) 170/89 (01/15/22 0810)  SpO2: (!) 93 % (01/15/22 0900) Vital Signs (24h Range):  Temp:  [96 °F (35.6 °C)-97.4 °F (36.3 °C)] 96.9 °F (36.1 °C)  Pulse:  [53-95] 67  Resp:  [10-44] 10  SpO2:  [89 %-98 %] 93 %  BP: ()/() 170/89     Weight: 45.4 kg (100 lb)  Body mass index is 20.2 kg/m².    Intake/Output Summary (Last 24 hours) at 1/15/2022 0918  Last data filed at 1/15/2022 0900  Gross per 24 hour   Intake 250.26 ml   Output --   Net 250.26 ml      Physical Exam  Constitutional:       General: She is not in acute distress.     Appearance: She is ill-appearing. She is not toxic-appearing or diaphoretic.   Cardiovascular:      Rate and Rhythm: Normal rate and regular rhythm.      Heart sounds: No murmur heard.  No gallop.    Pulmonary:      Effort: Pulmonary effort is normal. No respiratory distress.      Breath sounds: Normal breath sounds. No wheezing.   Abdominal:      General: Bowel sounds are normal.      Palpations: Abdomen is soft.         Significant Labs: All pertinent labs within the past 24 hours have been reviewed.    Significant Imaging: I have reviewed all pertinent imaging results/findings within the past 24 hours.      Assessment/Plan:      * End of life care  Transitioned to comfort care 1/13. See ACP note.    Will try to wean precedex, using adjunctive prn medications instead    Comfort measures only status  As noted under ACP. Appreciate palliative input      Advance care planning  Discussed goals of care with family for 20 minutes today. They recognize  that she has been declining and is at the end of her life, and wish her to be comfortable rather than undergo aggressive procedures with limited hope of significant recovery of quality of life. Thus the family has decided to transition to focus on comfort.      Leukocytosis  Marked leukocytosis concerning for new infection vs inflammatory process, likely intraabdominal.      Preop cardiovascular exam  Seen by cardiology.      Sepsis due to COVID-19  Resolved      Acute respiratory failure with hypoxia  Stable on low-dose O2 with acute worsening 1/13. Etiology likely cardiogenic vs non-cardiogenic pulmonary edema.  - incentive spirometry  - prn furosemide for comfort        Hypokalemia  Replaced.      Pancreatic duct obstruction  Had prior plan to follow up with EUS as an outpatient. Unlikely to have treatment options if malignant        Pancreatic mass  Had prior plan to follow up with EUS as an outpatient. Unlikely to have treatment options if malignant      Fall on same level from tripping as cause of accidental injury  As above.      Intertrochanteric fracture of right hip  S/p IMN w/ ortho on 1/8.           Hypothyroid  Continue home synthroid          Anemia of chronic disease  Improved after transfusion        Atrial fibrillation with RVR  Continue amiodarone, metoprolol, warfarin          Chronic anticoagulation  Restart home warfarin      Chronic heart failure with preserved ejection fraction  Diuresis prn for comfort      Coronary artery disease involving native coronary artery of native heart without angina pectoris  Continue home aspirin/statin      Chronic recurrent pancreatitis  Likely recurrence of pancreatitis w/ increased abdominal pain 1/12. Now on comfort measures.      VTE Risk Mitigation (From admission, onward)         Ordered     IP VTE HIGH RISK PATIENT  Once         01/09/22 0047     Place JUNE hose  Until discontinued         01/08/22 0222                Discharge Planning   FRANCO:      Code  Status: DNR   Is the patient medically ready for discharge?:     Reason for patient still in hospital (select all that apply): Patient trending condition, Laboratory test, Treatment, Consult recommendations and Pending disposition  Discharge Plan A: Comfort care/withdrawal (Patient on comfort measures at this time.)   Discharge Delays: (!) Other (Family to make decision regarding hospice  1/14/2022)        Critical care time spent on the evaluation and treatment of severe organ dysfunction, review of pertinent labs and imaging studies, discussions with consulting providers and discussions with patient/family: 45 minutes.      Arsenio Robison MD  Department of Hospital Medicine   [facility] [place] - Intensive Care

## 2022-01-15 NOTE — CARE UPDATE
[facility] Intensive Care  ICU Shift Summary  Date: 1/15/2022      Prehospitalization: Home  Admit Date / LOS : 1/7/2022/ 8 days    Diagnosis: End of life care    Consults:        Active: Palliative       Needed: N/A     Code Status: DNR   Advanced Directive: Not Received    LDA: Paulino and PIV       Central Lines/Site/Justification:Patient Does Not Have Central Line       Urinary Cath/Order/Justification:Hospice/Palliative Care/Comfort Care    Vasopressors/Infusions:    dexmedetomidine (PRECEDEX) infusion 0.4 mcg/kg/hr (01/15/22 0500)          GOALS: Volume/ Hemodynamic: N/A                     RASS: -1  awakes to voice (eye opening/contact) > 10 seconds    Pain Management: IV       Pain Controlled: yes     Rhythm: NSR    Respiratory Device: HFNC                  Most Recent SBT/ SAT: N/A       MOVE Screen: FAIL  ICU Liberation: not applicable    VTE Prophylaxis: none on comfort care  Mobility: Bedrest  Stress Ulcer Prophylaxis: No    Isolation: Airborne and Contact and Droplet  Dietary: NPO  Tolerance: not applicable  Advancement: no    I & O (24h):    Intake/Output Summary (Last 24 hours) at 1/15/2022 1751  Last data filed at 1/15/2022 1700  Gross per 24 hour   Intake 228.48 ml   Output --   Net 228.48 ml        Restraints: Yes    Significant Dates:  Post Op Date: N/A  Rescue Date: 1/13  Imaging/ Diagnostics: none    Noteworthy Labs:  None, on comfort care    COVID Test: (+)  CBC/Anemia Labs: Coags:    Recent Labs   Lab 01/10/22  0019 01/10/22  0532 01/11/22  2337 01/12/22  0412 01/12/22  1015 01/13/22  0442   WBC  --    < >  --   --  4.73 51.59*   HGB  --    < >  --    < > 8.9* 9.5*   HCT  --    < >  --    < > 28.2* 30.4*   PLT  --    < >  --   --  220 278   MCV  --    < >  --   --  83 83   RDW  --    < >  --   --  17.1* 17.9*   FERRITIN 390*  --  906*  --   --   --     < > = values in this interval not displayed.    Recent Labs   Lab 01/12/22  0412 01/13/22  0442   INR 2.4* 2.8*        Chemistries:   Recent  Labs   Lab 01/12/22  1015 01/13/22  0442    141   K 3.2* 4.4    105   CO2 27 18*   BUN 21 27*   CREATININE 0.7 0.9   CALCIUM 7.7* 8.7   PROT 5.5* 6.9   BILITOT 0.9 1.1*   ALKPHOS 351* 275*   * 121*   * 155*        Cardiac Enzymes: Ejection Fractions:    Recent Labs     01/13/22  0442   TROPONINI 0.019    EF   Date Value Ref Range Status   01/13/2022 55 % Final        POCT Glucose: HbA1c:    Recent Labs   Lab 01/12/22  1049 01/12/22  1556 01/13/22  0022   POCTGLUCOSE 164* 208* 242*    No results found for: HGBA1C        ICU LOS 2d 9h  Level of Care: OK to Transfer    Chart Check: 12 HR Done  Shift Summary/Plan for the shift: See care plan note

## 2022-01-16 VITALS
TEMPERATURE: 98 F | SYSTOLIC BLOOD PRESSURE: 161 MMHG | WEIGHT: 100 LBS | RESPIRATION RATE: 26 BRPM | HEIGHT: 59 IN | BODY MASS INDEX: 20.16 KG/M2 | HEART RATE: 62 BPM | OXYGEN SATURATION: 100 % | DIASTOLIC BLOOD PRESSURE: 74 MMHG

## 2022-01-16 PROCEDURE — 25000003 PHARM REV CODE 250: Performed by: STUDENT IN AN ORGANIZED HEALTH CARE EDUCATION/TRAINING PROGRAM

## 2022-01-16 PROCEDURE — 63600175 PHARM REV CODE 636 W HCPCS: Performed by: STUDENT IN AN ORGANIZED HEALTH CARE EDUCATION/TRAINING PROGRAM

## 2022-01-16 PROCEDURE — 63600175 PHARM REV CODE 636 W HCPCS: Performed by: INTERNAL MEDICINE

## 2022-01-16 PROCEDURE — 20000000 HC ICU ROOM

## 2022-01-16 RX ORDER — MORPHINE SULFATE 20 MG/5ML
15 SOLUTION ORAL
Refills: 0
Start: 2022-01-16

## 2022-01-16 RX ORDER — HALOPERIDOL 5 MG/ML
2 INJECTION INTRAMUSCULAR EVERY 4 HOURS PRN
Start: 2022-01-16 | End: 2022-01-16 | Stop reason: HOSPADM

## 2022-01-16 RX ORDER — LORAZEPAM 2 MG/ML
1 CONCENTRATE ORAL EVERY 8 HOURS PRN
Start: 2022-01-16 | End: 2022-02-15

## 2022-01-16 RX ORDER — OLANZAPINE 10 MG/1
10 TABLET, ORALLY DISINTEGRATING ORAL EVERY 4 HOURS PRN
Start: 2022-01-16 | End: 2023-01-16

## 2022-01-16 RX ADMIN — HYDROMORPHONE HYDROCHLORIDE 1 MG: 2 INJECTION, SOLUTION INTRAMUSCULAR; INTRAVENOUS; SUBCUTANEOUS at 03:01

## 2022-01-16 RX ADMIN — HYDROMORPHONE HYDROCHLORIDE 1 MG: 2 INJECTION, SOLUTION INTRAMUSCULAR; INTRAVENOUS; SUBCUTANEOUS at 01:01

## 2022-01-16 RX ADMIN — HYDROMORPHONE HYDROCHLORIDE 1 MG: 2 INJECTION, SOLUTION INTRAMUSCULAR; INTRAVENOUS; SUBCUTANEOUS at 08:01

## 2022-01-16 RX ADMIN — HALOPERIDOL LACTATE 2 MG: 5 INJECTION, SOLUTION INTRAMUSCULAR at 02:01

## 2022-01-16 RX ADMIN — HYDROMORPHONE HYDROCHLORIDE 1 MG: 2 INJECTION, SOLUTION INTRAMUSCULAR; INTRAVENOUS; SUBCUTANEOUS at 06:01

## 2022-01-16 RX ADMIN — HALOPERIDOL LACTATE 2 MG: 5 INJECTION, SOLUTION INTRAMUSCULAR at 09:01

## 2022-01-16 RX ADMIN — LORAZEPAM 1 MG: 2 INJECTION INTRAMUSCULAR; INTRAVENOUS at 01:01

## 2022-01-16 RX ADMIN — HYDROMORPHONE HYDROCHLORIDE 1 MG: 2 INJECTION, SOLUTION INTRAMUSCULAR; INTRAVENOUS; SUBCUTANEOUS at 04:01

## 2022-01-16 RX ADMIN — HYDROMORPHONE HYDROCHLORIDE 1 MG: 2 INJECTION, SOLUTION INTRAMUSCULAR; INTRAVENOUS; SUBCUTANEOUS at 12:01

## 2022-01-16 RX ADMIN — HALOPERIDOL LACTATE 2 MG: 5 INJECTION, SOLUTION INTRAMUSCULAR at 06:01

## 2022-01-16 RX ADMIN — HALOPERIDOL LACTATE 2 MG: 5 INJECTION, SOLUTION INTRAMUSCULAR at 04:01

## 2022-01-16 RX ADMIN — DEXMEDETOMIDINE HYDROCHLORIDE IN 0.9% SODIUM CHLORIDE 0.7 MCG/KG/HR: 4 INJECTION INTRAVENOUS at 03:01

## 2022-01-16 NOTE — PLAN OF CARE
Pt remains in ICU with comfort care orders. HR running NSR on monitor. BP stable. Afebrile. Pt oriented to self. Precedex infusing titrated per order. PRN dilaudid and haldol given for agitation and comfort. Restraints in place and being monitored. Paulino in place with 350 ml of urine output. Daughter remained at bedside this shift. Pt and daughter updated on plan of care. No new falls, injuries, or skin breakdown this shift.

## 2022-01-16 NOTE — NURSING
[facility] [place] Intensive Care  ICU Shift Summary  Date: 1/16/2022      Prehospitalization: Home  Admit Date / LOS : 1/7/2022/ 9 days    Diagnosis: End of life care    Consults:        Active: Palliative       Needed: N/A     Code Status: DNR   Advanced Directive: Not Received    LDA: Paulino and PIV       Central Lines/Site/Justification:Patient Does Not Have Central Line       Urinary Cath/Order/Justification:Hospice/Palliative Care/Comfort Care    Vasopressors/Infusions:    dexmedetomidine (PRECEDEX) infusion 0.7 mcg/kg/hr (01/16/22 1600)          GOALS: Volume/ Hemodynamic: N/A                     RASS: -2  light sedation, briefly awakes to voice (eye opening)    Pain Management: IV       Pain Controlled: yes     Rhythm: NSR    Respiratory Device: Nasal Cannula                  Most Recent SBT/ SAT: N/A       MOVE Screen: FAIL  ICU Liberation: not applicable    VTE Prophylaxis: Mechanical  Mobility: Bedrest  Stress Ulcer Prophylaxis: No    Isolation: Airborne and Contact and Droplet  Dietary: NPO  Tolerance: not applicable  Advancement: no    I & O (24h):    Intake/Output Summary (Last 24 hours) at 1/16/2022 1740  Last data filed at 1/16/2022 1600  Gross per 24 hour   Intake 230.73 ml   Output 800 ml   Net -569.27 ml        Restraints: No    Significant Dates:  Post Op Date: N/A  Rescue Date: N/A  Imaging/ Diagnostics: N/A    Noteworthy Labs:  none    COVID Test: (+)  CBC/Anemia Labs: Coags:    Recent Labs   Lab 01/10/22  0019 01/10/22  0532 01/11/22  2337 01/12/22  0412 01/12/22  1015 01/13/22  0442   WBC  --    < >  --   --  4.73 51.59*   HGB  --    < >  --    < > 8.9* 9.5*   HCT  --    < >  --    < > 28.2* 30.4*   PLT  --    < >  --   --  220 278   MCV  --    < >  --   --  83 83   RDW  --    < >  --   --  17.1* 17.9*   FERRITIN 390*  --  906*  --   --   --     < > = values in this interval not displayed.    Recent Labs   Lab 01/12/22  0412 01/13/22  0442   INR 2.4* 2.8*        Chemistries:   Recent Labs   Lab  01/12/22  1015 01/13/22  0442    141   K 3.2* 4.4    105   CO2 27 18*   BUN 21 27*   CREATININE 0.7 0.9   CALCIUM 7.7* 8.7   PROT 5.5* 6.9   BILITOT 0.9 1.1*   ALKPHOS 351* 275*   * 121*   * 155*        Cardiac Enzymes: Ejection Fractions:    No results for input(s): CPK, CPKMB, MB, TROPONINI in the last 72 hours. EF   Date Value Ref Range Status   01/13/2022 55 % Final        POCT Glucose: HbA1c:    Recent Labs   Lab 01/12/22  1049 01/12/22  1556 01/13/22  0022   POCTGLUCOSE 164* 208* 242*    No results found for: HGBA1C        ICU LOS 3d 8h  Level of Care: Discharge    Chart Check: 24 HR Done  Shift Summary/Plan for the shift: see care plan note

## 2022-01-16 NOTE — PLAN OF CARE
01/16/22 0915   Post-Acute Status   Post-Acute Authorization Hospice   Post-Acute Placement Status Set-up Complete/Auth obtained   Hospice Status Set-up Complete/Auth obtained   Coverage [facility]   Hospital Resources/Appts/Education Provided Provided patient/caregiver with written discharge plan information   Discharge Delays (!) Other  (CINTIA waiting on [facility] transportation authorization #.)   Discharge Plan   Discharge Plan A Hospice/home   Discharge Plan B Hospice/home     CINTIA spoke with Xochitl ([facility]) requesting for ambulance authorization #. Xochitl ([facility]) will contact  back once receiving ambulance authorization #.      Ambulance authorization # [id number] for [facility].

## 2022-01-16 NOTE — NURSING
[facility] [place] Intensive Care  ICU Shift Summary  Date: 1/16/2022      Prehospitalization: Home  Admit Date / LOS : 1/7/2022/ 9 days    Diagnosis: End of life care    Consults:        Active: Palliative       Needed: N/A     Code Status: DNR   Advanced Directive: Not Received    LDA: Paulino and PIV       Central Lines/Site/Justification:Patient Does Not Have Central Line       Urinary Cath/Order/Justification:Hospice/Palliative Care/Comfort Care    Vasopressors/Infusions:    dexmedetomidine (PRECEDEX) infusion 0.5 mcg/kg/hr (01/15/22 2241)          GOALS: Volume/ Hemodynamic: N/A                     RASS: +1 anxious, apprehensive but not aggressive    Pain Management: IV       Pain Controlled: yes     Rhythm: NSR    Respiratory Device: HFNC                  Most Recent SBT/ SAT: N/A       MOVE Screen: PASS  ICU Liberation: yes    VTE Prophylaxis: Mechanical  Mobility: Bedrest  Stress Ulcer Prophylaxis: No    Isolation: Airborne and Contact and Droplet  Dietary: NPO  Tolerance: yes  Advancement: no    I & O (24h):    Intake/Output Summary (Last 24 hours) at 1/16/2022 0527  Last data filed at 1/16/2022 0500  Gross per 24 hour   Intake 271.42 ml   Output 800 ml   Net -528.58 ml        Restraints: Yes    Significant Dates:  Post Op Date: N/A  Rescue Date: N/A  Imaging/ Diagnostics: N/A    Noteworthy Labs:  See chart below    COVID Test: (+)  CBC/Anemia Labs: Coags:    Recent Labs   Lab 01/10/22  0019 01/10/22  0532 01/11/22  2337 01/12/22  0412 01/12/22  1015 01/13/22  0442   WBC  --    < >  --   --  4.73 51.59*   HGB  --    < >  --    < > 8.9* 9.5*   HCT  --    < >  --    < > 28.2* 30.4*   PLT  --    < >  --   --  220 278   MCV  --    < >  --   --  83 83   RDW  --    < >  --   --  17.1* 17.9*   FERRITIN 390*  --  906*  --   --   --     < > = values in this interval not displayed.    Recent Labs   Lab 01/12/22  0412 01/13/22  0442   INR 2.4* 2.8*        Chemistries:   Recent Labs   Lab 01/12/22  1015 01/13/22  0442   NA  143 141   K 3.2* 4.4    105   CO2 27 18*   BUN 21 27*   CREATININE 0.7 0.9   CALCIUM 7.7* 8.7   PROT 5.5* 6.9   BILITOT 0.9 1.1*   ALKPHOS 351* 275*   * 121*   * 155*        Cardiac Enzymes: Ejection Fractions:    No results for input(s): CPK, CPKMB, MB, TROPONINI in the last 72 hours. EF   Date Value Ref Range Status   01/13/2022 55 % Final        POCT Glucose: HbA1c:    Recent Labs   Lab 01/12/22  1049 01/12/22  1556 01/13/22  0022   POCTGLUCOSE 164* 208* 242*    No results found for: HGBA1C        ICU LOS 2d 20h  Level of Care: OK to Transfer    Chart Check: 24 HR Done  Shift Summary/Plan for the shift: see care plan summary

## 2022-01-16 NOTE — PLAN OF CARE
Pt remains in ICU awaiting transport for home hospice. VSS, afebrile. No new injury, falls, or skin breakdown this shift.

## 2022-01-16 NOTE — ASSESSMENT & PLAN NOTE
Transitioned to comfort care 1/13. See ACP note.    Will try to wean precedex, using adjunctive prn medications instead    To home hospice today

## 2022-01-16 NOTE — PLAN OF CARE
[facility] - Intensive Care  Discharge Final Note    Primary Care Provider: Panchito Perez MD    Expected Discharge Date: 1/16/2022    SW explained to patient's to contact [facility] for any additional questions. SW discussed patient responsibilities for MANAGING YOUR HEALTH AT HOME  EDUCATION:  Things You are responsible For To Manage Your Care At Home:  1.    Getting your prescriptions filled   2.    Taking your medications as directed, DO NOT MISS ANY DOSES!  3.    Going to your follow-up doctor appointment. This is important because it  allow the doctor to monitor your progress and determine if  any changes need to made to your treatment plan.  Call [facility] Help [facility] number for new or repeated problems / symptoms   Call 911 for CP and / or SOB  SW informed nurse Diogenes via secure chat that patient is ready for discharge from case management standpoint.       Final Discharge Note (most recent)       Final Note - 01/16/22 0957          Final Note    Assessment Type Final Discharge Note     Anticipated Discharge Disposition Hospice/Home     What phone number can be called within the next 1-3 days to see how you are doing after discharge? [phone]     Hospital Resources/Appts/Education Provided Provided patient/caregiver with written discharge plan information        Post-Acute Status    Post-Acute Authorization Hospice     Post-Acute Placement Status Set-up Complete/Auth obtained     Hospice Status Set-up Complete/Auth obtained     Coverage PHN     Discharge Delays None known at this time                     Important Message from Medicare  Important Message from Medicare regarding Discharge Appeal Rights: Given to patient/caregiver,Explained to patient/caregiver,Signed/date by patient/caregiver,Other (comments)     Date IMM was signed: 01/10/22  Time IMM was signed: 1620    Contact Info       [facility]    [address]  [place] [address]   Phone: [phone]       Next  Steps: Follow up    Instructions: Hospice

## 2022-01-16 NOTE — PLAN OF CARE
01/16/22 1004   Medicare Message   Important Message from Medicare regarding Discharge Appeal Rights Given to patient/caregiver;Explained to patient/caregiver   Date IMM was signed 01/16/22   Time IMM was signed 0944     IMM explained to patient's daughter and a copy will be mailed to her address.

## 2022-01-16 NOTE — SUBJECTIVE & OBJECTIVE
Interval History: Overnight pt w/ intermittent bouts of agitation    Review of Systems   Unable to perform ROS: Acuity of condition     Objective:     Vital Signs (Most Recent):  Temp: 97.1 °F (36.2 °C) (01/16/22 0301)  Pulse: 83 (01/16/22 0400)  Resp: 20 (01/16/22 0819)  BP: (!) 165/79 (01/16/22 0400)  SpO2: 98 % (01/16/22 0400) Vital Signs (24h Range):  Temp:  [96.3 °F (35.7 °C)-97.3 °F (36.3 °C)] 97.1 °F (36.2 °C)  Pulse:  [60-95] 83  Resp:  [10-28] 20  SpO2:  [84 %-100 %] 98 %  BP: (103-205)/(55-94) 165/79     Weight: 45.4 kg (100 lb)  Body mass index is 20.2 kg/m².    Intake/Output Summary (Last 24 hours) at 1/16/2022 0821  Last data filed at 1/16/2022 0500  Gross per 24 hour   Intake 185.68 ml   Output 800 ml   Net -614.32 ml      Physical Exam  Constitutional:       General: She is not in acute distress.     Appearance: She is ill-appearing. She is not toxic-appearing or diaphoretic.   Cardiovascular:      Rate and Rhythm: Normal rate and regular rhythm.      Heart sounds: No murmur heard.  No gallop.    Pulmonary:      Effort: Pulmonary effort is normal. No respiratory distress.      Breath sounds: Normal breath sounds. No wheezing.   Abdominal:      General: Bowel sounds are normal.      Palpations: Abdomen is soft.      Tenderness: There is no abdominal tenderness.         Significant Labs: All pertinent labs within the past 24 hours have been reviewed.    Significant Imaging: I have reviewed all pertinent imaging results/findings within the past 24 hours.

## 2022-01-16 NOTE — PLAN OF CARE
ADT 30 order placed for St[facility] Transportation.  Requested  time: 1700  If transportation does not arrive at ETA time nurse will be instructed to follow protocol for transportation below:   How can I get in touch directly with dispatch, if needed?                 · Non-emergent (stretcher): [phone]    · Emergent dispatch: [phone]    ++NURSING:  If Stretcher does not arrive at requested time please call the above Non Emergent Dispatcher.  If issue not resolved please escalate to your charge nurse for further instructions.

## 2022-01-16 NOTE — PLAN OF CARE
[facility]  Department of Hospital Medicine  [address] [place] [address]  [phone] after hours  [phone] fax    HOSPICE  ORDERS    01/16/2022    Admit to Hospice:  Home Service       Diagnoses:   Active Hospital Problems    Diagnosis  POA    *End of life care [Z51.5]  Not Applicable    Leukocytosis [D72.829]  No    Advance care planning [Z71.89]  Not Applicable    Comfort measures only status [Z51.5]  Not Applicable    Acute respiratory failure with hypoxia [J96.01]  Yes    Sepsis due to COVID-19 [U07.1, A41.89]  Yes    Preop cardiovascular exam [Z01.810]  Not Applicable    Intertrochanteric fracture of right hip [S72.141A]  Yes    Fall on same level from tripping as cause of accidental injury [W01.0XXA]  Yes    Pancreatic mass [K86.89]  Yes    Pancreatic duct obstruction [K86.89]  Yes    Hypokalemia [E87.6]  Yes    Hypothyroid [E03.9]  Yes    Atrial fibrillation with RVR [I48.91]  Yes    Anemia of chronic disease [D63.8]  Yes    Chronic anticoagulation [Z79.01]  Not Applicable    Chronic heart failure with preserved ejection fraction [I50.32]  Yes    Chronic recurrent pancreatitis [K86.1]  Yes     Chronic    Coronary artery disease involving native coronary artery of native heart without angina pectoris [I25.10]  Yes      Resolved Hospital Problems   No resolved problems to display.       Hospice Qualifying Diagnoses:       Patient has a life expectancy < 6 months due to:  1) Primary Hospice Diagnosis: recurrent necrotizing pancreatitis  2) Comorbid Conditions Contributing to Decline:  Hypoxic respiratory failure, sepsis, pancreatic mass, hip fracture, afib w/ rapid ventricular response, coronary artery disease    Vital Signs: Routine per Hospice Protocol.    Code Status: DNR    Allergies:   Review of patient's allergies indicates:   Allergen Reactions    Sulfa (sulfonamide antibiotics) Hives    Bactrim [sulfamethoxazole-trimethoprim]  Other (See Comments)     Pt. Reports that it caused severe pain    Integrilin [eptifibatide]     Percocet [oxycodone-acetaminophen] Itching    Statins-hmg-coa reductase inhibitors Other (See Comments)     Muscle pain. Patient states some, not all statins    Xarelto [rivaroxaban]     Epinephrine Palpitations       Diet: pleasure feedings     Activities: As tolerated    Nursing: Per Hospice Routine    Paulino Care: Empty Paulino bag Q shift and PRN.  Change Paulino every month.    Routine Skin for Bedridden Patients: Apply moisture barrier cream to all skin folds and   wet areas in perineal area daily and after baths and all bowel movements.    -     Oxygen: via NC (currently on 6L)    Other Miscellaneous Care:       Medications:          Medication List      START taking these medications    lorazepam 2 mg/mL Conc  Commonly known as: ATIVAN  Take 0.5 mLs (1 mg total) by mouth every 8 (eight) hours as needed (anxiety, agitation).  Replaces: LORazepam 0.5 MG tablet     morphine 20 mg/5 mL (4 mg/mL) solution  Take 3.8 mLs (15.2 mg total) by mouth every 2 (two) hours as needed for Pain.     olanzapine zydis 10 MG Tbdl  Commonly known as: ZyPREXA  Take 1 tablet (10 mg total) by mouth every 4 (four) hours as needed (agitation, insomnia).        STOP taking these medications    amiodarone 200 MG Tab  Commonly known as: PACERONE     amLODIPine 5 MG tablet  Commonly known as: NORVASC     aspirin 81 MG EC tablet  Commonly known as: ECOTRIN     cholecalciferol (vitamin D3) 50 mcg (2,000 unit) Cap  Commonly known as: VITAMIN D3     cyclobenzaprine 7.5 MG Tab  Commonly known as: FEXMID     dicyclomine 20 mg tablet  Commonly known as: BENTYL     levothyroxine 75 MCG tablet  Commonly known as: SYNTHROID     LORazepam 0.5 MG tablet  Commonly known as: ATIVAN  Replaced by: lorazepam 2 mg/mL Conc     metoprolol succinate 25 MG 24 hr tablet  Commonly known as: TOPROL-XL     nitroGLYCERIN 0.4 MG SL tablet  Commonly known as: NITROSTAT      ondansetron 4 MG Tbdl  Commonly known as: ZOFRAN-ODT     oxyCODONE-acetaminophen 5-325 mg per tablet  Commonly known as: PERCOCET     pulse oximeter device  Commonly known as: pulse oximeter     ramipriL 5 MG capsule  Commonly known as: ALTACE     warfarin 1 MG tablet  Commonly known as: COUMADIN              Future Orders:  Hospice Medical Director may dictate new orders for comfortable care measures & sign death certificate.        _________________________________  Arsenio Robison MD  01/16/2022

## 2022-01-16 NOTE — PLAN OF CARE
[facility] CASE MANAGEMENT                  WRITTEN DISCHARGE INFORMATION      APPOINTMENTS AND RESOURCES TO HELP YOU MANAGE YOUR CARE AT HOME BASED ON YOUR PREFERENCES:  (If an appointment is not scheduled for you when you leave the hospital, call your doctor to schedule a follow up visit within a week)     Follow-up Information     [facility].    Why: Hospice  Contact information:  [address]  [facility] [address]  [phone]                         Healthy Living Instructions to HELP MANAGE YOUR CARE AT HOME:  Things You are responsible for:  1.    Getting your prescriptions filled   2.    Taking your medications as directed, DO NOT MISS ANY DOSES!  3.    Following the diet and exercise recommended by your doctor  4.    Going to your follow-up doctor appointment. This is important because it allows the doctor to monitor your progress and determine if any changes need to made to your treatment plan.  5. If you have any questions about MANAGING YOUR CARE AT HOME Call the Nurse Care Line for 24/7 Assistance [phone]       Please answer any calls you may receive from [facility]. We want to continue to support you as you manage your healthcare needs. [facility] is happy to have the opportunity to serve you.      Thank you for choosing [facility] for your healthcare needs!  Your [facility] Case Management Team,

## 2022-01-17 LAB
GAMMA INTERFERON BACKGROUND BLD IA-ACNC: 0.01 IU/ML
M TB IFN-G CD4+ BCKGRND COR BLD-ACNC: 0 IU/ML
MITOGEN IGNF BCKGRD COR BLD-ACNC: 0.01 IU/ML
TB GOLD PLUS: ABNORMAL
TB2 - NIL: 0 IU/ML

## 2022-01-17 NOTE — NURSING
Upon arrival to shift, pt remained in ICU waiting for transport for home hospice. HR running NSR. BP stable. Afebrile. Pt on 5L HFNC sating above 95%. Precedex infusing. Daughter updated on plan of care. Once transport arrived, Prn haldol given for ride. IV removed. Paulino remained in place. Pt transfers to EMS stretcher with assistance.  2134: Pt left with EMS and daughter. No new falls, injuries, or skin breakdown this shift.

## 2022-01-24 NOTE — DISCHARGE SUMMARY
[facility] Medicine  Discharge Summary      Patient Name: Katt Mcneal  MRN: [id number]  Patient Class: IP- Inpatient  Admission Date: 1/7/2022  Hospital Length of Stay: 9 days  Discharge Date and Time: 1/16/2022 11:33 PM  Attending Physician: No att. providers found   Discharging Provider: Arsenio Robison MD  Primary Care Provider: Panchito Perez MD      HPI:   Ms. Mcneal is an 82yo lady with a past medical history of Afib on Coumadin, CAD, MI, HTN, pancreatic mass with CBD obstruction and pancreatitis, and hypothyroidism    She was recently admitted to  on 1/3 to 1/6 due to abdominal pain and pancreatitis (MRCP showed severe pancreatic ductal dilation with a suspected pancreatic mass in the body segment measuring up to 2.7 cm.  Recommend further evaluation with EUS and tissue sampling).  Please refer to Dr. Ny's complete discharge summary for details of her stay.  She was also found to be COVID positive on 1/3 during that admission, but her oxygenation was always >94%, so no acute therapy was initiated (CXR showed bilateral mild diffuse nonspecific interstitial coarsening improved from prior, and may reflect residual minimal pulmonary edema versus interstitial type pneumonia or chronic interstitial lung changes.)    She now comes to the ED after tripping and falling at home earlier today.  She cannot remember if she struck her head or not.  After falling she developed right hip pain and was unable to bear any weight on her right leg.  EMS had to be activated and bring her to the ED.  In the ED she was found to have COVID and nondisplaced fractures involving the right inter trochanteric region and right lesser trochanter.  Her O2 sats today went as low as 88% on RA.  No repeat CXR was done in the ED.    On my exam in her room, she is awake, but confused and unwilling to speak or participate in her exam by the nurse.  There is no family present.      Procedure(s)  (LRB):  INSERTION, INTRAMEDULLARY TASHI, FEMUR (Right)      Hospital Course:   Admitted for R intertrochanteric fracture. Underwent R IMN 1/8 without complication.     However she developed recurrent abdominal pain requiring increased analgesics. She then decompensated overnight with abdominal pain, AMS, and new onset pulmonary edema. She was transferred to the ICU.     After discussing goals of care with her children, the decision was made to transition to comfort-focused measures given her prolonged suffering from pancreatitis, increasing debility, and overall decline. She was treated for comfort while in the hospital and discharged to home hospice.       Goals of Care Treatment Preferences:  Code Status: DNR      Consults:   Consults (From admission, onward)        Status Ordering Provider     Inpatient consult to Social Work  Once        Provider:  (Not yet assigned)    Completed KULWINDER MEIER     Inpatient consult to Social Work  Once        Provider:  (Not yet assigned)    Completed KULWINDER MEIER     Inpatient consult to Pulmonology  Once        Provider:  Emanuel Baldwin MD    Completed KULWINDER MEIER     Inpatient consult to Palliative Care  Once        Provider:  Renetta Márquez NP    Completed KULWINDER MEIER     Inpatient consult to Orthopedic Surgery  Once        Provider:  Crow Ortega MD    Completed ANDREW NUÑEZ     Inpatient consult to Cardiology  Once        Provider:  Walker Almendarez MD    Completed ANDREW NUÑEZ          * End of life care  Transitioned to comfort care 1/13. See ACP note.    Will try to wean precedex, using adjunctive prn medications instead    To home hospice today    Comfort measures only status  As noted under ACP. Appreciate palliative input      Advance care planning  Discussed goals of care with family for 20 minutes today. They recognize that she has been declining and is at the end of her life, and wish her to be comfortable rather than undergo  aggressive procedures with limited hope of significant recovery of quality of life. Thus the family has decided to transition to focus on comfort.      Leukocytosis  Marked leukocytosis concerning for new infection vs inflammatory process, likely intraabdominal.      Preop cardiovascular exam  Seen by cardiology.      Sepsis due to COVID-19  Resolved      Acute respiratory failure with hypoxia  Stable on low-dose O2 with acute worsening 1/13. Etiology likely cardiogenic vs non-cardiogenic pulmonary edema.  - incentive spirometry  - prn furosemide for comfort        Hypokalemia  Replaced.      Pancreatic duct obstruction  Had prior plan to follow up with EUS as an outpatient. Unlikely to have treatment options if malignant        Pancreatic mass  Had prior plan to follow up with EUS as an outpatient. Unlikely to have treatment options if malignant      Fall on same level from tripping as cause of accidental injury  As above.      Intertrochanteric fracture of right hip  S/p IMN w/ ortho on 1/8.           Hypothyroid  Continue home synthroid          Anemia of chronic disease  Improved after transfusion        Atrial fibrillation with RVR  Continue amiodarone, metoprolol, warfarin          Chronic anticoagulation  Restart home warfarin      Chronic heart failure with preserved ejection fraction  Diuresis prn for comfort      Coronary artery disease involving native coronary artery of native heart without angina pectoris  Continue home aspirin/statin      Chronic recurrent pancreatitis  Likely recurrence of pancreatitis w/ increased abdominal pain 1/12. Now on comfort measures.      Final Active Diagnoses:    Diagnosis Date Noted POA    PRINCIPAL PROBLEM:  End of life care [Z51.5] 01/13/2022 Not Applicable    Leukocytosis [D72.829] 01/13/2022 No    Advance care planning [Z71.89] 01/13/2022 Not Applicable    Comfort measures only status [Z51.5] 01/13/2022 Not Applicable    Acute respiratory failure with hypoxia  [J96.01] 01/08/2022 Yes    Sepsis due to COVID-19 [U07.1, A41.89] 01/08/2022 Yes    Preop cardiovascular exam [Z01.810] 01/08/2022 Not Applicable    Intertrochanteric fracture of right hip [S72.141A] 01/07/2022 Yes    Fall on same level from tripping as cause of accidental injury [W01.0XXA] 01/07/2022 Yes    Pancreatic mass [K86.89] 01/07/2022 Yes    Pancreatic duct obstruction [K86.89] 01/07/2022 Yes    Hypokalemia [E87.6] 01/07/2022 Yes    Hypothyroid [E03.9] 01/04/2022 Yes    Atrial fibrillation with RVR [I48.91] 01/03/2022 Yes    Anemia of chronic disease [D63.8] 01/03/2022 Yes    Chronic anticoagulation [Z79.01] 05/16/2018 Not Applicable    Chronic heart failure with preserved ejection fraction [I50.32] 01/20/2016 Yes    Chronic recurrent pancreatitis [K86.1] 01/19/2016 Yes     Chronic    Coronary artery disease involving native coronary artery of native heart without angina pectoris [I25.10] 01/19/2016 Yes      Problems Resolved During this Admission:       Discharged Condition: stable    Disposition: Hospice/Home    Follow Up:   Follow-up Information     [facility].    Why: Hospice  Contact information:  [address], [address] [address]  [phone]                     Patient Instructions:   No discharge procedures on file.    Significant Diagnostic Studies: as above    Pending Diagnostic Studies:     Procedure Component Value Units Date/Time    EKG 12-lead [[id number]]     Order Status: Sent Lab Status: No result          Medications:  Reconciled Home Medications:      Medication List      START taking these medications    lorazepam 2 mg/mL Conc  Commonly known as: ATIVAN  Take 0.5 mLs (1 mg total) by mouth every 8 (eight) hours as needed (anxiety, agitation).  Replaces: LORazepam 0.5 MG tablet     morphine 20 mg/5 mL (4 mg/mL) solution  Take 3.8 mLs (15.2 mg total) by mouth every 2 (two) hours as needed for Pain.     olanzapine zydis 10 MG Tbdl  Commonly  known as: ZyPREXA  Take 1 tablet (10 mg total) by mouth every 4 (four) hours as needed (agitation, insomnia).        STOP taking these medications    amiodarone 200 MG Tab  Commonly known as: PACERONE     amLODIPine 5 MG tablet  Commonly known as: NORVASC     aspirin 81 MG EC tablet  Commonly known as: ECOTRIN     cholecalciferol (vitamin D3) 50 mcg (2,000 unit) Cap  Commonly known as: VITAMIN D3     cyclobenzaprine 7.5 MG Tab  Commonly known as: FEXMID     dicyclomine 20 mg tablet  Commonly known as: BENTYL     levothyroxine 75 MCG tablet  Commonly known as: SYNTHROID     LORazepam 0.5 MG tablet  Commonly known as: ATIVAN  Replaced by: lorazepam 2 mg/mL Conc     metoprolol succinate 25 MG 24 hr tablet  Commonly known as: TOPROL-XL     nitroGLYCERIN 0.4 MG SL tablet  Commonly known as: NITROSTAT     ondansetron 4 MG Tbdl  Commonly known as: ZOFRAN-ODT     oxyCODONE-acetaminophen 5-325 mg per tablet  Commonly known as: PERCOCET     pulse oximeter device  Commonly known as: pulse oximeter     ramipriL 5 MG capsule  Commonly known as: ALTACE     warfarin 1 MG tablet  Commonly known as: COUMADIN            Indwelling Lines/Drains at time of discharge:   Lines/Drains/Airways     Drain                 Urethral Catheter 01/13/22 1630 Non-latex 16 Fr. 10 days                Time spent on the discharge of patient: 55 minutes    Critical care time spent on the evaluation and treatment of severe organ dysfunction, review of pertinent labs and imaging studies, discussions with consulting providers and discussions with patient/family: 45 minutes.     Arsenio Robison MD  Department of Hospital Medicine  [facility] - Intensive Care

## 2022-02-02 ENCOUNTER — TELEPHONE (OUTPATIENT)
Dept: ENDOSCOPY | Facility: HOSPITAL | Age: 82
End: 2022-02-02
Payer: MEDICARE

## 2022-02-03 ENCOUNTER — TELEPHONE (OUTPATIENT)
Dept: ENDOSCOPY | Facility: HOSPITAL | Age: 82
End: 2022-02-03
Payer: MEDICARE

## 2022-10-11 NOTE — ASSESSMENT & PLAN NOTE
09/15/22 [place] no bx's taken visually normal Plan cotest in 1 year due 07/6/23     No acute issues. Continue home regimen

## (undated) DEVICE — SEE MEDLINE ITEM 107746

## (undated) DEVICE — UNDERGLOVES BIOGEL PI SIZE 8

## (undated) DEVICE — DRAPE C-ARM FOR MOBILE XRAY

## (undated) DEVICE — SUPPORT ULNA NERVE PROTECTOR

## (undated) DEVICE — BIT DRILL QC 3FLUTED 4.2X145 S

## (undated) DEVICE — DRAPE STERI-DRAPE 83X125 IOBAN

## (undated) DEVICE — DRAPE STERI U-SHAPED 47X51IN

## (undated) DEVICE — KIT PT CARE HANA PROFX SSXT

## (undated) DEVICE — ELECTRODE REM PLYHSV RETURN 9

## (undated) DEVICE — Device

## (undated) DEVICE — DRESSING AQUACEL FOAM 3 X 3

## (undated) DEVICE — SEE MEDLINE ITEM 146292

## (undated) DEVICE — SOL 9P NACL IRR PIC IL

## (undated) DEVICE — SUT VICRYL 2-0 36 CT-1

## (undated) DEVICE — BLANKET UPPER BODY 78.7X29.9IN

## (undated) DEVICE — STAPLER SKIN PROXIMATE WIDE

## (undated) DEVICE — CANISTER SUCTION 2 LTR

## (undated) DEVICE — TOWEL OR DISP STRL BLUE 4/PK

## (undated) DEVICE — DRESSING GAUZE XEROFORM 5X9

## (undated) DEVICE — COVER OVERHEAD SURG LT BLUE

## (undated) DEVICE — DRESSING AQUACEL FOAM 4 X 8

## (undated) DEVICE — SUT VICRYL PLUS 0 CT1 36IN

## (undated) DEVICE — APPLICATOR CHLORAPREP ORN 26ML

## (undated) DEVICE — POSITIONER IV ARMBOARD FOAM

## (undated) DEVICE — GLOVE SURGICAL LATEX SZ 8

## (undated) DEVICE — SEE MEDLINE ITEM 157166

## (undated) DEVICE — DRAPE PLASTIC U 60X72

## (undated) DEVICE — PAD CAST SPECIALIST STRL 6

## (undated) DEVICE — SEE MEDLINE ITEM 157110

## (undated) DEVICE — PAD TRACTION BOOT